# Patient Record
Sex: MALE | Race: WHITE | NOT HISPANIC OR LATINO | Employment: OTHER | RURAL
[De-identification: names, ages, dates, MRNs, and addresses within clinical notes are randomized per-mention and may not be internally consistent; named-entity substitution may affect disease eponyms.]

---

## 2017-11-07 ENCOUNTER — OFFICE VISIT (OUTPATIENT)
Dept: CARDIOLOGY | Facility: CLINIC | Age: 67
End: 2017-11-07

## 2017-11-07 VITALS
DIASTOLIC BLOOD PRESSURE: 74 MMHG | SYSTOLIC BLOOD PRESSURE: 130 MMHG | WEIGHT: 263 LBS | HEIGHT: 72 IN | BODY MASS INDEX: 35.62 KG/M2 | HEART RATE: 80 BPM

## 2017-11-07 DIAGNOSIS — I10 ESSENTIAL HYPERTENSION: ICD-10-CM

## 2017-11-07 DIAGNOSIS — I25.10 CORONARY ARTERY DISEASE INVOLVING NATIVE CORONARY ARTERY OF NATIVE HEART WITHOUT ANGINA PECTORIS: Primary | ICD-10-CM

## 2017-11-07 DIAGNOSIS — E78.5 DYSLIPIDEMIA: ICD-10-CM

## 2017-11-07 PROCEDURE — 99213 OFFICE O/P EST LOW 20 MIN: CPT | Performed by: INTERNAL MEDICINE

## 2018-11-20 ENCOUNTER — OFFICE VISIT (OUTPATIENT)
Dept: CARDIOLOGY | Facility: CLINIC | Age: 68
End: 2018-11-20

## 2018-11-20 VITALS
WEIGHT: 238 LBS | HEIGHT: 72 IN | SYSTOLIC BLOOD PRESSURE: 108 MMHG | DIASTOLIC BLOOD PRESSURE: 70 MMHG | HEART RATE: 89 BPM | BODY MASS INDEX: 32.23 KG/M2

## 2018-11-20 DIAGNOSIS — E78.5 DYSLIPIDEMIA: ICD-10-CM

## 2018-11-20 DIAGNOSIS — I10 ESSENTIAL HYPERTENSION: ICD-10-CM

## 2018-11-20 DIAGNOSIS — I25.110 CORONARY ARTERY DISEASE INVOLVING NATIVE CORONARY ARTERY OF NATIVE HEART WITH UNSTABLE ANGINA PECTORIS (HCC): Primary | ICD-10-CM

## 2018-11-20 PROCEDURE — 99214 OFFICE O/P EST MOD 30 MIN: CPT | Performed by: INTERNAL MEDICINE

## 2018-11-20 RX ORDER — NITROGLYCERIN 0.4 MG/1
0.4 TABLET SUBLINGUAL
Qty: 100 TABLET | Refills: 1 | Status: SHIPPED | OUTPATIENT
Start: 2018-11-20

## 2018-11-20 RX ORDER — EZETIMIBE 10 MG/1
10 TABLET ORAL DAILY
COMMUNITY

## 2018-11-20 RX ORDER — ISOSORBIDE MONONITRATE 30 MG/1
30 TABLET, EXTENDED RELEASE ORAL DAILY
Qty: 30 TABLET | Refills: 6 | Status: SHIPPED | OUTPATIENT
Start: 2018-11-20 | End: 2019-06-19 | Stop reason: SDUPTHER

## 2018-11-20 RX ORDER — AMLODIPINE BESYLATE 5 MG/1
5 TABLET ORAL DAILY
Status: ON HOLD | COMMUNITY
End: 2021-06-09

## 2019-02-13 ENCOUNTER — APPOINTMENT (OUTPATIENT)
Dept: PREADMISSION TESTING | Facility: HOSPITAL | Age: 69
End: 2019-02-13

## 2019-02-13 ENCOUNTER — OFFICE VISIT (OUTPATIENT)
Dept: CARDIOLOGY | Facility: CLINIC | Age: 69
End: 2019-02-13

## 2019-02-13 ENCOUNTER — PREP FOR SURGERY (OUTPATIENT)
Dept: OTHER | Facility: HOSPITAL | Age: 69
End: 2019-02-13

## 2019-02-13 VITALS
DIASTOLIC BLOOD PRESSURE: 80 MMHG | SYSTOLIC BLOOD PRESSURE: 120 MMHG | HEART RATE: 80 BPM | HEIGHT: 72 IN | BODY MASS INDEX: 32.91 KG/M2 | WEIGHT: 243 LBS

## 2019-02-13 DIAGNOSIS — E78.5 DYSLIPIDEMIA: ICD-10-CM

## 2019-02-13 DIAGNOSIS — I25.10 CORONARY ARTERY DISEASE INVOLVING NATIVE CORONARY ARTERY, ANGINA PRESENCE UNSPECIFIED, UNSPECIFIED WHETHER NATIVE OR TRANSPLANTED HEART: ICD-10-CM

## 2019-02-13 DIAGNOSIS — I20.0 UNSTABLE ANGINA (HCC): Primary | ICD-10-CM

## 2019-02-13 DIAGNOSIS — I10 ESSENTIAL HYPERTENSION: ICD-10-CM

## 2019-02-13 DIAGNOSIS — Z71.6 TOBACCO ABUSE COUNSELING: ICD-10-CM

## 2019-02-13 DIAGNOSIS — I20.0 UNSTABLE ANGINA (HCC): ICD-10-CM

## 2019-02-13 PROBLEM — I65.23 BILATERAL CAROTID ARTERY STENOSIS: Status: ACTIVE | Noted: 2019-02-13

## 2019-02-13 PROBLEM — E11.9 DIABETES MELLITUS (HCC): Status: ACTIVE | Noted: 2019-02-13

## 2019-02-13 PROBLEM — Z72.0 TOBACCO ABUSE: Status: ACTIVE | Noted: 2019-02-13

## 2019-02-13 LAB
ALBUMIN SERPL-MCNC: 4.66 G/DL (ref 3.2–4.8)
ALBUMIN/GLOB SERPL: 2.3 G/DL (ref 1.5–2.5)
ALP SERPL-CCNC: 64 U/L (ref 25–100)
ALT SERPL W P-5'-P-CCNC: 21 U/L (ref 7–40)
ANION GAP SERPL CALCULATED.3IONS-SCNC: 5 MMOL/L (ref 3–11)
ARTICHOKE IGE QN: 124 MG/DL (ref 0–130)
AST SERPL-CCNC: 16 U/L (ref 0–33)
BASOPHILS # BLD AUTO: 0.04 10*3/MM3 (ref 0–0.2)
BASOPHILS NFR BLD AUTO: 0.5 % (ref 0–1)
BILIRUB SERPL-MCNC: 0.2 MG/DL (ref 0.3–1.2)
BUN BLD-MCNC: 25 MG/DL (ref 9–23)
BUN/CREAT SERPL: 22.3 (ref 7–25)
CALCIUM SPEC-SCNC: 9.6 MG/DL (ref 8.7–10.4)
CHLORIDE SERPL-SCNC: 100 MMOL/L (ref 99–109)
CHOLEST SERPL-MCNC: 205 MG/DL (ref 0–200)
CO2 SERPL-SCNC: 32 MMOL/L (ref 20–31)
CREAT BLD-MCNC: 1.12 MG/DL (ref 0.6–1.3)
DEPRECATED RDW RBC AUTO: 39.2 FL (ref 37–54)
EOSINOPHIL # BLD AUTO: 0.05 10*3/MM3 (ref 0–0.3)
EOSINOPHIL NFR BLD AUTO: 0.7 % (ref 0–3)
ERYTHROCYTE [DISTWIDTH] IN BLOOD BY AUTOMATED COUNT: 13.1 % (ref 11.3–14.5)
GFR SERPL CREATININE-BSD FRML MDRD: 66 ML/MIN/1.73
GLOBULIN UR ELPH-MCNC: 2 GM/DL
GLUCOSE BLD-MCNC: 224 MG/DL (ref 70–100)
HBA1C MFR BLD: 9.2 % (ref 4.8–5.6)
HCT VFR BLD AUTO: 45.1 % (ref 38.9–50.9)
HDLC SERPL-MCNC: 35 MG/DL (ref 40–60)
HGB BLD-MCNC: 15.3 G/DL (ref 13.1–17.5)
IMM GRANULOCYTES # BLD AUTO: 0.01 10*3/MM3 (ref 0–0.05)
IMM GRANULOCYTES NFR BLD AUTO: 0.1 % (ref 0–0.6)
LYMPHOCYTES # BLD AUTO: 1.84 10*3/MM3 (ref 0.6–4.8)
LYMPHOCYTES NFR BLD AUTO: 24.2 % (ref 24–44)
MCH RBC QN AUTO: 28.2 PG (ref 27–31)
MCHC RBC AUTO-ENTMCNC: 33.9 G/DL (ref 32–36)
MCV RBC AUTO: 83.2 FL (ref 80–99)
MONOCYTES # BLD AUTO: 0.41 10*3/MM3 (ref 0–1)
MONOCYTES NFR BLD AUTO: 5.4 % (ref 0–12)
NEUTROPHILS # BLD AUTO: 5.27 10*3/MM3 (ref 1.5–8.3)
NEUTROPHILS NFR BLD AUTO: 69.2 % (ref 41–71)
PLATELET # BLD AUTO: 186 10*3/MM3 (ref 150–450)
PMV BLD AUTO: 10.7 FL (ref 6–12)
POTASSIUM BLD-SCNC: 4.3 MMOL/L (ref 3.5–5.5)
PROT SERPL-MCNC: 6.7 G/DL (ref 5.7–8.2)
RBC # BLD AUTO: 5.42 10*6/MM3 (ref 4.2–5.76)
SODIUM BLD-SCNC: 137 MMOL/L (ref 132–146)
TRIGL SERPL-MCNC: 645 MG/DL (ref 0–150)
WBC NRBC COR # BLD: 7.61 10*3/MM3 (ref 3.5–10.8)

## 2019-02-13 PROCEDURE — 99214 OFFICE O/P EST MOD 30 MIN: CPT | Performed by: PHYSICIAN ASSISTANT

## 2019-02-13 PROCEDURE — 83036 HEMOGLOBIN GLYCOSYLATED A1C: CPT | Performed by: PHYSICIAN ASSISTANT

## 2019-02-13 PROCEDURE — 80053 COMPREHEN METABOLIC PANEL: CPT | Performed by: PHYSICIAN ASSISTANT

## 2019-02-13 PROCEDURE — 80061 LIPID PANEL: CPT | Performed by: PHYSICIAN ASSISTANT

## 2019-02-13 PROCEDURE — 85025 COMPLETE CBC W/AUTO DIFF WBC: CPT | Performed by: PHYSICIAN ASSISTANT

## 2019-02-13 PROCEDURE — 93000 ELECTROCARDIOGRAM COMPLETE: CPT | Performed by: PHYSICIAN ASSISTANT

## 2019-02-13 PROCEDURE — 36415 COLL VENOUS BLD VENIPUNCTURE: CPT

## 2019-02-13 RX ORDER — NITROGLYCERIN 0.4 MG/1
0.4 TABLET SUBLINGUAL
Status: CANCELLED | OUTPATIENT
Start: 2019-02-13

## 2019-02-13 RX ORDER — RANOLAZINE 500 MG/1
500 TABLET, EXTENDED RELEASE ORAL 2 TIMES DAILY
Qty: 60 TABLET | Refills: 0 | Status: ON HOLD | COMMUNITY
Start: 2019-02-13 | End: 2021-06-09

## 2019-02-13 RX ORDER — ACETAMINOPHEN 325 MG/1
650 TABLET ORAL EVERY 4 HOURS PRN
Status: CANCELLED | OUTPATIENT
Start: 2019-02-13

## 2019-02-13 RX ORDER — SODIUM CHLORIDE 0.9 % (FLUSH) 0.9 %
3-10 SYRINGE (ML) INJECTION AS NEEDED
Status: CANCELLED | OUTPATIENT
Start: 2019-02-13

## 2019-02-13 RX ORDER — SODIUM CHLORIDE 0.9 % (FLUSH) 0.9 %
3 SYRINGE (ML) INJECTION EVERY 12 HOURS SCHEDULED
Status: CANCELLED | OUTPATIENT
Start: 2019-02-13

## 2019-02-13 RX ORDER — ASPIRIN 325 MG
325 TABLET ORAL ONCE
Status: CANCELLED | OUTPATIENT
Start: 2019-02-13 | End: 2019-02-13

## 2019-02-13 RX ORDER — ONDANSETRON 2 MG/ML
4 INJECTION INTRAMUSCULAR; INTRAVENOUS EVERY 8 HOURS PRN
Status: CANCELLED | OUTPATIENT
Start: 2019-02-13

## 2019-02-20 ENCOUNTER — HOSPITAL ENCOUNTER (OUTPATIENT)
Facility: HOSPITAL | Age: 69
Setting detail: HOSPITAL OUTPATIENT SURGERY
Discharge: HOME | End: 2019-02-20
Attending: INTERNAL MEDICINE | Admitting: INTERNAL MEDICINE

## 2019-02-20 VITALS
DIASTOLIC BLOOD PRESSURE: 97 MMHG | BODY MASS INDEX: 33.06 KG/M2 | OXYGEN SATURATION: 96 % | HEART RATE: 81 BPM | TEMPERATURE: 97 F | SYSTOLIC BLOOD PRESSURE: 152 MMHG | WEIGHT: 244.05 LBS | RESPIRATION RATE: 16 BRPM | HEIGHT: 72 IN

## 2019-02-20 DIAGNOSIS — I20.0 UNSTABLE ANGINA (HCC): ICD-10-CM

## 2019-02-20 LAB
ACT BLD: 224 SECONDS (ref 82–152)
GLUCOSE BLDC GLUCOMTR-MCNC: 175 MG/DL (ref 70–130)
GLUCOSE BLDC GLUCOMTR-MCNC: 204 MG/DL (ref 70–130)

## 2019-02-20 PROCEDURE — 25010000002 FENTANYL CITRATE (PF) 100 MCG/2ML SOLUTION: Performed by: INTERNAL MEDICINE

## 2019-02-20 PROCEDURE — C1874 STENT, COATED/COV W/DEL SYS: HCPCS | Performed by: INTERNAL MEDICINE

## 2019-02-20 PROCEDURE — C1769 GUIDE WIRE: HCPCS | Performed by: INTERNAL MEDICINE

## 2019-02-20 PROCEDURE — 92937 PRQ TRLUML REVSC CAB GRF 1: CPT | Performed by: INTERNAL MEDICINE

## 2019-02-20 PROCEDURE — C1887 CATHETER, GUIDING: HCPCS | Performed by: INTERNAL MEDICINE

## 2019-02-20 PROCEDURE — G0108 DIAB MANAGE TRN  PER INDIV: HCPCS

## 2019-02-20 PROCEDURE — C1725 CATH, TRANSLUMIN NON-LASER: HCPCS | Performed by: INTERNAL MEDICINE

## 2019-02-20 PROCEDURE — 0 IOPAMIDOL PER 1 ML: Performed by: INTERNAL MEDICINE

## 2019-02-20 PROCEDURE — 25010000002 PHENYLEPHRINE PER 1 ML: Performed by: INTERNAL MEDICINE

## 2019-02-20 PROCEDURE — 93005 ELECTROCARDIOGRAM TRACING: CPT | Performed by: INTERNAL MEDICINE

## 2019-02-20 PROCEDURE — 93459 L HRT ART/GRFT ANGIO: CPT | Performed by: INTERNAL MEDICINE

## 2019-02-20 PROCEDURE — 25010000002 MIDAZOLAM PER 1 MG: Performed by: INTERNAL MEDICINE

## 2019-02-20 PROCEDURE — C9604 PERC D-E COR REVASC T CABG S: HCPCS | Performed by: INTERNAL MEDICINE

## 2019-02-20 PROCEDURE — 82962 GLUCOSE BLOOD TEST: CPT

## 2019-02-20 PROCEDURE — C1894 INTRO/SHEATH, NON-LASER: HCPCS | Performed by: INTERNAL MEDICINE

## 2019-02-20 PROCEDURE — 85347 COAGULATION TIME ACTIVATED: CPT

## 2019-02-20 PROCEDURE — C1884 EMBOLIZATION PROTECT SYST: HCPCS | Performed by: INTERNAL MEDICINE

## 2019-02-20 PROCEDURE — 25010000002 HEPARIN (PORCINE) PER 1000 UNITS: Performed by: INTERNAL MEDICINE

## 2019-02-20 DEVICE — XIENCE SIERRA™ EVEROLIMUS ELUTING CORONARY STENT SYSTEM 4.00 MM X 38 MM / RAPID-EXCHANGE
Type: IMPLANTABLE DEVICE | Status: FUNCTIONAL
Brand: XIENCE SIERRA™

## 2019-02-20 DEVICE — XIENCE SIERRA™ EVEROLIMUS ELUTING CORONARY STENT SYSTEM 3.00 MM X 38 MM / RAPID-EXCHANGE
Type: IMPLANTABLE DEVICE | Status: FUNCTIONAL
Brand: XIENCE SIERRA™

## 2019-02-20 RX ORDER — SODIUM CHLORIDE 9 MG/ML
INJECTION, SOLUTION INTRAVENOUS CONTINUOUS PRN
Status: COMPLETED | OUTPATIENT
Start: 2019-02-20 | End: 2019-02-20

## 2019-02-20 RX ORDER — NICARDIPINE HCL-0.9% SOD CHLOR 1 MG/10 ML
SYRINGE (ML) INTRAVENOUS AS NEEDED
Status: DISCONTINUED | OUTPATIENT
Start: 2019-02-20 | End: 2019-02-20 | Stop reason: HOSPADM

## 2019-02-20 RX ORDER — NITROGLYCERIN 0.4 MG/1
0.4 TABLET SUBLINGUAL
Status: DISCONTINUED | OUTPATIENT
Start: 2019-02-20 | End: 2019-02-20 | Stop reason: HOSPADM

## 2019-02-20 RX ORDER — ACETAMINOPHEN 325 MG/1
650 TABLET ORAL EVERY 4 HOURS PRN
Status: DISCONTINUED | OUTPATIENT
Start: 2019-02-20 | End: 2019-02-20 | Stop reason: HOSPADM

## 2019-02-20 RX ORDER — ASPIRIN 325 MG
325 TABLET ORAL ONCE
Status: COMPLETED | OUTPATIENT
Start: 2019-02-20 | End: 2019-02-20

## 2019-02-20 RX ORDER — FENTANYL CITRATE 50 UG/ML
INJECTION, SOLUTION INTRAMUSCULAR; INTRAVENOUS AS NEEDED
Status: DISCONTINUED | OUTPATIENT
Start: 2019-02-20 | End: 2019-02-20 | Stop reason: HOSPADM

## 2019-02-20 RX ORDER — ASPIRIN 325 MG
325 TABLET, DELAYED RELEASE (ENTERIC COATED) ORAL DAILY
Status: DISCONTINUED | OUTPATIENT
Start: 2019-02-21 | End: 2019-02-20 | Stop reason: HOSPADM

## 2019-02-20 RX ORDER — SODIUM CHLORIDE 0.9 % (FLUSH) 0.9 %
3 SYRINGE (ML) INJECTION EVERY 12 HOURS SCHEDULED
Status: DISCONTINUED | OUTPATIENT
Start: 2019-02-20 | End: 2019-02-20 | Stop reason: HOSPADM

## 2019-02-20 RX ORDER — HEPARIN SODIUM 1000 [USP'U]/ML
INJECTION, SOLUTION INTRAVENOUS; SUBCUTANEOUS AS NEEDED
Status: DISCONTINUED | OUTPATIENT
Start: 2019-02-20 | End: 2019-02-20 | Stop reason: HOSPADM

## 2019-02-20 RX ORDER — ONDANSETRON 2 MG/ML
4 INJECTION INTRAMUSCULAR; INTRAVENOUS EVERY 8 HOURS PRN
Status: DISCONTINUED | OUTPATIENT
Start: 2019-02-20 | End: 2019-02-20 | Stop reason: HOSPADM

## 2019-02-20 RX ORDER — ONDANSETRON 2 MG/ML
4 INJECTION INTRAMUSCULAR; INTRAVENOUS EVERY 6 HOURS PRN
Status: DISCONTINUED | OUTPATIENT
Start: 2019-02-20 | End: 2019-02-20 | Stop reason: HOSPADM

## 2019-02-20 RX ORDER — MIDAZOLAM HYDROCHLORIDE 1 MG/ML
INJECTION INTRAMUSCULAR; INTRAVENOUS AS NEEDED
Status: DISCONTINUED | OUTPATIENT
Start: 2019-02-20 | End: 2019-02-20 | Stop reason: HOSPADM

## 2019-02-20 RX ORDER — SODIUM CHLORIDE 9 MG/ML
3 INJECTION, SOLUTION INTRAVENOUS CONTINUOUS
Status: ACTIVE | OUTPATIENT
Start: 2019-02-20 | End: 2019-02-20

## 2019-02-20 RX ORDER — ONDANSETRON 4 MG/1
4 TABLET, FILM COATED ORAL EVERY 6 HOURS PRN
Status: DISCONTINUED | OUTPATIENT
Start: 2019-02-20 | End: 2019-02-20 | Stop reason: HOSPADM

## 2019-02-20 RX ORDER — SODIUM CHLORIDE 9 MG/ML
100 INJECTION, SOLUTION INTRAVENOUS CONTINUOUS
Status: ACTIVE | OUTPATIENT
Start: 2019-02-20 | End: 2019-02-20

## 2019-02-20 RX ORDER — LIDOCAINE HYDROCHLORIDE 10 MG/ML
INJECTION, SOLUTION INFILTRATION; PERINEURAL AS NEEDED
Status: DISCONTINUED | OUTPATIENT
Start: 2019-02-20 | End: 2019-02-20 | Stop reason: HOSPADM

## 2019-02-20 RX ORDER — HYDROCODONE BITARTRATE AND ACETAMINOPHEN 5; 325 MG/1; MG/1
1 TABLET ORAL EVERY 4 HOURS PRN
Status: DISCONTINUED | OUTPATIENT
Start: 2019-02-20 | End: 2019-02-20 | Stop reason: HOSPADM

## 2019-02-20 RX ORDER — SODIUM CHLORIDE 0.9 % (FLUSH) 0.9 %
3-10 SYRINGE (ML) INJECTION AS NEEDED
Status: DISCONTINUED | OUTPATIENT
Start: 2019-02-20 | End: 2019-02-20 | Stop reason: HOSPADM

## 2019-02-20 RX ADMIN — SODIUM CHLORIDE 3 ML/KG/HR: 9 INJECTION, SOLUTION INTRAVENOUS at 06:52

## 2019-02-20 RX ADMIN — ASPIRIN 325 MG ORAL TABLET 325 MG: 325 PILL ORAL at 06:52

## 2019-02-21 ENCOUNTER — CALL CENTER PROGRAMS (OUTPATIENT)
Dept: CALL CENTER | Facility: HOSPITAL | Age: 69
End: 2019-02-21

## 2019-06-19 RX ORDER — ISOSORBIDE MONONITRATE 30 MG/1
30 TABLET, EXTENDED RELEASE ORAL DAILY
Qty: 30 TABLET | Refills: 2 | Status: SHIPPED | OUTPATIENT
Start: 2019-06-19 | End: 2019-09-23 | Stop reason: SDUPTHER

## 2019-07-29 ENCOUNTER — TELEPHONE (OUTPATIENT)
Dept: CARDIOLOGY | Facility: CLINIC | Age: 69
End: 2019-07-29

## 2019-09-23 RX ORDER — ISOSORBIDE MONONITRATE 30 MG/1
30 TABLET, EXTENDED RELEASE ORAL DAILY
Qty: 30 TABLET | Refills: 2 | Status: SHIPPED | OUTPATIENT
Start: 2019-09-23 | End: 2021-03-30 | Stop reason: SDUPTHER

## 2019-10-08 ENCOUNTER — TELEPHONE (OUTPATIENT)
Dept: CARDIOLOGY | Facility: CLINIC | Age: 69
End: 2019-10-08

## 2020-04-10 ENCOUNTER — OFFICE VISIT (OUTPATIENT)
Dept: CARDIOLOGY | Facility: CLINIC | Age: 70
End: 2020-04-10

## 2020-04-10 VITALS
DIASTOLIC BLOOD PRESSURE: 64 MMHG | SYSTOLIC BLOOD PRESSURE: 128 MMHG | HEART RATE: 70 BPM | WEIGHT: 240 LBS | HEIGHT: 72 IN | BODY MASS INDEX: 32.51 KG/M2

## 2020-04-10 DIAGNOSIS — I25.810 CORONARY ARTERY DISEASE INVOLVING CORONARY BYPASS GRAFT OF NATIVE HEART WITHOUT ANGINA PECTORIS: Primary | ICD-10-CM

## 2020-04-10 DIAGNOSIS — E78.5 DYSLIPIDEMIA: ICD-10-CM

## 2020-04-10 DIAGNOSIS — I10 ESSENTIAL HYPERTENSION: ICD-10-CM

## 2020-04-10 PROCEDURE — 99213 OFFICE O/P EST LOW 20 MIN: CPT | Performed by: INTERNAL MEDICINE

## 2020-07-21 ENCOUNTER — OFFICE VISIT (OUTPATIENT)
Dept: CARDIOLOGY | Facility: CLINIC | Age: 70
End: 2020-07-21

## 2020-07-21 VITALS
SYSTOLIC BLOOD PRESSURE: 100 MMHG | HEIGHT: 72 IN | BODY MASS INDEX: 30.61 KG/M2 | WEIGHT: 226 LBS | DIASTOLIC BLOOD PRESSURE: 60 MMHG | HEART RATE: 93 BPM

## 2020-07-21 DIAGNOSIS — I10 ESSENTIAL HYPERTENSION: ICD-10-CM

## 2020-07-21 DIAGNOSIS — I65.23 BILATERAL CAROTID ARTERY STENOSIS: ICD-10-CM

## 2020-07-21 DIAGNOSIS — E78.5 DYSLIPIDEMIA: ICD-10-CM

## 2020-07-21 DIAGNOSIS — I25.810 CORONARY ARTERY DISEASE INVOLVING CORONARY BYPASS GRAFT OF NATIVE HEART WITHOUT ANGINA PECTORIS: Primary | ICD-10-CM

## 2020-07-21 PROCEDURE — 99214 OFFICE O/P EST MOD 30 MIN: CPT | Performed by: INTERNAL MEDICINE

## 2021-03-02 ENCOUNTER — OUTSIDE FACILITY SERVICE (OUTPATIENT)
Dept: CARDIOLOGY | Facility: CLINIC | Age: 71
End: 2021-03-02

## 2021-03-02 PROCEDURE — 78452 HT MUSCLE IMAGE SPECT MULT: CPT | Performed by: INTERNAL MEDICINE

## 2021-03-02 PROCEDURE — 93018 CV STRESS TEST I&R ONLY: CPT | Performed by: INTERNAL MEDICINE

## 2021-03-30 ENCOUNTER — OFFICE VISIT (OUTPATIENT)
Dept: CARDIOLOGY | Facility: CLINIC | Age: 71
End: 2021-03-30

## 2021-03-30 VITALS
TEMPERATURE: 97 F | HEART RATE: 74 BPM | WEIGHT: 264 LBS | SYSTOLIC BLOOD PRESSURE: 118 MMHG | DIASTOLIC BLOOD PRESSURE: 68 MMHG | BODY MASS INDEX: 35.76 KG/M2 | OXYGEN SATURATION: 97 % | HEIGHT: 72 IN

## 2021-03-30 DIAGNOSIS — I65.23 BILATERAL CAROTID ARTERY STENOSIS: ICD-10-CM

## 2021-03-30 DIAGNOSIS — I10 ESSENTIAL HYPERTENSION: ICD-10-CM

## 2021-03-30 DIAGNOSIS — I25.708 CORONARY ARTERY DISEASE INVOLVING CORONARY BYPASS GRAFT OF NATIVE HEART WITH OTHER FORMS OF ANGINA PECTORIS (HCC): Primary | ICD-10-CM

## 2021-03-30 DIAGNOSIS — E78.5 DYSLIPIDEMIA: ICD-10-CM

## 2021-03-30 PROCEDURE — 99214 OFFICE O/P EST MOD 30 MIN: CPT | Performed by: INTERNAL MEDICINE

## 2021-03-30 RX ORDER — HYDROCODONE BITARTRATE AND ACETAMINOPHEN 10; 325 MG/1; MG/1
1 TABLET ORAL EVERY 6 HOURS PRN
Status: ON HOLD | COMMUNITY
End: 2021-06-09

## 2021-03-30 RX ORDER — ISOSORBIDE MONONITRATE 60 MG/1
60 TABLET, EXTENDED RELEASE ORAL DAILY
Qty: 90 TABLET | Refills: 3 | Status: SHIPPED | OUTPATIENT
Start: 2021-03-30

## 2021-05-27 ENCOUNTER — DOCUMENTATION (OUTPATIENT)
Dept: CARDIOLOGY | Facility: CLINIC | Age: 71
End: 2021-05-27

## 2021-05-27 ENCOUNTER — HOSPITAL ENCOUNTER (OUTPATIENT)
Facility: HOSPITAL | Age: 71
Setting detail: OBSERVATION
Discharge: HOME | End: 2021-05-28
Attending: INTERNAL MEDICINE | Admitting: INTERNAL MEDICINE

## 2021-05-27 PROBLEM — I21.4 NSTEMI (NON-ST ELEVATED MYOCARDIAL INFARCTION): Status: ACTIVE | Noted: 2021-05-27

## 2021-05-27 PROBLEM — R07.9 CHEST PAIN: Status: ACTIVE | Noted: 2021-05-27

## 2021-05-27 PROBLEM — N18.9 CKD (CHRONIC KIDNEY DISEASE): Status: ACTIVE | Noted: 2021-05-27

## 2021-05-27 LAB
ALBUMIN SERPL-MCNC: 4 G/DL (ref 3.5–5.2)
ALBUMIN/GLOB SERPL: 1.6 G/DL
ALP SERPL-CCNC: 46 U/L (ref 39–117)
ALT SERPL W P-5'-P-CCNC: 25 U/L (ref 1–41)
ANION GAP SERPL CALCULATED.3IONS-SCNC: 10 MMOL/L (ref 5–15)
ANION GAP SERPL CALCULATED.3IONS-SCNC: 11 MMOL/L (ref 5–15)
APTT PPP: 40.8 SECONDS (ref 50–95)
AST SERPL-CCNC: 78 U/L (ref 1–40)
BASOPHILS # BLD AUTO: 0.03 10*3/MM3 (ref 0–0.2)
BASOPHILS NFR BLD AUTO: 0.4 % (ref 0–1.5)
BILIRUB SERPL-MCNC: 0.2 MG/DL (ref 0–1.2)
BUN SERPL-MCNC: 25 MG/DL (ref 8–23)
BUN SERPL-MCNC: 25 MG/DL (ref 8–23)
BUN/CREAT SERPL: 11.6 (ref 7–25)
BUN/CREAT SERPL: 12.4 (ref 7–25)
CALCIUM SPEC-SCNC: 10.1 MG/DL (ref 8.6–10.5)
CALCIUM SPEC-SCNC: 9.5 MG/DL (ref 8.6–10.5)
CHLORIDE SERPL-SCNC: 100 MMOL/L (ref 98–107)
CHLORIDE SERPL-SCNC: 101 MMOL/L (ref 98–107)
CO2 SERPL-SCNC: 27 MMOL/L (ref 22–29)
CO2 SERPL-SCNC: 28 MMOL/L (ref 22–29)
CREAT SERPL-MCNC: 2.02 MG/DL (ref 0.76–1.27)
CREAT SERPL-MCNC: 2.15 MG/DL (ref 0.76–1.27)
DEPRECATED RDW RBC AUTO: 42.5 FL (ref 37–54)
EOSINOPHIL # BLD AUTO: 0.06 10*3/MM3 (ref 0–0.4)
EOSINOPHIL NFR BLD AUTO: 0.8 % (ref 0.3–6.2)
ERYTHROCYTE [DISTWIDTH] IN BLOOD BY AUTOMATED COUNT: 14.2 % (ref 12.3–15.4)
FLUAV RNA RESP QL NAA+PROBE: NOT DETECTED
FLUBV RNA RESP QL NAA+PROBE: NOT DETECTED
GFR SERPL CREATININE-BSD FRML MDRD: 31 ML/MIN/1.73
GFR SERPL CREATININE-BSD FRML MDRD: 33 ML/MIN/1.73
GLOBULIN UR ELPH-MCNC: 2.5 GM/DL
GLUCOSE SERPL-MCNC: 100 MG/DL (ref 65–99)
GLUCOSE SERPL-MCNC: 150 MG/DL (ref 65–99)
HCT VFR BLD AUTO: 32.9 % (ref 37.5–51)
HGB BLD-MCNC: 10.9 G/DL (ref 13–17.7)
IMM GRANULOCYTES # BLD AUTO: 0.05 10*3/MM3 (ref 0–0.05)
IMM GRANULOCYTES NFR BLD AUTO: 0.7 % (ref 0–0.5)
INR PPP: 1.02 (ref 0.85–1.16)
LYMPHOCYTES # BLD AUTO: 1.12 10*3/MM3 (ref 0.7–3.1)
LYMPHOCYTES NFR BLD AUTO: 15.4 % (ref 19.6–45.3)
MCH RBC QN AUTO: 27.5 PG (ref 26.6–33)
MCHC RBC AUTO-ENTMCNC: 33.1 G/DL (ref 31.5–35.7)
MCV RBC AUTO: 82.9 FL (ref 79–97)
MONOCYTES # BLD AUTO: 0.44 10*3/MM3 (ref 0.1–0.9)
MONOCYTES NFR BLD AUTO: 6 % (ref 5–12)
NEUTROPHILS NFR BLD AUTO: 5.58 10*3/MM3 (ref 1.7–7)
NEUTROPHILS NFR BLD AUTO: 76.7 % (ref 42.7–76)
NRBC BLD AUTO-RTO: 0 /100 WBC (ref 0–0.2)
NT-PROBNP SERPL-MCNC: 3005 PG/ML (ref 0–900)
PLATELET # BLD AUTO: 133 10*3/MM3 (ref 140–450)
PMV BLD AUTO: 9.7 FL (ref 6–12)
POTASSIUM SERPL-SCNC: 4.2 MMOL/L (ref 3.5–5.2)
POTASSIUM SERPL-SCNC: 4.3 MMOL/L (ref 3.5–5.2)
PROT SERPL-MCNC: 6.5 G/DL (ref 6–8.5)
PROTHROMBIN TIME: 13.1 SECONDS (ref 11.4–14.4)
RBC # BLD AUTO: 3.97 10*6/MM3 (ref 4.14–5.8)
SARS-COV-2 RNA RESP QL NAA+PROBE: NOT DETECTED
SODIUM SERPL-SCNC: 138 MMOL/L (ref 136–145)
SODIUM SERPL-SCNC: 139 MMOL/L (ref 136–145)
TROPONIN T SERPL-MCNC: 2.42 NG/ML (ref 0–0.03)
TROPONIN T SERPL-MCNC: 3.07 NG/ML (ref 0–0.03)
UFH PPP CHRO-ACNC: 0.1 IU/ML (ref 0.3–0.7)
WBC # BLD AUTO: 7.28 10*3/MM3 (ref 3.4–10.8)

## 2021-05-27 PROCEDURE — 25010000002 HEPARIN (PORCINE) 25000-0.45 UT/250ML-% SOLUTION: Performed by: NURSE PRACTITIONER

## 2021-05-27 PROCEDURE — 84484 ASSAY OF TROPONIN QUANT: CPT | Performed by: NURSE PRACTITIONER

## 2021-05-27 PROCEDURE — 96366 THER/PROPH/DIAG IV INF ADDON: CPT

## 2021-05-27 PROCEDURE — G0379 DIRECT REFER HOSPITAL OBSERV: HCPCS

## 2021-05-27 PROCEDURE — G0378 HOSPITAL OBSERVATION PER HR: HCPCS

## 2021-05-27 PROCEDURE — 96365 THER/PROPH/DIAG IV INF INIT: CPT

## 2021-05-27 PROCEDURE — 84484 ASSAY OF TROPONIN QUANT: CPT | Performed by: INTERNAL MEDICINE

## 2021-05-27 PROCEDURE — 85025 COMPLETE CBC W/AUTO DIFF WBC: CPT | Performed by: NURSE PRACTITIONER

## 2021-05-27 PROCEDURE — 99220 PR INITIAL OBSERVATION CARE/DAY 70 MINUTES: CPT | Performed by: NURSE PRACTITIONER

## 2021-05-27 PROCEDURE — 96376 TX/PRO/DX INJ SAME DRUG ADON: CPT

## 2021-05-27 PROCEDURE — 87636 SARSCOV2 & INF A&B AMP PRB: CPT | Performed by: INTERNAL MEDICINE

## 2021-05-27 PROCEDURE — 85730 THROMBOPLASTIN TIME PARTIAL: CPT | Performed by: NURSE PRACTITIONER

## 2021-05-27 PROCEDURE — 96368 THER/DIAG CONCURRENT INF: CPT

## 2021-05-27 PROCEDURE — 25010000002 HEPARIN (PORCINE) PER 1000 UNITS

## 2021-05-27 PROCEDURE — 85610 PROTHROMBIN TIME: CPT | Performed by: NURSE PRACTITIONER

## 2021-05-27 PROCEDURE — 63710000001 INSULIN DETEMIR PER 5 UNITS: Performed by: NURSE PRACTITIONER

## 2021-05-27 PROCEDURE — 85520 HEPARIN ASSAY: CPT | Performed by: NURSE PRACTITIONER

## 2021-05-27 PROCEDURE — 83880 ASSAY OF NATRIURETIC PEPTIDE: CPT | Performed by: INTERNAL MEDICINE

## 2021-05-27 PROCEDURE — 80053 COMPREHEN METABOLIC PANEL: CPT | Performed by: INTERNAL MEDICINE

## 2021-05-27 RX ORDER — NITROGLYCERIN 20 MG/100ML
5-200 INJECTION INTRAVENOUS
Status: DISCONTINUED | OUTPATIENT
Start: 2021-05-27 | End: 2021-05-28 | Stop reason: HOSPADM

## 2021-05-27 RX ORDER — METOPROLOL TARTRATE 50 MG/1
50 TABLET, FILM COATED ORAL EVERY 12 HOURS SCHEDULED
Status: DISCONTINUED | OUTPATIENT
Start: 2021-05-27 | End: 2021-05-28 | Stop reason: HOSPADM

## 2021-05-27 RX ORDER — HYDROCODONE BITARTRATE AND ACETAMINOPHEN 10; 325 MG/1; MG/1
1 TABLET ORAL EVERY 6 HOURS PRN
Status: DISCONTINUED | OUTPATIENT
Start: 2021-05-27 | End: 2021-05-28 | Stop reason: HOSPADM

## 2021-05-27 RX ORDER — HEPARIN SODIUM 10000 [USP'U]/100ML
14 INJECTION, SOLUTION INTRAVENOUS
Status: DISCONTINUED | OUTPATIENT
Start: 2021-05-27 | End: 2021-05-28 | Stop reason: HOSPADM

## 2021-05-27 RX ORDER — AMLODIPINE BESYLATE 5 MG/1
5 TABLET ORAL DAILY
Status: DISCONTINUED | OUTPATIENT
Start: 2021-05-28 | End: 2021-05-28 | Stop reason: HOSPADM

## 2021-05-27 RX ORDER — SODIUM CHLORIDE 0.9 % (FLUSH) 0.9 %
10 SYRINGE (ML) INJECTION AS NEEDED
Status: DISCONTINUED | OUTPATIENT
Start: 2021-05-27 | End: 2021-05-28 | Stop reason: HOSPADM

## 2021-05-27 RX ORDER — ASPIRIN 81 MG/1
81 TABLET, CHEWABLE ORAL DAILY
Status: DISCONTINUED | OUTPATIENT
Start: 2021-05-28 | End: 2021-05-28 | Stop reason: HOSPADM

## 2021-05-27 RX ORDER — HEPARIN SODIUM 1000 [USP'U]/ML
30 INJECTION, SOLUTION INTRAVENOUS; SUBCUTANEOUS AS NEEDED
Status: DISCONTINUED | OUTPATIENT
Start: 2021-05-27 | End: 2021-05-27

## 2021-05-27 RX ORDER — RANOLAZINE 500 MG/1
500 TABLET, EXTENDED RELEASE ORAL 2 TIMES DAILY
Status: DISCONTINUED | OUTPATIENT
Start: 2021-05-27 | End: 2021-05-28 | Stop reason: HOSPADM

## 2021-05-27 RX ORDER — ISOSORBIDE MONONITRATE 60 MG/1
60 TABLET, EXTENDED RELEASE ORAL DAILY
Status: CANCELLED | OUTPATIENT
Start: 2021-05-28

## 2021-05-27 RX ORDER — DEXTROSE MONOHYDRATE 25 G/50ML
25 INJECTION, SOLUTION INTRAVENOUS
Status: DISCONTINUED | OUTPATIENT
Start: 2021-05-27 | End: 2021-05-28 | Stop reason: HOSPADM

## 2021-05-27 RX ORDER — PANTOPRAZOLE SODIUM 40 MG/1
40 TABLET, DELAYED RELEASE ORAL EVERY MORNING
Status: DISCONTINUED | OUTPATIENT
Start: 2021-05-28 | End: 2021-05-28 | Stop reason: HOSPADM

## 2021-05-27 RX ORDER — AMOXICILLIN 250 MG
2 CAPSULE ORAL 2 TIMES DAILY
Status: DISCONTINUED | OUTPATIENT
Start: 2021-05-27 | End: 2021-05-28 | Stop reason: HOSPADM

## 2021-05-27 RX ORDER — SODIUM CHLORIDE 0.9 % (FLUSH) 0.9 %
10 SYRINGE (ML) INJECTION EVERY 12 HOURS SCHEDULED
Status: DISCONTINUED | OUTPATIENT
Start: 2021-05-27 | End: 2021-05-28 | Stop reason: HOSPADM

## 2021-05-27 RX ORDER — ACETAMINOPHEN 160 MG/5ML
650 SOLUTION ORAL EVERY 4 HOURS PRN
Status: DISCONTINUED | OUTPATIENT
Start: 2021-05-27 | End: 2021-05-28 | Stop reason: HOSPADM

## 2021-05-27 RX ORDER — BISACODYL 10 MG
10 SUPPOSITORY, RECTAL RECTAL DAILY PRN
Status: DISCONTINUED | OUTPATIENT
Start: 2021-05-27 | End: 2021-05-28 | Stop reason: HOSPADM

## 2021-05-27 RX ORDER — BISACODYL 5 MG/1
5 TABLET, DELAYED RELEASE ORAL DAILY PRN
Status: DISCONTINUED | OUTPATIENT
Start: 2021-05-27 | End: 2021-05-28 | Stop reason: HOSPADM

## 2021-05-27 RX ORDER — ACETAMINOPHEN 650 MG/1
650 SUPPOSITORY RECTAL EVERY 4 HOURS PRN
Status: DISCONTINUED | OUTPATIENT
Start: 2021-05-27 | End: 2021-05-28 | Stop reason: HOSPADM

## 2021-05-27 RX ORDER — NICOTINE POLACRILEX 4 MG
15 LOZENGE BUCCAL
Status: DISCONTINUED | OUTPATIENT
Start: 2021-05-27 | End: 2021-05-28 | Stop reason: HOSPADM

## 2021-05-27 RX ORDER — HEPARIN SODIUM 1000 [USP'U]/ML
4000 INJECTION, SOLUTION INTRAVENOUS; SUBCUTANEOUS ONCE
Status: COMPLETED | OUTPATIENT
Start: 2021-05-27 | End: 2021-05-27

## 2021-05-27 RX ORDER — ACETAMINOPHEN 325 MG/1
650 TABLET ORAL EVERY 4 HOURS PRN
Status: DISCONTINUED | OUTPATIENT
Start: 2021-05-27 | End: 2021-05-28 | Stop reason: HOSPADM

## 2021-05-27 RX ORDER — ONDANSETRON 4 MG/1
4 TABLET, FILM COATED ORAL EVERY 6 HOURS PRN
Status: DISCONTINUED | OUTPATIENT
Start: 2021-05-27 | End: 2021-05-28 | Stop reason: HOSPADM

## 2021-05-27 RX ORDER — FENOFIBRATE 145 MG/1
145 TABLET, COATED ORAL DAILY
Status: DISCONTINUED | OUTPATIENT
Start: 2021-05-28 | End: 2021-05-28 | Stop reason: HOSPADM

## 2021-05-27 RX ORDER — HEPARIN SODIUM 1000 [USP'U]/ML
60 INJECTION, SOLUTION INTRAVENOUS; SUBCUTANEOUS AS NEEDED
Status: DISCONTINUED | OUTPATIENT
Start: 2021-05-27 | End: 2021-05-27

## 2021-05-27 RX ORDER — POLYETHYLENE GLYCOL 3350 17 G/17G
17 POWDER, FOR SOLUTION ORAL DAILY PRN
Status: DISCONTINUED | OUTPATIENT
Start: 2021-05-27 | End: 2021-05-28 | Stop reason: HOSPADM

## 2021-05-27 RX ORDER — SERTRALINE HYDROCHLORIDE 100 MG/1
100 TABLET, FILM COATED ORAL 2 TIMES DAILY
Status: DISCONTINUED | OUTPATIENT
Start: 2021-05-27 | End: 2021-05-28 | Stop reason: HOSPADM

## 2021-05-27 RX ORDER — ONDANSETRON 2 MG/ML
4 INJECTION INTRAMUSCULAR; INTRAVENOUS EVERY 6 HOURS PRN
Status: DISCONTINUED | OUTPATIENT
Start: 2021-05-27 | End: 2021-05-28 | Stop reason: HOSPADM

## 2021-05-27 RX ADMIN — ACETAMINOPHEN 650 MG: 325 TABLET ORAL at 20:23

## 2021-05-27 RX ADMIN — SODIUM CHLORIDE, PRESERVATIVE FREE 10 ML: 5 INJECTION INTRAVENOUS at 20:34

## 2021-05-27 RX ADMIN — NITROGLYCERIN 5 MCG/MIN: 20 INJECTION INTRAVENOUS at 20:23

## 2021-05-27 RX ADMIN — INSULIN DETEMIR 20 UNITS: 100 INJECTION, SOLUTION SUBCUTANEOUS at 20:38

## 2021-05-27 RX ADMIN — HEPARIN SODIUM 4000 UNITS: 1000 INJECTION, SOLUTION INTRAVENOUS; SUBCUTANEOUS at 20:38

## 2021-05-27 RX ADMIN — HEPARIN SODIUM 8.8 UNITS/KG/HR: 10000 INJECTION, SOLUTION INTRAVENOUS at 19:21

## 2021-05-28 ENCOUNTER — APPOINTMENT (OUTPATIENT)
Dept: CARDIOLOGY | Facility: HOSPITAL | Age: 71
End: 2021-05-28

## 2021-05-28 ENCOUNTER — APPOINTMENT (OUTPATIENT)
Dept: GENERAL RADIOLOGY | Facility: HOSPITAL | Age: 71
End: 2021-05-28

## 2021-05-28 VITALS
WEIGHT: 248 LBS | RESPIRATION RATE: 16 BRPM | BODY MASS INDEX: 33.59 KG/M2 | DIASTOLIC BLOOD PRESSURE: 100 MMHG | SYSTOLIC BLOOD PRESSURE: 157 MMHG | OXYGEN SATURATION: 94 % | HEIGHT: 72 IN | HEART RATE: 98 BPM | TEMPERATURE: 98.2 F

## 2021-05-28 LAB
ANION GAP SERPL CALCULATED.3IONS-SCNC: 10 MMOL/L (ref 5–15)
BASOPHILS # BLD AUTO: 0.02 10*3/MM3 (ref 0–0.2)
BASOPHILS NFR BLD AUTO: 0.3 % (ref 0–1.5)
BUN SERPL-MCNC: 25 MG/DL (ref 8–23)
BUN/CREAT SERPL: 11.5 (ref 7–25)
CALCIUM SPEC-SCNC: 9.8 MG/DL (ref 8.6–10.5)
CHLORIDE SERPL-SCNC: 105 MMOL/L (ref 98–107)
CHOLEST SERPL-MCNC: 159 MG/DL (ref 0–200)
CO2 SERPL-SCNC: 27 MMOL/L (ref 22–29)
CREAT SERPL-MCNC: 2.18 MG/DL (ref 0.76–1.27)
DEPRECATED RDW RBC AUTO: 43.7 FL (ref 37–54)
EOSINOPHIL # BLD AUTO: 0.08 10*3/MM3 (ref 0–0.4)
EOSINOPHIL NFR BLD AUTO: 1.4 % (ref 0.3–6.2)
ERYTHROCYTE [DISTWIDTH] IN BLOOD BY AUTOMATED COUNT: 14.3 % (ref 12.3–15.4)
GFR SERPL CREATININE-BSD FRML MDRD: 30 ML/MIN/1.73
GLUCOSE BLDC GLUCOMTR-MCNC: 151 MG/DL (ref 70–130)
GLUCOSE SERPL-MCNC: 141 MG/DL (ref 65–99)
HBA1C MFR BLD: 7.2 % (ref 4.8–5.6)
HCT VFR BLD AUTO: 33.4 % (ref 37.5–51)
HDLC SERPL-MCNC: 32 MG/DL (ref 40–60)
HGB BLD-MCNC: 10.7 G/DL (ref 13–17.7)
IMM GRANULOCYTES # BLD AUTO: 0.02 10*3/MM3 (ref 0–0.05)
IMM GRANULOCYTES NFR BLD AUTO: 0.3 % (ref 0–0.5)
LDLC SERPL CALC-MCNC: 85 MG/DL (ref 0–100)
LDLC/HDLC SERPL: 2.39 {RATIO}
LYMPHOCYTES # BLD AUTO: 0.94 10*3/MM3 (ref 0.7–3.1)
LYMPHOCYTES NFR BLD AUTO: 16.2 % (ref 19.6–45.3)
MCH RBC QN AUTO: 26.8 PG (ref 26.6–33)
MCHC RBC AUTO-ENTMCNC: 32 G/DL (ref 31.5–35.7)
MCV RBC AUTO: 83.7 FL (ref 79–97)
MONOCYTES # BLD AUTO: 0.38 10*3/MM3 (ref 0.1–0.9)
MONOCYTES NFR BLD AUTO: 6.5 % (ref 5–12)
NEUTROPHILS NFR BLD AUTO: 4.38 10*3/MM3 (ref 1.7–7)
NEUTROPHILS NFR BLD AUTO: 75.3 % (ref 42.7–76)
NRBC BLD AUTO-RTO: 0 /100 WBC (ref 0–0.2)
PLATELET # BLD AUTO: 122 10*3/MM3 (ref 140–450)
PMV BLD AUTO: 10 FL (ref 6–12)
POTASSIUM SERPL-SCNC: 4.8 MMOL/L (ref 3.5–5.2)
RBC # BLD AUTO: 3.99 10*6/MM3 (ref 4.14–5.8)
SODIUM SERPL-SCNC: 142 MMOL/L (ref 136–145)
TRIGL SERPL-MCNC: 252 MG/DL (ref 0–150)
TROPONIN T SERPL-MCNC: 2.32 NG/ML (ref 0–0.03)
TROPONIN T SERPL-MCNC: 2.97 NG/ML (ref 0–0.03)
UFH PPP CHRO-ACNC: 0.29 IU/ML (ref 0.3–0.7)
VLDLC SERPL-MCNC: 42 MG/DL (ref 5–40)
WBC # BLD AUTO: 5.82 10*3/MM3 (ref 3.4–10.8)

## 2021-05-28 PROCEDURE — 82962 GLUCOSE BLOOD TEST: CPT

## 2021-05-28 PROCEDURE — G0378 HOSPITAL OBSERVATION PER HR: HCPCS

## 2021-05-28 PROCEDURE — 84484 ASSAY OF TROPONIN QUANT: CPT | Performed by: NURSE PRACTITIONER

## 2021-05-28 PROCEDURE — 93005 ELECTROCARDIOGRAM TRACING: CPT | Performed by: PHYSICIAN ASSISTANT

## 2021-05-28 PROCEDURE — 93010 ELECTROCARDIOGRAM REPORT: CPT | Performed by: INTERNAL MEDICINE

## 2021-05-28 PROCEDURE — 71045 X-RAY EXAM CHEST 1 VIEW: CPT

## 2021-05-28 PROCEDURE — 99214 OFFICE O/P EST MOD 30 MIN: CPT | Performed by: INTERNAL MEDICINE

## 2021-05-28 PROCEDURE — 93306 TTE W/DOPPLER COMPLETE: CPT

## 2021-05-28 PROCEDURE — 85025 COMPLETE CBC W/AUTO DIFF WBC: CPT | Performed by: NURSE PRACTITIONER

## 2021-05-28 PROCEDURE — 96366 THER/PROPH/DIAG IV INF ADDON: CPT

## 2021-05-28 PROCEDURE — 83036 HEMOGLOBIN GLYCOSYLATED A1C: CPT | Performed by: NURSE PRACTITIONER

## 2021-05-28 PROCEDURE — 85520 HEPARIN ASSAY: CPT

## 2021-05-28 PROCEDURE — 93306 TTE W/DOPPLER COMPLETE: CPT | Performed by: INTERNAL MEDICINE

## 2021-05-28 PROCEDURE — 80061 LIPID PANEL: CPT | Performed by: NURSE PRACTITIONER

## 2021-05-28 PROCEDURE — 80048 BASIC METABOLIC PNL TOTAL CA: CPT | Performed by: NURSE PRACTITIONER

## 2021-05-28 RX ORDER — SODIUM CHLORIDE 9 MG/ML
1-3 INJECTION, SOLUTION INTRAVENOUS CONTINUOUS
Status: DISCONTINUED | OUTPATIENT
Start: 2021-05-28 | End: 2021-05-28 | Stop reason: HOSPADM

## 2021-05-28 RX ORDER — ROSUVASTATIN CALCIUM 10 MG/1
10 TABLET, COATED ORAL NIGHTLY
Status: DISCONTINUED | OUTPATIENT
Start: 2021-05-28 | End: 2021-05-28 | Stop reason: HOSPADM

## 2021-05-28 RX ORDER — NICOTINE 21 MG/24HR
1 PATCH, TRANSDERMAL 24 HOURS TRANSDERMAL
Status: DISCONTINUED | OUTPATIENT
Start: 2021-05-28 | End: 2021-05-28 | Stop reason: HOSPADM

## 2021-05-28 RX ADMIN — METOPROLOL TARTRATE 50 MG: 50 TABLET, FILM COATED ORAL at 08:17

## 2021-05-28 RX ADMIN — TICAGRELOR 90 MG: 90 TABLET ORAL at 08:18

## 2021-05-28 RX ADMIN — FENOFIBRATE 145 MG: 145 TABLET ORAL at 08:20

## 2021-05-28 RX ADMIN — AMLODIPINE BESYLATE 5 MG: 5 TABLET ORAL at 08:20

## 2021-05-28 RX ADMIN — NITROGLYCERIN 30 MCG/MIN: 20 INJECTION INTRAVENOUS at 08:22

## 2021-05-28 RX ADMIN — ASPIRIN 81 MG: 81 TABLET, CHEWABLE ORAL at 08:17

## 2021-05-28 RX ADMIN — DOCUSATE SODIUM 50MG AND SENNOSIDES 8.6MG 2 TABLET: 8.6; 5 TABLET, FILM COATED ORAL at 08:18

## 2021-05-28 RX ADMIN — RANOLAZINE 500 MG: 500 TABLET, FILM COATED, EXTENDED RELEASE ORAL at 08:21

## 2021-05-28 RX ADMIN — SODIUM CHLORIDE, PRESERVATIVE FREE 10 ML: 5 INJECTION INTRAVENOUS at 08:21

## 2021-05-28 RX ADMIN — SODIUM CHLORIDE 3 ML/KG/HR: 9 INJECTION, SOLUTION INTRAVENOUS at 10:59

## 2021-05-28 RX ADMIN — PANTOPRAZOLE SODIUM 40 MG: 40 TABLET, DELAYED RELEASE ORAL at 05:59

## 2021-05-28 RX ADMIN — SERTRALINE HYDROCHLORIDE 100 MG: 100 TABLET ORAL at 08:18

## 2021-05-29 LAB
ASCENDING AORTA: 4.2 CM
BH CV ECHO MEAS - AO MAX PG (FULL): 7 MMHG
BH CV ECHO MEAS - AO MAX PG: 10.5 MMHG
BH CV ECHO MEAS - AO MEAN PG (FULL): 4.5 MMHG
BH CV ECHO MEAS - AO MEAN PG: 6 MMHG
BH CV ECHO MEAS - AO ROOT AREA (BSA CORRECTED): 1.7
BH CV ECHO MEAS - AO ROOT AREA: 12.6 CM^2
BH CV ECHO MEAS - AO ROOT DIAM: 4 CM
BH CV ECHO MEAS - AO V2 MAX: 162 CM/SEC
BH CV ECHO MEAS - AO V2 MEAN: 116 CM/SEC
BH CV ECHO MEAS - AO V2 VTI: 37.4 CM
BH CV ECHO MEAS - ASC AORTA: 4.2 CM
BH CV ECHO MEAS - AVA(I,A): 2.1 CM^2
BH CV ECHO MEAS - AVA(I,D): 2.1 CM^2
BH CV ECHO MEAS - AVA(V,A): 2.2 CM^2
BH CV ECHO MEAS - AVA(V,D): 2.2 CM^2
BH CV ECHO MEAS - BSA(HAYCOCK): 2.4 M^2
BH CV ECHO MEAS - BSA: 2.3 M^2
BH CV ECHO MEAS - BZI_BMI: 33.6 KILOGRAMS/M^2
BH CV ECHO MEAS - BZI_METRIC_HEIGHT: 182.9 CM
BH CV ECHO MEAS - BZI_METRIC_WEIGHT: 112.5 KG
BH CV ECHO MEAS - EDV(CUBED): 193.1 ML
BH CV ECHO MEAS - EDV(MOD-SP2): 133 ML
BH CV ECHO MEAS - EDV(MOD-SP4): 159 ML
BH CV ECHO MEAS - EDV(TEICH): 165.2 ML
BH CV ECHO MEAS - EF(CUBED): 56.8 %
BH CV ECHO MEAS - EF(MOD-BP): 59 %
BH CV ECHO MEAS - EF(MOD-SP2): 60.2 %
BH CV ECHO MEAS - EF(MOD-SP4): 57.2 %
BH CV ECHO MEAS - EF(TEICH): 47.8 %
BH CV ECHO MEAS - ESV(CUBED): 83.5 ML
BH CV ECHO MEAS - ESV(MOD-SP2): 53 ML
BH CV ECHO MEAS - ESV(MOD-SP4): 68 ML
BH CV ECHO MEAS - ESV(TEICH): 86.3 ML
BH CV ECHO MEAS - FS: 24.4 %
BH CV ECHO MEAS - IVS/LVPW: 1.2
BH CV ECHO MEAS - IVSD: 1.3 CM
BH CV ECHO MEAS - LA DIMENSION: 4.6 CM
BH CV ECHO MEAS - LA/AO: 1.2
BH CV ECHO MEAS - LAD MAJOR: 5.8 CM
BH CV ECHO MEAS - LAT PEAK E' VEL: 7.2 CM/SEC
BH CV ECHO MEAS - LATERAL E/E' RATIO: 12.5
BH CV ECHO MEAS - LV DIASTOLIC VOL/BSA (35-75): 68.1 ML/M^2
BH CV ECHO MEAS - LV IVRT: 0.07 SEC
BH CV ECHO MEAS - LV MASS(C)D: 288.7 GRAMS
BH CV ECHO MEAS - LV MASS(C)DI: 123.7 GRAMS/M^2
BH CV ECHO MEAS - LV MAX PG: 3.4 MMHG
BH CV ECHO MEAS - LV MEAN PG: 1.5 MMHG
BH CV ECHO MEAS - LV SYSTOLIC VOL/BSA (12-30): 29.1 ML/M^2
BH CV ECHO MEAS - LV V1 MAX: 92.4 CM/SEC
BH CV ECHO MEAS - LV V1 MEAN: 60.8 CM/SEC
BH CV ECHO MEAS - LV V1 VTI: 20.9 CM
BH CV ECHO MEAS - LVIDD: 5.8 CM
BH CV ECHO MEAS - LVIDS: 4.4 CM
BH CV ECHO MEAS - LVLD AP2: 7.8 CM
BH CV ECHO MEAS - LVLD AP4: 8.2 CM
BH CV ECHO MEAS - LVLS AP2: 6.7 CM
BH CV ECHO MEAS - LVLS AP4: 6.9 CM
BH CV ECHO MEAS - LVOT AREA (M): 3.8 CM^2
BH CV ECHO MEAS - LVOT AREA: 3.8 CM^2
BH CV ECHO MEAS - LVOT DIAM: 2.2 CM
BH CV ECHO MEAS - LVPWD: 1.1 CM
BH CV ECHO MEAS - MED PEAK E' VEL: 6.4 CM/SEC
BH CV ECHO MEAS - MEDIAL E/E' RATIO: 14.2
BH CV ECHO MEAS - MV A MAX VEL: 72.6 CM/SEC
BH CV ECHO MEAS - MV DEC SLOPE: 289 CM/SEC^2
BH CV ECHO MEAS - MV DEC TIME: 0.22 SEC
BH CV ECHO MEAS - MV E MAX VEL: 90.3 CM/SEC
BH CV ECHO MEAS - MV E/A: 1.2
BH CV ECHO MEAS - MV P1/2T MAX VEL: 99.9 CM/SEC
BH CV ECHO MEAS - MV P1/2T: 101.2 MSEC
BH CV ECHO MEAS - MVA P1/2T LCG: 2.2 CM^2
BH CV ECHO MEAS - MVA(P1/2T): 2.2 CM^2
BH CV ECHO MEAS - PA ACC SLOPE: 886 CM/SEC^2
BH CV ECHO MEAS - PA ACC TIME: 0.11 SEC
BH CV ECHO MEAS - PA MAX PG: 5.6 MMHG
BH CV ECHO MEAS - PA PR(ACCEL): 28.2 MMHG
BH CV ECHO MEAS - PA V2 MAX: 118 CM/SEC
BH CV ECHO MEAS - PAPD(PI EDV): 5 MMHG
BH CV ECHO MEAS - PI END-D VEL: 111 CM/SEC
BH CV ECHO MEAS - PULM A REVS VEL: 27 CM/SEC
BH CV ECHO MEAS - PULM DIAS VEL: 62.1 CM/SEC
BH CV ECHO MEAS - PULM S/D: 0.89
BH CV ECHO MEAS - PULM SYS VEL: 55.1 CM/SEC
BH CV ECHO MEAS - SI(AO): 201.3 ML/M^2
BH CV ECHO MEAS - SI(CUBED): 47 ML/M^2
BH CV ECHO MEAS - SI(LVOT): 34 ML/M^2
BH CV ECHO MEAS - SI(MOD-SP2): 34.3 ML/M^2
BH CV ECHO MEAS - SI(MOD-SP4): 39 ML/M^2
BH CV ECHO MEAS - SI(TEICH): 33.8 ML/M^2
BH CV ECHO MEAS - SV(AO): 470 ML
BH CV ECHO MEAS - SV(CUBED): 109.6 ML
BH CV ECHO MEAS - SV(LVOT): 79.3 ML
BH CV ECHO MEAS - SV(MOD-SP2): 80 ML
BH CV ECHO MEAS - SV(MOD-SP4): 91 ML
BH CV ECHO MEAS - SV(TEICH): 79 ML
BH CV ECHO MEAS - TAPSE (>1.6): 2.3 CM
BH CV ECHO MEAS - TR MAX PG: 36 MMHG
BH CV ECHO MEAS - TR MAX VEL: 300 CM/SEC
BH CV ECHO MEASUREMENTS AVERAGE E/E' RATIO: 13.28
BH CV VAS BP LEFT ARM: NORMAL MMHG
BH CV XLRA - RV BASE: 4.9 CM
BH CV XLRA - RV LENGTH: 8.4 CM
BH CV XLRA - RV MID: 4.3 CM
BH CV XLRA - TDI S': 9.32 CM/SEC
GLUCOSE BLDC GLUCOMTR-MCNC: 171 MG/DL (ref 70–130)
LEFT ATRIUM VOLUME INDEX: 27.8 ML/M^2
LEFT ATRIUM VOLUME: 65 ML
LV EF 2D ECHO EST: 55 %

## 2021-05-29 PROCEDURE — 82962 GLUCOSE BLOOD TEST: CPT

## 2021-06-01 ENCOUNTER — TELEPHONE (OUTPATIENT)
Dept: CARDIOLOGY | Facility: CLINIC | Age: 71
End: 2021-06-01

## 2021-06-01 DIAGNOSIS — I21.4 NSTEMI (NON-ST ELEVATED MYOCARDIAL INFARCTION) (HCC): Primary | ICD-10-CM

## 2021-06-01 DIAGNOSIS — R07.9 CHEST PAIN, UNSPECIFIED TYPE: ICD-10-CM

## 2021-06-01 DIAGNOSIS — I25.708 CORONARY ARTERY DISEASE INVOLVING CORONARY BYPASS GRAFT OF NATIVE HEART WITH OTHER FORMS OF ANGINA PECTORIS (HCC): ICD-10-CM

## 2021-06-02 PROBLEM — I25.10 CORONARY ARTERY DISEASE: Status: ACTIVE | Noted: 2021-06-02

## 2021-06-08 ENCOUNTER — PREP FOR SURGERY (OUTPATIENT)
Dept: OTHER | Facility: HOSPITAL | Age: 71
End: 2021-06-08

## 2021-06-08 DIAGNOSIS — I21.4 NSTEMI (NON-ST ELEVATED MYOCARDIAL INFARCTION) (HCC): Primary | ICD-10-CM

## 2021-06-08 RX ORDER — SODIUM CHLORIDE 0.9 % (FLUSH) 0.9 %
3 SYRINGE (ML) INJECTION EVERY 12 HOURS SCHEDULED
Status: CANCELLED | OUTPATIENT
Start: 2021-06-08

## 2021-06-08 RX ORDER — ASPIRIN 325 MG
325 TABLET ORAL ONCE
Status: CANCELLED | OUTPATIENT
Start: 2021-06-08 | End: 2021-06-08

## 2021-06-08 RX ORDER — SODIUM CHLORIDE 0.9 % (FLUSH) 0.9 %
10 SYRINGE (ML) INJECTION AS NEEDED
Status: CANCELLED | OUTPATIENT
Start: 2021-06-08

## 2021-06-08 RX ORDER — ONDANSETRON 2 MG/ML
4 INJECTION INTRAMUSCULAR; INTRAVENOUS EVERY 8 HOURS PRN
Status: CANCELLED | OUTPATIENT
Start: 2021-06-08

## 2021-06-08 RX ORDER — ASPIRIN 325 MG
325 TABLET, DELAYED RELEASE (ENTERIC COATED) ORAL DAILY
Status: CANCELLED | OUTPATIENT
Start: 2021-06-09

## 2021-06-08 RX ORDER — NITROGLYCERIN 0.4 MG/1
0.4 TABLET SUBLINGUAL
Status: CANCELLED | OUTPATIENT
Start: 2021-06-08

## 2021-06-09 ENCOUNTER — HOSPITAL ENCOUNTER (OUTPATIENT)
Facility: HOSPITAL | Age: 71
Discharge: HOME | End: 2021-06-10
Attending: INTERNAL MEDICINE | Admitting: INTERNAL MEDICINE

## 2021-06-09 DIAGNOSIS — N18.9 CHRONIC KIDNEY DISEASE, UNSPECIFIED CKD STAGE: ICD-10-CM

## 2021-06-09 DIAGNOSIS — I21.4 NSTEMI (NON-ST ELEVATED MYOCARDIAL INFARCTION) (HCC): ICD-10-CM

## 2021-06-09 DIAGNOSIS — R07.9 CHEST PAIN, UNSPECIFIED TYPE: ICD-10-CM

## 2021-06-09 DIAGNOSIS — N18.4 STAGE 4 CHRONIC KIDNEY DISEASE (HCC): Primary | ICD-10-CM

## 2021-06-09 DIAGNOSIS — I25.708 CORONARY ARTERY DISEASE INVOLVING CORONARY BYPASS GRAFT OF NATIVE HEART WITH OTHER FORMS OF ANGINA PECTORIS (HCC): ICD-10-CM

## 2021-06-09 LAB
ACT BLD: 219 SECONDS (ref 82–152)
ACT BLD: 246 SECONDS (ref 82–152)
ALBUMIN SERPL-MCNC: 4.2 G/DL (ref 3.5–5.2)
ALBUMIN/GLOB SERPL: 1.8 G/DL
ALP SERPL-CCNC: 46 U/L (ref 39–117)
ALT SERPL W P-5'-P-CCNC: 11 U/L (ref 1–41)
ANION GAP SERPL CALCULATED.3IONS-SCNC: 10 MMOL/L (ref 5–15)
AST SERPL-CCNC: 9 U/L (ref 1–40)
BILIRUB SERPL-MCNC: 0.2 MG/DL (ref 0–1.2)
BUN SERPL-MCNC: 32 MG/DL (ref 8–23)
BUN/CREAT SERPL: 13.9 (ref 7–25)
CALCIUM SPEC-SCNC: 9.8 MG/DL (ref 8.6–10.5)
CHLORIDE SERPL-SCNC: 103 MMOL/L (ref 98–107)
CHOLEST SERPL-MCNC: 147 MG/DL (ref 0–200)
CO2 SERPL-SCNC: 27 MMOL/L (ref 22–29)
CREAT SERPL-MCNC: 2.3 MG/DL (ref 0.76–1.27)
DEPRECATED RDW RBC AUTO: 43.1 FL (ref 37–54)
ERYTHROCYTE [DISTWIDTH] IN BLOOD BY AUTOMATED COUNT: 14 % (ref 12.3–15.4)
FLUAV RNA RESP QL NAA+PROBE: NOT DETECTED
FLUBV RNA RESP QL NAA+PROBE: NOT DETECTED
GFR SERPL CREATININE-BSD FRML MDRD: 28 ML/MIN/1.73
GLOBULIN UR ELPH-MCNC: 2.3 GM/DL
GLUCOSE BLDC GLUCOMTR-MCNC: 104 MG/DL (ref 70–130)
GLUCOSE BLDC GLUCOMTR-MCNC: 149 MG/DL (ref 70–130)
GLUCOSE BLDC GLUCOMTR-MCNC: 201 MG/DL (ref 70–130)
GLUCOSE SERPL-MCNC: 154 MG/DL (ref 65–99)
HBA1C MFR BLD: 7.1 % (ref 4.8–5.6)
HCT VFR BLD AUTO: 34.6 % (ref 37.5–51)
HDLC SERPL-MCNC: 27 MG/DL (ref 40–60)
HGB BLD-MCNC: 11 G/DL (ref 13–17.7)
LDLC SERPL CALC-MCNC: 60 MG/DL (ref 0–100)
LDLC/HDLC SERPL: 1.58 {RATIO}
MCH RBC QN AUTO: 26.8 PG (ref 26.6–33)
MCHC RBC AUTO-ENTMCNC: 31.8 G/DL (ref 31.5–35.7)
MCV RBC AUTO: 84.2 FL (ref 79–97)
PLATELET # BLD AUTO: 155 10*3/MM3 (ref 140–450)
PMV BLD AUTO: 10.1 FL (ref 6–12)
POTASSIUM SERPL-SCNC: 4.8 MMOL/L (ref 3.5–5.2)
PROT SERPL-MCNC: 6.5 G/DL (ref 6–8.5)
RBC # BLD AUTO: 4.11 10*6/MM3 (ref 4.14–5.8)
SARS-COV-2 RNA RESP QL NAA+PROBE: NOT DETECTED
SODIUM SERPL-SCNC: 140 MMOL/L (ref 136–145)
TRIGL SERPL-MCNC: 387 MG/DL (ref 0–150)
VLDLC SERPL-MCNC: 60 MG/DL (ref 5–40)
WBC # BLD AUTO: 6.6 10*3/MM3 (ref 3.4–10.8)

## 2021-06-09 PROCEDURE — 63710000001 TAMSULOSIN 0.4 MG CAPSULE: Performed by: INTERNAL MEDICINE

## 2021-06-09 PROCEDURE — C1874 STENT, COATED/COV W/DEL SYS: HCPCS | Performed by: INTERNAL MEDICINE

## 2021-06-09 PROCEDURE — 0 IOPAMIDOL PER 1 ML: Performed by: INTERNAL MEDICINE

## 2021-06-09 PROCEDURE — 25010000002 MIDAZOLAM PER 1 MG: Performed by: INTERNAL MEDICINE

## 2021-06-09 PROCEDURE — 93005 ELECTROCARDIOGRAM TRACING: CPT | Performed by: INTERNAL MEDICINE

## 2021-06-09 PROCEDURE — 63710000001 ASPIRIN 325 MG TABLET: Performed by: PHYSICIAN ASSISTANT

## 2021-06-09 PROCEDURE — C1887 CATHETER, GUIDING: HCPCS | Performed by: INTERNAL MEDICINE

## 2021-06-09 PROCEDURE — A9270 NON-COVERED ITEM OR SERVICE: HCPCS | Performed by: INTERNAL MEDICINE

## 2021-06-09 PROCEDURE — C1769 GUIDE WIRE: HCPCS | Performed by: INTERNAL MEDICINE

## 2021-06-09 PROCEDURE — 63710000001 INSULIN LISPRO (HUMAN) PER 5 UNITS: Performed by: PHYSICIAN ASSISTANT

## 2021-06-09 PROCEDURE — 63710000001 TICAGRELOR 90 MG TABLET: Performed by: INTERNAL MEDICINE

## 2021-06-09 PROCEDURE — 87636 SARSCOV2 & INF A&B AMP PRB: CPT | Performed by: INTERNAL MEDICINE

## 2021-06-09 PROCEDURE — 82565 ASSAY OF CREATININE: CPT

## 2021-06-09 PROCEDURE — C1725 CATH, TRANSLUMIN NON-LASER: HCPCS | Performed by: INTERNAL MEDICINE

## 2021-06-09 PROCEDURE — 82962 GLUCOSE BLOOD TEST: CPT

## 2021-06-09 PROCEDURE — 92937 PRQ TRLUML REVSC CAB GRF 1: CPT | Performed by: INTERNAL MEDICINE

## 2021-06-09 PROCEDURE — G0378 HOSPITAL OBSERVATION PER HR: HCPCS

## 2021-06-09 PROCEDURE — 25010000002 HEPARIN (PORCINE) PER 1000 UNITS: Performed by: INTERNAL MEDICINE

## 2021-06-09 PROCEDURE — 63710000001 BUSPIRONE 15 MG TABLET: Performed by: INTERNAL MEDICINE

## 2021-06-09 PROCEDURE — 83036 HEMOGLOBIN GLYCOSYLATED A1C: CPT | Performed by: INTERNAL MEDICINE

## 2021-06-09 PROCEDURE — 93459 L HRT ART/GRFT ANGIO: CPT | Performed by: INTERNAL MEDICINE

## 2021-06-09 PROCEDURE — 80061 LIPID PANEL: CPT | Performed by: INTERNAL MEDICINE

## 2021-06-09 PROCEDURE — 63710000001 METOPROLOL TARTRATE 50 MG TABLET: Performed by: INTERNAL MEDICINE

## 2021-06-09 PROCEDURE — 80053 COMPREHEN METABOLIC PANEL: CPT | Performed by: INTERNAL MEDICINE

## 2021-06-09 PROCEDURE — C1894 INTRO/SHEATH, NON-LASER: HCPCS | Performed by: INTERNAL MEDICINE

## 2021-06-09 PROCEDURE — A9270 NON-COVERED ITEM OR SERVICE: HCPCS | Performed by: PHYSICIAN ASSISTANT

## 2021-06-09 PROCEDURE — 63710000001 INSULIN DETEMIR PER 5 UNITS: Performed by: PHYSICIAN ASSISTANT

## 2021-06-09 PROCEDURE — C1757 CATH, THROMBECTOMY/EMBOLECT: HCPCS | Performed by: INTERNAL MEDICINE

## 2021-06-09 PROCEDURE — C9803 HOPD COVID-19 SPEC COLLECT: HCPCS

## 2021-06-09 PROCEDURE — 85027 COMPLETE CBC AUTOMATED: CPT | Performed by: INTERNAL MEDICINE

## 2021-06-09 PROCEDURE — 93010 ELECTROCARDIOGRAM REPORT: CPT | Performed by: INTERNAL MEDICINE

## 2021-06-09 PROCEDURE — 85347 COAGULATION TIME ACTIVATED: CPT

## 2021-06-09 PROCEDURE — C9604 PERC D-E COR REVASC T CABG S: HCPCS | Performed by: INTERNAL MEDICINE

## 2021-06-09 DEVICE — XIENCE SIERRA™ EVEROLIMUS ELUTING CORONARY STENT SYSTEM 4.00 MM X 38 MM / RAPID-EXCHANGE
Type: IMPLANTABLE DEVICE | Status: FUNCTIONAL
Brand: XIENCE SIERRA™

## 2021-06-09 DEVICE — XIENCE SIERRA™ EVEROLIMUS ELUTING CORONARY STENT SYSTEM 3.00 MM X 23 MM / RAPID-EXCHANGE
Type: IMPLANTABLE DEVICE | Status: FUNCTIONAL
Brand: XIENCE SIERRA™

## 2021-06-09 DEVICE — XIENCE SIERRA™ EVEROLIMUS ELUTING CORONARY STENT SYSTEM 3.00 MM X 18 MM / RAPID-EXCHANGE
Type: IMPLANTABLE DEVICE | Status: FUNCTIONAL
Brand: XIENCE SIERRA™

## 2021-06-09 RX ORDER — HEPARIN SODIUM 1000 [USP'U]/ML
INJECTION, SOLUTION INTRAVENOUS; SUBCUTANEOUS AS NEEDED
Status: DISCONTINUED | OUTPATIENT
Start: 2021-06-09 | End: 2021-06-09 | Stop reason: HOSPADM

## 2021-06-09 RX ORDER — ACETAMINOPHEN 325 MG/1
650 TABLET ORAL EVERY 4 HOURS PRN
Status: DISCONTINUED | OUTPATIENT
Start: 2021-06-09 | End: 2021-06-10 | Stop reason: HOSPADM

## 2021-06-09 RX ORDER — GLIPIZIDE 10 MG/1
10 TABLET ORAL
Status: DISCONTINUED | OUTPATIENT
Start: 2021-06-10 | End: 2021-06-10

## 2021-06-09 RX ORDER — SODIUM CHLORIDE 0.9 % (FLUSH) 0.9 %
10 SYRINGE (ML) INJECTION AS NEEDED
Status: DISCONTINUED | OUTPATIENT
Start: 2021-06-09 | End: 2021-06-09 | Stop reason: HOSPADM

## 2021-06-09 RX ORDER — ASPIRIN 325 MG
325 TABLET, DELAYED RELEASE (ENTERIC COATED) ORAL DAILY
Status: DISCONTINUED | OUTPATIENT
Start: 2021-06-10 | End: 2021-06-09 | Stop reason: HOSPADM

## 2021-06-09 RX ORDER — BUSPIRONE HYDROCHLORIDE 15 MG/1
15 TABLET ORAL 2 TIMES DAILY
COMMUNITY

## 2021-06-09 RX ORDER — SODIUM CHLORIDE 9 MG/ML
330 INJECTION, SOLUTION INTRAVENOUS CONTINUOUS
Status: ACTIVE | OUTPATIENT
Start: 2021-06-09 | End: 2021-06-09

## 2021-06-09 RX ORDER — LABETALOL HYDROCHLORIDE 5 MG/ML
INJECTION, SOLUTION INTRAVENOUS AS NEEDED
Status: DISCONTINUED | OUTPATIENT
Start: 2021-06-09 | End: 2021-06-09 | Stop reason: HOSPADM

## 2021-06-09 RX ORDER — FINASTERIDE 5 MG/1
5 TABLET, FILM COATED ORAL DAILY
Status: DISCONTINUED | OUTPATIENT
Start: 2021-06-10 | End: 2021-06-10 | Stop reason: HOSPADM

## 2021-06-09 RX ORDER — SODIUM CHLORIDE 9 MG/ML
100 INJECTION, SOLUTION INTRAVENOUS CONTINUOUS
Status: ACTIVE | OUTPATIENT
Start: 2021-06-09 | End: 2021-06-09

## 2021-06-09 RX ORDER — NITROGLYCERIN 0.4 MG/1
0.4 TABLET SUBLINGUAL
Status: DISCONTINUED | OUTPATIENT
Start: 2021-06-09 | End: 2021-06-09 | Stop reason: HOSPADM

## 2021-06-09 RX ORDER — LIDOCAINE HYDROCHLORIDE 10 MG/ML
INJECTION, SOLUTION EPIDURAL; INFILTRATION; INTRACAUDAL; PERINEURAL AS NEEDED
Status: DISCONTINUED | OUTPATIENT
Start: 2021-06-09 | End: 2021-06-09 | Stop reason: HOSPADM

## 2021-06-09 RX ORDER — SODIUM CHLORIDE 0.9 % (FLUSH) 0.9 %
3 SYRINGE (ML) INJECTION EVERY 12 HOURS SCHEDULED
Status: DISCONTINUED | OUTPATIENT
Start: 2021-06-09 | End: 2021-06-09 | Stop reason: HOSPADM

## 2021-06-09 RX ORDER — TAMSULOSIN HYDROCHLORIDE 0.4 MG/1
0.4 CAPSULE ORAL NIGHTLY
Status: DISCONTINUED | OUTPATIENT
Start: 2021-06-09 | End: 2021-06-10 | Stop reason: HOSPADM

## 2021-06-09 RX ORDER — ASPIRIN 81 MG/1
81 TABLET, CHEWABLE ORAL DAILY
Status: DISCONTINUED | OUTPATIENT
Start: 2021-06-10 | End: 2021-06-10 | Stop reason: HOSPADM

## 2021-06-09 RX ORDER — GLIPIZIDE 10 MG/1
10 TABLET ORAL
COMMUNITY

## 2021-06-09 RX ORDER — DUTASTERIDE 0.5 MG/1
0.5 CAPSULE, LIQUID FILLED ORAL DAILY
COMMUNITY

## 2021-06-09 RX ORDER — BUSPIRONE HYDROCHLORIDE 15 MG/1
15 TABLET ORAL EVERY 12 HOURS SCHEDULED
Status: DISCONTINUED | OUTPATIENT
Start: 2021-06-09 | End: 2021-06-10 | Stop reason: HOSPADM

## 2021-06-09 RX ORDER — MIDAZOLAM HYDROCHLORIDE 1 MG/ML
INJECTION INTRAMUSCULAR; INTRAVENOUS AS NEEDED
Status: DISCONTINUED | OUTPATIENT
Start: 2021-06-09 | End: 2021-06-09 | Stop reason: HOSPADM

## 2021-06-09 RX ORDER — LISINOPRIL 20 MG/1
20 TABLET ORAL DAILY
Status: DISCONTINUED | OUTPATIENT
Start: 2021-06-09 | End: 2021-06-10 | Stop reason: HOSPADM

## 2021-06-09 RX ORDER — ALFUZOSIN HYDROCHLORIDE 10 MG/1
10 TABLET, EXTENDED RELEASE ORAL DAILY
COMMUNITY
End: 2022-08-30 | Stop reason: ALTCHOICE

## 2021-06-09 RX ORDER — SERTRALINE HYDROCHLORIDE 100 MG/1
100 TABLET, FILM COATED ORAL DAILY
Status: DISCONTINUED | OUTPATIENT
Start: 2021-06-10 | End: 2021-06-10 | Stop reason: HOSPADM

## 2021-06-09 RX ORDER — METOPROLOL TARTRATE 50 MG/1
50 TABLET, FILM COATED ORAL EVERY 12 HOURS SCHEDULED
Status: DISCONTINUED | OUTPATIENT
Start: 2021-06-09 | End: 2021-06-10 | Stop reason: HOSPADM

## 2021-06-09 RX ORDER — PANTOPRAZOLE SODIUM 40 MG/1
40 TABLET, DELAYED RELEASE ORAL
Status: DISCONTINUED | OUTPATIENT
Start: 2021-06-10 | End: 2021-06-10 | Stop reason: HOSPADM

## 2021-06-09 RX ORDER — ISOSORBIDE MONONITRATE 60 MG/1
60 TABLET, EXTENDED RELEASE ORAL DAILY
Status: DISCONTINUED | OUTPATIENT
Start: 2021-06-10 | End: 2021-06-10 | Stop reason: HOSPADM

## 2021-06-09 RX ORDER — ONDANSETRON 2 MG/ML
4 INJECTION INTRAMUSCULAR; INTRAVENOUS EVERY 8 HOURS PRN
Status: DISCONTINUED | OUTPATIENT
Start: 2021-06-09 | End: 2021-06-09 | Stop reason: HOSPADM

## 2021-06-09 RX ORDER — CHOLECALCIFEROL (VITAMIN D3) 50 MCG
2000 TABLET ORAL DAILY
COMMUNITY

## 2021-06-09 RX ORDER — NITROGLYCERIN 0.4 MG/1
0.4 TABLET SUBLINGUAL
Status: DISCONTINUED | OUTPATIENT
Start: 2021-06-09 | End: 2021-06-10 | Stop reason: HOSPADM

## 2021-06-09 RX ORDER — DEXTROSE MONOHYDRATE 25 G/50ML
25 INJECTION, SOLUTION INTRAVENOUS
Status: DISCONTINUED | OUTPATIENT
Start: 2021-06-09 | End: 2021-06-10 | Stop reason: HOSPADM

## 2021-06-09 RX ORDER — NICOTINE POLACRILEX 4 MG
15 LOZENGE BUCCAL
Status: DISCONTINUED | OUTPATIENT
Start: 2021-06-09 | End: 2021-06-10 | Stop reason: HOSPADM

## 2021-06-09 RX ORDER — ASPIRIN 325 MG
325 TABLET ORAL ONCE
Status: COMPLETED | OUTPATIENT
Start: 2021-06-09 | End: 2021-06-09

## 2021-06-09 RX ADMIN — BUSPIRONE HYDROCHLORIDE 15 MG: 15 TABLET ORAL at 23:05

## 2021-06-09 RX ADMIN — ASPIRIN 325 MG ORAL TABLET 325 MG: 325 PILL ORAL at 10:26

## 2021-06-09 RX ADMIN — INSULIN LISPRO 4 UNITS: 100 INJECTION, SOLUTION INTRAVENOUS; SUBCUTANEOUS at 23:16

## 2021-06-09 RX ADMIN — TAMSULOSIN HYDROCHLORIDE 0.4 MG: 0.4 CAPSULE ORAL at 23:05

## 2021-06-09 RX ADMIN — SODIUM CHLORIDE 330 ML/HR: 9 INJECTION, SOLUTION INTRAVENOUS at 10:26

## 2021-06-09 RX ADMIN — METOPROLOL TARTRATE 50 MG: 50 TABLET, FILM COATED ORAL at 23:05

## 2021-06-09 RX ADMIN — INSULIN DETEMIR 30 UNITS: 100 INJECTION, SOLUTION SUBCUTANEOUS at 23:06

## 2021-06-09 RX ADMIN — TICAGRELOR 90 MG: 90 TABLET ORAL at 23:06

## 2021-06-10 VITALS
HEART RATE: 60 BPM | RESPIRATION RATE: 16 BRPM | WEIGHT: 151.9 LBS | DIASTOLIC BLOOD PRESSURE: 78 MMHG | SYSTOLIC BLOOD PRESSURE: 128 MMHG | BODY MASS INDEX: 21.27 KG/M2 | HEIGHT: 71 IN | TEMPERATURE: 98.2 F | OXYGEN SATURATION: 98 %

## 2021-06-10 LAB
ANION GAP SERPL CALCULATED.3IONS-SCNC: 9 MMOL/L (ref 5–15)
BUN SERPL-MCNC: 30 MG/DL (ref 8–23)
BUN/CREAT SERPL: 13.1 (ref 7–25)
CALCIUM SPEC-SCNC: 9.1 MG/DL (ref 8.6–10.5)
CHLORIDE SERPL-SCNC: 105 MMOL/L (ref 98–107)
CO2 SERPL-SCNC: 27 MMOL/L (ref 22–29)
CREAT BLDA-MCNC: 2.4 MG/DL (ref 0.6–1.3)
CREAT SERPL-MCNC: 2.29 MG/DL (ref 0.76–1.27)
DEPRECATED RDW RBC AUTO: 41.9 FL (ref 37–54)
ERYTHROCYTE [DISTWIDTH] IN BLOOD BY AUTOMATED COUNT: 14.1 % (ref 12.3–15.4)
GFR SERPL CREATININE-BSD FRML MDRD: 29 ML/MIN/1.73
GLUCOSE BLDC GLUCOMTR-MCNC: 137 MG/DL (ref 70–130)
GLUCOSE BLDC GLUCOMTR-MCNC: 453 MG/DL (ref 70–130)
GLUCOSE SERPL-MCNC: 122 MG/DL (ref 65–99)
HCT VFR BLD AUTO: 33.7 % (ref 37.5–51)
HGB BLD-MCNC: 11.1 G/DL (ref 13–17.7)
MCH RBC QN AUTO: 27.1 PG (ref 26.6–33)
MCHC RBC AUTO-ENTMCNC: 32.9 G/DL (ref 31.5–35.7)
MCV RBC AUTO: 82.2 FL (ref 79–97)
PLATELET # BLD AUTO: 136 10*3/MM3 (ref 140–450)
PMV BLD AUTO: 9.8 FL (ref 6–12)
POTASSIUM SERPL-SCNC: 4.6 MMOL/L (ref 3.5–5.2)
RBC # BLD AUTO: 4.1 10*6/MM3 (ref 4.14–5.8)
SODIUM SERPL-SCNC: 141 MMOL/L (ref 136–145)
WBC # BLD AUTO: 6.87 10*3/MM3 (ref 3.4–10.8)

## 2021-06-10 PROCEDURE — 85027 COMPLETE CBC AUTOMATED: CPT | Performed by: INTERNAL MEDICINE

## 2021-06-10 PROCEDURE — 82962 GLUCOSE BLOOD TEST: CPT

## 2021-06-10 PROCEDURE — 63710000001 FINASTERIDE 5 MG TABLET: Performed by: INTERNAL MEDICINE

## 2021-06-10 PROCEDURE — A9270 NON-COVERED ITEM OR SERVICE: HCPCS | Performed by: INTERNAL MEDICINE

## 2021-06-10 PROCEDURE — 63710000001 INSULIN LISPRO (HUMAN) PER 5 UNITS: Performed by: PHYSICIAN ASSISTANT

## 2021-06-10 PROCEDURE — 93005 ELECTROCARDIOGRAM TRACING: CPT | Performed by: INTERNAL MEDICINE

## 2021-06-10 PROCEDURE — 63710000001 GLIPIZIDE 10 MG TABLET: Performed by: INTERNAL MEDICINE

## 2021-06-10 PROCEDURE — 63710000001 PANTOPRAZOLE 40 MG TABLET DELAYED-RELEASE: Performed by: INTERNAL MEDICINE

## 2021-06-10 PROCEDURE — 63710000001 TICAGRELOR 90 MG TABLET: Performed by: INTERNAL MEDICINE

## 2021-06-10 PROCEDURE — A9270 NON-COVERED ITEM OR SERVICE: HCPCS | Performed by: PHYSICIAN ASSISTANT

## 2021-06-10 PROCEDURE — 99213 OFFICE O/P EST LOW 20 MIN: CPT | Performed by: NURSE PRACTITIONER

## 2021-06-10 PROCEDURE — 63710000001 BUSPIRONE 15 MG TABLET: Performed by: INTERNAL MEDICINE

## 2021-06-10 PROCEDURE — 93010 ELECTROCARDIOGRAM REPORT: CPT | Performed by: INTERNAL MEDICINE

## 2021-06-10 PROCEDURE — 63710000001 ISOSORBIDE MONONITRATE 60 MG TABLET SUSTAINED-RELEASE 24 HOUR: Performed by: INTERNAL MEDICINE

## 2021-06-10 PROCEDURE — 63710000001 SERTRALINE 100 MG TABLET: Performed by: INTERNAL MEDICINE

## 2021-06-10 PROCEDURE — 63710000001 METOPROLOL TARTRATE 50 MG TABLET: Performed by: INTERNAL MEDICINE

## 2021-06-10 PROCEDURE — 99214 OFFICE O/P EST MOD 30 MIN: CPT | Performed by: INTERNAL MEDICINE

## 2021-06-10 PROCEDURE — 80048 BASIC METABOLIC PNL TOTAL CA: CPT | Performed by: INTERNAL MEDICINE

## 2021-06-10 PROCEDURE — 63710000001 ASPIRIN 81 MG CHEWABLE TABLET: Performed by: INTERNAL MEDICINE

## 2021-06-10 PROCEDURE — 63710000001 LISINOPRIL 20 MG TABLET: Performed by: INTERNAL MEDICINE

## 2021-06-10 PROCEDURE — G0378 HOSPITAL OBSERVATION PER HR: HCPCS

## 2021-06-10 RX ORDER — AMLODIPINE BESYLATE 10 MG/1
10 TABLET ORAL DAILY
Qty: 90 TABLET | Refills: 3 | Status: SHIPPED | OUTPATIENT
Start: 2021-06-10 | End: 2021-10-27

## 2021-06-10 RX ADMIN — METOPROLOL TARTRATE 50 MG: 50 TABLET, FILM COATED ORAL at 09:03

## 2021-06-10 RX ADMIN — ISOSORBIDE MONONITRATE 60 MG: 60 TABLET, EXTENDED RELEASE ORAL at 09:03

## 2021-06-10 RX ADMIN — LISINOPRIL 20 MG: 20 TABLET ORAL at 09:03

## 2021-06-10 RX ADMIN — TICAGRELOR 90 MG: 90 TABLET ORAL at 09:03

## 2021-06-10 RX ADMIN — GLIPIZIDE 10 MG: 10 TABLET ORAL at 09:03

## 2021-06-10 RX ADMIN — SERTRALINE 100 MG: 100 TABLET, FILM COATED ORAL at 09:03

## 2021-06-10 RX ADMIN — INSULIN LISPRO 9 UNITS: 100 INJECTION, SOLUTION INTRAVENOUS; SUBCUTANEOUS at 09:02

## 2021-06-10 RX ADMIN — ASPIRIN 81 MG: 81 TABLET, CHEWABLE ORAL at 09:03

## 2021-06-10 RX ADMIN — FINASTERIDE 5 MG: 5 TABLET, FILM COATED ORAL at 09:03

## 2021-06-10 RX ADMIN — BUSPIRONE HYDROCHLORIDE 15 MG: 15 TABLET ORAL at 09:03

## 2021-06-10 RX ADMIN — PANTOPRAZOLE SODIUM 40 MG: 40 TABLET, DELAYED RELEASE ORAL at 06:12

## 2021-06-11 ENCOUNTER — READMISSION MANAGEMENT (OUTPATIENT)
Dept: CALL CENTER | Facility: HOSPITAL | Age: 71
End: 2021-06-11

## 2021-06-15 ENCOUNTER — READMISSION MANAGEMENT (OUTPATIENT)
Dept: CALL CENTER | Facility: HOSPITAL | Age: 71
End: 2021-06-15

## 2021-06-18 LAB
QT INTERVAL: 412 MS
QTC INTERVAL: 453 MS

## 2021-06-25 ENCOUNTER — OFFICE VISIT (OUTPATIENT)
Dept: CARDIOLOGY | Facility: HOSPITAL | Age: 71
End: 2021-06-25

## 2021-06-25 VITALS
HEART RATE: 69 BPM | OXYGEN SATURATION: 96 % | SYSTOLIC BLOOD PRESSURE: 96 MMHG | RESPIRATION RATE: 17 BRPM | TEMPERATURE: 98.2 F | WEIGHT: 265.25 LBS | DIASTOLIC BLOOD PRESSURE: 55 MMHG | BODY MASS INDEX: 37.14 KG/M2 | HEIGHT: 71 IN

## 2021-06-25 DIAGNOSIS — I10 ESSENTIAL HYPERTENSION: ICD-10-CM

## 2021-06-25 DIAGNOSIS — N18.4 STAGE 4 CHRONIC KIDNEY DISEASE (HCC): ICD-10-CM

## 2021-06-25 DIAGNOSIS — I25.810 CORONARY ARTERY DISEASE INVOLVING CORONARY BYPASS GRAFT OF NATIVE HEART WITHOUT ANGINA PECTORIS: Primary | ICD-10-CM

## 2021-06-25 PROCEDURE — 99214 OFFICE O/P EST MOD 30 MIN: CPT | Performed by: NURSE PRACTITIONER

## 2021-06-25 RX ORDER — PREGABALIN 150 MG/1
150 CAPSULE ORAL 2 TIMES DAILY
COMMUNITY

## 2021-06-25 RX ORDER — BUDESONIDE AND FORMOTEROL FUMARATE DIHYDRATE 160; 4.5 UG/1; UG/1
2 AEROSOL RESPIRATORY (INHALATION) DAILY
COMMUNITY
Start: 2021-05-18 | End: 2022-08-30 | Stop reason: ALTCHOICE

## 2021-06-25 RX ORDER — TORSEMIDE 20 MG/1
100 TABLET ORAL 2 TIMES DAILY
COMMUNITY

## 2021-07-27 ENCOUNTER — OFFICE VISIT (OUTPATIENT)
Dept: CARDIOLOGY | Facility: CLINIC | Age: 71
End: 2021-07-27

## 2021-07-27 VITALS
OXYGEN SATURATION: 96 % | DIASTOLIC BLOOD PRESSURE: 74 MMHG | HEART RATE: 60 BPM | HEIGHT: 72 IN | WEIGHT: 274 LBS | SYSTOLIC BLOOD PRESSURE: 134 MMHG | BODY MASS INDEX: 37.11 KG/M2

## 2021-07-27 DIAGNOSIS — R60.0 BILATERAL LEG EDEMA: ICD-10-CM

## 2021-07-27 DIAGNOSIS — I10 ESSENTIAL HYPERTENSION: ICD-10-CM

## 2021-07-27 DIAGNOSIS — E78.5 DYSLIPIDEMIA: ICD-10-CM

## 2021-07-27 DIAGNOSIS — I25.810 CORONARY ARTERY DISEASE INVOLVING CORONARY BYPASS GRAFT OF NATIVE HEART WITHOUT ANGINA PECTORIS: Primary | ICD-10-CM

## 2021-07-27 PROCEDURE — 99214 OFFICE O/P EST MOD 30 MIN: CPT | Performed by: INTERNAL MEDICINE

## 2021-07-27 RX ORDER — TAMSULOSIN HYDROCHLORIDE 0.4 MG/1
1 CAPSULE ORAL NIGHTLY
COMMUNITY
Start: 2021-07-09

## 2021-07-27 RX ORDER — OXYCODONE AND ACETAMINOPHEN 10; 325 MG/1; MG/1
1 TABLET ORAL EVERY 4 HOURS PRN
COMMUNITY
Start: 2021-06-30

## 2021-07-27 RX ORDER — NALOXEGOL OXALATE 25 MG/1
25 TABLET, FILM COATED ORAL DAILY
COMMUNITY
Start: 2021-07-09 | End: 2021-10-27

## 2021-09-27 ENCOUNTER — OFFICE VISIT (OUTPATIENT)
Dept: SURGERY | Facility: CLINIC | Age: 71
End: 2021-09-27

## 2021-09-27 VITALS
TEMPERATURE: 97.2 F | WEIGHT: 260 LBS | BODY MASS INDEX: 35.21 KG/M2 | HEIGHT: 72 IN | OXYGEN SATURATION: 96 % | SYSTOLIC BLOOD PRESSURE: 108 MMHG | HEART RATE: 73 BPM | DIASTOLIC BLOOD PRESSURE: 68 MMHG

## 2021-09-27 DIAGNOSIS — D50.9 IRON DEFICIENCY ANEMIA, UNSPECIFIED IRON DEFICIENCY ANEMIA TYPE: ICD-10-CM

## 2021-09-27 DIAGNOSIS — Z01.818 PREOP TESTING: Primary | ICD-10-CM

## 2021-09-27 DIAGNOSIS — R13.19 ESOPHAGEAL DYSPHAGIA: Primary | ICD-10-CM

## 2021-09-27 PROCEDURE — 99204 OFFICE O/P NEW MOD 45 MIN: CPT | Performed by: SURGERY

## 2021-09-27 RX ORDER — BISACODYL 5 MG
TABLET, DELAYED RELEASE (ENTERIC COATED) ORAL
Qty: 4 TABLET | Refills: 0 | Status: SHIPPED | OUTPATIENT
Start: 2021-09-27 | End: 2021-10-29

## 2021-09-27 RX ORDER — POLYETHYLENE GLYCOL 3350 17 G/17G
238 POWDER, FOR SOLUTION ORAL ONCE
Qty: 17 PACKET | Refills: 0 | Status: SHIPPED | OUTPATIENT
Start: 2021-09-27 | End: 2021-09-27

## 2021-09-28 PROBLEM — R13.19 ESOPHAGEAL DYSPHAGIA: Status: ACTIVE | Noted: 2021-09-28

## 2021-09-28 PROBLEM — D50.9 IRON DEFICIENCY ANEMIA: Status: ACTIVE | Noted: 2021-09-28

## 2021-09-30 LAB
QT INTERVAL: 392 MS
QT INTERVAL: 450 MS
QTC INTERVAL: 426 MS
QTC INTERVAL: 437 MS

## 2021-10-27 RX ORDER — ALLOPURINOL 100 MG/1
100 TABLET ORAL DAILY
COMMUNITY

## 2021-10-28 ENCOUNTER — LAB (OUTPATIENT)
Dept: LAB | Facility: HOSPITAL | Age: 71
End: 2021-10-28

## 2021-10-28 ENCOUNTER — TELEPHONE (OUTPATIENT)
Dept: SURGERY | Facility: CLINIC | Age: 71
End: 2021-10-28

## 2021-10-28 ENCOUNTER — TELEPHONE (OUTPATIENT)
Dept: CARDIOLOGY | Facility: CLINIC | Age: 71
End: 2021-10-28

## 2021-10-28 LAB — SARS-COV-2 RNA PNL SPEC NAA+PROBE: NOT DETECTED

## 2021-10-28 PROCEDURE — 87635 SARS-COV-2 COVID-19 AMP PRB: CPT | Performed by: SURGERY

## 2021-10-28 PROCEDURE — C9803 HOPD COVID-19 SPEC COLLECT: HCPCS

## 2021-10-29 DIAGNOSIS — Z01.818 PREOP TESTING: Primary | ICD-10-CM

## 2021-10-29 RX ORDER — POLYETHYLENE GLYCOL 3350 17 G/17G
238 POWDER, FOR SOLUTION ORAL ONCE
Qty: 17 PACKET | Refills: 0 | Status: SHIPPED | OUTPATIENT
Start: 2021-10-29 | End: 2021-10-29

## 2021-10-29 RX ORDER — BISACODYL 5 MG
TABLET, DELAYED RELEASE (ENTERIC COATED) ORAL
Qty: 4 TABLET | Refills: 0 | Status: SHIPPED | OUTPATIENT
Start: 2021-10-29 | End: 2021-11-10 | Stop reason: HOSPADM

## 2021-11-10 ENCOUNTER — ANESTHESIA (OUTPATIENT)
Dept: GASTROENTEROLOGY | Facility: HOSPITAL | Age: 71
End: 2021-11-10

## 2021-11-10 ENCOUNTER — HOSPITAL ENCOUNTER (OUTPATIENT)
Facility: HOSPITAL | Age: 71
Setting detail: HOSPITAL OUTPATIENT SURGERY
Discharge: HOME | End: 2021-11-10
Attending: SURGERY | Admitting: SURGERY

## 2021-11-10 ENCOUNTER — ANESTHESIA EVENT (OUTPATIENT)
Dept: GASTROENTEROLOGY | Facility: HOSPITAL | Age: 71
End: 2021-11-10

## 2021-11-10 VITALS
BODY MASS INDEX: 35.62 KG/M2 | WEIGHT: 263 LBS | DIASTOLIC BLOOD PRESSURE: 83 MMHG | OXYGEN SATURATION: 99 % | SYSTOLIC BLOOD PRESSURE: 133 MMHG | TEMPERATURE: 97 F | HEART RATE: 82 BPM | RESPIRATION RATE: 20 BRPM | HEIGHT: 72 IN

## 2021-11-10 DIAGNOSIS — R13.19 ESOPHAGEAL DYSPHAGIA: ICD-10-CM

## 2021-11-10 DIAGNOSIS — D50.9 IRON DEFICIENCY ANEMIA, UNSPECIFIED IRON DEFICIENCY ANEMIA TYPE: ICD-10-CM

## 2021-11-10 LAB
GLUCOSE BLDC GLUCOMTR-MCNC: 222 MG/DL (ref 70–130)
SARS-COV-2 RNA PNL SPEC NAA+PROBE: NOT DETECTED

## 2021-11-10 PROCEDURE — 43239 EGD BIOPSY SINGLE/MULTIPLE: CPT | Performed by: SURGERY

## 2021-11-10 PROCEDURE — 25010000002 ONDANSETRON PER 1 MG: Performed by: NURSE ANESTHETIST, CERTIFIED REGISTERED

## 2021-11-10 PROCEDURE — C9803 HOPD COVID-19 SPEC COLLECT: HCPCS

## 2021-11-10 PROCEDURE — 82962 GLUCOSE BLOOD TEST: CPT

## 2021-11-10 PROCEDURE — C1726 CATH, BAL DIL, NON-VASCULAR: HCPCS | Performed by: SURGERY

## 2021-11-10 PROCEDURE — 43249 ESOPH EGD DILATION <30 MM: CPT | Performed by: SURGERY

## 2021-11-10 PROCEDURE — 87635 SARS-COV-2 COVID-19 AMP PRB: CPT | Performed by: SURGERY

## 2021-11-10 PROCEDURE — S0260 H&P FOR SURGERY: HCPCS | Performed by: SURGERY

## 2021-11-10 PROCEDURE — 25010000002 PROPOFOL 200 MG/20ML EMULSION: Performed by: NURSE ANESTHETIST, CERTIFIED REGISTERED

## 2021-11-10 PROCEDURE — C1889 IMPLANT/INSERT DEVICE, NOC: HCPCS | Performed by: SURGERY

## 2021-11-10 PROCEDURE — 45385 COLONOSCOPY W/LESION REMOVAL: CPT | Performed by: SURGERY

## 2021-11-10 DEVICE — DEV CLIP ENDO RESOLUTION360 CONTRL ROT 235CM: Type: IMPLANTABLE DEVICE | Site: ASCENDING COLON | Status: FUNCTIONAL

## 2021-11-10 RX ORDER — ONDANSETRON 2 MG/ML
INJECTION INTRAMUSCULAR; INTRAVENOUS AS NEEDED
Status: DISCONTINUED | OUTPATIENT
Start: 2021-11-10 | End: 2021-11-10 | Stop reason: SURG

## 2021-11-10 RX ORDER — KETAMINE HCL IN NACL, ISO-OSM 100MG/10ML
SYRINGE (ML) INJECTION AS NEEDED
Status: DISCONTINUED | OUTPATIENT
Start: 2021-11-10 | End: 2021-11-10 | Stop reason: SURG

## 2021-11-10 RX ORDER — LIDOCAINE HYDROCHLORIDE 20 MG/ML
INJECTION, SOLUTION INTRAVENOUS AS NEEDED
Status: DISCONTINUED | OUTPATIENT
Start: 2021-11-10 | End: 2021-11-10 | Stop reason: SURG

## 2021-11-10 RX ORDER — ONDANSETRON 2 MG/ML
4 INJECTION INTRAMUSCULAR; INTRAVENOUS ONCE AS NEEDED
Status: DISCONTINUED | OUTPATIENT
Start: 2021-11-10 | End: 2021-11-10 | Stop reason: HOSPADM

## 2021-11-10 RX ORDER — PROPOFOL 10 MG/ML
INJECTION, EMULSION INTRAVENOUS AS NEEDED
Status: DISCONTINUED | OUTPATIENT
Start: 2021-11-10 | End: 2021-11-10 | Stop reason: SURG

## 2021-11-10 RX ORDER — SODIUM CHLORIDE, SODIUM LACTATE, POTASSIUM CHLORIDE, CALCIUM CHLORIDE 600; 310; 30; 20 MG/100ML; MG/100ML; MG/100ML; MG/100ML
1000 INJECTION, SOLUTION INTRAVENOUS CONTINUOUS
Status: DISCONTINUED | OUTPATIENT
Start: 2021-11-10 | End: 2021-11-10 | Stop reason: HOSPADM

## 2021-11-10 RX ORDER — SODIUM CHLORIDE 0.9 % (FLUSH) 0.9 %
10 SYRINGE (ML) INJECTION AS NEEDED
Status: DISCONTINUED | OUTPATIENT
Start: 2021-11-10 | End: 2021-11-10 | Stop reason: HOSPADM

## 2021-11-10 RX ADMIN — ONDANSETRON 4 MG: 2 INJECTION INTRAMUSCULAR; INTRAVENOUS at 11:21

## 2021-11-10 RX ADMIN — Medication 10 MG: at 10:30

## 2021-11-10 RX ADMIN — PROPOFOL 50 MG: 10 INJECTION, EMULSION INTRAVENOUS at 10:55

## 2021-11-10 RX ADMIN — PROPOFOL 50 MG: 10 INJECTION, EMULSION INTRAVENOUS at 11:20

## 2021-11-10 RX ADMIN — PROPOFOL 50 MG: 10 INJECTION, EMULSION INTRAVENOUS at 10:36

## 2021-11-10 RX ADMIN — SODIUM CHLORIDE, POTASSIUM CHLORIDE, SODIUM LACTATE AND CALCIUM CHLORIDE 1000 ML: 600; 310; 30; 20 INJECTION, SOLUTION INTRAVENOUS at 09:55

## 2021-11-10 RX ADMIN — LIDOCAINE HYDROCHLORIDE 100 MG: 20 INJECTION, SOLUTION INTRAVENOUS at 10:30

## 2021-11-10 RX ADMIN — PROPOFOL 50 MG: 10 INJECTION, EMULSION INTRAVENOUS at 10:49

## 2021-11-10 RX ADMIN — PROPOFOL 50 MG: 10 INJECTION, EMULSION INTRAVENOUS at 11:01

## 2021-11-10 RX ADMIN — GLYCOPYRROLATE 0.2 MG: 0.2 INJECTION, SOLUTION INTRAMUSCULAR; INTRAVITREAL at 10:29

## 2021-11-10 RX ADMIN — PROPOFOL 50 MG: 10 INJECTION, EMULSION INTRAVENOUS at 11:13

## 2021-11-10 RX ADMIN — PROPOFOL 50 MG: 10 INJECTION, EMULSION INTRAVENOUS at 11:07

## 2021-11-10 RX ADMIN — PROPOFOL 100 MG: 10 INJECTION, EMULSION INTRAVENOUS at 10:30

## 2021-11-10 RX ADMIN — PROPOFOL 100 MG: 10 INJECTION, EMULSION INTRAVENOUS at 10:43

## 2021-11-15 LAB
LAB AP CASE REPORT: NORMAL
PATH REPORT.FINAL DX SPEC: NORMAL

## 2021-11-22 ENCOUNTER — OFFICE VISIT (OUTPATIENT)
Dept: SURGERY | Facility: CLINIC | Age: 71
End: 2021-11-22

## 2021-11-22 VITALS
HEART RATE: 64 BPM | WEIGHT: 263 LBS | OXYGEN SATURATION: 98 % | DIASTOLIC BLOOD PRESSURE: 60 MMHG | BODY MASS INDEX: 35.62 KG/M2 | HEIGHT: 72 IN | TEMPERATURE: 98.1 F | SYSTOLIC BLOOD PRESSURE: 108 MMHG

## 2021-11-22 DIAGNOSIS — D12.6 ADENOMATOUS POLYP OF COLON, UNSPECIFIED PART OF COLON: ICD-10-CM

## 2021-11-22 DIAGNOSIS — R13.19 ESOPHAGEAL DYSPHAGIA: ICD-10-CM

## 2021-11-22 DIAGNOSIS — D50.9 IRON DEFICIENCY ANEMIA, UNSPECIFIED IRON DEFICIENCY ANEMIA TYPE: Primary | ICD-10-CM

## 2021-11-22 PROCEDURE — 99213 OFFICE O/P EST LOW 20 MIN: CPT | Performed by: SURGERY

## 2021-11-22 RX ORDER — ERYTHROMYCIN 5 MG/G
OINTMENT OPHTHALMIC
COMMUNITY
Start: 2021-09-29 | End: 2022-02-22

## 2021-11-22 RX ORDER — ATROPINE SULFATE 10 MG/ML
SOLUTION/ DROPS OPHTHALMIC
COMMUNITY
Start: 2021-09-29 | End: 2022-08-30 | Stop reason: ALTCHOICE

## 2021-11-22 RX ORDER — FENOFIBRATE 145 MG/1
145 TABLET, COATED ORAL DAILY
COMMUNITY
Start: 2021-11-01 | End: 2022-08-30 | Stop reason: ALTCHOICE

## 2022-02-22 ENCOUNTER — OFFICE VISIT (OUTPATIENT)
Dept: CARDIOLOGY | Facility: CLINIC | Age: 72
End: 2022-02-22

## 2022-02-22 VITALS
WEIGHT: 255 LBS | HEIGHT: 72 IN | SYSTOLIC BLOOD PRESSURE: 108 MMHG | DIASTOLIC BLOOD PRESSURE: 61 MMHG | HEART RATE: 74 BPM | BODY MASS INDEX: 34.54 KG/M2 | OXYGEN SATURATION: 96 %

## 2022-02-22 DIAGNOSIS — E78.5 DYSLIPIDEMIA: ICD-10-CM

## 2022-02-22 DIAGNOSIS — I25.708 CORONARY ARTERY DISEASE INVOLVING CORONARY BYPASS GRAFT OF NATIVE HEART WITH OTHER FORMS OF ANGINA PECTORIS: Primary | ICD-10-CM

## 2022-02-22 DIAGNOSIS — Z79.4 TYPE 2 DIABETES MELLITUS WITH OTHER CIRCULATORY COMPLICATION, WITH LONG-TERM CURRENT USE OF INSULIN: ICD-10-CM

## 2022-02-22 DIAGNOSIS — E11.59 TYPE 2 DIABETES MELLITUS WITH OTHER CIRCULATORY COMPLICATION, WITH LONG-TERM CURRENT USE OF INSULIN: ICD-10-CM

## 2022-02-22 DIAGNOSIS — I10 PRIMARY HYPERTENSION: ICD-10-CM

## 2022-02-22 PROCEDURE — 99214 OFFICE O/P EST MOD 30 MIN: CPT | Performed by: INTERNAL MEDICINE

## 2022-03-16 ENCOUNTER — TELEPHONE (OUTPATIENT)
Dept: VASCULAR SURGERY | Age: 72
End: 2022-03-16

## 2022-03-16 DIAGNOSIS — G47.33 OBSTRUCTIVE SLEEP APNEA: ICD-10-CM

## 2022-03-16 DIAGNOSIS — E11.00 TYPE 2 DIABETES MELLITUS WITH HYPEROSMOLARITY WITHOUT COMA, WITHOUT LONG-TERM CURRENT USE OF INSULIN (HCC): ICD-10-CM

## 2022-03-16 DIAGNOSIS — D50.0 IRON DEFICIENCY ANEMIA DUE TO CHRONIC BLOOD LOSS: ICD-10-CM

## 2022-03-16 DIAGNOSIS — I65.23 STENOSIS OF BOTH CAROTID ARTERIES WITHOUT INFARCTION: ICD-10-CM

## 2022-03-16 DIAGNOSIS — R60.9 PERIPHERAL EDEMA: ICD-10-CM

## 2022-03-16 DIAGNOSIS — I48.20 CHRONIC ATRIAL FIBRILLATION (HCC): ICD-10-CM

## 2022-03-16 DIAGNOSIS — M15.8 OTHER OSTEOARTHRITIS INVOLVING MULTIPLE JOINTS: ICD-10-CM

## 2022-03-16 DIAGNOSIS — Z86.018: ICD-10-CM

## 2022-03-16 DIAGNOSIS — I10 HYPERTENSION, UNSPECIFIED TYPE: ICD-10-CM

## 2022-03-16 DIAGNOSIS — Z71.6 TOBACCO ABUSE COUNSELING: ICD-10-CM

## 2022-03-16 DIAGNOSIS — J20.8 ACUTE BACTERIAL BRONCHITIS: ICD-10-CM

## 2022-03-16 DIAGNOSIS — N18.4 STAGE 4 CHRONIC KIDNEY DISEASE (HCC): Primary | ICD-10-CM

## 2022-03-16 DIAGNOSIS — K21.00 GASTROESOPHAGEAL REFLUX DISEASE WITH ESOPHAGITIS WITHOUT HEMORRHAGE: ICD-10-CM

## 2022-03-16 DIAGNOSIS — N25.81 SECONDARY HYPERPARATHYROIDISM (HCC): ICD-10-CM

## 2022-03-16 DIAGNOSIS — E78.5 DYSLIPIDEMIA: ICD-10-CM

## 2022-03-16 DIAGNOSIS — E79.0 ASYMPTOMATIC HYPERURICEMIA: ICD-10-CM

## 2022-03-16 DIAGNOSIS — E08.43 DIABETIC AUTONOMIC NEUROPATHY ASSOCIATED WITH DIABETES MELLITUS DUE TO UNDERLYING CONDITION (HCC): ICD-10-CM

## 2022-03-16 DIAGNOSIS — I25.10 CORONARY ARTERIOSCLEROSIS: ICD-10-CM

## 2022-03-16 DIAGNOSIS — B96.89 ACUTE BACTERIAL BRONCHITIS: ICD-10-CM

## 2022-03-16 DIAGNOSIS — R33.9 RETENTION OF URINE: ICD-10-CM

## 2022-03-16 DIAGNOSIS — E55.9 VITAMIN D DEFICIENCY: ICD-10-CM

## 2022-03-16 RX ORDER — ASPIRIN 81 MG/1
81 TABLET ORAL DAILY
COMMUNITY

## 2022-03-16 RX ORDER — CALCITRIOL 0.25 UG/1
0.5 CAPSULE, LIQUID FILLED ORAL DAILY
COMMUNITY

## 2022-03-16 RX ORDER — METOPROLOL SUCCINATE 50 MG/1
50 TABLET, EXTENDED RELEASE ORAL DAILY
COMMUNITY

## 2022-03-16 RX ORDER — CARVEDILOL 25 MG/1
25 TABLET ORAL 2 TIMES DAILY WITH MEALS
COMMUNITY

## 2022-03-16 RX ORDER — TAMSULOSIN HYDROCHLORIDE 0.4 MG/1
0.4 CAPSULE ORAL DAILY
COMMUNITY

## 2022-03-16 RX ORDER — DUTASTERIDE 0.5 MG/1
0.5 CAPSULE, LIQUID FILLED ORAL DAILY
COMMUNITY

## 2022-03-16 RX ORDER — GLIPIZIDE 10 MG/1
10 TABLET ORAL
COMMUNITY

## 2022-03-16 RX ORDER — VITAMIN B COMPLEX
1 CAPSULE ORAL DAILY
COMMUNITY

## 2022-03-16 RX ORDER — TORSEMIDE 100 MG/1
100 TABLET ORAL DAILY
COMMUNITY

## 2022-03-16 RX ORDER — LEVOTHYROXINE SODIUM 0.2 MG/1
200 TABLET ORAL DAILY
COMMUNITY

## 2022-03-16 RX ORDER — PREGABALIN 150 MG/1
150 CAPSULE ORAL 2 TIMES DAILY
COMMUNITY

## 2022-03-16 RX ORDER — ISOSORBIDE MONONITRATE 60 MG/1
60 TABLET, EXTENDED RELEASE ORAL DAILY
COMMUNITY

## 2022-03-16 RX ORDER — BUDESONIDE AND FORMOTEROL FUMARATE DIHYDRATE 160; 4.5 UG/1; UG/1
2 AEROSOL RESPIRATORY (INHALATION) 2 TIMES DAILY
COMMUNITY

## 2022-03-16 RX ORDER — BUSPIRONE HYDROCHLORIDE 15 MG/1
15 TABLET ORAL 3 TIMES DAILY
COMMUNITY

## 2022-03-16 RX ORDER — PRAVASTATIN SODIUM 80 MG/1
80 TABLET ORAL DAILY
COMMUNITY

## 2022-03-16 RX ORDER — SERTRALINE HYDROCHLORIDE 100 MG/1
100 TABLET, FILM COATED ORAL DAILY
COMMUNITY

## 2022-03-16 RX ORDER — OMEPRAZOLE 20 MG/1
20 CAPSULE, DELAYED RELEASE ORAL DAILY
COMMUNITY

## 2022-03-16 RX ORDER — LORATADINE 10 MG/1
10 CAPSULE, LIQUID FILLED ORAL DAILY
COMMUNITY

## 2022-03-16 RX ORDER — BUMETANIDE 1 MG/1
2 TABLET ORAL DAILY
COMMUNITY

## 2022-03-16 RX ORDER — SODIUM BICARBONATE 325 MG/1
650 TABLET ORAL 4 TIMES DAILY
COMMUNITY

## 2022-03-16 RX ORDER — EZETIMIBE 10 MG/1
10 TABLET ORAL DAILY
COMMUNITY

## 2022-03-16 RX ORDER — HYDRALAZINE HYDROCHLORIDE 50 MG/1
50 TABLET, FILM COATED ORAL 3 TIMES DAILY
COMMUNITY

## 2022-03-16 RX ORDER — DIMENHYDRINATE 50 MG
1000 TABLET ORAL DAILY
COMMUNITY

## 2022-03-16 RX ORDER — ALBUTEROL SULFATE 90 UG/1
2 AEROSOL, METERED RESPIRATORY (INHALATION) EVERY 6 HOURS PRN
COMMUNITY

## 2022-03-16 RX ORDER — DULOXETIN HYDROCHLORIDE 30 MG/1
30 CAPSULE, DELAYED RELEASE ORAL DAILY
COMMUNITY

## 2022-03-16 RX ORDER — ALLOPURINOL 100 MG/1
100 TABLET ORAL DAILY
COMMUNITY

## 2022-03-16 RX ORDER — METHOCARBAMOL 750 MG/1
750 TABLET, FILM COATED ORAL 4 TIMES DAILY
COMMUNITY

## 2022-03-16 RX ORDER — FAMOTIDINE 40 MG/1
40 TABLET, FILM COATED ORAL DAILY
COMMUNITY

## 2022-03-16 RX ORDER — NITROGLYCERIN 0.4 MG/1
0.4 TABLET SUBLINGUAL EVERY 5 MIN PRN
COMMUNITY

## 2022-03-16 RX ORDER — SPIRONOLACTONE 25 MG/1
25 TABLET ORAL DAILY
COMMUNITY

## 2022-03-16 RX ORDER — INSULIN GLARGINE 100 [IU]/ML
50 INJECTION, SOLUTION SUBCUTANEOUS NIGHTLY
COMMUNITY

## 2022-03-16 RX ORDER — CALCIUM ACETATE 667 MG/1
1 CAPSULE ORAL
COMMUNITY

## 2022-03-16 RX ORDER — FERROUS SULFATE 325(65) MG
325 TABLET ORAL
COMMUNITY

## 2022-03-16 RX ORDER — FOLIC ACID 0.8 MG
TABLET ORAL
COMMUNITY

## 2022-03-16 RX ORDER — LOSARTAN POTASSIUM 100 MG/1
100 TABLET ORAL DAILY
COMMUNITY

## 2022-03-16 RX ORDER — OXYCODONE AND ACETAMINOPHEN 10; 325 MG/1; MG/1
1 TABLET ORAL EVERY 4 HOURS PRN
COMMUNITY

## 2022-03-16 RX ORDER — METOLAZONE 2.5 MG/1
2.5 TABLET ORAL DAILY
COMMUNITY

## 2022-03-16 NOTE — TELEPHONE ENCOUNTER
Referral rec'd from [facility] for possible permanent access. Spoke with pt and he stated he did not want to come up to [facility] that was too far of a drive. I LM with nephrologist office re this pt.

## 2022-08-30 ENCOUNTER — OFFICE VISIT (OUTPATIENT)
Dept: CARDIOLOGY | Facility: CLINIC | Age: 72
End: 2022-08-30

## 2022-08-30 VITALS
HEIGHT: 72 IN | WEIGHT: 267 LBS | DIASTOLIC BLOOD PRESSURE: 55 MMHG | SYSTOLIC BLOOD PRESSURE: 92 MMHG | HEART RATE: 71 BPM | OXYGEN SATURATION: 94 % | BODY MASS INDEX: 36.16 KG/M2

## 2022-08-30 DIAGNOSIS — E78.5 DYSLIPIDEMIA: ICD-10-CM

## 2022-08-30 DIAGNOSIS — Z79.4 TYPE 2 DIABETES MELLITUS WITH OTHER CIRCULATORY COMPLICATION, WITH LONG-TERM CURRENT USE OF INSULIN: ICD-10-CM

## 2022-08-30 DIAGNOSIS — I10 PRIMARY HYPERTENSION: ICD-10-CM

## 2022-08-30 DIAGNOSIS — E11.59 TYPE 2 DIABETES MELLITUS WITH OTHER CIRCULATORY COMPLICATION, WITH LONG-TERM CURRENT USE OF INSULIN: ICD-10-CM

## 2022-08-30 DIAGNOSIS — Z72.0 TOBACCO USE: ICD-10-CM

## 2022-08-30 DIAGNOSIS — I25.708 CORONARY ARTERY DISEASE INVOLVING CORONARY BYPASS GRAFT OF NATIVE HEART WITH OTHER FORMS OF ANGINA PECTORIS: Primary | ICD-10-CM

## 2022-08-30 PROCEDURE — 99214 OFFICE O/P EST MOD 30 MIN: CPT

## 2022-08-30 RX ORDER — LISINOPRIL 20 MG/1
20 TABLET ORAL DAILY
COMMUNITY
End: 2022-08-30 | Stop reason: SDUPTHER

## 2022-08-30 RX ORDER — LISINOPRIL 5 MG/1
5 TABLET ORAL DAILY
Qty: 90 TABLET | Refills: 1 | Status: SHIPPED | OUTPATIENT
Start: 2022-08-30

## 2022-08-30 RX ORDER — CALCITRIOL 0.25 UG/1
0.25 CAPSULE, LIQUID FILLED ORAL DAILY
COMMUNITY

## 2022-08-30 RX ORDER — FERROUS SULFATE TAB EC 324 MG (65 MG FE EQUIVALENT) 324 (65 FE) MG
324 TABLET DELAYED RESPONSE ORAL
COMMUNITY

## 2022-08-30 RX ORDER — COLCHICINE 0.6 MG/1
0.6 TABLET ORAL DAILY
COMMUNITY

## 2022-10-04 ENCOUNTER — APPOINTMENT (OUTPATIENT)
Dept: CT IMAGING | Facility: HOSPITAL | Age: 72
End: 2022-10-04

## 2022-10-04 ENCOUNTER — APPOINTMENT (OUTPATIENT)
Dept: GENERAL RADIOLOGY | Facility: HOSPITAL | Age: 72
End: 2022-10-04

## 2022-10-04 ENCOUNTER — HOSPITAL ENCOUNTER (INPATIENT)
Facility: HOSPITAL | Age: 72
LOS: 3 days | Discharge: HOME HEALTH | End: 2022-10-07
Attending: EMERGENCY MEDICINE | Admitting: STUDENT IN AN ORGANIZED HEALTH CARE EDUCATION/TRAINING PROGRAM

## 2022-10-04 DIAGNOSIS — N18.9 ACUTE RENAL FAILURE SUPERIMPOSED ON CHRONIC KIDNEY DISEASE, UNSPECIFIED CKD STAGE, UNSPECIFIED ACUTE RENAL FAILURE TYPE: ICD-10-CM

## 2022-10-04 DIAGNOSIS — G93.40 ENCEPHALOPATHY: Primary | ICD-10-CM

## 2022-10-04 DIAGNOSIS — N17.9 ACUTE RENAL FAILURE SUPERIMPOSED ON CHRONIC KIDNEY DISEASE, UNSPECIFIED CKD STAGE, UNSPECIFIED ACUTE RENAL FAILURE TYPE: ICD-10-CM

## 2022-10-04 DIAGNOSIS — R53.81 DEBILITY: ICD-10-CM

## 2022-10-04 LAB
A-A DO2: 117.2 MMHG
ALBUMIN SERPL-MCNC: 4.1 G/DL (ref 3.5–5.2)
ALBUMIN/GLOB SERPL: 1.4 G/DL
ALP SERPL-CCNC: 108 U/L (ref 39–117)
ALT SERPL W P-5'-P-CCNC: 7 U/L (ref 1–41)
AMPHET+METHAMPHET UR QL: NEGATIVE
AMPHETAMINES UR QL: NEGATIVE
ANION GAP SERPL CALCULATED.3IONS-SCNC: 14.3 MMOL/L (ref 5–15)
APAP SERPL-MCNC: <5 MCG/ML (ref 0–30)
APTT PPP: 37.6 SECONDS (ref 70–100)
ARTERIAL PATENCY WRIST A: POSITIVE
AST SERPL-CCNC: 10 U/L (ref 1–40)
ATMOSPHERIC PRESS: 737 MMHG
BARBITURATES UR QL SCN: NEGATIVE
BASE EXCESS BLDA CALC-SCNC: -2.9 MMOL/L (ref 0–2)
BASOPHILS # BLD AUTO: 0.03 10*3/MM3 (ref 0–0.2)
BASOPHILS NFR BLD AUTO: 0.4 % (ref 0–1.5)
BDY SITE: ABNORMAL
BENZODIAZ UR QL SCN: NEGATIVE
BILIRUB SERPL-MCNC: 0.3 MG/DL (ref 0–1.2)
BILIRUB UR QL STRIP: NEGATIVE
BUN SERPL-MCNC: 82 MG/DL (ref 8–23)
BUN/CREAT SERPL: 19.7 (ref 7–25)
BUPRENORPHINE SERPL-MCNC: NEGATIVE NG/ML
CALCIUM SPEC-SCNC: 9 MG/DL (ref 8.6–10.5)
CANNABINOIDS SERPL QL: NEGATIVE
CHLORIDE SERPL-SCNC: 95 MMOL/L (ref 98–107)
CLARITY UR: CLEAR
CO2 SERPL-SCNC: 24.7 MMOL/L (ref 22–29)
COCAINE UR QL: NEGATIVE
COHGB MFR BLD: 1.1 % (ref 0–2)
COLOR UR: YELLOW
CREAT SERPL-MCNC: 4.17 MG/DL (ref 0.76–1.27)
D-LACTATE SERPL-SCNC: 1.4 MMOL/L (ref 0.5–2)
DEPRECATED RDW RBC AUTO: 46.4 FL (ref 37–54)
EGFRCR SERPLBLD CKD-EPI 2021: 14.7 ML/MIN/1.73
EOSINOPHIL # BLD AUTO: 0.21 10*3/MM3 (ref 0–0.4)
EOSINOPHIL NFR BLD AUTO: 2.5 % (ref 0.3–6.2)
ERYTHROCYTE [DISTWIDTH] IN BLOOD BY AUTOMATED COUNT: 15.4 % (ref 12.3–15.4)
ETHANOL BLD-MCNC: <10 MG/DL (ref 0–10)
ETHANOL UR QL: <0.01 %
GLOBULIN UR ELPH-MCNC: 3 GM/DL
GLUCOSE BLDC GLUCOMTR-MCNC: 135 MG/DL (ref 70–130)
GLUCOSE SERPL-MCNC: 95 MG/DL (ref 65–99)
GLUCOSE UR STRIP-MCNC: NEGATIVE MG/DL
HCO3 BLDA-SCNC: 23.8 MMOL/L (ref 22–28)
HCT VFR BLD AUTO: 29 % (ref 37.5–51)
HCT VFR BLD CALC: 27.8 %
HGB BLD-MCNC: 9.4 G/DL (ref 13–17.7)
HGB UR QL STRIP.AUTO: NEGATIVE
HOLD SPECIMEN: NORMAL
HOLD SPECIMEN: NORMAL
IMM GRANULOCYTES # BLD AUTO: 0.07 10*3/MM3 (ref 0–0.05)
IMM GRANULOCYTES NFR BLD AUTO: 0.8 % (ref 0–0.5)
INHALED O2 CONCENTRATION: 100 %
INR PPP: 1.1 (ref 0.9–1.1)
KETONES UR QL STRIP: NEGATIVE
LEUKOCYTE ESTERASE UR QL STRIP.AUTO: NEGATIVE
LYMPHOCYTES # BLD AUTO: 0.84 10*3/MM3 (ref 0.7–3.1)
LYMPHOCYTES NFR BLD AUTO: 9.9 % (ref 19.6–45.3)
Lab: ABNORMAL
Lab: ABNORMAL
MAGNESIUM SERPL-MCNC: 2.6 MG/DL (ref 1.6–2.4)
MCH RBC QN AUTO: 26.4 PG (ref 26.6–33)
MCHC RBC AUTO-ENTMCNC: 32.4 G/DL (ref 31.5–35.7)
MCV RBC AUTO: 81.5 FL (ref 79–97)
METHADONE UR QL SCN: NEGATIVE
METHGB BLD QL: 0.9 % (ref 0–1.5)
MODALITY: ABNORMAL
MONOCYTES # BLD AUTO: 0.54 10*3/MM3 (ref 0.1–0.9)
MONOCYTES NFR BLD AUTO: 6.4 % (ref 5–12)
NEUTROPHILS NFR BLD AUTO: 6.77 10*3/MM3 (ref 1.7–7)
NEUTROPHILS NFR BLD AUTO: 80 % (ref 42.7–76)
NITRITE UR QL STRIP: NEGATIVE
NOTE: ABNORMAL
NOTIFIED BY: ABNORMAL
NOTIFIED WHO: ABNORMAL
NRBC BLD AUTO-RTO: 0 /100 WBC (ref 0–0.2)
OPIATES UR QL: NEGATIVE
OXYCODONE UR QL SCN: POSITIVE
OXYHGB MFR BLDV: 98.8 % (ref 94–99)
PCO2 BLDA: 49.6 MM HG (ref 35–45)
PCO2 TEMP ADJ BLD: ABNORMAL MM[HG]
PCP UR QL SCN: NEGATIVE
PH BLDA: 7.29 PH UNITS (ref 7.35–7.45)
PH UR STRIP.AUTO: <=5 [PH] (ref 5–8)
PH, TEMP CORRECTED: ABNORMAL
PLATELET # BLD AUTO: 210 10*3/MM3 (ref 140–450)
PMV BLD AUTO: 10.7 FL (ref 6–12)
PO2 BLDA: 524 MM HG (ref 75–100)
PO2 TEMP ADJ BLD: ABNORMAL MM[HG]
POTASSIUM SERPL-SCNC: 4.9 MMOL/L (ref 3.5–5.2)
PROCALCITONIN SERPL-MCNC: 0.42 NG/ML (ref 0–0.25)
PROPOXYPH UR QL: NEGATIVE
PROT SERPL-MCNC: 7.1 G/DL (ref 6–8.5)
PROT UR QL STRIP: NEGATIVE
PROTHROMBIN TIME: 14.5 SECONDS (ref 12.5–14.5)
RBC # BLD AUTO: 3.56 10*6/MM3 (ref 4.14–5.8)
SALICYLATES SERPL-MCNC: <0.3 MG/DL
SAO2 % BLDCOA: >100.6 % (ref 94–100)
SODIUM SERPL-SCNC: 134 MMOL/L (ref 136–145)
SODIUM UR-SCNC: 55 MMOL/L
SP GR UR STRIP: 1.01 (ref 1–1.03)
TRICYCLICS UR QL SCN: NEGATIVE
TROPONIN T SERPL-MCNC: 0.03 NG/ML (ref 0–0.03)
UROBILINOGEN UR QL STRIP: NORMAL
VENTILATOR MODE: AC
WBC NRBC COR # BLD: 8.46 10*3/MM3 (ref 3.4–10.8)
WHOLE BLOOD HOLD COAG: NORMAL
WHOLE BLOOD HOLD SPECIMEN: NORMAL

## 2022-10-04 PROCEDURE — 99223 1ST HOSP IP/OBS HIGH 75: CPT | Performed by: INTERNAL MEDICINE

## 2022-10-04 PROCEDURE — 80306 DRUG TEST PRSMV INSTRMNT: CPT | Performed by: EMERGENCY MEDICINE

## 2022-10-04 PROCEDURE — 74176 CT ABD & PELVIS W/O CONTRAST: CPT

## 2022-10-04 PROCEDURE — 83735 ASSAY OF MAGNESIUM: CPT

## 2022-10-04 PROCEDURE — 80053 COMPREHEN METABOLIC PANEL: CPT

## 2022-10-04 PROCEDURE — 82805 BLOOD GASES W/O2 SATURATION: CPT

## 2022-10-04 PROCEDURE — 71045 X-RAY EXAM CHEST 1 VIEW: CPT

## 2022-10-04 PROCEDURE — 74018 RADEX ABDOMEN 1 VIEW: CPT

## 2022-10-04 PROCEDURE — 25010000002 CEFTRIAXONE SODIUM-DEXTROSE 1-3.74 GM-%(50ML) RECONSTITUTED SOLUTION: Performed by: EMERGENCY MEDICINE

## 2022-10-04 PROCEDURE — 82962 GLUCOSE BLOOD TEST: CPT

## 2022-10-04 PROCEDURE — 84300 ASSAY OF URINE SODIUM: CPT | Performed by: INTERNAL MEDICINE

## 2022-10-04 PROCEDURE — 99291 CRITICAL CARE FIRST HOUR: CPT

## 2022-10-04 PROCEDURE — 31500 INSERT EMERGENCY AIRWAY: CPT

## 2022-10-04 PROCEDURE — 87040 BLOOD CULTURE FOR BACTERIA: CPT | Performed by: EMERGENCY MEDICINE

## 2022-10-04 PROCEDURE — 93005 ELECTROCARDIOGRAM TRACING: CPT

## 2022-10-04 PROCEDURE — 82375 ASSAY CARBOXYHB QUANT: CPT

## 2022-10-04 PROCEDURE — 63710000001 INSULIN DETEMIR PER 5 UNITS: Performed by: INTERNAL MEDICINE

## 2022-10-04 PROCEDURE — B54BZZA ULTRASONOGRAPHY OF RIGHT LOWER EXTREMITY VEINS, GUIDANCE: ICD-10-PCS | Performed by: EMERGENCY MEDICINE

## 2022-10-04 PROCEDURE — 5A1935Z RESPIRATORY VENTILATION, LESS THAN 24 CONSECUTIVE HOURS: ICD-10-PCS | Performed by: INTERNAL MEDICINE

## 2022-10-04 PROCEDURE — 25010000002 HEPARIN (PORCINE) PER 1000 UNITS: Performed by: INTERNAL MEDICINE

## 2022-10-04 PROCEDURE — 25010000002 PROPOFOL 10 MG/ML EMULSION: Performed by: INTERNAL MEDICINE

## 2022-10-04 PROCEDURE — 81003 URINALYSIS AUTO W/O SCOPE: CPT

## 2022-10-04 PROCEDURE — 0BH17EZ INSERTION OF ENDOTRACHEAL AIRWAY INTO TRACHEA, VIA NATURAL OR ARTIFICIAL OPENING: ICD-10-PCS | Performed by: EMERGENCY MEDICINE

## 2022-10-04 PROCEDURE — 94002 VENT MGMT INPAT INIT DAY: CPT

## 2022-10-04 PROCEDURE — 84145 PROCALCITONIN (PCT): CPT | Performed by: EMERGENCY MEDICINE

## 2022-10-04 PROCEDURE — 06HM33Z INSERTION OF INFUSION DEVICE INTO RIGHT FEMORAL VEIN, PERCUTANEOUS APPROACH: ICD-10-PCS | Performed by: EMERGENCY MEDICINE

## 2022-10-04 PROCEDURE — 94799 UNLISTED PULMONARY SVC/PX: CPT

## 2022-10-04 PROCEDURE — 83050 HGB METHEMOGLOBIN QUAN: CPT

## 2022-10-04 PROCEDURE — 36600 WITHDRAWAL OF ARTERIAL BLOOD: CPT

## 2022-10-04 PROCEDURE — 85025 COMPLETE CBC W/AUTO DIFF WBC: CPT

## 2022-10-04 PROCEDURE — 25010000002 PROPOFOL 10 MG/ML EMULSION: Performed by: EMERGENCY MEDICINE

## 2022-10-04 PROCEDURE — 82077 ASSAY SPEC XCP UR&BREATH IA: CPT | Performed by: EMERGENCY MEDICINE

## 2022-10-04 PROCEDURE — 84484 ASSAY OF TROPONIN QUANT: CPT

## 2022-10-04 PROCEDURE — 85610 PROTHROMBIN TIME: CPT | Performed by: EMERGENCY MEDICINE

## 2022-10-04 PROCEDURE — 83605 ASSAY OF LACTIC ACID: CPT | Performed by: EMERGENCY MEDICINE

## 2022-10-04 PROCEDURE — 80143 DRUG ASSAY ACETAMINOPHEN: CPT | Performed by: EMERGENCY MEDICINE

## 2022-10-04 PROCEDURE — C1751 CATH, INF, PER/CENT/MIDLINE: HCPCS

## 2022-10-04 PROCEDURE — 80179 DRUG ASSAY SALICYLATE: CPT | Performed by: EMERGENCY MEDICINE

## 2022-10-04 PROCEDURE — 85730 THROMBOPLASTIN TIME PARTIAL: CPT | Performed by: EMERGENCY MEDICINE

## 2022-10-04 PROCEDURE — 25010000002 NALOXONE HCL 2 MG/2ML SOLUTION PREFILLED SYRINGE: Performed by: EMERGENCY MEDICINE

## 2022-10-04 PROCEDURE — 70450 CT HEAD/BRAIN W/O DYE: CPT

## 2022-10-04 RX ORDER — QUETIAPINE FUMARATE 25 MG/1
25 TABLET, FILM COATED ORAL EVERY 12 HOURS SCHEDULED
Status: DISCONTINUED | OUTPATIENT
Start: 2022-10-04 | End: 2022-10-07

## 2022-10-04 RX ORDER — NICOTINE POLACRILEX 4 MG
15 LOZENGE BUCCAL
Status: DISCONTINUED | OUTPATIENT
Start: 2022-10-04 | End: 2022-10-06 | Stop reason: SDUPTHER

## 2022-10-04 RX ORDER — HYDROCODONE BITARTRATE AND ACETAMINOPHEN 10; 325 MG/1; MG/1
1 TABLET ORAL EVERY 6 HOURS PRN
COMMUNITY

## 2022-10-04 RX ORDER — ASPIRIN 81 MG/1
81 TABLET, CHEWABLE ORAL DAILY
Status: DISCONTINUED | OUTPATIENT
Start: 2022-10-05 | End: 2022-10-07 | Stop reason: HOSPADM

## 2022-10-04 RX ORDER — SODIUM CHLORIDE 0.9 % (FLUSH) 0.9 %
10 SYRINGE (ML) INJECTION EVERY 12 HOURS SCHEDULED
Status: DISCONTINUED | OUTPATIENT
Start: 2022-10-04 | End: 2022-10-07 | Stop reason: HOSPADM

## 2022-10-04 RX ORDER — OXYCODONE AND ACETAMINOPHEN 10; 325 MG/1; MG/1
1 TABLET ORAL EVERY 4 HOURS PRN
Status: DISCONTINUED | OUTPATIENT
Start: 2022-10-04 | End: 2022-10-07 | Stop reason: HOSPADM

## 2022-10-04 RX ORDER — CALCITRIOL 0.25 UG/1
0.25 CAPSULE, LIQUID FILLED ORAL DAILY
Status: DISCONTINUED | OUTPATIENT
Start: 2022-10-05 | End: 2022-10-07 | Stop reason: HOSPADM

## 2022-10-04 RX ORDER — METOPROLOL TARTRATE 50 MG/1
50 TABLET, FILM COATED ORAL EVERY 12 HOURS SCHEDULED
Status: DISCONTINUED | OUTPATIENT
Start: 2022-10-04 | End: 2022-10-07 | Stop reason: HOSPADM

## 2022-10-04 RX ORDER — MELATONIN
2000 DAILY
Status: DISCONTINUED | OUTPATIENT
Start: 2022-10-05 | End: 2022-10-07 | Stop reason: HOSPADM

## 2022-10-04 RX ORDER — COLCHICINE 0.6 MG/1
0.6 TABLET ORAL DAILY
Status: DISCONTINUED | OUTPATIENT
Start: 2022-10-05 | End: 2022-10-07 | Stop reason: HOSPADM

## 2022-10-04 RX ORDER — SODIUM CHLORIDE 0.9 % (FLUSH) 0.9 %
10 SYRINGE (ML) INJECTION AS NEEDED
Status: DISCONTINUED | OUTPATIENT
Start: 2022-10-04 | End: 2022-10-07 | Stop reason: HOSPADM

## 2022-10-04 RX ORDER — ISOSORBIDE MONONITRATE 60 MG/1
60 TABLET, EXTENDED RELEASE ORAL DAILY
Status: DISCONTINUED | OUTPATIENT
Start: 2022-10-05 | End: 2022-10-07 | Stop reason: HOSPADM

## 2022-10-04 RX ORDER — ALLOPURINOL 100 MG/1
100 TABLET ORAL DAILY
Status: DISCONTINUED | OUTPATIENT
Start: 2022-10-05 | End: 2022-10-07 | Stop reason: HOSPADM

## 2022-10-04 RX ORDER — TAMSULOSIN HYDROCHLORIDE 0.4 MG/1
0.4 CAPSULE ORAL NIGHTLY
Status: DISCONTINUED | OUTPATIENT
Start: 2022-10-04 | End: 2022-10-07 | Stop reason: HOSPADM

## 2022-10-04 RX ORDER — BUSPIRONE HYDROCHLORIDE 15 MG/1
15 TABLET ORAL EVERY 12 HOURS SCHEDULED
Status: DISCONTINUED | OUTPATIENT
Start: 2022-10-04 | End: 2022-10-07 | Stop reason: HOSPADM

## 2022-10-04 RX ORDER — FINASTERIDE 5 MG/1
5 TABLET, FILM COATED ORAL DAILY
Status: DISCONTINUED | OUTPATIENT
Start: 2022-10-05 | End: 2022-10-07 | Stop reason: HOSPADM

## 2022-10-04 RX ORDER — NALOXONE HYDROCHLORIDE 1 MG/ML
2 INJECTION INTRAMUSCULAR; INTRAVENOUS; SUBCUTANEOUS ONCE
Status: COMPLETED | OUTPATIENT
Start: 2022-10-04 | End: 2022-10-04

## 2022-10-04 RX ORDER — DEXTROSE MONOHYDRATE 25 G/50ML
25 INJECTION, SOLUTION INTRAVENOUS
Status: DISCONTINUED | OUTPATIENT
Start: 2022-10-04 | End: 2022-10-06 | Stop reason: SDUPTHER

## 2022-10-04 RX ORDER — CEFTRIAXONE 1 G/50ML
1 INJECTION, SOLUTION INTRAVENOUS ONCE
Status: COMPLETED | OUTPATIENT
Start: 2022-10-04 | End: 2022-10-04

## 2022-10-04 RX ORDER — SODIUM CHLORIDE, SODIUM LACTATE, POTASSIUM CHLORIDE, CALCIUM CHLORIDE 600; 310; 30; 20 MG/100ML; MG/100ML; MG/100ML; MG/100ML
75 INJECTION, SOLUTION INTRAVENOUS CONTINUOUS
Status: DISCONTINUED | OUTPATIENT
Start: 2022-10-04 | End: 2022-10-06

## 2022-10-04 RX ORDER — NOREPINEPHRINE BITARTRATE/D5W 8 MG/250ML
.02-.3 PLASTIC BAG, INJECTION (ML) INTRAVENOUS
Status: DISCONTINUED | OUTPATIENT
Start: 2022-10-04 | End: 2022-10-07 | Stop reason: HOSPADM

## 2022-10-04 RX ORDER — DIAZEPAM 10 MG/1
10 TABLET ORAL 2 TIMES DAILY PRN
COMMUNITY

## 2022-10-04 RX ORDER — PANTOPRAZOLE SODIUM 40 MG/10ML
40 INJECTION, POWDER, LYOPHILIZED, FOR SOLUTION INTRAVENOUS
Status: DISCONTINUED | OUTPATIENT
Start: 2022-10-05 | End: 2022-10-07

## 2022-10-04 RX ORDER — HEPARIN SODIUM 5000 [USP'U]/ML
5000 INJECTION, SOLUTION INTRAVENOUS; SUBCUTANEOUS EVERY 8 HOURS SCHEDULED
Status: DISCONTINUED | OUTPATIENT
Start: 2022-10-04 | End: 2022-10-07 | Stop reason: HOSPADM

## 2022-10-04 RX ORDER — CHOLECALCIFEROL (VITAMIN D3) 125 MCG
5 CAPSULE ORAL NIGHTLY
Status: DISCONTINUED | OUTPATIENT
Start: 2022-10-04 | End: 2022-10-07 | Stop reason: HOSPADM

## 2022-10-04 RX ORDER — FERROUS SULFATE 300 MG/5ML
300 LIQUID (ML) ORAL DAILY
Status: DISCONTINUED | OUTPATIENT
Start: 2022-10-05 | End: 2022-10-07 | Stop reason: HOSPADM

## 2022-10-04 RX ORDER — NITROGLYCERIN 0.4 MG/1
0.4 TABLET SUBLINGUAL
Status: DISCONTINUED | OUTPATIENT
Start: 2022-10-04 | End: 2022-10-07 | Stop reason: HOSPADM

## 2022-10-04 RX ADMIN — INSULIN DETEMIR 30 UNITS: 100 INJECTION, SOLUTION SUBCUTANEOUS at 22:34

## 2022-10-04 RX ADMIN — NOREPINEPHRINE BITARTRATE 0.1 MCG/KG/MIN: 8 INJECTION, SOLUTION INTRAVENOUS at 18:00

## 2022-10-04 RX ADMIN — CEFTRIAXONE 1 G: 1 INJECTION, SOLUTION INTRAVENOUS at 18:27

## 2022-10-04 RX ADMIN — HEPARIN SODIUM 5000 UNITS: 5000 INJECTION INTRAVENOUS; SUBCUTANEOUS at 22:23

## 2022-10-04 RX ADMIN — TAMSULOSIN HYDROCHLORIDE 0.4 MG: 0.4 CAPSULE ORAL at 22:30

## 2022-10-04 RX ADMIN — TICAGRELOR 90 MG: 90 TABLET ORAL at 22:56

## 2022-10-04 RX ADMIN — PROPOFOL 25 MCG/KG/MIN: 10 INJECTION, EMULSION INTRAVENOUS at 23:10

## 2022-10-04 RX ADMIN — SODIUM CHLORIDE 1000 ML: 9 INJECTION, SOLUTION INTRAVENOUS at 14:45

## 2022-10-04 RX ADMIN — NALOXONE HYDROCHLORIDE 2 MG: 1 INJECTION PARENTERAL at 14:06

## 2022-10-04 RX ADMIN — Medication 10 ML: at 22:32

## 2022-10-04 RX ADMIN — PROPOFOL 5 MCG/KG/MIN: 10 INJECTION, EMULSION INTRAVENOUS at 15:00

## 2022-10-04 RX ADMIN — SODIUM CHLORIDE, POTASSIUM CHLORIDE, SODIUM LACTATE AND CALCIUM CHLORIDE 75 ML/HR: 600; 310; 30; 20 INJECTION, SOLUTION INTRAVENOUS at 22:41

## 2022-10-04 RX ADMIN — BUSPIRONE HYDROCHLORIDE 15 MG: 15 TABLET ORAL at 22:30

## 2022-10-04 RX ADMIN — PROPOFOL 50 MCG/KG/MIN: 10 INJECTION, EMULSION INTRAVENOUS at 20:31

## 2022-10-04 RX ADMIN — QUETIAPINE FUMARATE 25 MG: 25 TABLET ORAL at 22:31

## 2022-10-04 RX ADMIN — SODIUM CHLORIDE 1000 ML: 9 INJECTION, SOLUTION INTRAVENOUS at 18:00

## 2022-10-05 ENCOUNTER — APPOINTMENT (OUTPATIENT)
Dept: GENERAL RADIOLOGY | Facility: HOSPITAL | Age: 72
End: 2022-10-05

## 2022-10-05 LAB
ALBUMIN SERPL-MCNC: 3.7 G/DL (ref 3.5–5.2)
ALBUMIN/GLOB SERPL: 1.2 G/DL
ALP SERPL-CCNC: 100 U/L (ref 39–117)
ALT SERPL W P-5'-P-CCNC: 7 U/L (ref 1–41)
ANION GAP SERPL CALCULATED.3IONS-SCNC: 12 MMOL/L (ref 5–15)
AST SERPL-CCNC: 8 U/L (ref 1–40)
BASOPHILS # BLD AUTO: 0.02 10*3/MM3 (ref 0–0.2)
BASOPHILS NFR BLD AUTO: 0.4 % (ref 0–1.5)
BILIRUB SERPL-MCNC: 0.2 MG/DL (ref 0–1.2)
BUN SERPL-MCNC: 74 MG/DL (ref 8–23)
BUN/CREAT SERPL: 21.2 (ref 7–25)
CALCIUM SPEC-SCNC: 8.8 MG/DL (ref 8.6–10.5)
CHLORIDE SERPL-SCNC: 103 MMOL/L (ref 98–107)
CO2 SERPL-SCNC: 27 MMOL/L (ref 22–29)
CREAT SERPL-MCNC: 3.49 MG/DL (ref 0.76–1.27)
D-LACTATE SERPL-SCNC: 0.7 MMOL/L (ref 0.5–2)
DEPRECATED RDW RBC AUTO: 46.5 FL (ref 37–54)
EGFRCR SERPLBLD CKD-EPI 2021: 18.2 ML/MIN/1.73
EOSINOPHIL # BLD AUTO: 0.23 10*3/MM3 (ref 0–0.4)
EOSINOPHIL NFR BLD AUTO: 4.9 % (ref 0.3–6.2)
ERYTHROCYTE [DISTWIDTH] IN BLOOD BY AUTOMATED COUNT: 15.5 % (ref 12.3–15.4)
GLOBULIN UR ELPH-MCNC: 3 GM/DL
GLUCOSE BLDC GLUCOMTR-MCNC: 107 MG/DL (ref 70–130)
GLUCOSE BLDC GLUCOMTR-MCNC: 88 MG/DL (ref 70–130)
GLUCOSE SERPL-MCNC: 80 MG/DL (ref 65–99)
HCT VFR BLD AUTO: 27.1 % (ref 37.5–51)
HGB BLD-MCNC: 8.8 G/DL (ref 13–17.7)
IMM GRANULOCYTES # BLD AUTO: 0.02 10*3/MM3 (ref 0–0.05)
IMM GRANULOCYTES NFR BLD AUTO: 0.4 % (ref 0–0.5)
LYMPHOCYTES # BLD AUTO: 0.65 10*3/MM3 (ref 0.7–3.1)
LYMPHOCYTES NFR BLD AUTO: 13.8 % (ref 19.6–45.3)
MCH RBC QN AUTO: 26.6 PG (ref 26.6–33)
MCHC RBC AUTO-ENTMCNC: 32.5 G/DL (ref 31.5–35.7)
MCV RBC AUTO: 81.9 FL (ref 79–97)
MONOCYTES # BLD AUTO: 0.44 10*3/MM3 (ref 0.1–0.9)
MONOCYTES NFR BLD AUTO: 9.3 % (ref 5–12)
NEUTROPHILS NFR BLD AUTO: 3.35 10*3/MM3 (ref 1.7–7)
NEUTROPHILS NFR BLD AUTO: 71.2 % (ref 42.7–76)
NRBC BLD AUTO-RTO: 0 /100 WBC (ref 0–0.2)
PLATELET # BLD AUTO: 182 10*3/MM3 (ref 140–450)
PMV BLD AUTO: 10.5 FL (ref 6–12)
POTASSIUM SERPL-SCNC: 4.4 MMOL/L (ref 3.5–5.2)
PROCALCITONIN SERPL-MCNC: 0.41 NG/ML (ref 0–0.25)
PROT SERPL-MCNC: 6.7 G/DL (ref 6–8.5)
RBC # BLD AUTO: 3.31 10*6/MM3 (ref 4.14–5.8)
SODIUM SERPL-SCNC: 142 MMOL/L (ref 136–145)
WBC NRBC COR # BLD: 4.71 10*3/MM3 (ref 3.4–10.8)

## 2022-10-05 PROCEDURE — 94003 VENT MGMT INPAT SUBQ DAY: CPT

## 2022-10-05 PROCEDURE — 63710000001 INSULIN DETEMIR PER 5 UNITS: Performed by: INTERNAL MEDICINE

## 2022-10-05 PROCEDURE — 99232 SBSQ HOSP IP/OBS MODERATE 35: CPT | Performed by: STUDENT IN AN ORGANIZED HEALTH CARE EDUCATION/TRAINING PROGRAM

## 2022-10-05 PROCEDURE — 82962 GLUCOSE BLOOD TEST: CPT

## 2022-10-05 PROCEDURE — 25010000002 CEFTRIAXONE SODIUM-DEXTROSE 1-3.74 GM-%(50ML) RECONSTITUTED SOLUTION: Performed by: STUDENT IN AN ORGANIZED HEALTH CARE EDUCATION/TRAINING PROGRAM

## 2022-10-05 PROCEDURE — 83605 ASSAY OF LACTIC ACID: CPT | Performed by: STUDENT IN AN ORGANIZED HEALTH CARE EDUCATION/TRAINING PROGRAM

## 2022-10-05 PROCEDURE — 25010000002 PROPOFOL 10 MG/ML EMULSION: Performed by: INTERNAL MEDICINE

## 2022-10-05 PROCEDURE — 99223 1ST HOSP IP/OBS HIGH 75: CPT | Performed by: INTERNAL MEDICINE

## 2022-10-05 PROCEDURE — 94799 UNLISTED PULMONARY SVC/PX: CPT

## 2022-10-05 PROCEDURE — 25010000002 HEPARIN (PORCINE) PER 1000 UNITS: Performed by: INTERNAL MEDICINE

## 2022-10-05 PROCEDURE — 94761 N-INVAS EAR/PLS OXIMETRY MLT: CPT

## 2022-10-05 PROCEDURE — 71045 X-RAY EXAM CHEST 1 VIEW: CPT

## 2022-10-05 PROCEDURE — 80053 COMPREHEN METABOLIC PANEL: CPT | Performed by: STUDENT IN AN ORGANIZED HEALTH CARE EDUCATION/TRAINING PROGRAM

## 2022-10-05 PROCEDURE — 87040 BLOOD CULTURE FOR BACTERIA: CPT | Performed by: STUDENT IN AN ORGANIZED HEALTH CARE EDUCATION/TRAINING PROGRAM

## 2022-10-05 PROCEDURE — 85025 COMPLETE CBC W/AUTO DIFF WBC: CPT | Performed by: STUDENT IN AN ORGANIZED HEALTH CARE EDUCATION/TRAINING PROGRAM

## 2022-10-05 PROCEDURE — 84145 PROCALCITONIN (PCT): CPT | Performed by: STUDENT IN AN ORGANIZED HEALTH CARE EDUCATION/TRAINING PROGRAM

## 2022-10-05 PROCEDURE — 94002 VENT MGMT INPAT INIT DAY: CPT

## 2022-10-05 RX ORDER — CHLORHEXIDINE GLUCONATE 0.12 MG/ML
15 RINSE ORAL EVERY 12 HOURS SCHEDULED
Status: DISCONTINUED | OUTPATIENT
Start: 2022-10-05 | End: 2022-10-07 | Stop reason: HOSPADM

## 2022-10-05 RX ORDER — CEFTRIAXONE 1 G/50ML
1 INJECTION, SOLUTION INTRAVENOUS EVERY 24 HOURS
Status: DISCONTINUED | OUTPATIENT
Start: 2022-10-05 | End: 2022-10-07 | Stop reason: HOSPADM

## 2022-10-05 RX ADMIN — COLCHICINE 0.6 MG: 0.6 TABLET, FILM COATED ORAL at 10:18

## 2022-10-05 RX ADMIN — SERTRALINE 100 MG: 50 TABLET, FILM COATED ORAL at 10:17

## 2022-10-05 RX ADMIN — Medication 10 ML: at 10:18

## 2022-10-05 RX ADMIN — BUSPIRONE HYDROCHLORIDE 15 MG: 15 TABLET ORAL at 21:20

## 2022-10-05 RX ADMIN — HEPARIN SODIUM 5000 UNITS: 5000 INJECTION INTRAVENOUS; SUBCUTANEOUS at 06:10

## 2022-10-05 RX ADMIN — Medication 10 ML: at 21:20

## 2022-10-05 RX ADMIN — SODIUM CHLORIDE, POTASSIUM CHLORIDE, SODIUM LACTATE AND CALCIUM CHLORIDE 75 ML/HR: 600; 310; 30; 20 INJECTION, SOLUTION INTRAVENOUS at 12:09

## 2022-10-05 RX ADMIN — HEPARIN SODIUM 5000 UNITS: 5000 INJECTION INTRAVENOUS; SUBCUTANEOUS at 21:20

## 2022-10-05 RX ADMIN — TICAGRELOR 90 MG: 90 TABLET ORAL at 10:17

## 2022-10-05 RX ADMIN — CALCITRIOL CAPSULES 0.25 MCG 0.25 MCG: 0.25 CAPSULE ORAL at 10:18

## 2022-10-05 RX ADMIN — Medication 2000 UNITS: at 10:18

## 2022-10-05 RX ADMIN — 0.12% CHLORHEXIDINE GLUCONATE 15 ML: 1.2 RINSE ORAL at 01:50

## 2022-10-05 RX ADMIN — 0.12% CHLORHEXIDINE GLUCONATE 15 ML: 1.2 RINSE ORAL at 21:20

## 2022-10-05 RX ADMIN — 0.12% CHLORHEXIDINE GLUCONATE 15 ML: 1.2 RINSE ORAL at 10:17

## 2022-10-05 RX ADMIN — TICAGRELOR 90 MG: 90 TABLET ORAL at 21:20

## 2022-10-05 RX ADMIN — PROPOFOL 25 MCG/KG/MIN: 10 INJECTION, EMULSION INTRAVENOUS at 06:09

## 2022-10-05 RX ADMIN — TAMSULOSIN HYDROCHLORIDE 0.4 MG: 0.4 CAPSULE ORAL at 21:20

## 2022-10-05 RX ADMIN — BUSPIRONE HYDROCHLORIDE 15 MG: 15 TABLET ORAL at 10:17

## 2022-10-05 RX ADMIN — Medication 300 MG: at 10:17

## 2022-10-05 RX ADMIN — CEFTRIAXONE 1 G: 1 INJECTION, SOLUTION INTRAVENOUS at 15:00

## 2022-10-05 RX ADMIN — HEPARIN SODIUM 5000 UNITS: 5000 INJECTION INTRAVENOUS; SUBCUTANEOUS at 15:00

## 2022-10-05 RX ADMIN — METOPROLOL TARTRATE 50 MG: 50 TABLET ORAL at 21:20

## 2022-10-05 RX ADMIN — INSULIN DETEMIR 30 UNITS: 100 INJECTION, SOLUTION SUBCUTANEOUS at 21:20

## 2022-10-05 RX ADMIN — ALLOPURINOL 100 MG: 100 TABLET ORAL at 10:17

## 2022-10-05 RX ADMIN — Medication 5 MG: at 21:20

## 2022-10-05 RX ADMIN — PANTOPRAZOLE SODIUM 40 MG: 40 INJECTION, POWDER, FOR SOLUTION INTRAVENOUS at 06:10

## 2022-10-05 RX ADMIN — ASPIRIN 81 MG CHEWABLE TABLET 81 MG: 81 TABLET CHEWABLE at 10:17

## 2022-10-05 RX ADMIN — QUETIAPINE FUMARATE 25 MG: 25 TABLET ORAL at 21:20

## 2022-10-05 RX ADMIN — FINASTERIDE 5 MG: 5 TABLET, FILM COATED ORAL at 10:18

## 2022-10-06 ENCOUNTER — APPOINTMENT (OUTPATIENT)
Dept: GENERAL RADIOLOGY | Facility: HOSPITAL | Age: 72
End: 2022-10-06

## 2022-10-06 LAB
ALBUMIN SERPL-MCNC: 3.2 G/DL (ref 3.5–5.2)
ALBUMIN/GLOB SERPL: 1.1 G/DL
ALP SERPL-CCNC: 84 U/L (ref 39–117)
ALT SERPL W P-5'-P-CCNC: 5 U/L (ref 1–41)
ANION GAP SERPL CALCULATED.3IONS-SCNC: 9.4 MMOL/L (ref 5–15)
AST SERPL-CCNC: 8 U/L (ref 1–40)
BASOPHILS # BLD AUTO: 0.02 10*3/MM3 (ref 0–0.2)
BASOPHILS NFR BLD AUTO: 0.5 % (ref 0–1.5)
BILIRUB SERPL-MCNC: <0.2 MG/DL (ref 0–1.2)
BUN SERPL-MCNC: 60 MG/DL (ref 8–23)
BUN/CREAT SERPL: 22 (ref 7–25)
CALCIUM SPEC-SCNC: 8.9 MG/DL (ref 8.6–10.5)
CHLORIDE SERPL-SCNC: 106 MMOL/L (ref 98–107)
CK SERPL-CCNC: 42 U/L (ref 20–200)
CO2 SERPL-SCNC: 26.6 MMOL/L (ref 22–29)
CREAT SERPL-MCNC: 2.73 MG/DL (ref 0.76–1.27)
DEPRECATED RDW RBC AUTO: 46.6 FL (ref 37–54)
EGFRCR SERPLBLD CKD-EPI 2021: 24.4 ML/MIN/1.73
EOSINOPHIL # BLD AUTO: 0.23 10*3/MM3 (ref 0–0.4)
EOSINOPHIL NFR BLD AUTO: 6.2 % (ref 0.3–6.2)
ERYTHROCYTE [DISTWIDTH] IN BLOOD BY AUTOMATED COUNT: 15.3 % (ref 12.3–15.4)
GLOBULIN UR ELPH-MCNC: 3 GM/DL
GLUCOSE SERPL-MCNC: 60 MG/DL (ref 65–99)
HCT VFR BLD AUTO: 24.6 % (ref 37.5–51)
HGB BLD-MCNC: 7.8 G/DL (ref 13–17.7)
IMM GRANULOCYTES # BLD AUTO: 0.01 10*3/MM3 (ref 0–0.05)
IMM GRANULOCYTES NFR BLD AUTO: 0.3 % (ref 0–0.5)
LYMPHOCYTES # BLD AUTO: 0.73 10*3/MM3 (ref 0.7–3.1)
LYMPHOCYTES NFR BLD AUTO: 19.7 % (ref 19.6–45.3)
MCH RBC QN AUTO: 26.3 PG (ref 26.6–33)
MCHC RBC AUTO-ENTMCNC: 31.7 G/DL (ref 31.5–35.7)
MCV RBC AUTO: 82.8 FL (ref 79–97)
MONOCYTES # BLD AUTO: 0.44 10*3/MM3 (ref 0.1–0.9)
MONOCYTES NFR BLD AUTO: 11.9 % (ref 5–12)
NEUTROPHILS NFR BLD AUTO: 2.28 10*3/MM3 (ref 1.7–7)
NEUTROPHILS NFR BLD AUTO: 61.4 % (ref 42.7–76)
NRBC BLD AUTO-RTO: 0 /100 WBC (ref 0–0.2)
PLATELET # BLD AUTO: 143 10*3/MM3 (ref 140–450)
PMV BLD AUTO: 10.5 FL (ref 6–12)
POTASSIUM SERPL-SCNC: 4.2 MMOL/L (ref 3.5–5.2)
PROT SERPL-MCNC: 6.2 G/DL (ref 6–8.5)
RBC # BLD AUTO: 2.97 10*6/MM3 (ref 4.14–5.8)
SODIUM SERPL-SCNC: 142 MMOL/L (ref 136–145)
WBC NRBC COR # BLD: 3.71 10*3/MM3 (ref 3.4–10.8)

## 2022-10-06 PROCEDURE — 63710000001 INSULIN DETEMIR PER 5 UNITS: Performed by: INTERNAL MEDICINE

## 2022-10-06 PROCEDURE — 63710000001 INSULIN REGULAR HUMAN PER 5 UNITS: Performed by: INTERNAL MEDICINE

## 2022-10-06 PROCEDURE — 82550 ASSAY OF CK (CPK): CPT | Performed by: STUDENT IN AN ORGANIZED HEALTH CARE EDUCATION/TRAINING PROGRAM

## 2022-10-06 PROCEDURE — 80053 COMPREHEN METABOLIC PANEL: CPT | Performed by: STUDENT IN AN ORGANIZED HEALTH CARE EDUCATION/TRAINING PROGRAM

## 2022-10-06 PROCEDURE — 25010000002 CEFTRIAXONE SODIUM-DEXTROSE 1-3.74 GM-%(50ML) RECONSTITUTED SOLUTION: Performed by: STUDENT IN AN ORGANIZED HEALTH CARE EDUCATION/TRAINING PROGRAM

## 2022-10-06 PROCEDURE — 85025 COMPLETE CBC W/AUTO DIFF WBC: CPT | Performed by: STUDENT IN AN ORGANIZED HEALTH CARE EDUCATION/TRAINING PROGRAM

## 2022-10-06 PROCEDURE — 71045 X-RAY EXAM CHEST 1 VIEW: CPT

## 2022-10-06 PROCEDURE — 99232 SBSQ HOSP IP/OBS MODERATE 35: CPT | Performed by: STUDENT IN AN ORGANIZED HEALTH CARE EDUCATION/TRAINING PROGRAM

## 2022-10-06 PROCEDURE — 97162 PT EVAL MOD COMPLEX 30 MIN: CPT

## 2022-10-06 PROCEDURE — 25010000002 HEPARIN (PORCINE) PER 1000 UNITS: Performed by: INTERNAL MEDICINE

## 2022-10-06 PROCEDURE — 97166 OT EVAL MOD COMPLEX 45 MIN: CPT

## 2022-10-06 RX ORDER — DEXTROSE MONOHYDRATE 25 G/50ML
25 INJECTION, SOLUTION INTRAVENOUS
Status: DISCONTINUED | OUTPATIENT
Start: 2022-10-06 | End: 2022-10-07 | Stop reason: HOSPADM

## 2022-10-06 RX ORDER — NICOTINE POLACRILEX 4 MG
15 LOZENGE BUCCAL
Status: DISCONTINUED | OUTPATIENT
Start: 2022-10-06 | End: 2022-10-07 | Stop reason: HOSPADM

## 2022-10-06 RX ORDER — INSULIN ASPART 100 [IU]/ML
0-9 INJECTION, SOLUTION INTRAVENOUS; SUBCUTANEOUS
Status: DISCONTINUED | OUTPATIENT
Start: 2022-10-07 | End: 2022-10-07 | Stop reason: HOSPADM

## 2022-10-06 RX ADMIN — 0.12% CHLORHEXIDINE GLUCONATE 15 ML: 1.2 RINSE ORAL at 09:21

## 2022-10-06 RX ADMIN — TAMSULOSIN HYDROCHLORIDE 0.4 MG: 0.4 CAPSULE ORAL at 21:05

## 2022-10-06 RX ADMIN — HEPARIN SODIUM 5000 UNITS: 5000 INJECTION INTRAVENOUS; SUBCUTANEOUS at 15:41

## 2022-10-06 RX ADMIN — CEFTRIAXONE 1 G: 1 INJECTION, SOLUTION INTRAVENOUS at 15:41

## 2022-10-06 RX ADMIN — QUETIAPINE FUMARATE 25 MG: 25 TABLET ORAL at 21:05

## 2022-10-06 RX ADMIN — ALLOPURINOL 100 MG: 100 TABLET ORAL at 09:22

## 2022-10-06 RX ADMIN — Medication 300 MG: at 09:22

## 2022-10-06 RX ADMIN — BUSPIRONE HYDROCHLORIDE 15 MG: 15 TABLET ORAL at 09:22

## 2022-10-06 RX ADMIN — CALCITRIOL CAPSULES 0.25 MCG 0.25 MCG: 0.25 CAPSULE ORAL at 09:22

## 2022-10-06 RX ADMIN — Medication 5 MG: at 21:05

## 2022-10-06 RX ADMIN — TICAGRELOR 90 MG: 90 TABLET ORAL at 21:05

## 2022-10-06 RX ADMIN — QUETIAPINE FUMARATE 25 MG: 25 TABLET ORAL at 09:22

## 2022-10-06 RX ADMIN — FINASTERIDE 5 MG: 5 TABLET, FILM COATED ORAL at 09:22

## 2022-10-06 RX ADMIN — SERTRALINE 100 MG: 50 TABLET, FILM COATED ORAL at 09:22

## 2022-10-06 RX ADMIN — Medication 10 ML: at 09:22

## 2022-10-06 RX ADMIN — METOPROLOL TARTRATE 50 MG: 50 TABLET ORAL at 21:05

## 2022-10-06 RX ADMIN — COLCHICINE 0.6 MG: 0.6 TABLET, FILM COATED ORAL at 09:22

## 2022-10-06 RX ADMIN — Medication 2000 UNITS: at 09:22

## 2022-10-06 RX ADMIN — PANTOPRAZOLE SODIUM 40 MG: 40 INJECTION, POWDER, FOR SOLUTION INTRAVENOUS at 05:58

## 2022-10-06 RX ADMIN — HEPARIN SODIUM 5000 UNITS: 5000 INJECTION INTRAVENOUS; SUBCUTANEOUS at 21:05

## 2022-10-06 RX ADMIN — TICAGRELOR 90 MG: 90 TABLET ORAL at 09:22

## 2022-10-06 RX ADMIN — INSULIN DETEMIR 30 UNITS: 100 INJECTION, SOLUTION SUBCUTANEOUS at 21:05

## 2022-10-06 RX ADMIN — ISOSORBIDE MONONITRATE 60 MG: 60 TABLET, EXTENDED RELEASE ORAL at 09:22

## 2022-10-06 RX ADMIN — HEPARIN SODIUM 5000 UNITS: 5000 INJECTION INTRAVENOUS; SUBCUTANEOUS at 05:58

## 2022-10-06 RX ADMIN — METOPROLOL TARTRATE 50 MG: 50 TABLET ORAL at 09:22

## 2022-10-06 RX ADMIN — Medication 10 ML: at 21:05

## 2022-10-06 RX ADMIN — HUMAN INSULIN 3 UNITS: 100 INJECTION, SOLUTION SUBCUTANEOUS at 17:00

## 2022-10-06 RX ADMIN — ASPIRIN 81 MG CHEWABLE TABLET 81 MG: 81 TABLET CHEWABLE at 09:22

## 2022-10-06 RX ADMIN — BUSPIRONE HYDROCHLORIDE 15 MG: 15 TABLET ORAL at 21:05

## 2022-10-07 ENCOUNTER — READMISSION MANAGEMENT (OUTPATIENT)
Dept: CALL CENTER | Facility: HOSPITAL | Age: 72
End: 2022-10-07

## 2022-10-07 VITALS
TEMPERATURE: 98 F | OXYGEN SATURATION: 100 % | WEIGHT: 251.54 LBS | HEIGHT: 72 IN | DIASTOLIC BLOOD PRESSURE: 88 MMHG | SYSTOLIC BLOOD PRESSURE: 146 MMHG | BODY MASS INDEX: 34.07 KG/M2 | HEART RATE: 60 BPM | RESPIRATION RATE: 18 BRPM

## 2022-10-07 PROBLEM — R53.81 DEBILITY: Status: ACTIVE | Noted: 2022-10-07

## 2022-10-07 PROBLEM — R33.8 ACUTE URINARY RETENTION: Status: ACTIVE | Noted: 2022-10-07

## 2022-10-07 PROBLEM — G93.40 ENCEPHALOPATHY: Status: RESOLVED | Noted: 2022-10-04 | Resolved: 2022-10-07

## 2022-10-07 LAB
ALBUMIN SERPL-MCNC: 3.5 G/DL (ref 3.5–5.2)
ALBUMIN/GLOB SERPL: 1.1 G/DL
ALP SERPL-CCNC: 81 U/L (ref 39–117)
ALT SERPL W P-5'-P-CCNC: 6 U/L (ref 1–41)
ANION GAP SERPL CALCULATED.3IONS-SCNC: 11.5 MMOL/L (ref 5–15)
AST SERPL-CCNC: 9 U/L (ref 1–40)
BASOPHILS # BLD AUTO: 0.02 10*3/MM3 (ref 0–0.2)
BASOPHILS NFR BLD AUTO: 0.5 % (ref 0–1.5)
BILIRUB SERPL-MCNC: 0.2 MG/DL (ref 0–1.2)
BUN SERPL-MCNC: 46 MG/DL (ref 8–23)
BUN/CREAT SERPL: 20.8 (ref 7–25)
CALCIUM SPEC-SCNC: 9.2 MG/DL (ref 8.6–10.5)
CHLORIDE SERPL-SCNC: 109 MMOL/L (ref 98–107)
CO2 SERPL-SCNC: 26.5 MMOL/L (ref 22–29)
CREAT SERPL-MCNC: 2.21 MG/DL (ref 0.76–1.27)
DEPRECATED RDW RBC AUTO: 45 FL (ref 37–54)
EGFRCR SERPLBLD CKD-EPI 2021: 31.5 ML/MIN/1.73
EOSINOPHIL # BLD AUTO: 0.21 10*3/MM3 (ref 0–0.4)
EOSINOPHIL NFR BLD AUTO: 5.1 % (ref 0.3–6.2)
ERYTHROCYTE [DISTWIDTH] IN BLOOD BY AUTOMATED COUNT: 14.8 % (ref 12.3–15.4)
GLOBULIN UR ELPH-MCNC: 3.1 GM/DL
GLUCOSE BLDC GLUCOMTR-MCNC: 101 MG/DL (ref 70–130)
GLUCOSE BLDC GLUCOMTR-MCNC: 134 MG/DL (ref 70–130)
GLUCOSE SERPL-MCNC: 84 MG/DL (ref 65–99)
HCT VFR BLD AUTO: 25.3 % (ref 37.5–51)
HGB BLD-MCNC: 7.9 G/DL (ref 13–17.7)
IMM GRANULOCYTES # BLD AUTO: 0.02 10*3/MM3 (ref 0–0.05)
IMM GRANULOCYTES NFR BLD AUTO: 0.5 % (ref 0–0.5)
LYMPHOCYTES # BLD AUTO: 0.61 10*3/MM3 (ref 0.7–3.1)
LYMPHOCYTES NFR BLD AUTO: 15 % (ref 19.6–45.3)
MCH RBC QN AUTO: 25.9 PG (ref 26.6–33)
MCHC RBC AUTO-ENTMCNC: 31.2 G/DL (ref 31.5–35.7)
MCV RBC AUTO: 83 FL (ref 79–97)
MONOCYTES # BLD AUTO: 0.32 10*3/MM3 (ref 0.1–0.9)
MONOCYTES NFR BLD AUTO: 7.8 % (ref 5–12)
NEUTROPHILS NFR BLD AUTO: 2.9 10*3/MM3 (ref 1.7–7)
NEUTROPHILS NFR BLD AUTO: 71.1 % (ref 42.7–76)
NRBC BLD AUTO-RTO: 0 /100 WBC (ref 0–0.2)
PLATELET # BLD AUTO: 156 10*3/MM3 (ref 140–450)
PMV BLD AUTO: 10.5 FL (ref 6–12)
POTASSIUM SERPL-SCNC: 4.3 MMOL/L (ref 3.5–5.2)
PROT SERPL-MCNC: 6.6 G/DL (ref 6–8.5)
RBC # BLD AUTO: 3.05 10*6/MM3 (ref 4.14–5.8)
SODIUM SERPL-SCNC: 147 MMOL/L (ref 136–145)
WBC NRBC COR # BLD: 4.08 10*3/MM3 (ref 3.4–10.8)

## 2022-10-07 PROCEDURE — 99238 HOSP IP/OBS DSCHRG MGMT 30/<: CPT | Performed by: STUDENT IN AN ORGANIZED HEALTH CARE EDUCATION/TRAINING PROGRAM

## 2022-10-07 PROCEDURE — 25010000002 HEPARIN (PORCINE) PER 1000 UNITS: Performed by: INTERNAL MEDICINE

## 2022-10-07 PROCEDURE — 82962 GLUCOSE BLOOD TEST: CPT

## 2022-10-07 PROCEDURE — 80053 COMPREHEN METABOLIC PANEL: CPT | Performed by: STUDENT IN AN ORGANIZED HEALTH CARE EDUCATION/TRAINING PROGRAM

## 2022-10-07 PROCEDURE — 85025 COMPLETE CBC W/AUTO DIFF WBC: CPT | Performed by: STUDENT IN AN ORGANIZED HEALTH CARE EDUCATION/TRAINING PROGRAM

## 2022-10-07 RX ORDER — QUETIAPINE FUMARATE 25 MG/1
25 TABLET, FILM COATED ORAL NIGHTLY
Status: DISCONTINUED | OUTPATIENT
Start: 2022-10-07 | End: 2022-10-07 | Stop reason: HOSPADM

## 2022-10-07 RX ORDER — QUETIAPINE FUMARATE 25 MG/1
25 TABLET, FILM COATED ORAL NIGHTLY
Qty: 30 TABLET | Refills: 0 | Status: SHIPPED | OUTPATIENT
Start: 2022-10-07 | End: 2022-11-06

## 2022-10-07 RX ORDER — DEXTROSE MONOHYDRATE 50 MG/ML
250 INJECTION, SOLUTION INTRAVENOUS CONTINUOUS
Status: DISCONTINUED | OUTPATIENT
Start: 2022-10-07 | End: 2022-10-07 | Stop reason: HOSPADM

## 2022-10-07 RX ORDER — PANTOPRAZOLE SODIUM 40 MG/1
40 TABLET, DELAYED RELEASE ORAL
Status: DISCONTINUED | OUTPATIENT
Start: 2022-10-07 | End: 2022-10-07 | Stop reason: HOSPADM

## 2022-10-07 RX ADMIN — PANTOPRAZOLE SODIUM 40 MG: 40 TABLET, DELAYED RELEASE ORAL at 06:23

## 2022-10-07 RX ADMIN — ASPIRIN 81 MG CHEWABLE TABLET 81 MG: 81 TABLET CHEWABLE at 08:36

## 2022-10-07 RX ADMIN — COLCHICINE 0.6 MG: 0.6 TABLET, FILM COATED ORAL at 08:37

## 2022-10-07 RX ADMIN — SERTRALINE 100 MG: 50 TABLET, FILM COATED ORAL at 08:36

## 2022-10-07 RX ADMIN — BUSPIRONE HYDROCHLORIDE 15 MG: 15 TABLET ORAL at 08:37

## 2022-10-07 RX ADMIN — CALCITRIOL CAPSULES 0.25 MCG 0.25 MCG: 0.25 CAPSULE ORAL at 08:36

## 2022-10-07 RX ADMIN — Medication 2000 UNITS: at 08:36

## 2022-10-07 RX ADMIN — TICAGRELOR 90 MG: 90 TABLET ORAL at 08:36

## 2022-10-07 RX ADMIN — ISOSORBIDE MONONITRATE 60 MG: 60 TABLET, EXTENDED RELEASE ORAL at 08:36

## 2022-10-07 RX ADMIN — METOPROLOL TARTRATE 50 MG: 50 TABLET ORAL at 08:37

## 2022-10-07 RX ADMIN — FINASTERIDE 5 MG: 5 TABLET, FILM COATED ORAL at 08:36

## 2022-10-07 RX ADMIN — ALLOPURINOL 100 MG: 100 TABLET ORAL at 08:36

## 2022-10-07 RX ADMIN — Medication 300 MG: at 08:36

## 2022-10-07 RX ADMIN — HEPARIN SODIUM 5000 UNITS: 5000 INJECTION INTRAVENOUS; SUBCUTANEOUS at 06:23

## 2022-10-07 RX ADMIN — QUETIAPINE FUMARATE 25 MG: 25 TABLET ORAL at 08:36

## 2022-10-09 LAB — BACTERIA SPEC AEROBE CULT: NORMAL

## 2022-10-10 ENCOUNTER — READMISSION MANAGEMENT (OUTPATIENT)
Dept: CALL CENTER | Facility: HOSPITAL | Age: 72
End: 2022-10-10

## 2022-10-10 LAB
BACTERIA SPEC AEROBE CULT: NORMAL
BACTERIA SPEC AEROBE CULT: NORMAL

## 2022-10-19 ENCOUNTER — READMISSION MANAGEMENT (OUTPATIENT)
Dept: CALL CENTER | Facility: HOSPITAL | Age: 72
End: 2022-10-19

## 2022-12-09 ENCOUNTER — TELEPHONE (OUTPATIENT)
Dept: CARDIOLOGY | Facility: CLINIC | Age: 72
End: 2022-12-09

## 2023-02-02 ENCOUNTER — TELEPHONE (OUTPATIENT)
Dept: CARDIOLOGY | Facility: CLINIC | Age: 73
End: 2023-02-02
Payer: MEDICARE

## 2023-02-02 RX ORDER — CLOPIDOGREL BISULFATE 75 MG/1
75 TABLET ORAL DAILY
Qty: 90 TABLET | Refills: 3 | Status: SHIPPED | OUTPATIENT
Start: 2023-02-02

## 2023-06-13 ENCOUNTER — OFFICE VISIT (OUTPATIENT)
Dept: CARDIOLOGY | Facility: CLINIC | Age: 73
End: 2023-06-13
Payer: MEDICARE

## 2023-06-13 VITALS
OXYGEN SATURATION: 96 % | HEIGHT: 72 IN | HEART RATE: 57 BPM | BODY MASS INDEX: 33.18 KG/M2 | SYSTOLIC BLOOD PRESSURE: 96 MMHG | DIASTOLIC BLOOD PRESSURE: 60 MMHG | WEIGHT: 245 LBS

## 2023-06-13 DIAGNOSIS — I25.708 CORONARY ARTERY DISEASE INVOLVING CORONARY BYPASS GRAFT OF NATIVE HEART WITH OTHER FORMS OF ANGINA PECTORIS: Primary | ICD-10-CM

## 2023-06-13 DIAGNOSIS — Z72.0 TOBACCO USE: ICD-10-CM

## 2023-06-13 DIAGNOSIS — I10 ESSENTIAL HYPERTENSION: ICD-10-CM

## 2023-06-13 DIAGNOSIS — E78.5 DYSLIPIDEMIA: ICD-10-CM

## 2023-06-13 RX ORDER — BUPRENORPHINE HYDROCHLORIDE AND NALOXONE HYDROCHLORIDE DIHYDRATE 8; 2 MG/1; MG/1
TABLET SUBLINGUAL
COMMUNITY
Start: 2023-05-02

## 2023-06-13 RX ORDER — BUDESONIDE AND FORMOTEROL FUMARATE DIHYDRATE 160; 4.5 UG/1; UG/1
AEROSOL RESPIRATORY (INHALATION)
COMMUNITY
Start: 2023-04-18

## 2023-06-13 RX ORDER — NALOXONE HYDROCHLORIDE 4 MG/.1ML
SPRAY NASAL
COMMUNITY
Start: 2023-05-02

## 2023-06-13 RX ORDER — INSULIN DETEMIR 100 [IU]/ML
18 INJECTION, SOLUTION SUBCUTANEOUS 2 TIMES DAILY
COMMUNITY
Start: 2023-05-30

## 2023-06-13 RX ORDER — CARVEDILOL 6.25 MG/1
1 TABLET ORAL EVERY 12 HOURS SCHEDULED
COMMUNITY
Start: 2023-05-01 | End: 2023-06-14

## 2023-06-13 RX ORDER — ALBUTEROL SULFATE 90 UG/1
2 AEROSOL, METERED RESPIRATORY (INHALATION) EVERY 6 HOURS PRN
COMMUNITY
Start: 2023-04-18

## 2023-06-13 RX ORDER — TORSEMIDE 100 MG/1
1 TABLET ORAL EVERY 12 HOURS SCHEDULED
COMMUNITY
Start: 2023-05-24

## 2023-06-13 RX ORDER — SILDENAFIL CITRATE 20 MG/1
TABLET ORAL
COMMUNITY
Start: 2023-03-03

## 2023-06-15 ENCOUNTER — TELEPHONE (OUTPATIENT)
Dept: CARDIOLOGY | Facility: CLINIC | Age: 73
End: 2023-06-15
Payer: MEDICARE

## 2023-06-15 DIAGNOSIS — E78.5 DYSLIPIDEMIA: ICD-10-CM

## 2023-06-15 DIAGNOSIS — I25.708 CORONARY ARTERY DISEASE INVOLVING CORONARY BYPASS GRAFT OF NATIVE HEART WITH OTHER FORMS OF ANGINA PECTORIS: Primary | ICD-10-CM

## 2023-06-15 DIAGNOSIS — I10 PRIMARY HYPERTENSION: ICD-10-CM

## 2023-10-03 ENCOUNTER — OFFICE VISIT (OUTPATIENT)
Dept: CARDIOLOGY | Facility: CLINIC | Age: 73
End: 2023-10-03
Payer: MEDICARE

## 2023-10-03 VITALS
SYSTOLIC BLOOD PRESSURE: 118 MMHG | DIASTOLIC BLOOD PRESSURE: 60 MMHG | HEART RATE: 75 BPM | WEIGHT: 223 LBS | OXYGEN SATURATION: 94 % | BODY MASS INDEX: 30.2 KG/M2 | HEIGHT: 72 IN

## 2023-10-03 DIAGNOSIS — I25.810 CORONARY ARTERY DISEASE INVOLVING CORONARY BYPASS GRAFT OF NATIVE HEART WITHOUT ANGINA PECTORIS: Primary | ICD-10-CM

## 2023-10-03 DIAGNOSIS — I10 ESSENTIAL HYPERTENSION: ICD-10-CM

## 2023-10-03 DIAGNOSIS — E78.5 DYSLIPIDEMIA: ICD-10-CM

## 2023-10-03 RX ORDER — LISINOPRIL AND HYDROCHLOROTHIAZIDE 25; 20 MG/1; MG/1
1 TABLET ORAL DAILY
COMMUNITY

## 2023-10-05 ENCOUNTER — TELEPHONE (OUTPATIENT)
Dept: CARDIOLOGY | Facility: CLINIC | Age: 73
End: 2023-10-05

## 2023-10-19 ENCOUNTER — OFFICE VISIT (OUTPATIENT)
Dept: SURGERY | Facility: CLINIC | Age: 73
End: 2023-10-19
Payer: MEDICARE

## 2023-10-19 VITALS
DIASTOLIC BLOOD PRESSURE: 74 MMHG | HEIGHT: 72 IN | WEIGHT: 220 LBS | SYSTOLIC BLOOD PRESSURE: 108 MMHG | HEART RATE: 76 BPM | RESPIRATION RATE: 14 BRPM | OXYGEN SATURATION: 90 % | BODY MASS INDEX: 29.8 KG/M2 | TEMPERATURE: 98.4 F

## 2023-10-19 DIAGNOSIS — R13.19 ESOPHAGEAL DYSPHAGIA: ICD-10-CM

## 2023-10-19 DIAGNOSIS — D50.9 IRON DEFICIENCY ANEMIA, UNSPECIFIED IRON DEFICIENCY ANEMIA TYPE: Primary | ICD-10-CM

## 2023-10-19 PROCEDURE — 3074F SYST BP LT 130 MM HG: CPT | Performed by: SURGERY

## 2023-10-19 PROCEDURE — 1160F RVW MEDS BY RX/DR IN RCRD: CPT | Performed by: SURGERY

## 2023-10-19 PROCEDURE — 1159F MED LIST DOCD IN RCRD: CPT | Performed by: SURGERY

## 2023-10-19 PROCEDURE — 3078F DIAST BP <80 MM HG: CPT | Performed by: SURGERY

## 2023-10-19 PROCEDURE — 99214 OFFICE O/P EST MOD 30 MIN: CPT | Performed by: SURGERY

## 2023-10-19 RX ORDER — ISOSORBIDE MONONITRATE 60 MG/1
TABLET, EXTENDED RELEASE ORAL
COMMUNITY
End: 2023-10-19

## 2023-10-19 RX ORDER — CEFTRIAXONE 1 G/1
INJECTION, POWDER, FOR SOLUTION INTRAMUSCULAR; INTRAVENOUS
COMMUNITY
Start: 2023-08-17 | End: 2023-10-23

## 2023-10-19 RX ORDER — POLYETHYLENE GLYCOL 3350 17 G/17G
238 POWDER, FOR SOLUTION ORAL ONCE
Qty: 17 PACKET | Refills: 0 | Status: SHIPPED | OUTPATIENT
Start: 2023-10-19 | End: 2023-10-19

## 2023-10-19 RX ORDER — BISACODYL 5 MG/1
5 TABLET, DELAYED RELEASE ORAL TAKE AS DIRECTED
Qty: 4 TABLET | Refills: 0 | Status: SHIPPED | OUTPATIENT
Start: 2023-10-19 | End: 2024-10-18

## 2023-10-19 RX ORDER — AMLODIPINE BESYLATE 5 MG/1
TABLET ORAL
COMMUNITY

## 2023-10-19 RX ORDER — ASPIRIN 81 MG/1
1 TABLET ORAL DAILY
COMMUNITY

## 2023-10-19 RX ORDER — INSULIN ASPART 100 [IU]/ML
INJECTION, SUSPENSION SUBCUTANEOUS
COMMUNITY

## 2023-10-19 RX ORDER — IRON POLYSACCHARIDE COMPLEX 150 MG
CAPSULE ORAL
COMMUNITY
Start: 2023-08-17

## 2023-10-19 RX ORDER — SODIUM POLYSTYRENE SULFONATE 15 G/60ML
SUSPENSION ORAL; RECTAL
COMMUNITY
Start: 2023-08-29

## 2023-10-19 RX ORDER — TORSEMIDE 100 MG/1
1 TABLET ORAL 2 TIMES DAILY
COMMUNITY
Start: 2023-05-24

## 2023-10-19 RX ORDER — LISINOPRIL 10 MG/1
TABLET ORAL
COMMUNITY

## 2023-10-19 RX ORDER — ALLOPURINOL 100 MG/1
2 TABLET ORAL DAILY
COMMUNITY

## 2023-10-19 RX ORDER — CARVEDILOL 6.25 MG/1
1 TABLET ORAL 2 TIMES DAILY
COMMUNITY

## 2023-10-19 RX ORDER — OXYCODONE AND ACETAMINOPHEN 10; 325 MG/1; MG/1
TABLET ORAL
COMMUNITY

## 2023-10-19 RX ORDER — PROCHLORPERAZINE 25 MG/1
SUPPOSITORY RECTAL
COMMUNITY
Start: 2023-10-04

## 2023-10-19 RX ORDER — CALCIUM CITRATE/VITAMIN D3 200MG-6.25
TABLET ORAL SEE ADMIN INSTRUCTIONS
COMMUNITY
Start: 2023-10-03

## 2023-10-19 RX ORDER — ACETAMINOPHEN 325 MG/1
650 TABLET ORAL EVERY 6 HOURS PRN
COMMUNITY
Start: 2023-06-30

## 2023-10-19 RX ORDER — HYDROCHLOROTHIAZIDE 25 MG/1
TABLET ORAL
COMMUNITY

## 2023-10-19 RX ORDER — CALCITRIOL 0.25 UG/1
1 CAPSULE, LIQUID FILLED ORAL DAILY
COMMUNITY

## 2023-10-19 RX ORDER — SODIUM CHLORIDE 0.9 % (FLUSH) 0.9 %
10 SYRINGE (ML) INJECTION
COMMUNITY
Start: 2023-07-20

## 2023-10-19 RX ORDER — FINASTERIDE 5 MG/1
1 TABLET, FILM COATED ORAL DAILY
COMMUNITY

## 2023-10-19 RX ORDER — ANORECTAL OINTMENT 15.7; .44; 24; 20.6 G/100G; G/100G; G/100G; G/100G
OINTMENT TOPICAL
COMMUNITY
Start: 2023-08-17

## 2023-10-19 RX ORDER — LORATADINE 10 MG/1
10 CAPSULE, LIQUID FILLED ORAL DAILY
COMMUNITY

## 2023-10-20 ENCOUNTER — TELEPHONE (OUTPATIENT)
Dept: SURGERY | Facility: CLINIC | Age: 73
End: 2023-10-20
Payer: MEDICARE

## 2023-10-25 ENCOUNTER — ANESTHESIA EVENT (OUTPATIENT)
Dept: GASTROENTEROLOGY | Facility: HOSPITAL | Age: 73
End: 2023-10-25
Payer: MEDICARE

## 2023-10-25 ENCOUNTER — ANESTHESIA (OUTPATIENT)
Dept: GASTROENTEROLOGY | Facility: HOSPITAL | Age: 73
End: 2023-10-25
Payer: MEDICARE

## 2023-10-25 ENCOUNTER — PREP FOR SURGERY (OUTPATIENT)
Dept: OTHER | Facility: HOSPITAL | Age: 73
End: 2023-10-25
Payer: MEDICARE

## 2023-10-25 ENCOUNTER — HOSPITAL ENCOUNTER (OUTPATIENT)
Facility: HOSPITAL | Age: 73
Setting detail: HOSPITAL OUTPATIENT SURGERY
Discharge: HOME | End: 2023-10-25
Attending: SURGERY | Admitting: SURGERY
Payer: MEDICARE

## 2023-10-25 VITALS
BODY MASS INDEX: 29.77 KG/M2 | RESPIRATION RATE: 18 BRPM | HEART RATE: 73 BPM | DIASTOLIC BLOOD PRESSURE: 60 MMHG | TEMPERATURE: 97 F | HEIGHT: 72 IN | SYSTOLIC BLOOD PRESSURE: 106 MMHG | OXYGEN SATURATION: 91 % | WEIGHT: 219.8 LBS

## 2023-10-25 DIAGNOSIS — R13.19 ESOPHAGEAL DYSPHAGIA: ICD-10-CM

## 2023-10-25 DIAGNOSIS — D50.9 IRON DEFICIENCY ANEMIA, UNSPECIFIED IRON DEFICIENCY ANEMIA TYPE: ICD-10-CM

## 2023-10-25 DIAGNOSIS — D50.9 IRON DEFICIENCY ANEMIA, UNSPECIFIED IRON DEFICIENCY ANEMIA TYPE: Primary | ICD-10-CM

## 2023-10-25 LAB — GLUCOSE BLDC GLUCOMTR-MCNC: 167 MG/DL (ref 70–130)

## 2023-10-25 PROCEDURE — 25010000002 ONDANSETRON PER 1 MG: Performed by: NURSE ANESTHETIST, CERTIFIED REGISTERED

## 2023-10-25 PROCEDURE — C1726 CATH, BAL DIL, NON-VASCULAR: HCPCS | Performed by: SURGERY

## 2023-10-25 PROCEDURE — 82948 REAGENT STRIP/BLOOD GLUCOSE: CPT

## 2023-10-25 PROCEDURE — 25010000002 PROPOFOL 200 MG/20ML EMULSION: Performed by: NURSE ANESTHETIST, CERTIFIED REGISTERED

## 2023-10-25 PROCEDURE — 25810000003 LACTATED RINGERS PER 1000 ML: Performed by: SURGERY

## 2023-10-25 RX ORDER — BISACODYL 5 MG/1
5 TABLET, DELAYED RELEASE ORAL TAKE AS DIRECTED
Qty: 4 TABLET | Refills: 0 | Status: SHIPPED | OUTPATIENT
Start: 2023-10-25 | End: 2024-10-24

## 2023-10-25 RX ORDER — PROPOFOL 10 MG/ML
INJECTION, EMULSION INTRAVENOUS AS NEEDED
Status: DISCONTINUED | OUTPATIENT
Start: 2023-10-25 | End: 2023-10-25 | Stop reason: SURG

## 2023-10-25 RX ORDER — SODIUM CHLORIDE 0.9 % (FLUSH) 0.9 %
10 SYRINGE (ML) INJECTION AS NEEDED
Status: DISCONTINUED | OUTPATIENT
Start: 2023-10-25 | End: 2023-10-25 | Stop reason: HOSPADM

## 2023-10-25 RX ORDER — LIDOCAINE HCL/PF 100 MG/5ML
SYRINGE (ML) INJECTION AS NEEDED
Status: DISCONTINUED | OUTPATIENT
Start: 2023-10-25 | End: 2023-10-25 | Stop reason: SURG

## 2023-10-25 RX ORDER — POLYETHYLENE GLYCOL 3350 17 G/17G
238 POWDER, FOR SOLUTION ORAL ONCE
Qty: 17 PACKET | Refills: 0 | Status: SHIPPED | OUTPATIENT
Start: 2023-10-25 | End: 2023-10-25

## 2023-10-25 RX ORDER — ONDANSETRON 2 MG/ML
INJECTION INTRAMUSCULAR; INTRAVENOUS AS NEEDED
Status: DISCONTINUED | OUTPATIENT
Start: 2023-10-25 | End: 2023-10-25 | Stop reason: SURG

## 2023-10-25 RX ORDER — MAGNESIUM CARB/ALUMINUM HYDROX 105-160MG
296 TABLET,CHEWABLE ORAL 2 TIMES DAILY
Qty: 195 ML | Refills: 0 | Status: SHIPPED | OUTPATIENT
Start: 2023-10-25

## 2023-10-25 RX ORDER — KETAMINE HCL IN NACL, ISO-OSM 100MG/10ML
SYRINGE (ML) INJECTION AS NEEDED
Status: DISCONTINUED | OUTPATIENT
Start: 2023-10-25 | End: 2023-10-25 | Stop reason: SURG

## 2023-10-25 RX ORDER — SODIUM CHLORIDE, SODIUM LACTATE, POTASSIUM CHLORIDE, CALCIUM CHLORIDE 600; 310; 30; 20 MG/100ML; MG/100ML; MG/100ML; MG/100ML
1000 INJECTION, SOLUTION INTRAVENOUS CONTINUOUS
Status: DISCONTINUED | OUTPATIENT
Start: 2023-10-25 | End: 2023-10-25 | Stop reason: HOSPADM

## 2023-10-25 RX ORDER — ONDANSETRON 2 MG/ML
4 INJECTION INTRAMUSCULAR; INTRAVENOUS ONCE AS NEEDED
Status: DISCONTINUED | OUTPATIENT
Start: 2023-10-25 | End: 2023-10-25 | Stop reason: HOSPADM

## 2023-10-25 RX ADMIN — PROPOFOL 50 MG: 10 INJECTION, EMULSION INTRAVENOUS at 09:36

## 2023-10-25 RX ADMIN — GLYCOPYRROLATE 0.2 MG: 0.2 INJECTION, SOLUTION INTRAMUSCULAR; INTRAVENOUS at 09:27

## 2023-10-25 RX ADMIN — Medication 10 MG: at 09:29

## 2023-10-25 RX ADMIN — PROPOFOL 80 MG: 10 INJECTION, EMULSION INTRAVENOUS at 09:43

## 2023-10-25 RX ADMIN — ONDANSETRON 4 MG: 2 INJECTION INTRAMUSCULAR; INTRAVENOUS at 09:35

## 2023-10-25 RX ADMIN — PROPOFOL 100 MG: 10 INJECTION, EMULSION INTRAVENOUS at 09:29

## 2023-10-25 RX ADMIN — Medication 50 MG: at 09:29

## 2023-10-25 RX ADMIN — SODIUM CHLORIDE, POTASSIUM CHLORIDE, SODIUM LACTATE AND CALCIUM CHLORIDE 1000 ML: 600; 310; 30; 20 INJECTION, SOLUTION INTRAVENOUS at 08:10

## 2023-10-26 LAB — REF LAB TEST METHOD: NORMAL

## 2023-11-03 ENCOUNTER — TELEPHONE (OUTPATIENT)
Dept: SURGERY | Facility: CLINIC | Age: 73
End: 2023-11-03
Payer: MEDICARE

## 2023-11-08 ENCOUNTER — HOSPITAL ENCOUNTER (OUTPATIENT)
Facility: HOSPITAL | Age: 73
Setting detail: HOSPITAL OUTPATIENT SURGERY
Discharge: HOME | End: 2023-11-08
Attending: SURGERY | Admitting: SURGERY
Payer: MEDICARE

## 2023-11-08 ENCOUNTER — ANESTHESIA (OUTPATIENT)
Dept: GASTROENTEROLOGY | Facility: HOSPITAL | Age: 73
End: 2023-11-08
Payer: MEDICARE

## 2023-11-08 ENCOUNTER — ANESTHESIA EVENT (OUTPATIENT)
Dept: GASTROENTEROLOGY | Facility: HOSPITAL | Age: 73
End: 2023-11-08
Payer: MEDICARE

## 2023-11-08 VITALS
HEART RATE: 61 BPM | RESPIRATION RATE: 18 BRPM | SYSTOLIC BLOOD PRESSURE: 131 MMHG | OXYGEN SATURATION: 96 % | HEIGHT: 72 IN | BODY MASS INDEX: 29.81 KG/M2 | TEMPERATURE: 97.4 F | DIASTOLIC BLOOD PRESSURE: 76 MMHG

## 2023-11-08 DIAGNOSIS — D50.9 IRON DEFICIENCY ANEMIA, UNSPECIFIED IRON DEFICIENCY ANEMIA TYPE: ICD-10-CM

## 2023-11-08 LAB — GLUCOSE BLDC GLUCOMTR-MCNC: 95 MG/DL (ref 70–130)

## 2023-11-08 PROCEDURE — 25810000003 SODIUM CHLORIDE 0.9 % SOLUTION: Performed by: SURGERY

## 2023-11-08 PROCEDURE — 25010000002 PROPOFOL 200 MG/20ML EMULSION: Performed by: NURSE ANESTHETIST, CERTIFIED REGISTERED

## 2023-11-08 PROCEDURE — 43239 EGD BIOPSY SINGLE/MULTIPLE: CPT | Performed by: SURGERY

## 2023-11-08 PROCEDURE — 45385 COLONOSCOPY W/LESION REMOVAL: CPT | Performed by: SURGERY

## 2023-11-08 PROCEDURE — 82948 REAGENT STRIP/BLOOD GLUCOSE: CPT

## 2023-11-08 PROCEDURE — 88300 SURGICAL PATH GROSS: CPT

## 2023-11-08 PROCEDURE — 88305 TISSUE EXAM BY PATHOLOGIST: CPT

## 2023-11-08 RX ORDER — LIDOCAINE HCL/PF 100 MG/5ML
SYRINGE (ML) INJECTION AS NEEDED
Status: DISCONTINUED | OUTPATIENT
Start: 2023-11-08 | End: 2023-11-08 | Stop reason: SURG

## 2023-11-08 RX ORDER — ONDANSETRON 2 MG/ML
4 INJECTION INTRAMUSCULAR; INTRAVENOUS ONCE AS NEEDED
Status: DISCONTINUED | OUTPATIENT
Start: 2023-11-08 | End: 2023-11-08 | Stop reason: HOSPADM

## 2023-11-08 RX ORDER — SODIUM CHLORIDE, SODIUM LACTATE, POTASSIUM CHLORIDE, CALCIUM CHLORIDE 600; 310; 30; 20 MG/100ML; MG/100ML; MG/100ML; MG/100ML
1000 INJECTION, SOLUTION INTRAVENOUS CONTINUOUS
Status: DISCONTINUED | OUTPATIENT
Start: 2023-11-08 | End: 2023-11-08

## 2023-11-08 RX ORDER — PROPOFOL 10 MG/ML
INJECTION, EMULSION INTRAVENOUS CONTINUOUS PRN
Status: DISCONTINUED | OUTPATIENT
Start: 2023-11-08 | End: 2023-11-08 | Stop reason: SURG

## 2023-11-08 RX ORDER — SODIUM CHLORIDE 9 MG/ML
500 INJECTION, SOLUTION INTRAVENOUS CONTINUOUS
Status: DISCONTINUED | OUTPATIENT
Start: 2023-11-08 | End: 2023-11-08 | Stop reason: HOSPADM

## 2023-11-08 RX ADMIN — PROPOFOL 100 MCG/KG/MIN: 10 INJECTION, EMULSION INTRAVENOUS at 09:36

## 2023-11-08 RX ADMIN — Medication 50 MG: at 09:36

## 2023-11-08 RX ADMIN — SODIUM CHLORIDE 500 ML: 9 INJECTION, SOLUTION INTRAVENOUS at 07:52

## 2023-11-09 LAB — REF LAB TEST METHOD: NORMAL

## 2023-11-16 ENCOUNTER — OFFICE VISIT (OUTPATIENT)
Dept: SURGERY | Facility: CLINIC | Age: 73
End: 2023-11-16
Payer: MEDICARE

## 2023-11-16 VITALS
TEMPERATURE: 98.4 F | SYSTOLIC BLOOD PRESSURE: 128 MMHG | OXYGEN SATURATION: 94 % | WEIGHT: 245 LBS | HEART RATE: 88 BPM | BODY MASS INDEX: 33.18 KG/M2 | HEIGHT: 72 IN | DIASTOLIC BLOOD PRESSURE: 60 MMHG

## 2023-11-16 DIAGNOSIS — D50.9 IRON DEFICIENCY ANEMIA, UNSPECIFIED IRON DEFICIENCY ANEMIA TYPE: ICD-10-CM

## 2023-11-16 DIAGNOSIS — Z12.11 ENCOUNTER FOR COLONOSCOPY DUE TO HISTORY OF ADENOMATOUS COLONIC POLYPS: Primary | ICD-10-CM

## 2023-11-16 DIAGNOSIS — Z86.010 ENCOUNTER FOR COLONOSCOPY DUE TO HISTORY OF ADENOMATOUS COLONIC POLYPS: Primary | ICD-10-CM

## 2023-11-16 PROCEDURE — 1159F MED LIST DOCD IN RCRD: CPT | Performed by: SURGERY

## 2023-11-16 PROCEDURE — 3074F SYST BP LT 130 MM HG: CPT | Performed by: SURGERY

## 2023-11-16 PROCEDURE — 3078F DIAST BP <80 MM HG: CPT | Performed by: SURGERY

## 2023-11-16 PROCEDURE — 1160F RVW MEDS BY RX/DR IN RCRD: CPT | Performed by: SURGERY

## 2023-11-16 PROCEDURE — 99213 OFFICE O/P EST LOW 20 MIN: CPT | Performed by: SURGERY

## 2023-11-16 RX ORDER — LISINOPRIL AND HYDROCHLOROTHIAZIDE 25; 20 MG/1; MG/1
1 TABLET ORAL DAILY
COMMUNITY
Start: 2023-10-16

## 2023-11-16 RX ORDER — GLIPIZIDE 10 MG/1
1 TABLET, FILM COATED, EXTENDED RELEASE ORAL EVERY 12 HOURS SCHEDULED
COMMUNITY
Start: 2023-10-30

## 2023-11-16 RX ORDER — PROCHLORPERAZINE 25 MG/1
SUPPOSITORY RECTAL
COMMUNITY
Start: 2023-11-10

## 2023-12-01 ENCOUNTER — OFFICE VISIT (OUTPATIENT)
Dept: GASTROENTEROLOGY | Facility: CLINIC | Age: 73
End: 2023-12-01
Payer: MEDICARE

## 2023-12-01 VITALS — SYSTOLIC BLOOD PRESSURE: 122 MMHG | OXYGEN SATURATION: 92 % | DIASTOLIC BLOOD PRESSURE: 60 MMHG | HEART RATE: 72 BPM

## 2023-12-01 DIAGNOSIS — D50.0 IRON DEFICIENCY ANEMIA DUE TO CHRONIC BLOOD LOSS: ICD-10-CM

## 2023-12-01 DIAGNOSIS — R71.0 DROP IN HEMOGLOBIN: ICD-10-CM

## 2023-12-01 DIAGNOSIS — R13.14 PHARYNGOESOPHAGEAL DYSPHAGIA: ICD-10-CM

## 2023-12-01 DIAGNOSIS — R19.5 DARK STOOLS: ICD-10-CM

## 2023-12-01 RX ORDER — DUTASTERIDE 0.5 MG/1
0.5 CAPSULE, LIQUID FILLED ORAL DAILY
COMMUNITY

## 2023-12-01 RX ORDER — SIMETHICONE 20 MG/.3ML
160 EMULSION ORAL ONCE
Status: SHIPPED | OUTPATIENT
Start: 2023-12-01

## 2023-12-01 RX ORDER — METOPROLOL TARTRATE 50 MG/1
50 TABLET, FILM COATED ORAL 2 TIMES DAILY
COMMUNITY

## 2023-12-01 RX ORDER — POLYETHYLENE GLYCOL 3350 17 G/17G
POWDER, FOR SOLUTION ORAL
Qty: 7 PACKET | Refills: 1 | Status: SHIPPED | OUTPATIENT
Start: 2023-12-01

## 2023-12-27 ENCOUNTER — PROCEDURE VISIT (OUTPATIENT)
Dept: GASTROENTEROLOGY | Facility: CLINIC | Age: 73
End: 2023-12-27
Payer: MEDICARE

## 2023-12-27 VITALS — HEART RATE: 67 BPM | OXYGEN SATURATION: 92 % | DIASTOLIC BLOOD PRESSURE: 64 MMHG | SYSTOLIC BLOOD PRESSURE: 138 MMHG

## 2023-12-27 DIAGNOSIS — D50.8 OTHER IRON DEFICIENCY ANEMIA: Primary | ICD-10-CM

## 2023-12-27 RX ORDER — TIOTROPIUM BROMIDE 18 UG/1
CAPSULE ORAL; RESPIRATORY (INHALATION)
COMMUNITY
Start: 2023-11-30

## 2023-12-27 RX ORDER — INSULIN GLARGINE 100 [IU]/ML
INJECTION, SOLUTION SUBCUTANEOUS
COMMUNITY
Start: 2023-12-18

## 2023-12-27 RX ORDER — CEPHALEXIN 500 MG/1
CAPSULE ORAL
COMMUNITY
Start: 2023-12-15

## 2024-01-03 ENCOUNTER — TELEPHONE (OUTPATIENT)
Dept: SURGERY | Facility: CLINIC | Age: 74
End: 2024-01-03
Payer: MEDICARE

## 2024-01-04 ENCOUNTER — TELEPHONE (OUTPATIENT)
Dept: GASTROENTEROLOGY | Facility: CLINIC | Age: 74
End: 2024-01-04
Payer: MEDICARE

## 2024-01-04 DIAGNOSIS — D50.0 IRON DEFICIENCY ANEMIA DUE TO CHRONIC BLOOD LOSS: Primary | ICD-10-CM

## 2024-01-04 RX ORDER — POLYETHYLENE GLYCOL 3350 17 G/17G
POWDER, FOR SOLUTION ORAL
Qty: 7 PACKET | Refills: 1 | Status: SHIPPED | OUTPATIENT
Start: 2024-01-04

## 2024-01-09 ENCOUNTER — TELEPHONE (OUTPATIENT)
Dept: GASTROENTEROLOGY | Facility: CLINIC | Age: 74
End: 2024-01-09
Payer: MEDICARE

## 2024-01-15 DIAGNOSIS — D50.0 IRON DEFICIENCY ANEMIA DUE TO CHRONIC BLOOD LOSS: ICD-10-CM

## 2024-06-25 ENCOUNTER — TELEPHONE (OUTPATIENT)
Dept: CARDIOLOGY | Facility: CLINIC | Age: 74
End: 2024-06-25

## 2024-08-27 ENCOUNTER — OFFICE VISIT (OUTPATIENT)
Dept: CARDIOLOGY | Facility: CLINIC | Age: 74
End: 2024-08-27
Payer: MEDICARE

## 2024-08-27 VITALS
BODY MASS INDEX: 33.05 KG/M2 | WEIGHT: 244 LBS | OXYGEN SATURATION: 91 % | SYSTOLIC BLOOD PRESSURE: 104 MMHG | DIASTOLIC BLOOD PRESSURE: 60 MMHG | HEIGHT: 72 IN | HEART RATE: 70 BPM

## 2024-08-27 DIAGNOSIS — E78.5 DYSLIPIDEMIA: ICD-10-CM

## 2024-08-27 DIAGNOSIS — I10 ESSENTIAL HYPERTENSION: ICD-10-CM

## 2024-08-27 DIAGNOSIS — I25.810 CORONARY ARTERY DISEASE INVOLVING CORONARY BYPASS GRAFT OF NATIVE HEART WITHOUT ANGINA PECTORIS: Primary | ICD-10-CM

## 2024-08-28 ENCOUNTER — OFFICE VISIT (OUTPATIENT)
Dept: SURGERY | Facility: CLINIC | Age: 74
End: 2024-08-28
Payer: MEDICARE

## 2024-08-28 VITALS
WEIGHT: 244 LBS | RESPIRATION RATE: 14 BRPM | HEIGHT: 72 IN | SYSTOLIC BLOOD PRESSURE: 138 MMHG | OXYGEN SATURATION: 89 % | DIASTOLIC BLOOD PRESSURE: 64 MMHG | HEART RATE: 89 BPM | BODY MASS INDEX: 33.05 KG/M2 | TEMPERATURE: 97.7 F

## 2024-08-28 DIAGNOSIS — K61.1 PERIRECTAL ABSCESS: Primary | ICD-10-CM

## 2024-08-28 PROCEDURE — 1159F MED LIST DOCD IN RCRD: CPT | Performed by: SURGERY

## 2024-08-28 PROCEDURE — 3075F SYST BP GE 130 - 139MM HG: CPT | Performed by: SURGERY

## 2024-08-28 PROCEDURE — 3078F DIAST BP <80 MM HG: CPT | Performed by: SURGERY

## 2024-08-28 PROCEDURE — 1160F RVW MEDS BY RX/DR IN RCRD: CPT | Performed by: SURGERY

## 2024-08-28 PROCEDURE — 99213 OFFICE O/P EST LOW 20 MIN: CPT | Performed by: SURGERY

## 2024-08-28 RX ORDER — DAPAGLIFLOZIN 10 MG/1
TABLET, FILM COATED ORAL DAILY
COMMUNITY

## 2024-08-29 ENCOUNTER — HOSPITAL ENCOUNTER (OUTPATIENT)
Facility: HOSPITAL | Age: 74
Setting detail: HOSPITAL OUTPATIENT SURGERY
Discharge: HOME | End: 2024-08-29
Attending: SURGERY | Admitting: SURGERY
Payer: MEDICARE

## 2024-08-29 ENCOUNTER — ANESTHESIA EVENT (OUTPATIENT)
Dept: PERIOP | Facility: HOSPITAL | Age: 74
End: 2024-08-29
Payer: MEDICARE

## 2024-08-29 ENCOUNTER — ANESTHESIA (OUTPATIENT)
Dept: PERIOP | Facility: HOSPITAL | Age: 74
End: 2024-08-29
Payer: MEDICARE

## 2024-08-29 ENCOUNTER — APPOINTMENT (OUTPATIENT)
Dept: CT IMAGING | Facility: HOSPITAL | Age: 74
DRG: 871 | End: 2024-08-29
Payer: MEDICARE

## 2024-08-29 ENCOUNTER — HOSPITAL ENCOUNTER (INPATIENT)
Facility: HOSPITAL | Age: 74
LOS: 2 days | Discharge: HOME HEALTH | DRG: 871 | End: 2024-08-31
Attending: STUDENT IN AN ORGANIZED HEALTH CARE EDUCATION/TRAINING PROGRAM | Admitting: INTERNAL MEDICINE
Payer: MEDICARE

## 2024-08-29 VITALS
TEMPERATURE: 97.2 F | RESPIRATION RATE: 18 BRPM | HEART RATE: 85 BPM | SYSTOLIC BLOOD PRESSURE: 106 MMHG | OXYGEN SATURATION: 94 % | DIASTOLIC BLOOD PRESSURE: 54 MMHG

## 2024-08-29 DIAGNOSIS — N18.9 ACUTE RENAL FAILURE SUPERIMPOSED ON CHRONIC KIDNEY DISEASE, UNSPECIFIED ACUTE RENAL FAILURE TYPE, UNSPECIFIED CKD STAGE: Primary | ICD-10-CM

## 2024-08-29 DIAGNOSIS — K61.1 PERIRECTAL ABSCESS: ICD-10-CM

## 2024-08-29 DIAGNOSIS — N17.9 ACUTE RENAL FAILURE SUPERIMPOSED ON CHRONIC KIDNEY DISEASE, UNSPECIFIED ACUTE RENAL FAILURE TYPE, UNSPECIFIED CKD STAGE: Primary | ICD-10-CM

## 2024-08-29 DIAGNOSIS — R41.82 ALTERED MENTAL STATUS, UNSPECIFIED ALTERED MENTAL STATUS TYPE: ICD-10-CM

## 2024-08-29 LAB
A-A DO2: 20.6 MMHG
ALBUMIN SERPL-MCNC: 3.9 G/DL (ref 3.5–5.2)
ALBUMIN/GLOB SERPL: 1.1 G/DL
ALP SERPL-CCNC: 63 U/L (ref 39–117)
ALT SERPL W P-5'-P-CCNC: 12 U/L (ref 1–41)
ANION GAP SERPL CALCULATED.3IONS-SCNC: 11.4 MMOL/L (ref 5–15)
ARTERIAL PATENCY WRIST A: POSITIVE
AST SERPL-CCNC: 13 U/L (ref 1–40)
ATMOSPHERIC PRESS: 736 MMHG
BASE EXCESS BLDA CALC-SCNC: 3.8 MMOL/L (ref 0–2)
BASOPHILS # BLD AUTO: 0.04 10*3/MM3 (ref 0–0.2)
BASOPHILS NFR BLD AUTO: 0.3 % (ref 0–1.5)
BDY SITE: ABNORMAL
BILIRUB SERPL-MCNC: 0.2 MG/DL (ref 0–1.2)
BUN SERPL-MCNC: 75 MG/DL (ref 8–23)
BUN/CREAT SERPL: 20.7 (ref 7–25)
CALCIUM SPEC-SCNC: 9.4 MG/DL (ref 8.6–10.5)
CHLORIDE SERPL-SCNC: 94 MMOL/L (ref 98–107)
CO2 SERPL-SCNC: 30.6 MMOL/L (ref 22–29)
COHGB MFR BLD: 1.5 % (ref 0–2)
CREAT SERPL-MCNC: 3.62 MG/DL (ref 0.76–1.27)
DEPRECATED RDW RBC AUTO: 55.8 FL (ref 37–54)
EGFRCR SERPLBLD CKD-EPI 2021: 17.2 ML/MIN/1.73
EOSINOPHIL # BLD AUTO: 0.2 10*3/MM3 (ref 0–0.4)
EOSINOPHIL NFR BLD AUTO: 1.6 % (ref 0.3–6.2)
ERYTHROCYTE [DISTWIDTH] IN BLOOD BY AUTOMATED COUNT: 18.5 % (ref 12.3–15.4)
GAS FLOW AIRWAY: 2 LPM
GLOBULIN UR ELPH-MCNC: 3.5 GM/DL
GLUCOSE BLDC GLUCOMTR-MCNC: 171 MG/DL (ref 70–130)
GLUCOSE BLDC GLUCOMTR-MCNC: 97 MG/DL (ref 70–130)
GLUCOSE SERPL-MCNC: 120 MG/DL (ref 65–99)
HCO3 BLDA-SCNC: 29.5 MMOL/L (ref 22–28)
HCT VFR BLD AUTO: 29.9 % (ref 37.5–51)
HCT VFR BLD CALC: 28.2 %
HGB BLD-MCNC: 9.3 G/DL (ref 13–17.7)
IMM GRANULOCYTES # BLD AUTO: 0.09 10*3/MM3 (ref 0–0.05)
IMM GRANULOCYTES NFR BLD AUTO: 0.7 % (ref 0–0.5)
LYMPHOCYTES # BLD AUTO: 0.98 10*3/MM3 (ref 0.7–3.1)
LYMPHOCYTES NFR BLD AUTO: 8 % (ref 19.6–45.3)
Lab: ABNORMAL
MCH RBC QN AUTO: 26.3 PG (ref 26.6–33)
MCHC RBC AUTO-ENTMCNC: 31.1 G/DL (ref 31.5–35.7)
MCV RBC AUTO: 84.5 FL (ref 79–97)
METHGB BLD QL: 0.4 % (ref 0–1.5)
MODALITY: ABNORMAL
MONOCYTES # BLD AUTO: 0.51 10*3/MM3 (ref 0.1–0.9)
MONOCYTES NFR BLD AUTO: 4.2 % (ref 5–12)
NEUTROPHILS NFR BLD AUTO: 10.45 10*3/MM3 (ref 1.7–7)
NEUTROPHILS NFR BLD AUTO: 85.2 % (ref 42.7–76)
NRBC BLD AUTO-RTO: 0 /100 WBC (ref 0–0.2)
OXYHGB MFR BLDV: 92.3 % (ref 94–99)
PCO2 BLDA: 49.8 MM HG (ref 35–45)
PCO2 TEMP ADJ BLD: ABNORMAL MM[HG]
PH BLDA: 7.38 PH UNITS (ref 7.3–7.5)
PH, TEMP CORRECTED: ABNORMAL
PLATELET # BLD AUTO: 172 10*3/MM3 (ref 140–450)
PMV BLD AUTO: 9.2 FL (ref 6–12)
PO2 BLDA: 68 MM HG (ref 75–100)
PO2 TEMP ADJ BLD: ABNORMAL MM[HG]
POTASSIUM SERPL-SCNC: 4.2 MMOL/L (ref 3.5–5.2)
PROT SERPL-MCNC: 7.4 G/DL (ref 6–8.5)
RBC # BLD AUTO: 3.54 10*6/MM3 (ref 4.14–5.8)
SAO2 % BLDCOA: 94.1 % (ref 94–100)
SODIUM SERPL-SCNC: 136 MMOL/L (ref 136–145)
VENTILATOR MODE: ABNORMAL
WBC NRBC COR # BLD AUTO: 12.27 10*3/MM3 (ref 3.4–10.8)

## 2024-08-29 PROCEDURE — 25010000002 CEFAZOLIN PER 500 MG: Performed by: SURGERY

## 2024-08-29 PROCEDURE — 25010000002 LORAZEPAM PER 2 MG

## 2024-08-29 PROCEDURE — 82805 BLOOD GASES W/O2 SATURATION: CPT

## 2024-08-29 PROCEDURE — 25810000003 SODIUM CHLORIDE 0.9 % SOLUTION: Performed by: NURSE ANESTHETIST, CERTIFIED REGISTERED

## 2024-08-29 PROCEDURE — 99223 1ST HOSP IP/OBS HIGH 75: CPT | Performed by: INTERNAL MEDICINE

## 2024-08-29 PROCEDURE — 87040 BLOOD CULTURE FOR BACTERIA: CPT | Performed by: INTERNAL MEDICINE

## 2024-08-29 PROCEDURE — 93005 ELECTROCARDIOGRAM TRACING: CPT | Performed by: STUDENT IN AN ORGANIZED HEALTH CARE EDUCATION/TRAINING PROGRAM

## 2024-08-29 PROCEDURE — 46040 I&D ISCHIORCT&/PERIRCT ABSC: CPT | Performed by: SURGERY

## 2024-08-29 PROCEDURE — 80053 COMPREHEN METABOLIC PANEL: CPT | Performed by: STUDENT IN AN ORGANIZED HEALTH CARE EDUCATION/TRAINING PROGRAM

## 2024-08-29 PROCEDURE — 83605 ASSAY OF LACTIC ACID: CPT | Performed by: INTERNAL MEDICINE

## 2024-08-29 PROCEDURE — 84145 PROCALCITONIN (PCT): CPT | Performed by: INTERNAL MEDICINE

## 2024-08-29 PROCEDURE — G0378 HOSPITAL OBSERVATION PER HR: HCPCS

## 2024-08-29 PROCEDURE — 82948 REAGENT STRIP/BLOOD GLUCOSE: CPT

## 2024-08-29 PROCEDURE — 83050 HGB METHEMOGLOBIN QUAN: CPT

## 2024-08-29 PROCEDURE — 70450 CT HEAD/BRAIN W/O DYE: CPT

## 2024-08-29 PROCEDURE — 25010000002 FENTANYL CITRATE (PF) 50 MCG/ML SOLUTION PREFILLED SYRINGE: Performed by: NURSE ANESTHETIST, CERTIFIED REGISTERED

## 2024-08-29 PROCEDURE — 25810000003 SODIUM CHLORIDE 0.9 % SOLUTION: Performed by: SURGERY

## 2024-08-29 PROCEDURE — 74176 CT ABD & PELVIS W/O CONTRAST: CPT

## 2024-08-29 PROCEDURE — 36600 WITHDRAWAL OF ARTERIAL BLOOD: CPT

## 2024-08-29 PROCEDURE — 25010000002 PROPOFOL 10 MG/ML EMULSION: Performed by: NURSE ANESTHETIST, CERTIFIED REGISTERED

## 2024-08-29 PROCEDURE — 82375 ASSAY CARBOXYHB QUANT: CPT

## 2024-08-29 PROCEDURE — 36415 COLL VENOUS BLD VENIPUNCTURE: CPT

## 2024-08-29 PROCEDURE — 99285 EMERGENCY DEPT VISIT HI MDM: CPT

## 2024-08-29 PROCEDURE — 85025 COMPLETE CBC W/AUTO DIFF WBC: CPT | Performed by: STUDENT IN AN ORGANIZED HEALTH CARE EDUCATION/TRAINING PROGRAM

## 2024-08-29 PROCEDURE — 25810000003 SODIUM CHLORIDE 0.9 % SOLUTION: Performed by: STUDENT IN AN ORGANIZED HEALTH CARE EDUCATION/TRAINING PROGRAM

## 2024-08-29 RX ORDER — MAGNESIUM HYDROXIDE 1200 MG/15ML
LIQUID ORAL AS NEEDED
Status: DISCONTINUED | OUTPATIENT
Start: 2024-08-29 | End: 2024-08-29 | Stop reason: HOSPADM

## 2024-08-29 RX ORDER — PROPOFOL 10 MG/ML
VIAL (ML) INTRAVENOUS AS NEEDED
Status: DISCONTINUED | OUTPATIENT
Start: 2024-08-29 | End: 2024-08-29 | Stop reason: SURG

## 2024-08-29 RX ORDER — HEPARIN SODIUM 5000 [USP'U]/ML
5000 INJECTION, SOLUTION INTRAVENOUS; SUBCUTANEOUS EVERY 12 HOURS SCHEDULED
Status: DISCONTINUED | OUTPATIENT
Start: 2024-08-30 | End: 2024-08-31 | Stop reason: HOSPADM

## 2024-08-29 RX ORDER — LORAZEPAM 2 MG/ML
2 INJECTION INTRAMUSCULAR ONCE
Status: COMPLETED | OUTPATIENT
Start: 2024-08-29 | End: 2024-08-29

## 2024-08-29 RX ORDER — FENTANYL CITRATE 50 UG/ML
INJECTION, SOLUTION INTRAMUSCULAR; INTRAVENOUS AS NEEDED
Status: DISCONTINUED | OUTPATIENT
Start: 2024-08-29 | End: 2024-08-29 | Stop reason: SURG

## 2024-08-29 RX ORDER — ACETAMINOPHEN 325 MG/1
650 TABLET ORAL EVERY 4 HOURS PRN
Status: DISCONTINUED | OUTPATIENT
Start: 2024-08-29 | End: 2024-08-31 | Stop reason: HOSPADM

## 2024-08-29 RX ORDER — IPRATROPIUM BROMIDE AND ALBUTEROL SULFATE 2.5; .5 MG/3ML; MG/3ML
3 SOLUTION RESPIRATORY (INHALATION) ONCE
Status: DISCONTINUED | OUTPATIENT
Start: 2024-08-29 | End: 2024-08-29 | Stop reason: HOSPADM

## 2024-08-29 RX ORDER — ACETAMINOPHEN 160 MG
TABLET,DISINTEGRATING ORAL AS NEEDED
Status: DISCONTINUED | OUTPATIENT
Start: 2024-08-29 | End: 2024-08-29 | Stop reason: HOSPADM

## 2024-08-29 RX ORDER — BISACODYL 5 MG/1
5 TABLET, DELAYED RELEASE ORAL DAILY PRN
Status: DISCONTINUED | OUTPATIENT
Start: 2024-08-29 | End: 2024-08-31 | Stop reason: HOSPADM

## 2024-08-29 RX ORDER — BUPIVACAINE HCL/0.9 % NACL/PF 0.125 %
PLASTIC BAG, INJECTION (ML) EPIDURAL AS NEEDED
Status: DISCONTINUED | OUTPATIENT
Start: 2024-08-29 | End: 2024-08-29 | Stop reason: SURG

## 2024-08-29 RX ORDER — SODIUM CHLORIDE 0.9 % (FLUSH) 0.9 %
10 SYRINGE (ML) INJECTION EVERY 12 HOURS SCHEDULED
Status: DISCONTINUED | OUTPATIENT
Start: 2024-08-30 | End: 2024-08-31 | Stop reason: HOSPADM

## 2024-08-29 RX ORDER — ACETAMINOPHEN 650 MG/1
650 SUPPOSITORY RECTAL EVERY 4 HOURS PRN
Status: DISCONTINUED | OUTPATIENT
Start: 2024-08-29 | End: 2024-08-31 | Stop reason: HOSPADM

## 2024-08-29 RX ORDER — AMOXICILLIN 250 MG
2 CAPSULE ORAL 2 TIMES DAILY
Status: DISCONTINUED | OUTPATIENT
Start: 2024-08-30 | End: 2024-08-31 | Stop reason: HOSPADM

## 2024-08-29 RX ORDER — SODIUM CHLORIDE 9 MG/ML
40 INJECTION, SOLUTION INTRAVENOUS AS NEEDED
Status: DISCONTINUED | OUTPATIENT
Start: 2024-08-29 | End: 2024-08-31 | Stop reason: HOSPADM

## 2024-08-29 RX ORDER — HYDROCODONE BITARTRATE AND ACETAMINOPHEN 5; 325 MG/1; MG/1
1 TABLET ORAL ONCE
Status: COMPLETED | OUTPATIENT
Start: 2024-08-29 | End: 2024-08-29

## 2024-08-29 RX ORDER — ONDANSETRON 2 MG/ML
4 INJECTION INTRAMUSCULAR; INTRAVENOUS EVERY 6 HOURS PRN
Status: DISCONTINUED | OUTPATIENT
Start: 2024-08-29 | End: 2024-08-31 | Stop reason: HOSPADM

## 2024-08-29 RX ORDER — ACETAMINOPHEN 160 MG/5ML
650 SOLUTION ORAL EVERY 4 HOURS PRN
Status: DISCONTINUED | OUTPATIENT
Start: 2024-08-29 | End: 2024-08-31 | Stop reason: HOSPADM

## 2024-08-29 RX ORDER — SODIUM CHLORIDE 0.9 % (FLUSH) 0.9 %
10 SYRINGE (ML) INJECTION AS NEEDED
Status: DISCONTINUED | OUTPATIENT
Start: 2024-08-29 | End: 2024-08-31 | Stop reason: HOSPADM

## 2024-08-29 RX ORDER — POLYETHYLENE GLYCOL 3350 17 G/17G
17 POWDER, FOR SOLUTION ORAL DAILY PRN
Status: DISCONTINUED | OUTPATIENT
Start: 2024-08-29 | End: 2024-08-31 | Stop reason: HOSPADM

## 2024-08-29 RX ORDER — BISACODYL 10 MG
10 SUPPOSITORY, RECTAL RECTAL DAILY PRN
Status: DISCONTINUED | OUTPATIENT
Start: 2024-08-29 | End: 2024-08-31 | Stop reason: HOSPADM

## 2024-08-29 RX ORDER — SODIUM CHLORIDE 9 MG/ML
500 INJECTION, SOLUTION INTRAVENOUS CONTINUOUS
Status: DISCONTINUED | OUTPATIENT
Start: 2024-08-29 | End: 2024-08-29 | Stop reason: HOSPADM

## 2024-08-29 RX ORDER — SODIUM CHLORIDE 9 MG/ML
INJECTION, SOLUTION INTRAVENOUS CONTINUOUS PRN
Status: DISCONTINUED | OUTPATIENT
Start: 2024-08-29 | End: 2024-08-29 | Stop reason: SURG

## 2024-08-29 RX ORDER — HYDROCODONE BITARTRATE AND ACETAMINOPHEN 5; 325 MG/1; MG/1
1 TABLET ORAL EVERY 6 HOURS PRN
Qty: 10 TABLET | Refills: 0 | Status: SHIPPED | OUTPATIENT
Start: 2024-08-29 | End: 2024-08-31 | Stop reason: HOSPADM

## 2024-08-29 RX ORDER — LORAZEPAM 2 MG/ML
INJECTION INTRAMUSCULAR
Status: COMPLETED
Start: 2024-08-29 | End: 2024-08-29

## 2024-08-29 RX ADMIN — SODIUM CHLORIDE 1000 ML: 9 INJECTION, SOLUTION INTRAVENOUS at 20:47

## 2024-08-29 RX ADMIN — FENTANYL CITRATE 50 MCG: 50 INJECTION, SOLUTION INTRAMUSCULAR; INTRAVENOUS at 11:02

## 2024-08-29 RX ADMIN — SODIUM CHLORIDE 500 ML: 9 INJECTION, SOLUTION INTRAVENOUS at 09:43

## 2024-08-29 RX ADMIN — SODIUM CHLORIDE 2000 MG: 9 INJECTION, SOLUTION INTRAVENOUS at 10:56

## 2024-08-29 RX ADMIN — LORAZEPAM 2 MG: 2 INJECTION INTRAMUSCULAR at 23:00

## 2024-08-29 RX ADMIN — LORAZEPAM 2 MG: 2 INJECTION INTRAMUSCULAR; INTRAVENOUS at 23:00

## 2024-08-29 RX ADMIN — HYDROCODONE BITARTRATE AND ACETAMINOPHEN 1 TABLET: 5; 325 TABLET ORAL at 20:48

## 2024-08-29 RX ADMIN — Medication 20 MG: at 11:02

## 2024-08-29 RX ADMIN — SODIUM CHLORIDE: 9 INJECTION, SOLUTION INTRAVENOUS at 10:56

## 2024-08-29 RX ADMIN — LIDOCAINE HYDROCHLORIDE 60 MG: 20 INJECTION, SOLUTION INTRAVENOUS at 11:02

## 2024-08-29 RX ADMIN — PROPOFOL 50 MG: 10 INJECTION, EMULSION INTRAVENOUS at 11:02

## 2024-08-29 RX ADMIN — Medication 200 MCG: at 11:18

## 2024-08-29 RX ADMIN — PROPOFOL 100 MCG/KG/MIN: 10 INJECTION, EMULSION INTRAVENOUS at 11:03

## 2024-08-30 PROBLEM — A41.9 SEPSIS: Status: ACTIVE | Noted: 2024-08-30

## 2024-08-30 PROBLEM — G93.41 ACUTE METABOLIC ENCEPHALOPATHY: Status: ACTIVE | Noted: 2024-08-30

## 2024-08-30 LAB
ALBUMIN UR-MCNC: 1.8 MG/DL
ANION GAP SERPL CALCULATED.3IONS-SCNC: 11.5 MMOL/L (ref 5–15)
BILIRUB UR QL STRIP: NEGATIVE
BUN SERPL-MCNC: 67 MG/DL (ref 8–23)
BUN/CREAT SERPL: 20.4 (ref 7–25)
CALCIUM SPEC-SCNC: 8.8 MG/DL (ref 8.6–10.5)
CHLORIDE SERPL-SCNC: 101 MMOL/L (ref 98–107)
CLARITY UR: CLEAR
CO2 SERPL-SCNC: 27.5 MMOL/L (ref 22–29)
COLOR UR: YELLOW
CREAT SERPL-MCNC: 3.28 MG/DL (ref 0.76–1.27)
CREAT UR-MCNC: 78.4 MG/DL
CREAT UR-MCNC: 78.4 MG/DL
D-LACTATE SERPL-SCNC: 0.7 MMOL/L (ref 0.5–2)
DEPRECATED RDW RBC AUTO: 56.5 FL (ref 37–54)
EGFRCR SERPLBLD CKD-EPI 2021: 19.3 ML/MIN/1.73
ERYTHROCYTE [DISTWIDTH] IN BLOOD BY AUTOMATED COUNT: 18.6 % (ref 12.3–15.4)
GLUCOSE BLDC GLUCOMTR-MCNC: 112 MG/DL (ref 70–130)
GLUCOSE BLDC GLUCOMTR-MCNC: 127 MG/DL (ref 70–130)
GLUCOSE BLDC GLUCOMTR-MCNC: 144 MG/DL (ref 70–130)
GLUCOSE BLDC GLUCOMTR-MCNC: 58 MG/DL (ref 70–130)
GLUCOSE BLDC GLUCOMTR-MCNC: 82 MG/DL (ref 70–130)
GLUCOSE SERPL-MCNC: 81 MG/DL (ref 65–99)
GLUCOSE UR STRIP-MCNC: ABNORMAL MG/DL
HCT VFR BLD AUTO: 24.6 % (ref 37.5–51)
HGB BLD-MCNC: 7.8 G/DL (ref 13–17.7)
HGB UR QL STRIP.AUTO: NEGATIVE
KETONES UR QL STRIP: NEGATIVE
LEUKOCYTE ESTERASE UR QL STRIP.AUTO: NEGATIVE
MCH RBC QN AUTO: 27 PG (ref 26.6–33)
MCHC RBC AUTO-ENTMCNC: 31.7 G/DL (ref 31.5–35.7)
MCV RBC AUTO: 85.1 FL (ref 79–97)
MICROALBUMIN/CREAT UR: 23 MG/G (ref 0–29)
NITRITE UR QL STRIP: NEGATIVE
PH UR STRIP.AUTO: 6 [PH] (ref 5–8)
PLATELET # BLD AUTO: 144 10*3/MM3 (ref 140–450)
PMV BLD AUTO: 9.7 FL (ref 6–12)
POTASSIUM SERPL-SCNC: 3.8 MMOL/L (ref 3.5–5.2)
PROCALCITONIN SERPL-MCNC: 0.44 NG/ML (ref 0–0.25)
PROT ?TM UR-MCNC: 18.1 MG/DL
PROT UR QL STRIP: ABNORMAL
PROT/CREAT UR: 230.9 MG/G CREA (ref 0–200)
RBC # BLD AUTO: 2.89 10*6/MM3 (ref 4.14–5.8)
SODIUM SERPL-SCNC: 140 MMOL/L (ref 136–145)
SP GR UR STRIP: 1.02 (ref 1–1.03)
UROBILINOGEN UR QL STRIP: ABNORMAL
WBC NRBC COR # BLD AUTO: 14.26 10*3/MM3 (ref 3.4–10.8)

## 2024-08-30 PROCEDURE — 87641 MR-STAPH DNA AMP PROBE: CPT | Performed by: INTERNAL MEDICINE

## 2024-08-30 PROCEDURE — 82043 UR ALBUMIN QUANTITATIVE: CPT | Performed by: INTERNAL MEDICINE

## 2024-08-30 PROCEDURE — 94799 UNLISTED PULMONARY SVC/PX: CPT

## 2024-08-30 PROCEDURE — 85027 COMPLETE CBC AUTOMATED: CPT | Performed by: INTERNAL MEDICINE

## 2024-08-30 PROCEDURE — 97166 OT EVAL MOD COMPLEX 45 MIN: CPT

## 2024-08-30 PROCEDURE — 81003 URINALYSIS AUTO W/O SCOPE: CPT | Performed by: STUDENT IN AN ORGANIZED HEALTH CARE EDUCATION/TRAINING PROGRAM

## 2024-08-30 PROCEDURE — 80048 BASIC METABOLIC PNL TOTAL CA: CPT | Performed by: INTERNAL MEDICINE

## 2024-08-30 PROCEDURE — 84156 ASSAY OF PROTEIN URINE: CPT | Performed by: INTERNAL MEDICINE

## 2024-08-30 PROCEDURE — 94761 N-INVAS EAR/PLS OXIMETRY MLT: CPT

## 2024-08-30 PROCEDURE — 97162 PT EVAL MOD COMPLEX 30 MIN: CPT

## 2024-08-30 PROCEDURE — 82570 ASSAY OF URINE CREATININE: CPT | Performed by: INTERNAL MEDICINE

## 2024-08-30 PROCEDURE — 94640 AIRWAY INHALATION TREATMENT: CPT

## 2024-08-30 PROCEDURE — 25010000002 CEFTRIAXONE PER 250 MG: Performed by: INTERNAL MEDICINE

## 2024-08-30 PROCEDURE — 94660 CPAP INITIATION&MGMT: CPT

## 2024-08-30 PROCEDURE — 25810000003 SODIUM CHLORIDE 0.9 % SOLUTION: Performed by: INTERNAL MEDICINE

## 2024-08-30 PROCEDURE — 99233 SBSQ HOSP IP/OBS HIGH 50: CPT | Performed by: INTERNAL MEDICINE

## 2024-08-30 PROCEDURE — 82948 REAGENT STRIP/BLOOD GLUCOSE: CPT

## 2024-08-30 PROCEDURE — 25010000002 HEPARIN (PORCINE) PER 1000 UNITS: Performed by: INTERNAL MEDICINE

## 2024-08-30 PROCEDURE — 82948 REAGENT STRIP/BLOOD GLUCOSE: CPT | Performed by: INTERNAL MEDICINE

## 2024-08-30 RX ORDER — METOPROLOL TARTRATE 25 MG/1
25 TABLET, FILM COATED ORAL 2 TIMES DAILY
Status: DISCONTINUED | OUTPATIENT
Start: 2024-08-30 | End: 2024-08-31 | Stop reason: HOSPADM

## 2024-08-30 RX ORDER — MIDODRINE HYDROCHLORIDE 5 MG/1
5 TABLET ORAL
Status: DISCONTINUED | OUTPATIENT
Start: 2024-08-30 | End: 2024-08-31 | Stop reason: HOSPADM

## 2024-08-30 RX ORDER — SODIUM CHLORIDE 9 MG/ML
100 INJECTION, SOLUTION INTRAVENOUS CONTINUOUS
Status: DISCONTINUED | OUTPATIENT
Start: 2024-08-30 | End: 2024-08-31

## 2024-08-30 RX ORDER — BUDESONIDE 0.5 MG/2ML
0.5 INHALANT ORAL
Status: DISCONTINUED | OUTPATIENT
Start: 2024-08-30 | End: 2024-08-31 | Stop reason: HOSPADM

## 2024-08-30 RX ORDER — ARGININE/GLUTAMINE/CALCIUM BMB 7G-7G-1.5G
1 POWDER IN PACKET (EA) ORAL 2 TIMES DAILY
Status: DISCONTINUED | OUTPATIENT
Start: 2024-08-30 | End: 2024-08-31 | Stop reason: HOSPADM

## 2024-08-30 RX ORDER — FINASTERIDE 5 MG/1
5 TABLET, FILM COATED ORAL DAILY
Status: DISCONTINUED | OUTPATIENT
Start: 2024-08-30 | End: 2024-08-31 | Stop reason: HOSPADM

## 2024-08-30 RX ORDER — NICOTINE POLACRILEX 4 MG
15 LOZENGE BUCCAL
Status: DISCONTINUED | OUTPATIENT
Start: 2024-08-30 | End: 2024-08-31 | Stop reason: HOSPADM

## 2024-08-30 RX ORDER — IPRATROPIUM BROMIDE AND ALBUTEROL SULFATE 2.5; .5 MG/3ML; MG/3ML
3 SOLUTION RESPIRATORY (INHALATION) EVERY 4 HOURS PRN
Status: DISCONTINUED | OUTPATIENT
Start: 2024-08-30 | End: 2024-08-31 | Stop reason: HOSPADM

## 2024-08-30 RX ORDER — DEXTROSE MONOHYDRATE 25 G/50ML
25 INJECTION, SOLUTION INTRAVENOUS
Status: DISCONTINUED | OUTPATIENT
Start: 2024-08-30 | End: 2024-08-31 | Stop reason: HOSPADM

## 2024-08-30 RX ORDER — TAMSULOSIN HYDROCHLORIDE 0.4 MG/1
0.4 CAPSULE ORAL NIGHTLY
Status: DISCONTINUED | OUTPATIENT
Start: 2024-08-30 | End: 2024-08-31 | Stop reason: HOSPADM

## 2024-08-30 RX ORDER — CLOPIDOGREL BISULFATE 75 MG/1
75 TABLET ORAL DAILY
Status: DISCONTINUED | OUTPATIENT
Start: 2024-08-30 | End: 2024-08-31 | Stop reason: HOSPADM

## 2024-08-30 RX ORDER — PANTOPRAZOLE SODIUM 40 MG/1
40 TABLET, DELAYED RELEASE ORAL
Status: DISCONTINUED | OUTPATIENT
Start: 2024-08-30 | End: 2024-08-30

## 2024-08-30 RX ORDER — LISINOPRIL/HYDROCHLOROTHIAZIDE 10-12.5 MG
1 TABLET ORAL DAILY
COMMUNITY
End: 2024-08-31 | Stop reason: HOSPADM

## 2024-08-30 RX ORDER — ASPIRIN 81 MG/1
81 TABLET ORAL DAILY
Status: DISCONTINUED | OUTPATIENT
Start: 2024-08-30 | End: 2024-08-31 | Stop reason: HOSPADM

## 2024-08-30 RX ADMIN — TAMSULOSIN HYDROCHLORIDE 0.4 MG: 0.4 CAPSULE ORAL at 20:17

## 2024-08-30 RX ADMIN — IPRATROPIUM BROMIDE AND ALBUTEROL SULFATE 3 ML: .5; 3 SOLUTION RESPIRATORY (INHALATION) at 18:52

## 2024-08-30 RX ADMIN — SODIUM CHLORIDE 100 ML/HR: 9 INJECTION, SOLUTION INTRAVENOUS at 06:40

## 2024-08-30 RX ADMIN — BUDESONIDE 0.5 MG: 0.5 INHALANT RESPIRATORY (INHALATION) at 18:52

## 2024-08-30 RX ADMIN — MIDODRINE HYDROCHLORIDE 5 MG: 5 TABLET ORAL at 18:18

## 2024-08-30 RX ADMIN — IPRATROPIUM BROMIDE AND ALBUTEROL SULFATE 3 ML: .5; 3 SOLUTION RESPIRATORY (INHALATION) at 00:52

## 2024-08-30 RX ADMIN — Medication 10 ML: at 20:18

## 2024-08-30 RX ADMIN — ACETAMINOPHEN 650 MG: 650 SUPPOSITORY RECTAL at 00:19

## 2024-08-30 RX ADMIN — SODIUM CHLORIDE 1000 ML: 9 INJECTION, SOLUTION INTRAVENOUS at 04:33

## 2024-08-30 RX ADMIN — BUDESONIDE 0.5 MG: 0.5 INHALANT RESPIRATORY (INHALATION) at 00:52

## 2024-08-30 RX ADMIN — Medication 10 ML: at 10:54

## 2024-08-30 RX ADMIN — DEXTROSE MONOHYDRATE 25 G: 25 INJECTION, SOLUTION INTRAVENOUS at 16:05

## 2024-08-30 RX ADMIN — SODIUM CHLORIDE 100 ML/HR: 9 INJECTION, SOLUTION INTRAVENOUS at 03:28

## 2024-08-30 RX ADMIN — SODIUM CHLORIDE 2000 MG: 9 INJECTION, SOLUTION INTRAVENOUS at 00:17

## 2024-08-30 RX ADMIN — CLOPIDOGREL BISULFATE 75 MG: 75 TABLET ORAL at 10:53

## 2024-08-30 RX ADMIN — ASPIRIN 81 MG: 81 TABLET, COATED ORAL at 10:53

## 2024-08-30 RX ADMIN — Medication 10 ML: at 03:30

## 2024-08-30 RX ADMIN — FINASTERIDE 5 MG: 5 TABLET, FILM COATED ORAL at 10:53

## 2024-08-30 RX ADMIN — BUDESONIDE 0.5 MG: 0.5 INHALANT RESPIRATORY (INHALATION) at 07:14

## 2024-08-30 RX ADMIN — SENNOSIDES AND DOCUSATE SODIUM 2 TABLET: 50; 8.6 TABLET ORAL at 20:17

## 2024-08-30 RX ADMIN — Medication 1 PACKET: at 20:18

## 2024-08-30 RX ADMIN — HEPARIN SODIUM 5000 UNITS: 5000 INJECTION INTRAVENOUS; SUBCUTANEOUS at 00:14

## 2024-08-30 RX ADMIN — HEPARIN SODIUM 5000 UNITS: 5000 INJECTION INTRAVENOUS; SUBCUTANEOUS at 20:18

## 2024-08-31 ENCOUNTER — READMISSION MANAGEMENT (OUTPATIENT)
Dept: CALL CENTER | Facility: HOSPITAL | Age: 74
End: 2024-08-31
Payer: MEDICARE

## 2024-08-31 VITALS
DIASTOLIC BLOOD PRESSURE: 71 MMHG | SYSTOLIC BLOOD PRESSURE: 130 MMHG | BODY MASS INDEX: 33.29 KG/M2 | HEIGHT: 72 IN | OXYGEN SATURATION: 97 % | RESPIRATION RATE: 18 BRPM | HEART RATE: 75 BPM | TEMPERATURE: 98 F | WEIGHT: 245.81 LBS

## 2024-08-31 LAB
ANION GAP SERPL CALCULATED.3IONS-SCNC: 11.6 MMOL/L (ref 5–15)
BUN SERPL-MCNC: 50 MG/DL (ref 8–23)
BUN/CREAT SERPL: 19.8 (ref 7–25)
CALCIUM SPEC-SCNC: 8.9 MG/DL (ref 8.6–10.5)
CHLORIDE SERPL-SCNC: 106 MMOL/L (ref 98–107)
CO2 SERPL-SCNC: 25.4 MMOL/L (ref 22–29)
CREAT SERPL-MCNC: 2.52 MG/DL (ref 0.76–1.27)
DEPRECATED RDW RBC AUTO: 59.5 FL (ref 37–54)
EGFRCR SERPLBLD CKD-EPI 2021: 26.5 ML/MIN/1.73
ERYTHROCYTE [DISTWIDTH] IN BLOOD BY AUTOMATED COUNT: 18.7 % (ref 12.3–15.4)
GLUCOSE BLDC GLUCOMTR-MCNC: 161 MG/DL (ref 70–130)
GLUCOSE BLDC GLUCOMTR-MCNC: 169 MG/DL (ref 70–130)
GLUCOSE SERPL-MCNC: 123 MG/DL (ref 65–99)
HCT VFR BLD AUTO: 26.6 % (ref 37.5–51)
HGB BLD-MCNC: 8 G/DL (ref 13–17.7)
MCH RBC QN AUTO: 26.3 PG (ref 26.6–33)
MCHC RBC AUTO-ENTMCNC: 30.1 G/DL (ref 31.5–35.7)
MCV RBC AUTO: 87.5 FL (ref 79–97)
MRSA DNA SPEC QL NAA+PROBE: NORMAL
PLATELET # BLD AUTO: 133 10*3/MM3 (ref 140–450)
PMV BLD AUTO: 9.9 FL (ref 6–12)
POTASSIUM SERPL-SCNC: 3.7 MMOL/L (ref 3.5–5.2)
RBC # BLD AUTO: 3.04 10*6/MM3 (ref 4.14–5.8)
SODIUM SERPL-SCNC: 143 MMOL/L (ref 136–145)
WBC NRBC COR # BLD AUTO: 6.19 10*3/MM3 (ref 3.4–10.8)

## 2024-08-31 PROCEDURE — 99239 HOSP IP/OBS DSCHRG MGMT >30: CPT | Performed by: INTERNAL MEDICINE

## 2024-08-31 PROCEDURE — 94664 DEMO&/EVAL PT USE INHALER: CPT

## 2024-08-31 PROCEDURE — 25010000002 HEPARIN (PORCINE) PER 1000 UNITS: Performed by: INTERNAL MEDICINE

## 2024-08-31 PROCEDURE — 63710000001 INSULIN REGULAR HUMAN PER 5 UNITS: Performed by: INTERNAL MEDICINE

## 2024-08-31 PROCEDURE — 85027 COMPLETE CBC AUTOMATED: CPT | Performed by: INTERNAL MEDICINE

## 2024-08-31 PROCEDURE — 94799 UNLISTED PULMONARY SVC/PX: CPT

## 2024-08-31 PROCEDURE — 25810000003 SODIUM CHLORIDE 0.9 % SOLUTION: Performed by: INTERNAL MEDICINE

## 2024-08-31 PROCEDURE — 80048 BASIC METABOLIC PNL TOTAL CA: CPT | Performed by: INTERNAL MEDICINE

## 2024-08-31 PROCEDURE — 82948 REAGENT STRIP/BLOOD GLUCOSE: CPT | Performed by: INTERNAL MEDICINE

## 2024-08-31 PROCEDURE — 82948 REAGENT STRIP/BLOOD GLUCOSE: CPT

## 2024-08-31 PROCEDURE — 94761 N-INVAS EAR/PLS OXIMETRY MLT: CPT

## 2024-08-31 PROCEDURE — 25010000002 CEFTRIAXONE PER 250 MG: Performed by: INTERNAL MEDICINE

## 2024-08-31 PROCEDURE — 94660 CPAP INITIATION&MGMT: CPT

## 2024-08-31 RX ORDER — BUDESONIDE AND FORMOTEROL FUMARATE DIHYDRATE 160; 4.5 UG/1; UG/1
2 AEROSOL RESPIRATORY (INHALATION)
Qty: 10.2 G | Refills: 0 | Status: SHIPPED | OUTPATIENT
Start: 2024-08-31 | End: 2024-09-30

## 2024-08-31 RX ORDER — ALBUTEROL SULFATE 90 UG/1
2 AEROSOL, METERED RESPIRATORY (INHALATION) EVERY 6 HOURS PRN
Qty: 18 G | Refills: 0 | Status: SHIPPED | OUTPATIENT
Start: 2024-08-31 | End: 2024-09-30

## 2024-08-31 RX ORDER — TIOTROPIUM BROMIDE 18 UG/1
1 CAPSULE ORAL; RESPIRATORY (INHALATION)
Qty: 30 CAPSULE | Refills: 0 | Status: SHIPPED | OUTPATIENT
Start: 2024-08-31 | End: 2024-09-30

## 2024-08-31 RX ADMIN — ASPIRIN 81 MG: 81 TABLET, COATED ORAL at 08:58

## 2024-08-31 RX ADMIN — INSULIN HUMAN 2 UNITS: 100 INJECTION, SOLUTION PARENTERAL at 00:34

## 2024-08-31 RX ADMIN — FINASTERIDE 5 MG: 5 TABLET, FILM COATED ORAL at 08:59

## 2024-08-31 RX ADMIN — METOPROLOL TARTRATE 25 MG: 25 TABLET, FILM COATED ORAL at 08:59

## 2024-08-31 RX ADMIN — Medication 10 ML: at 09:00

## 2024-08-31 RX ADMIN — HEPARIN SODIUM 5000 UNITS: 5000 INJECTION INTRAVENOUS; SUBCUTANEOUS at 08:58

## 2024-08-31 RX ADMIN — SENNOSIDES AND DOCUSATE SODIUM 2 TABLET: 50; 8.6 TABLET ORAL at 08:58

## 2024-08-31 RX ADMIN — SODIUM CHLORIDE 100 ML/HR: 9 INJECTION, SOLUTION INTRAVENOUS at 06:31

## 2024-08-31 RX ADMIN — ACETAMINOPHEN 650 MG: 325 TABLET, FILM COATED ORAL at 08:58

## 2024-08-31 RX ADMIN — BUDESONIDE 0.5 MG: 0.5 INHALANT RESPIRATORY (INHALATION) at 07:22

## 2024-08-31 RX ADMIN — MIDODRINE HYDROCHLORIDE 5 MG: 5 TABLET ORAL at 06:31

## 2024-08-31 RX ADMIN — CLOPIDOGREL BISULFATE 75 MG: 75 TABLET ORAL at 08:59

## 2024-08-31 RX ADMIN — INSULIN HUMAN 2 UNITS: 100 INJECTION, SOLUTION PARENTERAL at 06:31

## 2024-08-31 RX ADMIN — Medication 1 PACKET: at 08:59

## 2024-08-31 RX ADMIN — SODIUM CHLORIDE 2000 MG: 9 INJECTION, SOLUTION INTRAVENOUS at 00:34

## 2024-09-01 LAB — GLUCOSE BLDC GLUCOMTR-MCNC: 215 MG/DL (ref 70–130)

## 2024-09-03 ENCOUNTER — READMISSION MANAGEMENT (OUTPATIENT)
Dept: CALL CENTER | Facility: HOSPITAL | Age: 74
End: 2024-09-03
Payer: MEDICARE

## 2024-09-03 ENCOUNTER — TELEPHONE (OUTPATIENT)
Dept: SURGERY | Facility: CLINIC | Age: 74
End: 2024-09-03

## 2024-09-04 LAB
BACTERIA SPEC AEROBE CULT: NORMAL
BACTERIA SPEC AEROBE CULT: NORMAL

## 2024-09-05 ENCOUNTER — TELEPHONE (OUTPATIENT)
Dept: SURGERY | Facility: CLINIC | Age: 74
End: 2024-09-05

## 2024-09-16 ENCOUNTER — READMISSION MANAGEMENT (OUTPATIENT)
Dept: CALL CENTER | Facility: HOSPITAL | Age: 74
End: 2024-09-16
Payer: MEDICARE

## 2024-11-06 ENCOUNTER — TRANSCRIBE ORDERS (OUTPATIENT)
Dept: ADMINISTRATIVE | Facility: HOSPITAL | Age: 74
End: 2024-11-06
Payer: MEDICARE

## 2024-11-06 DIAGNOSIS — C68.0 URETHRAL CANCER: Primary | ICD-10-CM

## 2024-11-20 ENCOUNTER — HOSPITAL ENCOUNTER (OUTPATIENT)
Dept: PET IMAGING | Facility: HOSPITAL | Age: 74
Discharge: HOME | End: 2024-11-20
Payer: MEDICARE

## 2024-11-20 DIAGNOSIS — C68.0 URETHRAL CANCER: ICD-10-CM

## 2025-01-07 ENCOUNTER — TELEPHONE (OUTPATIENT)
Dept: CARDIOLOGY | Facility: CLINIC | Age: 75
End: 2025-01-07
Payer: MEDICARE

## 2025-01-07 RX ORDER — METOPROLOL TARTRATE 25 MG/1
25 TABLET, FILM COATED ORAL DAILY
Qty: 30 TABLET | Refills: 3 | Status: SHIPPED | OUTPATIENT
Start: 2025-01-07

## 2025-01-07 RX ORDER — TORSEMIDE 100 MG/1
100 TABLET ORAL DAILY PRN
Qty: 90 TABLET | Refills: 0 | Status: SHIPPED | OUTPATIENT
Start: 2025-01-07

## 2025-01-07 NOTE — TELEPHONE ENCOUNTER
"Salma from [facility] LVM stating pt was in clinic yesterday and pt's bp was 102/56 and pt was dizzy. Stated pt was \"already on the lowest dose of metoprolol\". Called pt this morning. Pt has been taking metoprolol differently than prescribed. Pt has been taking metoprolol 50 mg daily. Pt took blood pressure while I was on the phone with him and it was 83/40 and HR 56. Pt states dizziness has improved some. Pt stated he has not taken his metoprolol dose yet today or other morning meds. Advised pt to stay hydrated, not to stand up too quickly, take his time with position changes. Advised pt to hold off on taking metoprolol until he hears back from me.     Please advise    8/27/2024  Subjective  Chief Complaint: Coronary Artery Disease, Dizziness, Edema (Bilateral legs), and Shortness of Breath        PROBLEM LIST:  Coronary artery disease:   Angina with [facility], 2003: Multivessel disease.  CABG ×3, 04/2003, [facility]: LIMA to LAD, SVG to RCA and SVG to OM1.  Recurrent chest pain and abnormal Cardiolite stress  [facility], 01/16/2006: 3/3 patent grafts. EF 60%. Good collateralization.  [facility] 10/27/2011: Multivessel native disease with patent 3/3 grafts. 100% occlusion of left branch of RCA with excellent collaterals. EF 60%.  [facility], 12/30/2014: 3/3 patent grafts. PDA is potential culprit. Good collaterals. Normal EF.  [facility], 11/04/2016: No reversible ischemia, normal EF.  [facility], 02/20/2019: 99% stenosis of the SVG to the LCx, now s/p 4.0 x 38 mm GONZALEZ. Subtotal occlusion of the SVG to the RCA now s/p 2 overlapping 3.0 x 38 mm GONZALEZ. Patent LIMA graft to the LAD. EF 60%.  Echo, 03/01/2021: EF 60%. Moderate pulmonary hypertension. PA pressure of 46 mmHg. Concentric left ventricular hypertrophy.  Echo, 05/28/2021: EF 59%. Biatrial enlargement. Mild TR.  [facility], 06/09/2021: Patent LIMA graft to the LAD. 95% IntraStent restenosis of vein graft to obtuse marginal branch of circumflex which was restented with a 4.0 x 38 mm GONZALEZ. 90% proximal " in-stent restenosis of vein graft to RCA which was restented with a 3.0 x 18 mm GONZALEZ. 70% IntraStent restenosis of the mid segment of vein graft to RCA which was restented with 3.0 x 23 mm GONZALEZ. There is residual moderate diffuse disease of the vein graft to the RCA to be managed with medical therapy.  Echo, 08/31/2022 (): EF 65%. The aortic root is mildly dilated.   Hypertension  Dyslipidemia  Carotid artery disease:  Duplex 10/27/2011 showing a second stenosis bilaterally.  TIA in 2003 with right-sided weakness and negative carotid duplex.  No residual effects.  Hospitalized July 2017, with dizziness and subsequently negative CT brain and carotid ultrasound.  No hemodynamically significant carotid disease noted on duplex July 2017.  Obesity  DM 2  CKD  GERD  OA  Psoriasis  Probable ERASMO  Anxiety/depression  Bladder cancer, s/p left nephrectomy:  Currently undergoing chemotherapy     History of Present Illness  Patient presents today for a follow-up with a history of CAD and cardiac risk factors. Since last visit, patient has been doing well overall from a cardiovascular standpoint. He has not had any recent hospital admissions or ED visits. Patient is currently using a wheelchair after a fall where he broke his right leg. He states he had multiple orthopedic surgeries at  for repair but the bone did not fuse well and he is unable to stand or walk.  He also has edema and some redness of the lower extremities.   He otherwise denies chest pain, shortness of breath, orthopnea, palpitations, dizziness, and syncope.      Allergies        Allergies   Allergen Reactions    Metformin Diarrhea              Current Medications      Current Outpatient Medications:     acetaminophen (TYLENOL) 325 MG tablet, Take 2 tablets by mouth Every 6 (Six) Hours As Needed., Disp: , Rfl:     albuterol sulfate  (90 Base) MCG/ACT inhaler, Inhale 2 puffs Every 6 (Six) Hours As Needed., Disp: , Rfl:     allopurinol (ZYLOPRIM) 100 MG  tablet, Take 2 tablets by mouth Daily., Disp: , Rfl:     aspirin 81 MG EC tablet, Take 1 tablet by mouth Daily., Disp: , Rfl:     buprenorphine-naloxone (SUBOXONE) 8-2 MG per SL tablet, Place 1 tablet under the tongue Daily., Disp: , Rfl:     busPIRone (BUSPAR) 15 MG tablet, Take 1 tablet by mouth 2 (two) times a day., Disp: , Rfl:     calcitriol (ROCALTROL) 0.25 MCG capsule, Take 1 capsule by mouth Daily., Disp: , Rfl:     Calmoseptine 0.44-20.6 % ointment, apply to affected area twice a day, Disp: , Rfl:     Cholecalciferol (Vitamin D) 50 MCG (2000 UT) tablet, Take 1 tablet by mouth Daily., Disp: , Rfl:     clopidogrel (PLAVIX) 75 MG tablet, Take 1 tablet by mouth Daily., Disp: 90 tablet, Rfl: 3    dutasteride (AVODART) 0.5 MG capsule, Take 1 capsule by mouth Daily., Disp: , Rfl:     ezetimibe (ZETIA) 10 MG tablet, Take 1 tablet by mouth Daily., Disp: , Rfl:     glipizide (GLUCOTROL XL) 10 MG 24 hr tablet, Take 1 tablet by mouth Every 12 (Twelve) Hours., Disp: , Rfl:     Lantus SoloStar 100 UNIT/ML injection pen, Inject 20 Units under the skin into the appropriate area as directed Daily As Needed., Disp: , Rfl:     lisinopril-hydrochlorothiazide (PRINZIDE,ZESTORETIC) 20-25 MG per tablet, Take 1 tablet by mouth Daily., Disp: , Rfl:     Loratadine 10 MG capsule, Take 1 capsule by mouth Daily., Disp: , Rfl:     metoprolol tartrate (LOPRESSOR) 50 MG tablet, Take 1 tablet by mouth 2 (Two) Times a Day., Disp: , Rfl:     mupirocin (BACTROBAN) 2 % ointment, Apply 1 Application topically to the appropriate area as directed Daily., Disp: , Rfl:     naloxone (NARCAN) 4 MG/0.1ML nasal spray, spray 0.1 milliliter by intranasal route in 1 nostril may repeat dose every 2-3 minutes as needed alternating nostrils with each dose, Disp: , Rfl:     nitroglycerin (NITROSTAT) 0.4 MG SL tablet, Place 1 tablet under the tongue Every 5 (Five) Minutes As Needed for Chest Pain. Take no more than 3 doses in 15 minutes., Disp: 100 tablet,  "Rfl: 1    omeprazole (priLOSEC) 20 MG capsule, Take 1 capsule by mouth 2 (Two) Times a Day., Disp: , Rfl:     Poly-Iron 150 150 MG capsule, take 1 twice a day, Disp: , Rfl:     polyethylene glycol (MIRALAX) 17 g packet, Mix 7 packets (119 grams) in 32 ounces of gatorade. See prep instructions from office., Disp: 7 packet, Rfl: 1    sertraline (ZOLOFT) 100 MG tablet, Take 1 tablet by mouth Daily. 2 tablet daily, Disp: , Rfl:     sildenafil (REVATIO) 20 MG tablet, TAKE 3 TO 5 TABS ORALLY AS NEEDED, Disp: , Rfl:     SITagliptin (JANUVIA) 100 MG tablet, Take 1 tablet by mouth Daily., Disp: , Rfl:     Spiriva HandiHaler 18 MCG per inhalation capsule, 1 CAPSULE BY INHALING THE CONTENTS OF THE CAPSULE USING THE HANDIHALER DEVICE ONCE A DAY INHALATION 90, Disp: , Rfl:     Symbicort 160-4.5 MCG/ACT inhaler, 2 PUFFS INHALATION TWICE A DAY 30 DAYS, Disp: , Rfl:     tamsulosin (FLOMAX) 0.4 MG capsule 24 hr capsule, Take 1 capsule by mouth Every Night., Disp: , Rfl:     torsemide (DEMADEX) 100 MG tablet, Take 1 tablet by mouth Daily., Disp: , Rfl:     True Metrix Blood Glucose Test test strip, See Admin Instructions., Disp: , Rfl:      Current Facility-Administered Medications:     simethicone (MYLICON) 40 MG/0.6ML drops 160 mg, 160 mg, Oral, Once, Jaren Sterling MD        The following portions of the patient's history were reviewed and updated as appropriate: allergies, current medications, past family history, past medical history, past social history, past surgical history and problem list.     ROS  Review of Systems   14 point ROS negative except for that listed in the HPI.         Objective  Objective:      /60 (BP Location: Right arm, Patient Position: Sitting)   Pulse 70   Ht 182.9 cm (72\")   Wt 111 kg (244 lb)   SpO2 91%   BMI 33.09 kg/m²       Physical Exam  Constitutional: Patient appears well-developed and well-nourished.   HENT: HEENT exam unremarkable.   Neck: Neck supple. No JVD present. No carotid " bruits.   Cardiovascular: Normal rate, regular rhythm and normal heart sounds. No murmur heard.   2+ symmetric pulses.   Pulmonary/Chest: Breath sounds normal. Does not exhibit tenderness.   Abdominal: Abdomen benign.   Musculoskeletal: Bilateral lower extremity edema and right lower leg deformity.  Mild redness.  Neurological: Neurological exam unremarkable.   Vitals reviewed.     Data Review:   Lab date: 06/03/2024  CMP: Glu 132, BUN 26, Creat 2.12, eGFR 32.9, Na 143, K 4.5, Cl 103, CO2 27, Ca 8.7, Alk Phos 71, AST 13, ALT 18 (10/26/2023)  CBC: WBC 8.1, RBC 4.58, HGB 11.3, HCT 37.4, MCV 82, MCH 24.7,            Lab Results   Component Value Date     HGBA1C 6.2 (H) 10/26/2023         Procedures            Advance Care Planning  ACP discussion was declined by the patient. Patient does not have an advance directive, declines further assistance.               Assessment:        Diagnosis Plan   1. Coronary artery disease involving coronary bypass graft of native heart without angina pectoris  Stable without angina on current activity. Continue on aspirin 81 mg for antiplatelet therapy. Continue on Plavix 75 mg daily for antiplatelet therapy. Continue on nitroglycerin 0.4 mg PRN for chest pain.       2. Essential hypertension  Well controlled. Continue on metoprolol 50 mg BID for rate control and hypertension.        3. Dyslipidemia  No labs for review. Continue on Zetia 10 mg daily for hyperlipidemia.           Plan:   Stable cardiac status.  No angina or CHF symptoms  Recommended use of support stockings, elevation of legs, and avoidance of salt/sodium.  Advised to see PCP if the redness on his legs worsens due to risk of cellulitis  Continue current medications.   FU in 6 MO, sooner as needed.

## 2025-01-07 NOTE — TELEPHONE ENCOUNTER
Spoke with pt's wife and relayed Dr. Smith's recommendaton to change demadex to as needed for swelling and 1/2 tab metoprolol 50 mg daily if sbp greater than 110. Verbalized understanding. Pt's wife stated she was unable to cut metoprolol pill. Will send in script for metoprolol 25 mg daily. Attempted to update Salma at [facility] with medication changes, but no answer and unable to leave vm

## 2025-06-17 NOTE — PROGRESS NOTES
[facility] Cardiology    Encounter Date: 2025    Patient ID: Jonny Ferrari Jr. is a 74 y.o. male.  : 1950     PCP: Amaury Brice MD       Chief Complaint: Coronary Artery Disease (Coronary artery disease involving coronary bypass graft of native heart without angina pectoris)      PROBLEM LIST:  Coronary artery disease:   Angina with [facility], : Multivessel disease.  CABG ×3, 2003, [facility]: LIMA to LAD, SVG to RCA and SVG to OM1.  Recurrent chest pain and abnormal Cardiolite stress  [facility], 2006: 3/3 patent grafts. EF 60%. Good collateralization.  [facility] 10/27/2011: Multivessel native disease with patent 3/3 grafts. 100% occlusion of left branch of RCA with excellent collaterals. EF 60%.  [facility], 2014: 3/3 patent grafts. PDA is potential culprit. Good collaterals. Normal EF.  [facility], 2016: No reversible ischemia, normal EF.  [facility], 2019: 99% stenosis of the SVG to the LCx, now s/p 4.0 x 38 mm GONZALEZ. Subtotal occlusion of the SVG to the RCA now s/p 2 overlapping 3.0 x 38 mm GONZALEZ. Patent LIMA graft to the LAD. EF 60%.  Echo, 2021: EF 60%. Moderate pulmonary hypertension. PA pressure of 46 mmHg. Concentric left ventricular hypertrophy.  Echo, 2021: EF 59%. Biatrial enlargement. Mild TR.  [facility], 2021: Patent LIMA graft to the LAD. 95% IntraStent restenosis of vein graft to obtuse marginal branch of circumflex which was restented with a 4.0 x 38 mm GONZALEZ. 90% proximal in-stent restenosis of vein graft to RCA which was restented with a 3.0 x 18 mm GONZALEZ. 70% IntraStent restenosis of the mid segment of vein graft to RCA which was restented with 3.0 x 23 mm GONZALEZ. There is residual moderate diffuse disease of the vein graft to the RCA to be managed with medical therapy.  Echo, 2022 (): EF 65%. The aortic root is mildly dilated.   Hypertension  Dyslipidemia  Carotid artery disease:  Duplex 10/27/2011 showing a second stenosis bilaterally.  TIA in  2003 with right-sided weakness and negative carotid duplex.  No residual effects.  Hospitalized July 2017, with dizziness and subsequently negative CT brain and carotid ultrasound.  No hemodynamically significant carotid disease noted on duplex July 2017.  Obesity  DM 2  CKD  GERD  OA  Psoriasis  Probable ERASMO  Anxiety/depression  Bladder cancer, s/p left nephrectomy:  Currently undergoing chemotherapy    History of Present Illness  Patient presents today for a follow-up with a history of CAD and cardiac risk factors. Since last visit, Patient has been doing well from a cardiac standpoint.  Active and busy in daily life but does not have a regular exercise routine.  Tolerates activity well. He has continued to have leg weakness and is in a wheelchair in the office today. Had an echocardiogram performed and this revealed mildly elevated RVSP. Continues to have frequent falls. Reports his BP is low at home. Patient denies any abnormal shortness of breath,    Allergies   Allergen Reactions    Metformin Diarrhea         Current Outpatient Medications:     acetaminophen (TYLENOL) 325 MG tablet, Take 2 tablets by mouth Every 6 (Six) Hours As Needed., Disp: , Rfl:     alfuzosin (UROXATRAL) 10 MG 24 hr tablet, Take 1 tablet by mouth Daily., Disp: , Rfl:     allopurinol (ZYLOPRIM) 100 MG tablet, Take 2 tablets by mouth Daily., Disp: , Rfl:     aspirin 81 MG EC tablet, Take 1 tablet by mouth Daily., Disp: , Rfl:     buprenorphine-naloxone (SUBOXONE) 8-2 MG per SL tablet, Place 1 tablet under the tongue 2 (Two) Times a Day., Disp: , Rfl:     busPIRone (BUSPAR) 15 MG tablet, Take 1 tablet by mouth 2 (two) times a day., Disp: , Rfl:     calcitriol (ROCALTROL) 0.25 MCG capsule, Take 1 capsule by mouth Daily., Disp: , Rfl:     clopidogrel (PLAVIX) 75 MG tablet, Take 1 tablet by mouth Daily., Disp: 90 tablet, Rfl: 3    dapagliflozin Propanediol (Farxiga) 10 MG tablet, Take  by mouth Daily., Disp: , Rfl:     dutasteride (AVODART) 0.5  MG capsule, Take 1 capsule by mouth Daily., Disp: , Rfl:     ezetimibe (ZETIA) 10 MG tablet, Take 1 tablet by mouth Daily., Disp: , Rfl:     Lantus SoloStar 100 UNIT/ML injection pen, Inject 20 Units under the skin into the appropriate area as directed 4 (Four) Times a Day After Meals & at Bedtime., Disp: , Rfl:     linagliptin (TRADJENTA) 5 MG tablet tablet, Take 1 tablet by mouth Daily., Disp: 30 tablet, Rfl: 0    metoprolol tartrate (LOPRESSOR) 25 MG tablet, Take 1 tablet by mouth Daily. Take for systolic blood pressure greater than 110, Disp: 30 tablet, Rfl: 3    nitroglycerin (NITROSTAT) 0.4 MG SL tablet, Place 1 tablet under the tongue Every 5 (Five) Minutes As Needed for Chest Pain. Take no more than 3 doses in 15 minutes., Disp: 100 tablet, Rfl: 1    omeprazole (priLOSEC) 20 MG capsule, Take 1 capsule by mouth 2 (Two) Times a Day., Disp: , Rfl:     potassium chloride ER (K-TAB) 20 MEQ tablet controlled-release ER tablet, Take 1 tablet by mouth 2 (Two) Times a Day., Disp: , Rfl:     sertraline (ZOLOFT) 100 MG tablet, Take 1 tablet by mouth Daily. 2 tablet daily, Disp: , Rfl:     Spiriva HandiHaler 18 MCG per inhalation capsule, Place 1 capsule into inhaler and inhale Daily for 30 days., Disp: 30 capsule, Rfl: 0    Symbicort 160-4.5 MCG/ACT inhaler, Inhale 2 puffs 2 (Two) Times a Day for 30 days., Disp: 10.2 g, Rfl: 0    tamsulosin (FLOMAX) 0.4 MG capsule 24 hr capsule, Take 1 capsule by mouth Every Night., Disp: , Rfl:     torsemide (DEMADEX) 100 MG tablet, Take 1 tablet by mouth Daily As Needed (Take as needed for fluid retention/swelling)., Disp: 90 tablet, Rfl: 0    True Metrix Blood Glucose Test test strip, See Admin Instructions., Disp: , Rfl:     Calmoseptine 0.44-20.6 % ointment, apply to affected area twice a day (Patient not taking: Reported on 6/24/2025), Disp: , Rfl:     Cholecalciferol (Vitamin D) 50 MCG (2000 UT) tablet, Take 1 tablet by mouth Daily. (Patient not taking: Reported on 6/24/2025),  "Disp: , Rfl:     Poly-Iron 150 150 MG capsule, take 1 twice a day (Patient not taking: Reported on 6/24/2025), Disp: , Rfl:     The following portions of the patient's history were reviewed and updated as appropriate: allergies, current medications, past family history, past medical history, past social history, past surgical history and problem list.    ROS  Review of Systems   14 point ROS negative except for that listed in the HPI.         Objective:     /63 (BP Location: Right arm, Patient Position: Sitting)   Ht 182.9 cm (72\")   Wt 96.2 kg (212 lb)   BMI 28.75 kg/m²      Physical Exam  Constitutional: Patient appears well-developed and well-nourished.   Neck: Neck supple. No JVD present.    Cardiovascular: Normal rate, regular rhythm and normal heart sounds. No murmur heard.   2+ symmetric pulses.   Pulmonary/Chest: Breath sounds normal. Does not exhibit tenderness.   Musculoskeletal: Does not exhibit edema.   Vitals reviewed.    Data Review:   Lab Results   Component Value Date    GLUCOSE 123 (H) 08/31/2024    BUN 50 (H) 08/31/2024    CREATININE 2.52 (H) 08/31/2024    EGFR 26.5 (L) 08/31/2024    BCR 19.8 08/31/2024     08/31/2024    K 3.7 08/31/2024    CO2 25.4 08/31/2024    CALCIUM 8.9 08/31/2024    ALBUMIN 3.9 08/29/2024    AST 13 08/29/2024    ALT 12 08/29/2024     Lab Results   Component Value Date    WBC 6.19 08/31/2024    RBC 3.04 (L) 08/31/2024    HGB 8.0 (L) 08/31/2024    HCT 26.6 (L) 08/31/2024    MCV 87.5 08/31/2024     (L) 08/31/2024        Procedures     Advance Care Planning   ACP discussion was held with the patient during this visit. Patient does not have an advance directive, declines further assistance.           Assessment and plan:      Diagnosis Plan   1. Coronary artery disease involving coronary bypass graft of native heart without angina pectoris  Stable without current angina.  Continue Plavix and Zetia.      2. Essential hypertension  Now with low blood pressure " and frequent falls.  Add midodrine 5 mg 3 times daily as needed.      3. Dyslipidemia  Continue Zetia.          Continue current medications.   FU in 3 MO, sooner as needed.  Thank you for allowing us to participate in the care of your patient.         Erin Bailey PA-C    Please note that portions of this note may have been completed with a voice recognition program. Efforts were made to edit the dictations, but occasionally words are mistranscribed.

## 2025-06-24 ENCOUNTER — OFFICE VISIT (OUTPATIENT)
Dept: CARDIOLOGY | Facility: CLINIC | Age: 75
End: 2025-06-24
Payer: MEDICARE

## 2025-06-24 VITALS
DIASTOLIC BLOOD PRESSURE: 63 MMHG | SYSTOLIC BLOOD PRESSURE: 103 MMHG | WEIGHT: 212 LBS | HEIGHT: 72 IN | BODY MASS INDEX: 28.71 KG/M2

## 2025-06-24 DIAGNOSIS — E78.5 DYSLIPIDEMIA: ICD-10-CM

## 2025-06-24 DIAGNOSIS — I10 ESSENTIAL HYPERTENSION: ICD-10-CM

## 2025-06-24 DIAGNOSIS — I25.810 CORONARY ARTERY DISEASE INVOLVING CORONARY BYPASS GRAFT OF NATIVE HEART WITHOUT ANGINA PECTORIS: Primary | ICD-10-CM

## 2025-06-24 PROCEDURE — 99214 OFFICE O/P EST MOD 30 MIN: CPT

## 2025-06-24 PROCEDURE — 3074F SYST BP LT 130 MM HG: CPT

## 2025-06-24 PROCEDURE — 3078F DIAST BP <80 MM HG: CPT

## 2025-06-24 RX ORDER — POTASSIUM CHLORIDE 1500 MG/1
20 TABLET, EXTENDED RELEASE ORAL 2 TIMES DAILY
Status: ON HOLD | COMMUNITY

## 2025-06-24 RX ORDER — MIDODRINE HYDROCHLORIDE 5 MG/1
5 TABLET ORAL
Qty: 270 TABLET | Refills: 1 | Status: ON HOLD | OUTPATIENT
Start: 2025-06-24

## 2025-06-24 RX ORDER — ALFUZOSIN HYDROCHLORIDE 10 MG/1
10 TABLET, EXTENDED RELEASE ORAL DAILY
Status: ON HOLD | COMMUNITY

## 2025-06-24 NOTE — PROGRESS NOTES
June 24, 2025    Hello, may I speak with Jonny Ferrari Jr.?    My name is TU      I am  with [facility] CARDIOLOGY  [address]  [place] [address] [phone].    Before we get started may I verify your date of birth? 1950    I am calling to officially welcome you to our practice and ask about your recent visit. Is this a good time to talk? yes    Tell me about your visit with us. What things went well?  APPOINTMENT WAS GOOD        We're always looking for ways to make our patients' experiences even better. Do you have recommendations on ways we may improve?  no    Overall were you satisfied with your first visit to our practice? yes       I appreciate you taking the time to speak with me today. Is there anything else I can do for you? no      Thank you, and have a great day.

## 2025-06-29 ENCOUNTER — HOSPITAL ENCOUNTER (INPATIENT)
Facility: HOSPITAL | Age: 75
LOS: 11 days | Discharge: HOME HEALTH | DRG: 871 | End: 2025-07-10
Attending: EMERGENCY MEDICINE | Admitting: INTERNAL MEDICINE
Payer: MEDICARE

## 2025-06-29 ENCOUNTER — APPOINTMENT (OUTPATIENT)
Dept: CT IMAGING | Facility: HOSPITAL | Age: 75
DRG: 871 | End: 2025-06-29
Payer: MEDICARE

## 2025-06-29 ENCOUNTER — APPOINTMENT (OUTPATIENT)
Dept: GENERAL RADIOLOGY | Facility: HOSPITAL | Age: 75
DRG: 871 | End: 2025-06-29
Payer: MEDICARE

## 2025-06-29 DIAGNOSIS — R65.21 SEPSIS WITH ACUTE RENAL FAILURE AND SEPTIC SHOCK, DUE TO UNSPECIFIED ORGANISM, UNSPECIFIED ACUTE RENAL FAILURE TYPE: Primary | ICD-10-CM

## 2025-06-29 DIAGNOSIS — E11.65 TYPE 2 DIABETES MELLITUS WITH HYPERGLYCEMIA, UNSPECIFIED WHETHER LONG TERM INSULIN USE: ICD-10-CM

## 2025-06-29 DIAGNOSIS — N17.9 ACUTE KIDNEY INJURY SUPERIMPOSED ON CHRONIC KIDNEY DISEASE: ICD-10-CM

## 2025-06-29 DIAGNOSIS — R53.81 DEBILITY: ICD-10-CM

## 2025-06-29 DIAGNOSIS — D64.9 CHRONIC ANEMIA: ICD-10-CM

## 2025-06-29 DIAGNOSIS — J18.1 LEFT LOWER LOBE CONSOLIDATION: ICD-10-CM

## 2025-06-29 DIAGNOSIS — E87.6 HYPOKALEMIA: ICD-10-CM

## 2025-06-29 DIAGNOSIS — T14.8XXA MULTIPLE SKIN TEARS: ICD-10-CM

## 2025-06-29 DIAGNOSIS — N18.9 ACUTE KIDNEY INJURY SUPERIMPOSED ON CHRONIC KIDNEY DISEASE: ICD-10-CM

## 2025-06-29 DIAGNOSIS — N18.4 CHRONIC KIDNEY DISEASE, STAGE IV (SEVERE): ICD-10-CM

## 2025-06-29 DIAGNOSIS — N17.9 SEPSIS WITH ACUTE RENAL FAILURE AND SEPTIC SHOCK, DUE TO UNSPECIFIED ORGANISM, UNSPECIFIED ACUTE RENAL FAILURE TYPE: Primary | ICD-10-CM

## 2025-06-29 DIAGNOSIS — E87.1 HYPONATREMIA: ICD-10-CM

## 2025-06-29 DIAGNOSIS — A41.9 SEPSIS WITH ACUTE RENAL FAILURE AND SEPTIC SHOCK, DUE TO UNSPECIFIED ORGANISM, UNSPECIFIED ACUTE RENAL FAILURE TYPE: Primary | ICD-10-CM

## 2025-06-29 LAB
ALBUMIN SERPL-MCNC: 3.3 G/DL (ref 3.5–5.2)
ALBUMIN/GLOB SERPL: 0.9 G/DL
ALP SERPL-CCNC: 69 U/L (ref 39–117)
ALT SERPL W P-5'-P-CCNC: 14 U/L (ref 1–41)
ANION GAP SERPL CALCULATED.3IONS-SCNC: 25.6 MMOL/L (ref 5–15)
AST SERPL-CCNC: 19 U/L (ref 1–40)
BACTERIA UR QL AUTO: ABNORMAL /HPF
BASOPHILS # BLD AUTO: 0.03 10*3/MM3 (ref 0–0.2)
BASOPHILS NFR BLD AUTO: 0.2 % (ref 0–1.5)
BILIRUB SERPL-MCNC: 0.2 MG/DL (ref 0–1.2)
BILIRUB UR QL STRIP: NEGATIVE
BUN SERPL-MCNC: 61 MG/DL (ref 8–23)
BUN/CREAT SERPL: 16.1 (ref 7–25)
CALCIUM SPEC-SCNC: 9 MG/DL (ref 8.6–10.5)
CHLORIDE SERPL-SCNC: 81 MMOL/L (ref 98–107)
CLARITY UR: CLEAR
CO2 SERPL-SCNC: 24.4 MMOL/L (ref 22–29)
COLOR UR: YELLOW
CREAT SERPL-MCNC: 3.79 MG/DL (ref 0.76–1.27)
D-LACTATE SERPL-SCNC: 2 MMOL/L (ref 0.5–2)
D-LACTATE SERPL-SCNC: 4.2 MMOL/L (ref 0.5–2)
D-LACTATE SERPL-SCNC: 9.7 MMOL/L (ref 0.5–2)
DEPRECATED RDW RBC AUTO: 45.5 FL (ref 37–54)
EGFRCR SERPLBLD CKD-EPI 2021: 16.2 ML/MIN/1.73
EOSINOPHIL # BLD AUTO: 0 10*3/MM3 (ref 0–0.4)
EOSINOPHIL NFR BLD AUTO: 0 % (ref 0.3–6.2)
ERYTHROCYTE [DISTWIDTH] IN BLOOD BY AUTOMATED COUNT: 16.1 % (ref 12.3–15.4)
GEN 5 1HR TROPONIN T REFLEX: 95 NG/L
GLOBULIN UR ELPH-MCNC: 3.5 GM/DL
GLUCOSE SERPL-MCNC: 335 MG/DL (ref 65–99)
GLUCOSE UR STRIP-MCNC: ABNORMAL MG/DL
HCT VFR BLD AUTO: 27.6 % (ref 37.5–51)
HEMOCCULT STL QL: NEGATIVE
HGB BLD-MCNC: 9 G/DL (ref 13–17.7)
HGB UR QL STRIP.AUTO: NEGATIVE
HOLD SPECIMEN: NORMAL
HOLD SPECIMEN: NORMAL
HYALINE CASTS UR QL AUTO: ABNORMAL /LPF
IMM GRANULOCYTES # BLD AUTO: 0.14 10*3/MM3 (ref 0–0.05)
IMM GRANULOCYTES NFR BLD AUTO: 0.9 % (ref 0–0.5)
KETONES UR QL STRIP: NEGATIVE
LEUKOCYTE ESTERASE UR QL STRIP.AUTO: ABNORMAL
LIPASE SERPL-CCNC: 19 U/L (ref 13–60)
LYMPHOCYTES # BLD AUTO: 0.87 10*3/MM3 (ref 0.7–3.1)
LYMPHOCYTES NFR BLD AUTO: 5.5 % (ref 19.6–45.3)
MAGNESIUM SERPL-MCNC: 1.8 MG/DL (ref 1.6–2.4)
MCH RBC QN AUTO: 25.4 PG (ref 26.6–33)
MCHC RBC AUTO-ENTMCNC: 32.6 G/DL (ref 31.5–35.7)
MCV RBC AUTO: 78 FL (ref 79–97)
MONOCYTES # BLD AUTO: 0.42 10*3/MM3 (ref 0.1–0.9)
MONOCYTES NFR BLD AUTO: 2.7 % (ref 5–12)
NEUTROPHILS NFR BLD AUTO: 14.23 10*3/MM3 (ref 1.7–7)
NEUTROPHILS NFR BLD AUTO: 90.7 % (ref 42.7–76)
NITRITE UR QL STRIP: NEGATIVE
NRBC BLD AUTO-RTO: 0 /100 WBC (ref 0–0.2)
NT-PROBNP SERPL-MCNC: 1835 PG/ML (ref 0–900)
PH UR STRIP.AUTO: 5.5 [PH] (ref 5–8)
PLATELET # BLD AUTO: 205 10*3/MM3 (ref 140–450)
PMV BLD AUTO: 9.8 FL (ref 6–12)
POTASSIUM SERPL-SCNC: 2.9 MMOL/L (ref 3.5–5.2)
PROCALCITONIN SERPL-MCNC: 0.79 NG/ML (ref 0–0.25)
PROT SERPL-MCNC: 6.8 G/DL (ref 6–8.5)
PROT UR QL STRIP: NEGATIVE
RBC # BLD AUTO: 3.54 10*6/MM3 (ref 4.14–5.8)
RBC # UR STRIP: ABNORMAL /HPF
REF LAB TEST METHOD: ABNORMAL
SODIUM SERPL-SCNC: 131 MMOL/L (ref 136–145)
SP GR UR STRIP: 1.01 (ref 1–1.03)
SQUAMOUS #/AREA URNS HPF: ABNORMAL /HPF
TROPONIN T % DELTA: -3
TROPONIN T NUMERIC DELTA: -3 NG/L
TROPONIN T SERPL HS-MCNC: 98 NG/L
UROBILINOGEN UR QL STRIP: ABNORMAL
WBC # UR STRIP: ABNORMAL /HPF
WBC NRBC COR # BLD AUTO: 15.69 10*3/MM3 (ref 3.4–10.8)
WHOLE BLOOD HOLD COAG: NORMAL
WHOLE BLOOD HOLD SPECIMEN: NORMAL

## 2025-06-29 PROCEDURE — 80053 COMPREHEN METABOLIC PANEL: CPT | Performed by: EMERGENCY MEDICINE

## 2025-06-29 PROCEDURE — 25010000002 PIPERACILLIN SOD-TAZOBACTAM PER 1 G: Performed by: EMERGENCY MEDICINE

## 2025-06-29 PROCEDURE — 25810000003 LACTATED RINGERS SOLUTION: Performed by: EMERGENCY MEDICINE

## 2025-06-29 PROCEDURE — 87040 BLOOD CULTURE FOR BACTERIA: CPT | Performed by: EMERGENCY MEDICINE

## 2025-06-29 PROCEDURE — 84145 PROCALCITONIN (PCT): CPT | Performed by: EMERGENCY MEDICINE

## 2025-06-29 PROCEDURE — 82948 REAGENT STRIP/BLOOD GLUCOSE: CPT

## 2025-06-29 PROCEDURE — 93005 ELECTROCARDIOGRAM TRACING: CPT | Performed by: EMERGENCY MEDICINE

## 2025-06-29 PROCEDURE — 83036 HEMOGLOBIN GLYCOSYLATED A1C: CPT | Performed by: INTERNAL MEDICINE

## 2025-06-29 PROCEDURE — 83735 ASSAY OF MAGNESIUM: CPT | Performed by: EMERGENCY MEDICINE

## 2025-06-29 PROCEDURE — 25010000002 LIDOCAINE 1% - EPINEPHRINE 1:100000 1 %-1:100000 SOLUTION: Performed by: EMERGENCY MEDICINE

## 2025-06-29 PROCEDURE — 83690 ASSAY OF LIPASE: CPT | Performed by: EMERGENCY MEDICINE

## 2025-06-29 PROCEDURE — 25810000003 SODIUM CHLORIDE 0.9 % SOLUTION: Performed by: EMERGENCY MEDICINE

## 2025-06-29 PROCEDURE — 06HM33Z INSERTION OF INFUSION DEVICE INTO RIGHT FEMORAL VEIN, PERCUTANEOUS APPROACH: ICD-10-PCS | Performed by: EMERGENCY MEDICINE

## 2025-06-29 PROCEDURE — 25010000002 VANCOMYCIN 5 G RECONSTITUTED SOLUTION: Performed by: EMERGENCY MEDICINE

## 2025-06-29 PROCEDURE — 99223 1ST HOSP IP/OBS HIGH 75: CPT | Performed by: INTERNAL MEDICINE

## 2025-06-29 PROCEDURE — 99291 CRITICAL CARE FIRST HOUR: CPT | Performed by: EMERGENCY MEDICINE

## 2025-06-29 PROCEDURE — 36415 COLL VENOUS BLD VENIPUNCTURE: CPT

## 2025-06-29 PROCEDURE — 83605 ASSAY OF LACTIC ACID: CPT | Performed by: EMERGENCY MEDICINE

## 2025-06-29 PROCEDURE — 87186 SC STD MICRODIL/AGAR DIL: CPT | Performed by: EMERGENCY MEDICINE

## 2025-06-29 PROCEDURE — 85025 COMPLETE CBC W/AUTO DIFF WBC: CPT | Performed by: EMERGENCY MEDICINE

## 2025-06-29 PROCEDURE — 81001 URINALYSIS AUTO W/SCOPE: CPT | Performed by: EMERGENCY MEDICINE

## 2025-06-29 PROCEDURE — 87086 URINE CULTURE/COLONY COUNT: CPT | Performed by: EMERGENCY MEDICINE

## 2025-06-29 PROCEDURE — 74176 CT ABD & PELVIS W/O CONTRAST: CPT

## 2025-06-29 PROCEDURE — P9612 CATHETERIZE FOR URINE SPEC: HCPCS

## 2025-06-29 PROCEDURE — 71250 CT THORAX DX C-: CPT

## 2025-06-29 PROCEDURE — 83880 ASSAY OF NATRIURETIC PEPTIDE: CPT | Performed by: EMERGENCY MEDICINE

## 2025-06-29 PROCEDURE — 84484 ASSAY OF TROPONIN QUANT: CPT | Performed by: EMERGENCY MEDICINE

## 2025-06-29 PROCEDURE — 87077 CULTURE AEROBIC IDENTIFY: CPT | Performed by: EMERGENCY MEDICINE

## 2025-06-29 PROCEDURE — 71045 X-RAY EXAM CHEST 1 VIEW: CPT

## 2025-06-29 PROCEDURE — 82272 OCCULT BLD FECES 1-3 TESTS: CPT | Performed by: EMERGENCY MEDICINE

## 2025-06-29 PROCEDURE — 25810000003 LACTATED RINGERS PER 1000 ML: Performed by: EMERGENCY MEDICINE

## 2025-06-29 PROCEDURE — C1751 CATH, INF, PER/CENT/MIDLINE: HCPCS

## 2025-06-29 RX ORDER — SODIUM CHLORIDE 0.9 % (FLUSH) 0.9 %
10 SYRINGE (ML) INJECTION AS NEEDED
Status: DISCONTINUED | OUTPATIENT
Start: 2025-06-29 | End: 2025-07-10 | Stop reason: HOSPADM

## 2025-06-29 RX ORDER — VANCOMYCIN 2 GRAM/500 ML IN 0.9 % SODIUM CHLORIDE INTRAVENOUS
20 ONCE
Status: COMPLETED | OUTPATIENT
Start: 2025-06-29 | End: 2025-06-29

## 2025-06-29 RX ORDER — INSULIN LISPRO 100 [IU]/ML
2-9 INJECTION, SOLUTION INTRAVENOUS; SUBCUTANEOUS
Status: DISCONTINUED | OUTPATIENT
Start: 2025-06-30 | End: 2025-07-10 | Stop reason: HOSPADM

## 2025-06-29 RX ORDER — ACETAMINOPHEN 650 MG/1
650 SUPPOSITORY RECTAL EVERY 4 HOURS PRN
Status: DISCONTINUED | OUTPATIENT
Start: 2025-06-29 | End: 2025-07-10 | Stop reason: HOSPADM

## 2025-06-29 RX ORDER — BISACODYL 5 MG/1
5 TABLET, DELAYED RELEASE ORAL DAILY PRN
Status: DISCONTINUED | OUTPATIENT
Start: 2025-06-29 | End: 2025-07-10 | Stop reason: HOSPADM

## 2025-06-29 RX ORDER — INSULIN LISPRO 100 [IU]/ML
8 INJECTION, SOLUTION INTRAVENOUS; SUBCUTANEOUS
Status: DISCONTINUED | OUTPATIENT
Start: 2025-06-30 | End: 2025-07-03

## 2025-06-29 RX ORDER — SODIUM CHLORIDE, SODIUM LACTATE, POTASSIUM CHLORIDE, CALCIUM CHLORIDE 600; 310; 30; 20 MG/100ML; MG/100ML; MG/100ML; MG/100ML
125 INJECTION, SOLUTION INTRAVENOUS CONTINUOUS
Status: ACTIVE | OUTPATIENT
Start: 2025-06-29 | End: 2025-06-30

## 2025-06-29 RX ORDER — AMOXICILLIN 250 MG
2 CAPSULE ORAL 2 TIMES DAILY
Status: DISCONTINUED | OUTPATIENT
Start: 2025-06-30 | End: 2025-07-10 | Stop reason: HOSPADM

## 2025-06-29 RX ORDER — HEPARIN SODIUM 5000 [USP'U]/ML
5000 INJECTION, SOLUTION INTRAVENOUS; SUBCUTANEOUS EVERY 12 HOURS SCHEDULED
Status: DISCONTINUED | OUTPATIENT
Start: 2025-06-30 | End: 2025-07-01

## 2025-06-29 RX ORDER — LIDOCAINE HYDROCHLORIDE AND EPINEPHRINE 10; 10 MG/ML; UG/ML
10 INJECTION, SOLUTION INFILTRATION; PERINEURAL ONCE
Status: COMPLETED | OUTPATIENT
Start: 2025-06-29 | End: 2025-06-29

## 2025-06-29 RX ORDER — SODIUM CHLORIDE 9 MG/ML
40 INJECTION, SOLUTION INTRAVENOUS AS NEEDED
Status: DISCONTINUED | OUTPATIENT
Start: 2025-06-29 | End: 2025-07-10 | Stop reason: HOSPADM

## 2025-06-29 RX ORDER — DEXTROSE MONOHYDRATE 25 G/50ML
25 INJECTION, SOLUTION INTRAVENOUS
Status: DISCONTINUED | OUTPATIENT
Start: 2025-06-29 | End: 2025-07-10 | Stop reason: HOSPADM

## 2025-06-29 RX ORDER — POTASSIUM CHLORIDE 1500 MG/1
40 TABLET, EXTENDED RELEASE ORAL ONCE
Status: COMPLETED | OUTPATIENT
Start: 2025-06-29 | End: 2025-06-29

## 2025-06-29 RX ORDER — NICOTINE POLACRILEX 4 MG
15 LOZENGE BUCCAL
Status: DISCONTINUED | OUTPATIENT
Start: 2025-06-29 | End: 2025-07-10 | Stop reason: HOSPADM

## 2025-06-29 RX ORDER — SODIUM CHLORIDE 0.9 % (FLUSH) 0.9 %
10 SYRINGE (ML) INJECTION EVERY 12 HOURS SCHEDULED
Status: DISCONTINUED | OUTPATIENT
Start: 2025-06-30 | End: 2025-07-10 | Stop reason: HOSPADM

## 2025-06-29 RX ORDER — ACETAMINOPHEN 160 MG/5ML
650 SOLUTION ORAL EVERY 4 HOURS PRN
Status: DISCONTINUED | OUTPATIENT
Start: 2025-06-29 | End: 2025-07-10 | Stop reason: HOSPADM

## 2025-06-29 RX ORDER — POLYETHYLENE GLYCOL 3350 17 G/17G
17 POWDER, FOR SOLUTION ORAL DAILY PRN
Status: DISCONTINUED | OUTPATIENT
Start: 2025-06-29 | End: 2025-07-10 | Stop reason: HOSPADM

## 2025-06-29 RX ORDER — ONDANSETRON 2 MG/ML
4 INJECTION INTRAMUSCULAR; INTRAVENOUS EVERY 6 HOURS PRN
Status: DISCONTINUED | OUTPATIENT
Start: 2025-06-29 | End: 2025-07-10 | Stop reason: HOSPADM

## 2025-06-29 RX ORDER — ACETAMINOPHEN 325 MG/1
650 TABLET ORAL EVERY 4 HOURS PRN
Status: DISCONTINUED | OUTPATIENT
Start: 2025-06-29 | End: 2025-07-10 | Stop reason: HOSPADM

## 2025-06-29 RX ORDER — NOREPINEPHRINE BITARTRATE/D5W 8 MG/250ML
.02-.3 PLASTIC BAG, INJECTION (ML) INTRAVENOUS
Status: DISCONTINUED | OUTPATIENT
Start: 2025-06-29 | End: 2025-07-02

## 2025-06-29 RX ORDER — BISACODYL 10 MG
10 SUPPOSITORY, RECTAL RECTAL DAILY PRN
Status: DISCONTINUED | OUTPATIENT
Start: 2025-06-29 | End: 2025-07-10 | Stop reason: HOSPADM

## 2025-06-29 RX ADMIN — LIDOCAINE HYDROCHLORIDE,EPINEPHRINE BITARTRATE 10 ML: 10; .01 INJECTION, SOLUTION INFILTRATION; PERINEURAL at 22:27

## 2025-06-29 RX ADMIN — SODIUM CHLORIDE, POTASSIUM CHLORIDE, SODIUM LACTATE AND CALCIUM CHLORIDE 125 ML/HR: 600; 310; 30; 20 INJECTION, SOLUTION INTRAVENOUS at 22:12

## 2025-06-29 RX ADMIN — SODIUM CHLORIDE, POTASSIUM CHLORIDE, SODIUM LACTATE AND CALCIUM CHLORIDE 2859 ML: 600; 310; 30; 20 INJECTION, SOLUTION INTRAVENOUS at 19:18

## 2025-06-29 RX ADMIN — PIPERACILLIN AND TAZOBACTAM 3.38 G: 3; .375 INJECTION, POWDER, LYOPHILIZED, FOR SOLUTION INTRAVENOUS; PARENTERAL at 17:45

## 2025-06-29 RX ADMIN — SODIUM CHLORIDE 500 ML: 900 INJECTION, SOLUTION INTRAVENOUS at 17:12

## 2025-06-29 RX ADMIN — SODIUM CHLORIDE 2000 MG: 900 INJECTION, SOLUTION INTRAVENOUS at 18:15

## 2025-06-29 RX ADMIN — POTASSIUM CHLORIDE 40 MEQ: 1500 TABLET, EXTENDED RELEASE ORAL at 19:53

## 2025-06-29 RX ADMIN — NOREPINEPHRINE BITARTRATE 0.02 MCG/KG/MIN: 8 INJECTION, SOLUTION INTRAVENOUS at 22:31

## 2025-06-29 NOTE — ED PROVIDER NOTES
Subjective   History of Present Illness  Patient is a 74-year-old male presenting to the emergency department with generalized weakness and fatigue.  The patient reports that he is felt wiped out and weak for about 2 days.  EMS reports they were called and secondary to a sick patient and found him pale, cool, and diaphoretic on the toilet.  Patient believes it could be related to his heart.  He does have a significant cardiac history including coronary artery disease with CABG.  Review of the chart does demonstrate he also has a history of hypertension, hyperlipidemia, diabetes, chronic kidney disease, and prior admissions for metabolic encephalopathy and sepsis.  Patient is a former smoker.    History provided by:  Patient and EMS personnel      Review of Systems    Past Medical History:   Diagnosis Date    Anemia     Anxiety neurosis     CAD (coronary artery disease)     Cancer     Kidney    Chronic kidney disease     Patient reported left kidney removed secondary to kidney cancer and that he only has 30% function of the right kidney    Constipation     COPD (chronic obstructive pulmonary disease)     Depression     Diabetes mellitus     DM TYPE 2 , ONSET IN 2009    Dyslipidemia     Dysphagia     Reported noted with food, fluids and pills    Elevated cholesterol     Full dentures     GERD (gastroesophageal reflux disease)     Gout     H/O left nephrectomy 2020    secondary to cancer    [place] (hard of hearing)     Patient has bilateral hearing aids, still is very [place]    Hypertension     Impaired functional mobility, balance, gait, and endurance     MI (myocardial infarction)     Patient reported apx March 2021 (reported he refused cardiac cath) and June 2021 (did have cardiac cath with placement of 2 stents).     OA (osteoarthritis)     Obesity     MILD TO MODERATE EXOGENOUS OBESITY    Problems with swallowing     with food    Psoriasis     Sleep apnea     CPAP HS - to bring mask and machine DOS    Tattoo     x1     TIA (transient ischemic attack)     Wears glasses 10/27/2021       Allergies   Allergen Reactions    Metformin Diarrhea       Past Surgical History:   Procedure Laterality Date    APPENDECTOMY      CARDIAC CATHETERIZATION      CARDIAC CATHETERIZATION N/A 2/20/2019    Procedure: Left Heart Cath;  Surgeon: Ernst Smith MD;  Location:  [facility];  Service: Cardiology    CARDIAC CATHETERIZATION Left 6/9/2021    Procedure: Left Heart Cath;  Surgeon: Ernst Smith MD;  Location:  [facility];  Service: Cardiology;  Laterality: Left;    CHOLECYSTECTOMY      COLONOSCOPY      COLONOSCOPY N/A 11/10/2021    Procedure: COLONOSCOPY with polypectomy x6 and clip x2;  Surgeon: Chidi Trinidad MD;  Location: [facility] ENDOSCOPY;  Service: Gastroenterology;  Laterality: N/A;    COLONOSCOPY N/A 10/25/2023    Procedure: COLONOSCOPY incomplete;  Surgeon: Chidi Trinidad MD;  Location: [facility] ENDOSCOPY;  Service: Gastroenterology;  Laterality: N/A;    COLONOSCOPY N/A 11/8/2023    Procedure: COLONOSCOPY WITH POLYPECTOMY X1;  Surgeon: Chidi Trinidad MD;  Location: [facility] ENDOSCOPY;  Service: Gastroenterology;  Laterality: N/A;    CORONARY ARTERY BYPASS GRAFT  2001    Patient reported no MI prior to CABG and that the bypass was 3 vessel     ENDOSCOPY      ENDOSCOPY N/A 11/10/2021    Procedure: ESOPHAGOGASTRODUODENOSCOPY with biopsy and dilatation;  Surgeon: Chidi Trinidad MD;  Location: [facility] ENDOSCOPY;  Service: Gastroenterology;  Laterality: N/A;    ENDOSCOPY N/A 10/25/2023    Procedure: ESOPHAGOGASTRODUODENOSCOPY with biopsy and dilatation;  Surgeon: Chidi Trinidad MD;  Location: [facility] ENDOSCOPY;  Service: Gastroenterology;  Laterality: N/A;    ENDOSCOPY N/A 11/8/2023    Procedure: ESOPHAGOGASTRODUODENOSCOPY WITH BIOPSY;  Surgeon: Chidi Trinidad MD;  Location: [facility] ENDOSCOPY;  Service: Gastroenterology;  Laterality: N/A;    INCISION AND DRAINAGE PERIRECTAL ABSCESS N/A 8/29/2024     Procedure: INCISION AND DRAINAGE OF PERIRECTAL ABSCESS;  Surgeon: Sherman Billingsley MD;  Location: [facility];  Service: General;  Laterality: N/A;    LAPAROSCOPIC NEPHRECTOMY Left     MOUTH SURGERY      Full mouth extraction    URETER SURGERY      VASECTOMY         Family History   Problem Relation Age of Onset    Lung disease Mother     No Known Problems Father     No Known Problems Sister     No Known Problems Brother     No Known Problems Maternal Grandmother     No Known Problems Maternal Grandfather     No Known Problems Paternal Grandmother     No Known Problems Paternal Grandfather     No Known Problems Sister     No Known Problems Brother     No Known Problems Sister        Social History     Socioeconomic History    Marital status:    Tobacco Use    Smoking status: Former     Current packs/day: 1.00     Average packs/day: 1 pack/day for 45.5 years (45.5 ttl pk-yrs)     Types: Cigarettes     Start date: 1980    Smokeless tobacco: Current     Types: Snuff   Vaping Use    Vaping status: Never Used   Substance and Sexual Activity    Alcohol use: No    Drug use: No    Sexual activity: Defer           Objective   Physical Exam  Vitals and nursing note reviewed.   Constitutional:       General: He is not in acute distress.     Appearance: Normal appearance. He is ill-appearing. He is not toxic-appearing.   Cardiovascular:      Rate and Rhythm: Normal rate and regular rhythm.      Pulses: Normal pulses.   Pulmonary:      Effort: Pulmonary effort is normal. No respiratory distress.      Breath sounds: Normal breath sounds.   Abdominal:      Palpations: Abdomen is soft.   Skin:     General: Skin is warm and dry.   Neurological:      Mental Status: He is alert and oriented to person, place, and time.   Psychiatric:         Mood and Affect: Mood normal.         Behavior: Behavior normal.         Critical Care    Performed by: Fady Guevara MD  Authorized by: Fady Guevara MD    Critical care  provider statement:     Critical care time (minutes):  90    Critical care end time:  6/29/2025 10:19 PM    Critical care was necessary to treat or prevent imminent or life-threatening deterioration of the following conditions:  Sepsis, shock and renal failure    Critical care was time spent personally by me on the following activities:  Discussions with consultants, evaluation of patient's response to treatment, examination of patient, obtaining history from patient or surrogate, ordering and performing treatments and interventions, ordering and review of laboratory studies, ordering and review of radiographic studies, pulse oximetry and re-evaluation of patient's condition    Care discussed with: admitting provider    Central Line At Bedside    Date/Time: 6/29/2025 10:54 PM    Performed by: Fady Guevara MD  Authorized by: Fady Guevara MD    Consent:     Consent obtained:  Written and verbal    Consent given by:  Patient    Risks, benefits, and alternatives were discussed: yes      Risks discussed:  Arterial puncture, incorrect placement, nerve damage, pneumothorax, infection and bleeding    Alternatives discussed:  Alternative treatment  Universal protocol:     Procedure explained and questions answered to patient or proxy's satisfaction: yes      Test results available: yes      Immediately prior to procedure, a time out was called: yes      Patient identity confirmed:  Verbally with patient and arm band  Pre-procedure details:     Indication(s): central venous access      Hand hygiene: Hand hygiene performed prior to insertion      Sterile barrier technique: All elements of maximal sterile technique followed      Skin preparation:  Chlorhexidine    Skin preparation agent: Skin preparation agent completely dried prior to procedure    Sedation:     Sedation type:  None  Anesthesia:     Anesthesia method:  Local infiltration    Local anesthetic:  Lidocaine 1% WITH epi  Procedure details:      Location:  R femoral    Site selection rationale:  Access point and BP.    Patient position:  Supine    Procedural supplies:  Triple lumen    Catheter size:  7 Fr    Landmarks identified: yes      Ultrasound guidance: yes      Ultrasound guidance timing: prior to insertion and real time      Sterile ultrasound techniques: Sterile gel and sterile probe covers were used      Number of attempts:  2 (Initial wire kinked and had to be replaced.)    Successful placement: yes    Post-procedure details:     Post-procedure:  Dressing applied and line sutured    Assessment:  Blood return through all ports and free fluid flow    Procedure completion:  Tolerated well, no immediate complications             ED Course  ED Course as of 06/29/25 2255   Sun Jun 29, 2025   1703 Personally reviewed some of the most recent records.  The patient was last seen by cardiology 5 days ago.  The note is not complete at this point.  They note the patient has a history of coronary artery disease with diffuse disease and prior stents/CABG.  See that documentation for details.  Patient is also had recent visits with nephrology with known stage IV chronic kidney disease. [RS]   1821 Lactate(!!): 9.7  Significantly elevated lactate noted. [RS]   2008 XR Chest 1 View  I personally reviewed the single view of the chest.  On my interpretation there are findings concerning for left lower lobe infiltrate. [RS]   2009 Creatinine(!): 3.79  Acute kidney injury superimposed on chronic kidney disease when compared to prior values. [RS]   2157 Patient's maps have maintained in the 60s.  He continues to be alert and oriented.  Will plan admission for further evaluation and management.  Hospitalist messaged for admission.  At this point, the source appears to be a left lower lobe infiltrate.  Urine is pending. [RS]   2218 Patient's blood pressure is trending down.  We will start Levophed.  Hospitalist will admit to the ICU [RS]      ED Course User Index  [RS]  Fady Guevara MD                                                       Medical Decision Making  Problems Addressed:  Acute kidney injury superimposed on chronic kidney disease: complicated acute illness or injury  Chronic anemia: complicated acute illness or injury  Hypokalemia: complicated acute illness or injury  Hyponatremia: complicated acute illness or injury  Left lower lobe consolidation: complicated acute illness or injury  Multiple skin tears: complicated acute illness or injury  Sepsis with acute renal failure and septic shock, due to unspecified organism, unspecified acute renal failure type: complicated acute illness or injury  Type 2 diabetes mellitus with hyperglycemia, unspecified whether long term insulin use: complicated acute illness or injury    Amount and/or Complexity of Data Reviewed  Independent Historian: spouse and EMS  External Data Reviewed: labs.  Labs: ordered. Decision-making details documented in ED Course.  Radiology: ordered. Decision-making details documented in ED Course.  ECG/medicine tests: ordered.  Discussion of management or test interpretation with external provider(s): Hospitalist    Risk  OTC drugs.  Prescription drug management.  Decision regarding hospitalization.    Critical Care  Total time providing critical care: 90 minutes        Final diagnoses:   Sepsis with acute renal failure and septic shock, due to unspecified organism, unspecified acute renal failure type   Acute kidney injury superimposed on chronic kidney disease   Hyponatremia   Hypokalemia   Chronic anemia   Left lower lobe consolidation   Type 2 diabetes mellitus with hyperglycemia, unspecified whether long term insulin use   Multiple skin tears       ED Disposition  ED Disposition       ED Disposition   Decision to Admit    Condition   --    Comment   Level of Care: Critical Care [6]   Diagnosis: Septic shock [[id number]]   Admitting Physician: ANUPAM LOZANO [[id number]]   Certification: I Certify That  Inpatient Hospital Services Are Medically Necessary For Greater Than 2 Midnights                 No follow-up provider specified.       Medication List      No changes were made to your prescriptions during this visit.            Fady Guevara MD  06/29/25 1369       Fady Guevara MD  06/29/25 8060

## 2025-06-29 NOTE — ED NOTES
Wife has showed up and is looking for the pt's right ear hearing aide. Left ear is in place. Wife has looked through the bed with no success. I have reached out to [facility] to see if it is on the truck but no answer yet. Wife continues to ask about it. I told her I dont know where it is, maybe at home. She said no. I told he we reached out to [facility]. Well do they have it!? I told her we have not heard back.

## 2025-06-30 PROBLEM — N18.4 CHRONIC KIDNEY DISEASE, STAGE IV (SEVERE): Status: ACTIVE | Noted: 2021-05-27

## 2025-06-30 PROBLEM — J18.9 PNEUMONIA OF LEFT LOWER LOBE DUE TO INFECTIOUS ORGANISM: Status: ACTIVE | Noted: 2025-06-30

## 2025-06-30 PROBLEM — N39.0 ACUTE UTI (URINARY TRACT INFECTION): Status: ACTIVE | Noted: 2025-06-30

## 2025-06-30 LAB
ANION GAP SERPL CALCULATED.3IONS-SCNC: 16.7 MMOL/L (ref 5–15)
BUN SERPL-MCNC: 55 MG/DL (ref 8–23)
BUN/CREAT SERPL: 17.4 (ref 7–25)
CALCIUM SPEC-SCNC: 8.8 MG/DL (ref 8.6–10.5)
CHLORIDE SERPL-SCNC: 89 MMOL/L (ref 98–107)
CO2 SERPL-SCNC: 29.3 MMOL/L (ref 22–29)
CREAT SERPL-MCNC: 3.17 MG/DL (ref 0.76–1.27)
DEPRECATED RDW RBC AUTO: 46.4 FL (ref 37–54)
EGFRCR SERPLBLD CKD-EPI 2021: 20 ML/MIN/1.73
ERYTHROCYTE [DISTWIDTH] IN BLOOD BY AUTOMATED COUNT: 16.2 % (ref 12.3–15.4)
GLUCOSE BLDC GLUCOMTR-MCNC: 106 MG/DL (ref 70–130)
GLUCOSE BLDC GLUCOMTR-MCNC: 107 MG/DL (ref 70–130)
GLUCOSE BLDC GLUCOMTR-MCNC: 128 MG/DL (ref 70–130)
GLUCOSE BLDC GLUCOMTR-MCNC: 173 MG/DL (ref 70–130)
GLUCOSE BLDC GLUCOMTR-MCNC: 176 MG/DL (ref 70–130)
GLUCOSE SERPL-MCNC: 142 MG/DL (ref 65–99)
HBA1C MFR BLD: 7.6 % (ref 4.8–5.6)
HCT VFR BLD AUTO: 24.6 % (ref 37.5–51)
HGB BLD-MCNC: 8 G/DL (ref 13–17.7)
MCH RBC QN AUTO: 25.4 PG (ref 26.6–33)
MCHC RBC AUTO-ENTMCNC: 32.5 G/DL (ref 31.5–35.7)
MCV RBC AUTO: 78.1 FL (ref 79–97)
MRSA DNA SPEC QL NAA+PROBE: NORMAL
PLATELET # BLD AUTO: 203 10*3/MM3 (ref 140–450)
PMV BLD AUTO: 9.4 FL (ref 6–12)
POTASSIUM SERPL-SCNC: 2.4 MMOL/L (ref 3.5–5.2)
POTASSIUM SERPL-SCNC: 3.3 MMOL/L (ref 3.5–5.2)
POTASSIUM SERPL-SCNC: 3.5 MMOL/L (ref 3.5–5.2)
RBC # BLD AUTO: 3.15 10*6/MM3 (ref 4.14–5.8)
SODIUM SERPL-SCNC: 135 MMOL/L (ref 136–145)
VANCOMYCIN SERPL-MCNC: 19.8 MCG/ML (ref 5–40)
WBC NRBC COR # BLD AUTO: 18.6 10*3/MM3 (ref 3.4–10.8)

## 2025-06-30 PROCEDURE — 85027 COMPLETE CBC AUTOMATED: CPT | Performed by: INTERNAL MEDICINE

## 2025-06-30 PROCEDURE — 63710000001 INSULIN LISPRO (HUMAN) PER 5 UNITS: Performed by: INTERNAL MEDICINE

## 2025-06-30 PROCEDURE — 82948 REAGENT STRIP/BLOOD GLUCOSE: CPT | Performed by: INTERNAL MEDICINE

## 2025-06-30 PROCEDURE — 25010000002 VANCOMYCIN 1 G RECONSTITUTED SOLUTION 1 EACH VIAL: Performed by: INTERNAL MEDICINE

## 2025-06-30 PROCEDURE — 84132 ASSAY OF SERUM POTASSIUM: CPT | Performed by: INTERNAL MEDICINE

## 2025-06-30 PROCEDURE — 87641 MR-STAPH DNA AMP PROBE: CPT | Performed by: INTERNAL MEDICINE

## 2025-06-30 PROCEDURE — 94799 UNLISTED PULMONARY SVC/PX: CPT

## 2025-06-30 PROCEDURE — 25010000002 POTASSIUM CHLORIDE PER 2 MEQ: Performed by: INTERNAL MEDICINE

## 2025-06-30 PROCEDURE — 25010000002 HEPARIN (PORCINE) PER 1000 UNITS: Performed by: INTERNAL MEDICINE

## 2025-06-30 PROCEDURE — 63710000001 INSULIN GLARGINE PER 5 UNITS: Performed by: INTERNAL MEDICINE

## 2025-06-30 PROCEDURE — 80202 ASSAY OF VANCOMYCIN: CPT | Performed by: INTERNAL MEDICINE

## 2025-06-30 PROCEDURE — 25810000003 SODIUM CHLORIDE 0.9 % SOLUTION 250 ML FLEX CONT: Performed by: INTERNAL MEDICINE

## 2025-06-30 PROCEDURE — 25010000002 PIPERACILLIN SOD-TAZOBACTAM PER 1 G: Performed by: INTERNAL MEDICINE

## 2025-06-30 PROCEDURE — 99232 SBSQ HOSP IP/OBS MODERATE 35: CPT | Performed by: INTERNAL MEDICINE

## 2025-06-30 PROCEDURE — 82948 REAGENT STRIP/BLOOD GLUCOSE: CPT

## 2025-06-30 PROCEDURE — 94640 AIRWAY INHALATION TREATMENT: CPT

## 2025-06-30 PROCEDURE — 25810000003 LACTATED RINGERS PER 1000 ML: Performed by: INTERNAL MEDICINE

## 2025-06-30 PROCEDURE — 80048 BASIC METABOLIC PNL TOTAL CA: CPT | Performed by: INTERNAL MEDICINE

## 2025-06-30 RX ORDER — ASPIRIN 81 MG/1
81 TABLET ORAL DAILY
Status: DISCONTINUED | OUTPATIENT
Start: 2025-06-30 | End: 2025-07-10 | Stop reason: HOSPADM

## 2025-06-30 RX ORDER — MIDODRINE HYDROCHLORIDE 5 MG/1
5 TABLET ORAL
Status: DISCONTINUED | OUTPATIENT
Start: 2025-06-30 | End: 2025-07-01

## 2025-06-30 RX ORDER — POTASSIUM CHLORIDE 1500 MG/1
20 TABLET, EXTENDED RELEASE ORAL ONCE
Status: COMPLETED | OUTPATIENT
Start: 2025-06-30 | End: 2025-06-30

## 2025-06-30 RX ORDER — POTASSIUM CHLORIDE 29.8 MG/ML
20 INJECTION INTRAVENOUS
Status: COMPLETED | OUTPATIENT
Start: 2025-06-30 | End: 2025-06-30

## 2025-06-30 RX ORDER — BUDESONIDE 0.5 MG/2ML
0.5 INHALANT ORAL
Status: DISCONTINUED | OUTPATIENT
Start: 2025-06-30 | End: 2025-07-10 | Stop reason: HOSPADM

## 2025-06-30 RX ORDER — PANTOPRAZOLE SODIUM 40 MG/1
40 TABLET, DELAYED RELEASE ORAL
Status: DISCONTINUED | OUTPATIENT
Start: 2025-06-30 | End: 2025-07-10 | Stop reason: HOSPADM

## 2025-06-30 RX ORDER — IPRATROPIUM BROMIDE AND ALBUTEROL SULFATE 2.5; .5 MG/3ML; MG/3ML
3 SOLUTION RESPIRATORY (INHALATION) EVERY 4 HOURS PRN
Status: DISCONTINUED | OUTPATIENT
Start: 2025-06-30 | End: 2025-07-10 | Stop reason: HOSPADM

## 2025-06-30 RX ORDER — SODIUM CHLORIDE, SODIUM LACTATE, POTASSIUM CHLORIDE, CALCIUM CHLORIDE 600; 310; 30; 20 MG/100ML; MG/100ML; MG/100ML; MG/100ML
100 INJECTION, SOLUTION INTRAVENOUS CONTINUOUS
Status: ACTIVE | OUTPATIENT
Start: 2025-06-30 | End: 2025-07-01

## 2025-06-30 RX ORDER — L.ACID,PARA/B.BIFIDUM/S.THERM 8B CELL
1 CAPSULE ORAL 2 TIMES DAILY
Status: DISCONTINUED | OUTPATIENT
Start: 2025-06-30 | End: 2025-07-10 | Stop reason: HOSPADM

## 2025-06-30 RX ORDER — BUSPIRONE HYDROCHLORIDE 15 MG/1
15 TABLET ORAL EVERY 12 HOURS SCHEDULED
Status: DISCONTINUED | OUTPATIENT
Start: 2025-06-30 | End: 2025-07-10 | Stop reason: HOSPADM

## 2025-06-30 RX ORDER — CLOPIDOGREL BISULFATE 75 MG/1
75 TABLET ORAL DAILY
Status: DISCONTINUED | OUTPATIENT
Start: 2025-06-30 | End: 2025-07-10 | Stop reason: HOSPADM

## 2025-06-30 RX ADMIN — MIDODRINE HYDROCHLORIDE 5 MG: 5 TABLET ORAL at 11:29

## 2025-06-30 RX ADMIN — Medication 10 ML: at 20:37

## 2025-06-30 RX ADMIN — Medication 1 CAPSULE: at 08:26

## 2025-06-30 RX ADMIN — HEPARIN SODIUM 5000 UNITS: 5000 INJECTION INTRAVENOUS; SUBCUTANEOUS at 08:26

## 2025-06-30 RX ADMIN — CLOPIDOGREL BISULFATE 75 MG: 75 TABLET, FILM COATED ORAL at 08:23

## 2025-06-30 RX ADMIN — INSULIN LISPRO 8 UNITS: 100 INJECTION, SOLUTION INTRAVENOUS; SUBCUTANEOUS at 11:29

## 2025-06-30 RX ADMIN — VANCOMYCIN HYDROCHLORIDE 1000 MG: 1 INJECTION, POWDER, LYOPHILIZED, FOR SOLUTION INTRAVENOUS at 08:22

## 2025-06-30 RX ADMIN — MIDODRINE HYDROCHLORIDE 5 MG: 5 TABLET ORAL at 08:23

## 2025-06-30 RX ADMIN — MIDODRINE HYDROCHLORIDE 5 MG: 5 TABLET ORAL at 17:49

## 2025-06-30 RX ADMIN — Medication 10 ML: at 03:07

## 2025-06-30 RX ADMIN — POTASSIUM CHLORIDE 20 MEQ: 29.8 INJECTION, SOLUTION INTRAVENOUS at 07:12

## 2025-06-30 RX ADMIN — PANTOPRAZOLE SODIUM 40 MG: 40 TABLET, DELAYED RELEASE ORAL at 08:23

## 2025-06-30 RX ADMIN — MUPIROCIN 1 APPLICATION: 20 OINTMENT TOPICAL at 03:15

## 2025-06-30 RX ADMIN — BUSPIRONE HYDROCHLORIDE 15 MG: 15 TABLET ORAL at 03:06

## 2025-06-30 RX ADMIN — SODIUM CHLORIDE 3.38 G: 9 INJECTION, SOLUTION INTRAVENOUS at 03:05

## 2025-06-30 RX ADMIN — BUDESONIDE 0.5 MG: 0.5 SUSPENSION RESPIRATORY (INHALATION) at 20:06

## 2025-06-30 RX ADMIN — INSULIN GLARGINE 20 UNITS: 100 INJECTION, SOLUTION SUBCUTANEOUS at 21:16

## 2025-06-30 RX ADMIN — SODIUM CHLORIDE 3.38 G: 9 INJECTION, SOLUTION INTRAVENOUS at 08:22

## 2025-06-30 RX ADMIN — POTASSIUM CHLORIDE 20 MEQ: 1500 TABLET, EXTENDED RELEASE ORAL at 21:10

## 2025-06-30 RX ADMIN — HEPARIN SODIUM 5000 UNITS: 5000 INJECTION INTRAVENOUS; SUBCUTANEOUS at 03:05

## 2025-06-30 RX ADMIN — ASPIRIN 81 MG: 81 TABLET, COATED ORAL at 08:23

## 2025-06-30 RX ADMIN — MUPIROCIN 1 APPLICATION: 20 OINTMENT TOPICAL at 08:23

## 2025-06-30 RX ADMIN — INSULIN LISPRO 8 UNITS: 100 INJECTION, SOLUTION INTRAVENOUS; SUBCUTANEOUS at 17:49

## 2025-06-30 RX ADMIN — POTASSIUM CHLORIDE 20 MEQ: 29.8 INJECTION, SOLUTION INTRAVENOUS at 08:22

## 2025-06-30 RX ADMIN — MUPIROCIN 1 APPLICATION: 20 OINTMENT TOPICAL at 20:36

## 2025-06-30 RX ADMIN — SENNOSIDES AND DOCUSATE SODIUM 2 TABLET: 50; 8.6 TABLET ORAL at 20:38

## 2025-06-30 RX ADMIN — SODIUM CHLORIDE, SODIUM LACTATE, POTASSIUM CHLORIDE, CALCIUM CHLORIDE 100 ML/HR: 20; 30; 600; 310 INJECTION, SOLUTION INTRAVENOUS at 10:58

## 2025-06-30 RX ADMIN — Medication 1 CAPSULE: at 03:06

## 2025-06-30 RX ADMIN — BUSPIRONE HYDROCHLORIDE 15 MG: 15 TABLET ORAL at 21:12

## 2025-06-30 RX ADMIN — BUDESONIDE 0.5 MG: 0.5 SUSPENSION RESPIRATORY (INHALATION) at 07:17

## 2025-06-30 RX ADMIN — INSULIN GLARGINE 20 UNITS: 100 INJECTION, SOLUTION SUBCUTANEOUS at 03:06

## 2025-06-30 RX ADMIN — INSULIN LISPRO 2 UNITS: 100 INJECTION, SOLUTION INTRAVENOUS; SUBCUTANEOUS at 08:23

## 2025-06-30 RX ADMIN — BUSPIRONE HYDROCHLORIDE 15 MG: 15 TABLET ORAL at 08:26

## 2025-06-30 RX ADMIN — HEPARIN SODIUM 5000 UNITS: 5000 INJECTION INTRAVENOUS; SUBCUTANEOUS at 20:36

## 2025-06-30 RX ADMIN — INSULIN LISPRO 2 UNITS: 100 INJECTION, SOLUTION INTRAVENOUS; SUBCUTANEOUS at 11:29

## 2025-06-30 RX ADMIN — NOREPINEPHRINE BITARTRATE 0.18 MCG/KG/MIN: 8 INJECTION, SOLUTION INTRAVENOUS at 06:50

## 2025-06-30 RX ADMIN — INSULIN LISPRO 8 UNITS: 100 INJECTION, SOLUTION INTRAVENOUS; SUBCUTANEOUS at 08:23

## 2025-06-30 RX ADMIN — POTASSIUM CHLORIDE 20 MEQ: 1500 TABLET, EXTENDED RELEASE ORAL at 14:30

## 2025-06-30 RX ADMIN — NOREPINEPHRINE BITARTRATE 0.14 MCG/KG/MIN: 8 INJECTION, SOLUTION INTRAVENOUS at 14:07

## 2025-06-30 RX ADMIN — SODIUM CHLORIDE 3.38 G: 9 INJECTION, SOLUTION INTRAVENOUS at 17:49

## 2025-06-30 RX ADMIN — Medication 1 CAPSULE: at 20:36

## 2025-06-30 RX ADMIN — SODIUM CHLORIDE, SODIUM LACTATE, POTASSIUM CHLORIDE, CALCIUM CHLORIDE 100 ML/HR: 20; 30; 600; 310 INJECTION, SOLUTION INTRAVENOUS at 21:10

## 2025-06-30 RX ADMIN — Medication 10 ML: at 08:24

## 2025-06-30 NOTE — PROGRESS NOTES
[facility]    Name:  Jonny Ferrari Jr.   Age:  74 y.o.  Sex:  male  :  1950  MRN:  [id number]   Visit Number:  [id number]  Admission Date:  2025  Date Of Service:  25  Primary Care Physician:  Amaury Brice MD     LOS: 1 day :    Chief Complaint:      Follow-up septic shock    Subjective:    Patient examined this morning.  Alert and cooperative.  Very pleasant.  Overall states he is feeling better.  Wife at bedside.  Tolerating diet.  Remains on Levophed.    Hospital Course:    Jonyn Ferrari Jr. is a 74-year-old male with history of diabetes mellitus type 2, chronic kidney disease stage III, COPD, coronary artery disease status post CABG and stent, ERASMO, BPH was brought to the emergency room by EMS with symptoms of generalized weakness and fatigue   The patient was brought in by ambulance due to an inability to walk, a condition that has persisted for the past 2 to 3 days. He reports no fever, dysuria, or cough but feels weak. He has experienced several falls recently and has been unable to walk independently for the past 3 to 4 days. Despite using a walker or cane, he continues to fall.   In the emergency room, patient was afebrile but mildly tachycardic in the 100 with initial blood pressure of 80/54 and pulse oxygen saturation of 95% on room air.  Patient was given 30 mL/kg fluid bolus initially in the emergency room with initial improvement of the blood pressure to systolic 100 but subsequently dropped it again to the 70s systolic.  Patient was given another 500 bolus of normal saline, he right femoral central line was placed and he was initiated on Levophed.     Review of Systems:     All systems were reviewed and negative except as mentioned in subjective, assessment and plan.    Vital Signs:    Temp:  [97.4 °F (36.3 °C)-99.9 °F (37.7 °C)] 98.5 °F (36.9 °C)  Heart Rate:  [] 96  Resp:  [17-18] 17  BP: ()/(42-80) 102/55    Intake  and output:    I/O last 3 completed shifts:  In: 4079 [P.O.:120; IV Piggyback:3959]  Out: 1000 [Urine:1000]  I/O this shift:  In: 50 [P.O.:50]  Out: -     Physical Examination:    General Appearance:  Alert and cooperative.  Pleasant.  No acute distress.   Head:  Atraumatic and normocephalic.   Eyes: Conjunctivae and sclerae normal, no icterus. No pallor.   Throat: No oral lesions, no thrush, oral mucosa moist.   Neck: Supple, trachea midline, no thyromegaly.   Lungs:   Breath sounds heard bilaterally equally.  No wheezing or crackles.    Heart:  Normal S1 and S2, no murmur,No JVD.   Abdomen:   Normal bowel sounds, Soft, nontender, nondistended, no rebound tenderness.   Extremities: Supple, no edema, no cyanosis, no clubbing.   Skin: No bleeding or rash.  Multiple skin abrasions.   Neurologic: Alert and oriented x 3. No facial asymmetry. Moves all four limbs.      Laboratory results:    Results from last 7 days   Lab Units 06/30/25  0429 06/29/25  1706   SODIUM mmol/L 135* 131*   POTASSIUM mmol/L 2.4* 2.9*   CHLORIDE mmol/L 89* 81*   CO2 mmol/L 29.3* 24.4   BUN mg/dL 55.0* 61.0*   CREATININE mg/dL 3.17* 3.79*   CALCIUM mg/dL 8.8 9.0   BILIRUBIN mg/dL  --  0.2   ALK PHOS U/L  --  69   ALT (SGPT) U/L  --  14   AST (SGOT) U/L  --  19   GLUCOSE mg/dL 142* 335*     Results from last 7 days   Lab Units 06/30/25  0429 06/29/25  1706   WBC 10*3/mm3 18.60* 15.69*   HEMOGLOBIN g/dL 8.0* 9.0*   HEMATOCRIT % 24.6* 27.6*   PLATELETS 10*3/mm3 203 205         Results from last 7 days   Lab Units 06/29/25  1826 06/29/25  1706   HSTROP T ng/L 95* 98*         Recent Labs     08/29/24  2314   PHART 7.382   ZVK3ARH 49.8*   PO2ART 68.0*   AEE6JJQ 29.5*   BASEEXCESS 3.8*      I have reviewed the patient's laboratory results.    Radiology results:    XR Chest 1 View  Result Date: 6/30/2025  PROCEDURE: XR CHEST 1 VW-  HISTORY: generalized weakness, abnormal chest radiograph. Hypertension.  COMPARISON: 10/06/2022  FINDINGS:  Portable view  of the chest demonstrates the lungs to be grossly clear. There is no evidence of effusion, pneumothorax or other significant pleural disease. Patient is status post CABG. Mediastinum is otherwise unremarkable.  The heart size is normal.      Impression: Unremarkable portable chest.    This report was signed and finalized on 6/30/2025 9:48 AM by Frankie Ewing MD.      CT Abdomen Pelvis Without Contrast  Result Date: 6/29/2025  FINAL REPORT TECHNIQUE: null CLINICAL HISTORY: sepsis COMPARISON: null FINDINGS: CT abdomen and pelvis without contrast Comparison: 08/29/2024 Findings: Mild degenerative change spine and hips. No acute bony abnormalities. Liver and spleen within normal limits. Pancreas and adrenal glands unremarkable. Cholecystectomy. No right renal stone or hydronephrosis. No focal renal abnormality or ureteral dilation. Status post left nephrectomy. No evidence for aortic aneurysm. No free fluid or adenopathy in the pelvis. No diverticulitis. Appendix unremarkable.     Impression: Impression: No acute processes Authenticated and Electronically Signed by Anthony De La Paz MD on 06/29/2025 11:11:15 PM    CT Chest Without Contrast Diagnostic  Result Date: 6/29/2025  FINAL REPORT TECHNIQUE: null CLINICAL HISTORY: sepsis w/ hypoxemia COMPARISON: null FINDINGS: CT chest without contrast Comparison: None provided Findings: Bilateral posterior lower lobe atelectasis. Trace bilateral pleural effusions noted. Emphysema without other parenchymal abnormality. Cardiomegaly with coronary artery calcifications. Prior sternotomy for CABG. Benign partially calcified mediastinal nodes. No significant mediastinal adenopathy. No significant focal bony abnormalities.     Impression: Impression: Trace pleural effusions with lower lobe atelectasis Authenticated and Electronically Signed by Anthony De La Paz MD on 06/29/2025 11:07:32 PM    I have reviewed the patient's radiology reports.    Medication Review:     I have reviewed the  patient's active and prn medications.     Problem List:      Septic shock    CAD (coronary artery disease)    Chronic kidney disease, stage IV (severe)    Acute UTI (urinary tract infection)    Pneumonia of left lower lobe due to infectious organism      Assessment:    Septic shock secondary to #2 and #3, POA.  Acute urinary tract infection, POA.  Left lower lobe bacterial pneumonia, unable to classify further, POA.  Demand ischemia with elevated troponins, POA.  Hypokalemia, POA.  Chronic kidney disease stage IV.  Diabetes mellitus type 2 with nephropathy and hyperglycemia, POA.  Coronary artery disease status post CABG and stents.  COPD, no exacerbation.  Benign prostatic hyperplasia.  Will falls with skin abrasions, POA.    Plan:    Septic shock  Acute UTI  Left lower lobe bacterial pneumonia  Patient requiring Levophed to maintain MAP greater than 65.  Titrate off as able.  Hold chronic metoprolol and torsemide  Continue midodrine  CVL placed to right femoral on admission  Continue empiric antibiotics, Zosyn and vancomycin  MRSA screen pending  Continue supplemental oxygen as necessary to maintain saturation greater than 88%.  DuoNebs as needed  Follow-up urine and blood cultures  Demand ischemia with elevated troponins  Troponins plateaued 95-98  Suspect secondary to demand ischemia in the setting of his septic shock as well as CKD  Low suspicion for ACS  Hypokalemia  Cautious replacement given renal disease  Monitor daily  CKD stage IV  Nephrology, Dr. Hooper consulted and appreciate recommendations.  Type 2 diabetes  Subcutaneous insulin protocol  CAD status post CABG  Continue DAPT with aspirin and Plavix  COPD not exacerbation  Multiple skin abrasions  Wound care  Impaired mobility and ADLs  PT/OT once stable to participate    Further orders as clinical course dictates.    DVT Prophylaxis: Heparin  Code Status: Full  Diet: Renal diabetic.  Discharge Plan: Pending    Kuldeep Patel DO  06/30/25  10:32  EDT    Dictated utilizing Dragon dictation.

## 2025-06-30 NOTE — PROGRESS NOTES
"Pharmacy Consult-Vancomycin Dosing  Jonny Ferrari Jr. is a  74 y.o. male receiving vancomycin therapy.     Indication: Pneumonia  Consulting Provider: Dr. Silviano Cisneros    Goal Trough:    Current Antimicrobial Therapy  Anti-Infectives (From admission, onward)      Ordered     Dose/Rate Route Frequency Start Stop    06/30/25 0203  vancomycin (dosing per levels)        Ordering Provider: Silviano Cisneros MD     Not Applicable Daily 06/30/25 0900 07/05/25 0859    06/30/25 0013  piperacillin-tazobactam (ZOSYN) IVPB 3.375 g IVPB in 100 mL NS (VTB)        Ordering Provider: Silviano Cisneros MD    3.375 g  over 4 Hours Intravenous Every 8 Hours 06/30/25 0100 07/05/25 0059    06/30/25 0005  Pharmacy to dose vancomycin        Ordering Provider: Silviano Cisneros MD     Not Applicable Continuous PRN 06/30/25 0005 07/03/25 0004    06/30/25 0005  Pharmacy To Dose: Piperacillin-tazobactam (Zosyn)        Ordering Provider: Silviano Cisneros MD     Not Applicable Continuous PRN 06/30/25 0005 07/05/25 0004    06/29/25 1714  piperacillin-tazobactam (ZOSYN) IVPB 3.375 g IVPB in 100 mL NS (VTB)        Ordering Provider: Fady Guevara MD    3.375 g  over 30 Minutes Intravenous Once 06/29/25 1730 06/29/25 1815    06/29/25 1714  vancomycin IVPB 2000 mg in 0.9% Sodium Chloride 500 mL        Ordering Provider: Fady Guevara MD    20 mg/kg × 95.3 kg  250 mL/hr over 120 Minutes Intravenous Once 06/29/25 1730 06/29/25 2033            Allergies  Allergies as of 06/29/2025 - Reviewed 06/29/2025   Allergen Reaction Noted    Metformin Diarrhea        Labs    Results from last 7 days   Lab Units 06/29/25  1706   BUN mg/dL 61.0*   CREATININE mg/dL 3.79*       Results from last 7 days   Lab Units 06/29/25  1706   WBC 10*3/mm3 15.69*       Evaluation of Dosing     Last Dose Received in the ED/Outside Facility: ED:  2000 mg on 6/29/25 @ 1815  Is Patient on Dialysis or Renal Replacement: No, CrCl:  21.1 ml/min    Ht - 182.9 cm (72\")  Wt - 95.3 kg (210 " lb)    Estimated Creatinine Clearance: 21.1 mL/min (A) (by C-G formula based on SCr of 3.79 mg/dL (H)).    Intake & Output (last 3 days)         06/27 0701 06/28 0700 06/28 0701 06/29 0700 06/29 0701 06/30 0700    IV Piggyback   3959    Total Intake(mL/kg)   3959 (41.5)    Net   +3959                   Microbiology and Radiology  Microbiology Results (last 10 days)       ** No results found for the last 240 hours. **            Vancomycin Levels:                      Assessment/Plan    Pharmacy to dose vancomycin for Pneumonia. Goal trough 15-20 mcg/mL.  Patient currently receiving vancomycin dosed per level. Will order vancomycin 2000 mg for one dose.  Assess clearance by vancomycin random am level on 06/30/25 @ ~0600.  Pharmacy will continue to monitor renal function, cultures and sensitivities, and clinical status to adjust regimen as necessary.    Thank you,  Lili Zepeda [facility]  06/30/25 02:04 EDT

## 2025-06-30 NOTE — CONSULTS
[facility]      Nephrology Consultation  [facility]      Referring Provider:   Fady Guevara MD    Reason for Consultation:  Acute on chronic kidney disease 4 and associated problems.    Subjective:  Chief complaint   Chief Complaint   Patient presents with    Weakness - Generalized     Pt called ems for sick, unable to urinate and weakness. Pt was on the toilet with a pressure in the 80's. Pt has multiple skin tears. Pt has 18 ga iv in left ac, 22 ga right bicep by ems.      History of present illness:    Patient is 74-year-old male with multimedical problems including chronic kidney disease stage IV, type 2 diabetes, COPD, coronary artery disease status post CABG stent placement, obstructive sleep apnea, BPH, solitary kidney status post left nephrectomy secondary to cancer who was brought into the emergency room via EMS after he has been feeling weak and had not been able to get up and walk even with the help of walker or cane.  He had multiple falls in the last few days.  He is hard of hearing.  Patient does follow-up with our office as an outpatient.  In the ER he was noted to have low blood pressure was given boluses, central line was placed and pressors were started.  He was noted to have lactic acid of 9.7 with a white count of 15.7 and a hemoglobin of 9 UA shows 21-50 WBCs.  CT of the chest shows pleural effusion with lower lobe atelectasis versus pneumonia.  He was started on IV antibiotics including Zosyn and vancomycin.  He was admitted through the hospitalist service for further evaluation and treatment.  Currently patient is laying in the bed awake alert and interactive, he is hard of hearing wife walked in with his hearing aids and he can hear fairly well.  Denies having any chest pain or shortness of breath, denies any nausea vomiting or diarrhea.  No fever or chills.  I have reviewed labs/imaging/records from this hospitalization, including  ER staff and admitting/attending physicians H/P's and progress notes to establish a comprehensive understanding of this patient's clinical hospital course, as well as to establish plan of care appropriately.     Past Medical History:   Diagnosis Date    Anemia     Anxiety neurosis     CAD (coronary artery disease)     Cancer     Kidney    Chronic kidney disease     Patient reported left kidney removed secondary to kidney cancer and that he only has 30% function of the right kidney    Constipation     COPD (chronic obstructive pulmonary disease)     Depression     Diabetes mellitus     DM TYPE 2 , ONSET IN 2009    Dyslipidemia     Dysphagia     Reported noted with food, fluids and pills    Elevated cholesterol     Full dentures     GERD (gastroesophageal reflux disease)     Gout     H/O left nephrectomy 2020    secondary to cancer    [place] (hard of hearing)     Patient has bilateral hearing aids, still is very [place]    Hypertension     Impaired functional mobility, balance, gait, and endurance     MI (myocardial infarction)     Patient reported apx March 2021 (reported he refused cardiac cath) and June 2021 (did have cardiac cath with placement of 2 stents).     OA (osteoarthritis)     Obesity     MILD TO MODERATE EXOGENOUS OBESITY    Problems with swallowing     with food    Psoriasis     Sleep apnea     CPAP HS - to bring mask and machine DOS    Tattoo     x1    TIA (transient ischemic attack)     Wears glasses 10/27/2021       Past Surgical History:   Procedure Laterality Date    APPENDECTOMY      CARDIAC CATHETERIZATION      CARDIAC CATHETERIZATION N/A 2/20/2019    Procedure: Left Heart Cath;  Surgeon: Ernst Smith MD;  Location:  [facility];  Service: Cardiology    CARDIAC CATHETERIZATION Left 6/9/2021    Procedure: Left Heart Cath;  Surgeon: Ernst Smith MD;  Location:  [facility];  Service: Cardiology;  Laterality: Left;    CHOLECYSTECTOMY      COLONOSCOPY      COLONOSCOPY N/A  11/10/2021    Procedure: COLONOSCOPY with polypectomy x6 and clip x2;  Surgeon: Chidi Trinidad MD;  Location: [facility] ENDOSCOPY;  Service: Gastroenterology;  Laterality: N/A;    COLONOSCOPY N/A 10/25/2023    Procedure: COLONOSCOPY incomplete;  Surgeon: Chidi Trinidad MD;  Location: [facility] ENDOSCOPY;  Service: Gastroenterology;  Laterality: N/A;    COLONOSCOPY N/A 11/8/2023    Procedure: COLONOSCOPY WITH POLYPECTOMY X1;  Surgeon: Chidi Trinidad MD;  Location: [facility] ENDOSCOPY;  Service: Gastroenterology;  Laterality: N/A;    CORONARY ARTERY BYPASS GRAFT  2001    Patient reported no MI prior to CABG and that the bypass was 3 vessel     ENDOSCOPY      ENDOSCOPY N/A 11/10/2021    Procedure: ESOPHAGOGASTRODUODENOSCOPY with biopsy and dilatation;  Surgeon: Chidi Trinidad MD;  Location: [facility] ENDOSCOPY;  Service: Gastroenterology;  Laterality: N/A;    ENDOSCOPY N/A 10/25/2023    Procedure: ESOPHAGOGASTRODUODENOSCOPY with biopsy and dilatation;  Surgeon: Chidi Trinidad MD;  Location: [facility] ENDOSCOPY;  Service: Gastroenterology;  Laterality: N/A;    ENDOSCOPY N/A 11/8/2023    Procedure: ESOPHAGOGASTRODUODENOSCOPY WITH BIOPSY;  Surgeon: Chidi Trinidad MD;  Location: [facility] ENDOSCOPY;  Service: Gastroenterology;  Laterality: N/A;    INCISION AND DRAINAGE PERIRECTAL ABSCESS N/A 8/29/2024    Procedure: INCISION AND DRAINAGE OF PERIRECTAL ABSCESS;  Surgeon: Sherman Billingsley MD;  Location: [facility] OR;  Service: General;  Laterality: N/A;    LAPAROSCOPIC NEPHRECTOMY Left     MOUTH SURGERY      Full mouth extraction    URETER SURGERY      VASECTOMY       Family History   Problem Relation Age of Onset    Lung disease Mother     No Known Problems Father     No Known Problems Sister     No Known Problems Brother     No Known Problems Maternal Grandmother     No Known Problems Maternal Grandfather     No Known Problems Paternal Grandmother     No Known Problems Paternal Grandfather     No Known Problems Sister      No Known Problems Brother     No Known Problems Sister      negative h/o ESRD     Social History     Tobacco Use    Smoking status: Former     Current packs/day: 1.00     Average packs/day: 1 pack/day for 45.5 years (45.5 ttl pk-yrs)     Types: Cigarettes     Start date: 1980    Smokeless tobacco: Current     Types: Snuff   Vaping Use    Vaping status: Never Used   Substance Use Topics    Alcohol use: No    Drug use: No     Home medications:   Prior to Admission Medications       Prescriptions Last Dose Informant Patient Reported? Taking?    acetaminophen (TYLENOL) 325 MG tablet  Spouse/Significant Other Yes No    Take 2 tablets by mouth Every 6 (Six) Hours As Needed.    alfuzosin (UROXATRAL) 10 MG 24 hr tablet   Yes No    Take 1 tablet by mouth Daily.    allopurinol (ZYLOPRIM) 100 MG tablet  Spouse/Significant Other Yes No    Take 2 tablets by mouth Daily.    aspirin 81 MG EC tablet  Spouse/Significant Other Yes No    Take 1 tablet by mouth Daily.    buprenorphine-naloxone (SUBOXONE) 8-2 MG per SL tablet  Spouse/Significant Other Yes No    Place 1 tablet under the tongue 2 (Two) Times a Day.    busPIRone (BUSPAR) 15 MG tablet  Spouse/Significant Other Yes No    Take 1 tablet by mouth 2 (two) times a day.    calcitriol (ROCALTROL) 0.25 MCG capsule  Spouse/Significant Other Yes No    Take 1 capsule by mouth Daily.    Cholecalciferol (Vitamin D) 50 MCG (2000 UT) tablet  Spouse/Significant Other Yes No    Take 1 tablet by mouth Daily.    Patient not taking:  Reported on 6/24/2025    clopidogrel (PLAVIX) 75 MG tablet  Spouse/Significant Other No No    Take 1 tablet by mouth Daily.    dapagliflozin Propanediol (Farxiga) 10 MG tablet  Spouse/Significant Other Yes No    Take  by mouth Daily.    dutasteride (AVODART) 0.5 MG capsule  Spouse/Significant Other Yes No    Take 1 capsule by mouth Daily.    ezetimibe (ZETIA) 10 MG tablet  Spouse/Significant Other Yes No    Take 1 tablet by mouth Daily.    Lantus SoloStar 100  UNIT/ML injection pen  Spouse/Significant Other Yes No    Inject 20 Units under the skin into the appropriate area as directed 4 (Four) Times a Day After Meals & at Bedtime.    linagliptin (TRADJENTA) 5 MG tablet tablet   No No    Take 1 tablet by mouth Daily.    metoprolol tartrate (LOPRESSOR) 25 MG tablet   No No    Take 1 tablet by mouth Daily. Take for systolic blood pressure greater than 110    midodrine (PROAMATINE) 5 MG tablet   No No    Take 1 tablet by mouth 3 (Three) Times a Day Before Meals.    nitroglycerin (NITROSTAT) 0.4 MG SL tablet  Spouse/Significant Other No No    Place 1 tablet under the tongue Every 5 (Five) Minutes As Needed for Chest Pain. Take no more than 3 doses in 15 minutes.    omeprazole (priLOSEC) 20 MG capsule  Spouse/Significant Other Yes No    Take 1 capsule by mouth 2 (Two) Times a Day.    potassium chloride ER (K-TAB) 20 MEQ tablet controlled-release ER tablet   Yes No    Take 1 tablet by mouth 2 (Two) Times a Day.    sertraline (ZOLOFT) 100 MG tablet  Spouse/Significant Other Yes No    Take 1 tablet by mouth Daily. 2 tablet daily    Spiriva HandiHaler 18 MCG per inhalation capsule   No No    Place 1 capsule into inhaler and inhale Daily for 30 days.    Symbicort 160-4.5 MCG/ACT inhaler   No No    Inhale 2 puffs 2 (Two) Times a Day for 30 days.    tamsulosin (FLOMAX) 0.4 MG capsule 24 hr capsule  Spouse/Significant Other Yes No    Take 1 capsule by mouth Every Night.    torsemide (DEMADEX) 100 MG tablet   No No    Take 1 tablet by mouth Daily As Needed (Take as needed for fluid retention/swelling).    True Metrix Blood Glucose Test test strip  Spouse/Significant Other Yes No    See Admin Instructions.          Emergency department medications:   Medications   sodium chloride 0.9 % flush 10 mL (has no administration in time range)   Potassium Replacement - Follow Nurse / BPA Driven Protocol (has no administration in time range)   lactated ringers infusion (0 mL/hr Intravenous Stopped  6/30/25 0713)   norepinephrine (LEVOPHED) 8 mg in 250mL D5W infusion (0.18 mcg/kg/min × 95.3 kg Intravenous New Bag 6/30/25 0650)   insulin glargine (LANTUS, SEMGLEE) injection 20 Units (20 Units Subcutaneous Given 6/30/25 0306)   sodium chloride 0.9 % flush 10 mL (10 mL Intravenous Given 6/30/25 0307)   sodium chloride 0.9 % flush 10 mL (has no administration in time range)   sodium chloride 0.9 % infusion 40 mL (has no administration in time range)   mupirocin (BACTROBAN) 2 % nasal ointment 1 Application (1 Application Each Nare Given 6/30/25 0315)   acetaminophen (TYLENOL) tablet 650 mg (has no administration in time range)     Or   acetaminophen (TYLENOL) 160 MG/5ML oral solution 650 mg (has no administration in time range)     Or   acetaminophen (TYLENOL) suppository 650 mg (has no administration in time range)   ondansetron (ZOFRAN) injection 4 mg (has no administration in time range)   heparin (porcine) 5000 UNIT/ML injection 5,000 Units (5,000 Units Subcutaneous Given 6/30/25 0305)   sennosides-docusate (PERICOLACE) 8.6-50 MG per tablet 2 tablet (2 tablets Oral Not Given 6/30/25 0307)     And   polyethylene glycol (MIRALAX) packet 17 g (has no administration in time range)     And   bisacodyl (DULCOLAX) EC tablet 5 mg (has no administration in time range)     And   bisacodyl (DULCOLAX) suppository 10 mg (has no administration in time range)   dextrose (GLUTOSE) oral gel 15 g (has no administration in time range)   dextrose (D50W) (25 g/50 mL) IV injection 25 g (has no administration in time range)   glucagon (GLUCAGEN) injection 1 mg (has no administration in time range)   Insulin Lispro (humaLOG) injection 2-9 Units (has no administration in time range)   Insulin Lispro (humaLOG) injection 8 Units (has no administration in time range)   lactobacillus acidophilus (RISAQUAD) capsule 1 capsule (1 capsule Oral Given 6/30/25 0306)   aspirin EC tablet 81 mg (has no administration in time range)   busPIRone  (BUSPAR) tablet 15 mg (15 mg Oral Given 6/30/25 0306)   clopidogrel (PLAVIX) tablet 75 mg (has no administration in time range)   midodrine (PROAMATINE) tablet 5 mg (has no administration in time range)   pantoprazole (PROTONIX) EC tablet 40 mg (has no administration in time range)   Pharmacy To Dose: Piperacillin-tazobactam (Zosyn) (has no administration in time range)   Pharmacy to dose vancomycin (has no administration in time range)   budesonide (PULMICORT) nebulizer solution 0.5 mg (0.5 mg Nebulization Given 6/30/25 0717)   ipratropium-albuterol (DUO-NEB) nebulizer solution 3 mL (has no administration in time range)   Magnesium Standard Dose Replacement - Follow Nurse / BPA Driven Protocol (has no administration in time range)   Potassium Replacement - Follow Nurse / BPA Driven Protocol (has no administration in time range)   piperacillin-tazobactam (ZOSYN) IVPB 3.375 g IVPB in 100 mL NS (VTB) (3.375 g Intravenous New Bag 6/30/25 0305)   vancomycin (dosing per levels) ( Not Applicable Canceled Entry 6/30/25 0900)   potassium chloride 20 mEq in 50 mL IVPB (20 mEq Intravenous New Bag 6/30/25 0712)   vancomycin (VANCOCIN) 1,000 mg in sodium chloride 0.9 % 250 mL IVPB-VTB (has no administration in time range)   sodium chloride 0.9 % bolus 500 mL (0 mL Intravenous Stopped 6/29/25 1750)   piperacillin-tazobactam (ZOSYN) IVPB 3.375 g IVPB in 100 mL NS (VTB) (0 g Intravenous Stopped 6/29/25 1815)   vancomycin IVPB 2000 mg in 0.9% Sodium Chloride 500 mL (0 mg Intravenous Stopped 6/29/25 2033)   lactated ringers bolus 2,859 mL (0 mL Intravenous Stopped 6/29/25 2134)   potassium chloride (KLOR-CON M20) CR tablet 40 mEq (40 mEq Oral Given 6/29/25 1953)   lidocaine 1% - EPINEPHrine 1:472763 (XYLOCAINE W/EPI) 1 %-1:524147 injection 10 mL (10 mL Injection Given 6/29/25 2227)       Allergies:  Metformin    Review of Systems  14 point review of system were done and are negative except as mentioned in the history of present  "illness and assessment and plan.      Physical Exam:  Objective:  Vital Signs  /79   Pulse 98   Temp 98.5 °F (36.9 °C) (Oral)   Resp 18   Ht 182.9 cm (72\")   Wt 95.3 kg (210 lb)   SpO2 93%   BMI 28.48 kg/m²   Objective    No intake/output data recorded.    Intake/Output Summary (Last 24 hours) at 6/30/2025 0806  Last data filed at 6/30/2025 0633  Gross per 24 hour   Intake 3959 ml   Output 1000 ml   Net 2959 ml        Physical Exam     Constitutional: Awake, alert  Eyes: sclerae anicteric, no conjunctival injection  HEENT: mucous membranes dry  Neck: Supple, no thyromegaly, no lymphadenopathy, trachea midline, No JVD  Respiratory: Decreased breath sounds all over the chest with occasional rhonchi.  Cardiovascular: RRR, no murmurs, rubs, or gallops.  Gastrointestinal: Positive bowel sounds, obese, soft, nontender, nondistended  Musculoskeletal: Trace to 1+ edema, no clubbing or cyanosis  Psychiatric: Appropriate affect, cooperative  Neurologic: Oriented x 3, moving all extremities, Cranial Nerves grossly intact, speech clear  Skin: warm and dry, no rashes            Results Review:   Results from last 7 days   Lab Units 06/30/25  0429 06/29/25  1706   SODIUM mmol/L 135* 131*   POTASSIUM mmol/L 2.4* 2.9*   CHLORIDE mmol/L 89* 81*   CO2 mmol/L 29.3* 24.4   BUN mg/dL 55.0* 61.0*   CREATININE mg/dL 3.17* 3.79*   CALCIUM mg/dL 8.8 9.0   ALBUMIN g/dL  --  3.3*   BILIRUBIN mg/dL  --  0.2   ALK PHOS U/L  --  69   ALT (SGPT) U/L  --  14   AST (SGOT) U/L  --  19   GLUCOSE mg/dL 142* 335*     Estimated Creatinine Clearance: 25.2 mL/min (A) (by C-G formula based on SCr of 3.17 mg/dL (H)).  Results from last 7 days   Lab Units 06/29/25  1826   MAGNESIUM mg/dL 1.8         Results from last 7 days   Lab Units 06/30/25  0429 06/29/25  1706   WBC 10*3/mm3 18.60* 15.69*   HEMOGLOBIN g/dL 8.0* 9.0*   PLATELETS 10*3/mm3 203 205         Brief Urine Lab Results  (Last result in the past 365 days)        Color   Clarity   " "Blood   Leuk Est   Nitrite   Protein   CREAT   Urine HCG        06/29/25 2205 Yellow   Clear   Negative   Moderate (2+)   Negative   Negative                 No results found for: \"UTPCR\"  Imaging Results (Last 24 Hours)       Procedure Component Value Units Date/Time    CT Abdomen Pelvis Without Contrast [[id number]] Collected: 06/29/25 2311     Updated: 06/29/25 2312    Narrative:      FINAL REPORT    TECHNIQUE:  null    CLINICAL HISTORY:  sepsis    COMPARISON:  null    FINDINGS:  CT abdomen and pelvis without contrast    Comparison: 08/29/2024    Findings:    Mild degenerative change spine and hips.    No acute bony abnormalities.    Liver and spleen within normal limits.    Pancreas and adrenal glands unremarkable.    Cholecystectomy.    No right renal stone or hydronephrosis.    No focal renal abnormality or ureteral dilation.    Status post left nephrectomy.    No evidence for aortic aneurysm.    No free fluid or adenopathy in the pelvis.    No diverticulitis. Appendix unremarkable.      Impression:      Impression:    No acute processes    Authenticated and Electronically Signed by Anthony De La Paz MD on  06/29/2025 11:11:15 PM    CT Chest Without Contrast Diagnostic [[id number]] Collected: 06/29/25 2307     Updated: 06/29/25 2308    Narrative:      FINAL REPORT    TECHNIQUE:  null    CLINICAL HISTORY:  sepsis w/ hypoxemia    COMPARISON:  null    FINDINGS:  CT chest without contrast    Comparison: None provided    Findings:    Bilateral posterior lower lobe atelectasis.    Trace bilateral pleural effusions noted.    Emphysema without other parenchymal abnormality.    Cardiomegaly with coronary artery calcifications.    Prior sternotomy for CABG.    Benign partially calcified mediastinal nodes.    No significant mediastinal adenopathy.    No significant focal bony abnormalities.      Impression:      Impression:    Trace pleural effusions with lower lobe atelectasis    Authenticated and Electronically Signed by " Anthony De La Paz MD on  06/29/2025 11:07:32 PM    XR Chest 1 View - In process [[id number]] Resulted: 06/29/25 1712     Updated: 06/29/25 1714    This result has not been signed. Information might be incomplete.            aspirin, 81 mg, Oral, Daily  budesonide, 0.5 mg, Nebulization, BID - RT  busPIRone, 15 mg, Oral, Q12H  clopidogrel, 75 mg, Oral, Daily  heparin (porcine), 5,000 Units, Subcutaneous, Q12H  insulin glargine, 20 Units, Subcutaneous, Nightly  insulin lispro, 2-9 Units, Subcutaneous, 4x Daily AC & at Bedtime  insulin lispro, 8 Units, Subcutaneous, TID With Meals  lactobacillus acidophilus, 1 capsule, Oral, BID  midodrine, 5 mg, Oral, TID AC  mupirocin, 1 Application, Each Nare, BID  pantoprazole, 40 mg, Oral, Q AM  piperacillin-tazobactam, 3.375 g, Intravenous, Q8H  potassium chloride, 20 mEq, Intravenous, Q1H  senna-docusate sodium, 2 tablet, Oral, BID  sodium chloride, 10 mL, Intravenous, Q12H  vancomycin (dosing per levels), , Not Applicable, Daily  vancomycin, 1,000 mg, Intravenous, Once      norepinephrine, 0.02-0.3 mcg/kg/min, Last Rate: 0.18 mcg/kg/min (06/30/25 0650)  Pharmacy to dose vancomycin,   Pharmacy To Dose:,         Assessment/Plan:      Septic shock    CAD (coronary artery disease)    Chronic kidney disease, stage IV (severe)    Acute UTI (urinary tract infection)    Pneumonia of left lower lobe due to infectious organism    Acute kidney injury: Patient does have significant worsening of his renal function.  Most likely secondary to volume depletion and septic shock.  Bicarb is stable low potassium noted.  Chronic kidney disease stage IV: Underlying chronic kidney disease secondary to diabetes and hypertension as well as volume loss, status post left nephrectomy in March of 2020.  Baseline creatinine of 2.9 with a EGFR of 21 mL/min.  Type 2 diabetes: Longstanding history of type 2 diabetes likely with some complications.  Septic shock: Treated as per hospitalist service still on  pressors.  Left lower lobe pneumonia: On IV antibiotics as per hospitalist service.  Urinary tract infection: Treated  Hypokalemia: Replace per protocol  Coronary artery disease: Status post CABG and stent placements.  No acute issues  COPD: Longstanding history of smoking.  Untreated sleep apnea: Longstanding known history of obstructive sleep apnea, has not been able to use his BiPAP      Risk and complexity: High       Plan:  Continue with LR at 100 cc an hour, will reassess every 24 hours.  Electrolyte replacement per protocol.  Patient will need close monitoring of electrolytes.  Check echo once he is more stable to assess status right-sided pressures.  Continue with rest of the current treatment plan and surveillance labs, adjust medications to patient's GFR.  Details were discussed with the patient as well as family in the room.  Wife at the bedside.  Details were also discussed with the hospitalist service.   Further recommendations will depend on clinical course of the patient during the current hospitalization.    I also discussed the details with the nursing staff.  Rest as ordered.    In closing, I sincerely appreciate opportunity to participate in care of this patient. If I can be of any further assistance with the management of this patient, please don’t hesitate to contact me.    Jefry Hooper MD, FÁTIMA    06/30/25  08:06 EDT    Dictated using Dragon.

## 2025-06-30 NOTE — PROGRESS NOTES
Results from last 7 days   Lab Units 06/29/25  1706   WBC 10*3/mm3 15.69*   CREATININE mg/dL 3.79*   PROCALCITONIN ng/mL 0.79*   Estimated Creatinine Clearance: 21.1 mL/min (A) (by C-G formula based on SCr of 3.79 mg/dL (H)).    Indication:  Uncomplicated Cystitis    Micro:  Microbiology Results (last 10 days)       ** No results found for the last 240 hours. **            Current Abx:  Anti-Infectives (From admission, onward)      Ordered     Dose/Rate Route Frequency Start Stop    06/30/25 0013  piperacillin-tazobactam (ZOSYN) IVPB 3.375 g IVPB in 100 mL NS (VTB)        Ordering Provider: Silviano Cisneros MD    3.375 g  over 4 Hours Intravenous Every 8 Hours 06/30/25 0100 07/05/25 0059    06/30/25 0005  Pharmacy to dose vancomycin        Ordering Provider: Silviano Cisneros MD     Not Applicable Continuous PRN 06/30/25 0005 07/03/25 0004    06/30/25 0005  Pharmacy To Dose: Piperacillin-tazobactam (Zosyn)        Ordering Provider: Silviano Cisneros MD     Not Applicable Continuous PRN 06/30/25 0005 07/05/25 0004    06/29/25 1714  piperacillin-tazobactam (ZOSYN) IVPB 3.375 g IVPB in 100 mL NS (VTB)        Ordering Provider: Fady Guevara MD    3.375 g  over 30 Minutes Intravenous Once 06/29/25 1730 06/29/25 1815    06/29/25 1714  vancomycin IVPB 2000 mg in 0.9% Sodium Chloride 500 mL        Ordering Provider: Fady Guevara MD    20 mg/kg × 95.3 kg  250 mL/hr over 120 Minutes Intravenous Once 06/29/25 1730 06/29/25 2033            Assessment/Plan:  CrCl 21.1 ml/min,  borderline clearance, dose at Pipercillin-tazobactam 3.375 Gm IVPB q8hrs extended infusion.  Pharmacy to monitor renal function and adjust if necessary.

## 2025-06-30 NOTE — CONSULTS
Diabetes Education    Patient Name:  Jonny Ferrari Jr.  YOB: 1950  MRN: [id number]  Admit Date:  6/29/2025        DM education referral received this AM.  Will plan to see if pt. Is more medically appropriate for education 07/01/25      Electronically signed by:  Fiona Sweet RN  06/30/25 16:17 EDT

## 2025-06-30 NOTE — PROGRESS NOTES
[facility]      [facility]    Name:  Jonny Ferrari Jr.   Age:  74 y.o.  Sex:  male  :  1950  MRN:  [id number]   Visit Number:  [id number]  Admission Date:  2025  Date Of Service:  25  Primary Care Physician:  Amaury Brice MD     LOS: 1 day :    SEPSIS FOCUSED EXAM    *Must be completed by a licensed independent Practitioner within 6 hours of diagnosis for the following conditions -- Septic Shock or Severe Sepsis with Lactate >/= 4.    Fluid bolus must be completed prior to assessment.      Diagnosis: Septic shock secondary to urinary tract infection.     Vital Signs Temp:  [97.9 °F (36.6 °C)] 97.9 °F (36.6 °C)  Heart Rate:  [] 107  Resp:  [18] 18  BP: ()/(42-68) 91/56   General Comfortable at rest.   Respiratory Breath sounds heard bilaterally equally.  Bilateral basal crackles heard.   Cardiac/Chest S1 and S2 heard, normal.  No murmurs appreciated.  No JVD.     Skin Skin color is normal.  No mottling noted.  Multiple skin abrasions and excoriations noted.   Capillary Refill Capillary filling is less than 3 seconds.     Peripheral Pulses Checked                          Bilateral radial pulses felt equally.  Dorsalis pedis pulses are felt equally.       † Septic shock is defined by CMS as severe sepsis plus one of the following: persistent hypotension after fluid bolus OR tissue hypoperfusion (Lactic Acid >=4)  †† TWO OF THE FOLLOWING can be done in lieu of the Focused Exam: Measure CVP; Measure ScVO2; Bedside cardiovascular ultrasound; Dynamic assessment of fluid responsiveness with passive leg raise or fluid challenge.      Silviano Cisneros MD  25  00:26 EDT    Dictated using Dragon dictation.

## 2025-06-30 NOTE — NURSING NOTE
Seen for wound consult. Dressings intact and placed per previous shift per standing orders for skin tears.   He is at increased risk for skin breakdown. Standing orders for care include a turning schedule, barrier cream twice daily and prn after cleansing, float heels off bed with pillows, encourage increase mobility as appropriate and tolerated to reduce pressure, and a nutrition consult for dietary needs. Staff to contact provider and re-consult wound nurse for new skin issues or lack of improvement with current orders. Thank you for the consult. If you have questions or concerns do not hesitate to contact me.

## 2025-06-30 NOTE — PAYOR COMM NOTE
"TO:BC  FROM:FELIPE RODRÍGUEZ, RN PHONE [phone]  CLINICALS REF# [id number]    Isis, Nuzhat CallahanBuffy (74 y.o. Male)       Date of Birth   1950    Social Security Number       Address   [address] [place] [address]    Home Phone   [phone]    MRN   [id number]       [facility]    Marital Status                               Admission Date   2025    Admission Type   Emergency    Admitting Provider   Silviano Cisneros MD    Attending Provider   Kuldeep Patel DO    Department, Room/Bed   [facility] INTENSIVE CARE, I       Discharge Date       Discharge Disposition       Discharge Destination                                 Attending Provider: Kuldeep Patel DO    Allergies: Metformin    Isolation: None   Infection: None   Code Status: CPR    Ht: 182.9 cm (72\")   Wt: 95.3 kg (210 lb)    Admission Cmt: None   Principal Problem: Septic shock [A41.9,R65.21]                   Active Insurance as of 2025       Primary Coverage       Payor Plan Insurance Group Employer/Plan Group    ANTHEM MEDICARE REPLACEMENT ANTHEM MEDICARE ADVANTAGE HMO [id number]       Payor Plan Address Payor Plan Phone Number Payor Plan Fax Number Effective Dates    [id number] [phone]  2025 - None Entered    [facility] [address]         Subscriber Name Subscriber Birth Date Member ID       NUZHAT MARINELLI JR. 1950 [id number]                     Emergency Contacts        (Rel.) Home Phone Work Phone Mobile Phone    IsisLashon (Spouse) [phone] -- [phone]    ISISCATE (Relative) -- -- [phone]    ISISNEGRA (Son) [phone] -- --                 History & Physical        Silviano Cisneros MD at [address] [place] [address]   HISTORY AND PHYSICAL      Name:  Nuzhat Marinelli JrBuffy   Age:  74 y.o.  Sex:  male  :  1950  MRN:  [id number]   Visit Number:  [id number]  Admission Date:  2025  Date Of " Service:  06/29/25  Primary Care Physician:  Amaury Brice MD    Chief Complaint:     Generalized weakness and falls.    History Of Presenting Illness:      Jonny Ferrari Jr. is a 74-year-old male with history of diabetes mellitus type 2, chronic kidney disease stage III, COPD, coronary artery disease status post CABG and stent, ERASMO, BPH was brought to the emergency room by EMS with symptoms of generalized weakness and fatigue that has been going on for the last 2 days.  History of Present Illness  Reason for Admit: Fatigue, sepsis, and chronic kidney disease.    The patient was brought in by ambulance due to an inability to walk, a condition that has persisted for the past 2 to 3 days. He reports no fever, dysuria, or cough but feels weak. He has experienced several falls recently and has been unable to walk independently for the past 3 to 4 days. Despite using a walker or cane, he continues to fall. He also reports swelling in his legs and is hard of hearing in his left ear. He reports feeling slightly better this evening.    He has stage 4 chronic kidney disease and is under the care of Dr. Hooper. He has been taking torsemide 100 mg.    He underwent bypass surgery approximately 20 years ago and is currently on baby aspirin.    MEDICATIONS  Current: Aspirin, torsemide     In the emergency room, patient was afebrile but mildly tachycardic in the 100 with initial blood pressure of 80/54 and pulse oxygen saturation of 95% on room air.  Patient was given 30 mL/kg fluid bolus initially in the emergency room with initial improvement of the blood pressure to systolic 100 but subsequently dropped it again to the 70s systolic.  Patient was given another 500 bolus of normal saline, he right femoral central line was placed and he was initiated on Levophed.    Blood work done in the emergency room showed initial troponin of 98 with repeat at 95.  proBNP 1835.  Sodium 131, potassium 2.9, BUN 61, creatinine 3.79  (baseline), glucose 335.  LFT was unremarkable.  Magnesium 1.8.  Initial lactic acid was 9.7 with repeat at 4.2.  Procalcitonin 0.79.  WBC 15.7, hemoglobin 9.  Urine analysis shows 21-50 WBCs with trace bacteria.  Blood cultures were drawn.  Fecal occult blood was negative.  Urine culture was sent.  Portable chest x-ray done in the emergency room showed chronic appearing bilateral parenchymal changes with prominent right transverse pressure.  Possible on homogenous opacity noted on the left lower zone.  Patient was given Zosyn and vancomycin in the emergency room, oral potassium chloride and was subsequently initiated on lactated Ringer's IV fluids.  CT abdomen and pelvis showed no acute process.  CT chest without contrast showed pleural effusion with lower lobe atelectasis.  Patient was given Zosyn and vancomycin and was subsequently admitted to the medical ICU for management of septic shock, urinary tract infection, left lower lobe pneumonia.    Review Of Systems:    All systems were reviewed and negative except as mentioned in history of presenting illness, assessment and plan.    Past Medical History: Patient's  has a past medical history of Anemia, Anxiety neurosis, CAD (coronary artery disease), Cancer, Chronic kidney disease, Constipation, COPD (chronic obstructive pulmonary disease), Depression, Diabetes mellitus, Dyslipidemia, Dysphagia, Elevated cholesterol, Full dentures, GERD (gastroesophageal reflux disease), Gout, H/O left nephrectomy (2020), [place] (hard of hearing), Hypertension, Impaired functional mobility, balance, gait, and endurance, MI (myocardial infarction), OA (osteoarthritis), Obesity, Problems with swallowing, Psoriasis, Sleep apnea, Tattoo, TIA (transient ischemic attack), and Wears glasses (10/27/2021).    Past Surgical History: Patient's  has a past surgical history that includes Appendectomy; Cardiac catheterization; Coronary artery bypass graft (2001); Cholecystectomy; Cardiac  catheterization (N/A, 2/20/2019); Ureter surgery; Laparoscopic nephrectomy (Left); Cardiac catheterization (Left, 6/9/2021); Mouth surgery; Colonoscopy; Esophagogastroduodenoscopy; Vasectomy; Colonoscopy (N/A, 11/10/2021); Esophagogastroduodenoscopy (N/A, 11/10/2021); Colonoscopy (N/A, 10/25/2023); Esophagogastroduodenoscopy (N/A, 10/25/2023); Colonoscopy (N/A, 11/8/2023); Esophagogastroduodenoscopy (N/A, 11/8/2023); and Incision and drainage perirectal abscess (N/A, 8/29/2024).    Social History: Patient's  reports that he has quit smoking. His smoking use included cigarettes. He started smoking about 45 years ago. He has a 45.5 pack-year smoking history. His smokeless tobacco use includes snuff. He reports that he does not drink alcohol and does not use drugs.    Family History:  Patient's family history includes Lung disease in his mother; No Known Problems in his brother, brother, father, maternal grandfather, maternal grandmother, paternal grandfather, paternal grandmother, sister, sister, and sister.     Allergies:      Metformin    Home Medications:    Prior to Admission Medications       Prescriptions Last Dose Informant Patient Reported? Taking?    acetaminophen (TYLENOL) 325 MG tablet  Spouse/Significant Other Yes No    Take 2 tablets by mouth Every 6 (Six) Hours As Needed.    alfuzosin (UROXATRAL) 10 MG 24 hr tablet   Yes No    Take 1 tablet by mouth Daily.    allopurinol (ZYLOPRIM) 100 MG tablet  Spouse/Significant Other Yes No    Take 2 tablets by mouth Daily.    aspirin 81 MG EC tablet  Spouse/Significant Other Yes No    Take 1 tablet by mouth Daily.    buprenorphine-naloxone (SUBOXONE) 8-2 MG per SL tablet  Spouse/Significant Other Yes No    Place 1 tablet under the tongue 2 (Two) Times a Day.    busPIRone (BUSPAR) 15 MG tablet  Spouse/Significant Other Yes No    Take 1 tablet by mouth 2 (two) times a day.    calcitriol (ROCALTROL) 0.25 MCG capsule  Spouse/Significant Other Yes No    Take 1 capsule by  mouth Daily.    Cholecalciferol (Vitamin D) 50 MCG (2000 UT) tablet  Spouse/Significant Other Yes No    Take 1 tablet by mouth Daily.    Patient not taking:  Reported on 6/24/2025    clopidogrel (PLAVIX) 75 MG tablet  Spouse/Significant Other No No    Take 1 tablet by mouth Daily.    dapagliflozin Propanediol (Farxiga) 10 MG tablet  Spouse/Significant Other Yes No    Take  by mouth Daily.    dutasteride (AVODART) 0.5 MG capsule  Spouse/Significant Other Yes No    Take 1 capsule by mouth Daily.    ezetimibe (ZETIA) 10 MG tablet  Spouse/Significant Other Yes No    Take 1 tablet by mouth Daily.    Lantus SoloStar 100 UNIT/ML injection pen  Spouse/Significant Other Yes No    Inject 20 Units under the skin into the appropriate area as directed 4 (Four) Times a Day After Meals & at Bedtime.    linagliptin (TRADJENTA) 5 MG tablet tablet   No No    Take 1 tablet by mouth Daily.    metoprolol tartrate (LOPRESSOR) 25 MG tablet   No No    Take 1 tablet by mouth Daily. Take for systolic blood pressure greater than 110    midodrine (PROAMATINE) 5 MG tablet   No No    Take 1 tablet by mouth 3 (Three) Times a Day Before Meals.    nitroglycerin (NITROSTAT) 0.4 MG SL tablet  Spouse/Significant Other No No    Place 1 tablet under the tongue Every 5 (Five) Minutes As Needed for Chest Pain. Take no more than 3 doses in 15 minutes.    omeprazole (priLOSEC) 20 MG capsule  Spouse/Significant Other Yes No    Take 1 capsule by mouth 2 (Two) Times a Day.    potassium chloride ER (K-TAB) 20 MEQ tablet controlled-release ER tablet   Yes No    Take 1 tablet by mouth 2 (Two) Times a Day.    sertraline (ZOLOFT) 100 MG tablet  Spouse/Significant Other Yes No    Take 1 tablet by mouth Daily. 2 tablet daily    Spiriva HandiHaler 18 MCG per inhalation capsule   No No    Place 1 capsule into inhaler and inhale Daily for 30 days.    Symbicort 160-4.5 MCG/ACT inhaler   No No    Inhale 2 puffs 2 (Two) Times a Day for 30 days.    tamsulosin (FLOMAX) 0.4  "MG capsule 24 hr capsule  Spouse/Significant Other Yes No    Take 1 capsule by mouth Every Night.    torsemide (DEMADEX) 100 MG tablet   No No    Take 1 tablet by mouth Daily As Needed (Take as needed for fluid retention/swelling).    True Metrix Blood Glucose Test test strip  Spouse/Significant Other Yes No    See Admin Instructions.     ED Medications:    Medications   sodium chloride 0.9 % flush 10 mL (has no administration in time range)   Potassium Replacement - Follow Nurse / BPA Driven Protocol (has no administration in time range)   lactated ringers infusion (125 mL/hr Intravenous New Bag 6/29/25 2212)   norepinephrine (LEVOPHED) 8 mg in 250mL D5W infusion (0.1 mcg/kg/min × 95.3 kg Intravenous Rate/Dose Change 6/29/25 2306)   sodium chloride 0.9 % bolus 500 mL (0 mL Intravenous Stopped 6/29/25 1750)   piperacillin-tazobactam (ZOSYN) IVPB 3.375 g IVPB in 100 mL NS (VTB) (0 g Intravenous Stopped 6/29/25 1815)   vancomycin IVPB 2000 mg in 0.9% Sodium Chloride 500 mL (0 mg Intravenous Stopped 6/29/25 2033)   lactated ringers bolus 2,859 mL (0 mL Intravenous Stopped 6/29/25 2134)   potassium chloride (KLOR-CON M20) CR tablet 40 mEq (40 mEq Oral Given 6/29/25 1953)   lidocaine 1% - EPINEPHrine 1:966126 (XYLOCAINE W/EPI) 1 %-1:335384 injection 10 mL (10 mL Injection Given 6/29/25 2227)     Vital Signs:  Temp:  [97.9 °F (36.6 °C)] 97.9 °F (36.6 °C)  Heart Rate:  [] 107  Resp:  [18] 18  BP: ()/(42-68) 91/56        06/29/25  1658   Weight: 95.3 kg (210 lb)     Body mass index is 28.48 kg/m².    Physical Exam:     Most recent vital Signs: BP 91/56   Pulse 107   Temp 97.9 °F (36.6 °C) (Rectal)   Resp 18   Ht 182.9 cm (72\")   Wt 95.3 kg (210 lb)   SpO2 93%   BMI 28.48 kg/m²     Physical Exam  Constitutional:       General: He is not in acute distress.     Appearance: He is ill-appearing.      Comments: Does not seem to be in any distress at this time.   HENT:      Head: Normocephalic.      Comments: " Healing abrasion noted on the scalp/vertex.     Right Ear: External ear normal.      Left Ear: External ear normal.      Nose: Nose normal.      Mouth/Throat:      Mouth: Mucous membranes are moist.   Eyes:      Extraocular Movements: Extraocular movements intact.      Conjunctiva/sclera: Conjunctivae normal.   Cardiovascular:      Rate and Rhythm: Normal rate and regular rhythm.      Pulses: Normal pulses.      Heart sounds: Normal heart sounds. No murmur heard.     Comments: Midline CABG scar noted.  Pulmonary:      Effort: Pulmonary effort is normal.      Breath sounds: Rales present. No wheezing.   Abdominal:      General: Bowel sounds are normal.      Palpations: Abdomen is soft.      Tenderness: There is no abdominal tenderness. There is no guarding or rebound.      Comments: Surgical scars noted on the epigastric region.   Musculoskeletal:         General: Normal range of motion.      Cervical back: Neck supple.      Right lower leg: Edema present.      Left lower leg: Edema present.      Comments: 2+ pitting edema noted bilateral lower extremities up to the knees.  Surgical scar noted on the right knee joint.  Right femoral central venous line noted.   Skin:     General: Skin is warm.      Findings: Bruising and lesion present.      Comments: Multiple abrasions noted in bilateral lower and upper extremities, scalp.   Neurological:      General: No focal deficit present.      Mental Status: He is alert and oriented to person, place, and time. Mental status is at baseline.      Comments: Hard of hearing.   Psychiatric:         Mood and Affect: Mood normal.         Behavior: Behavior normal.       Physical Exam  Skin: Bruising is present all over the skin. There are numerous skin tears.  Extremities: Swelling is noted in the legs.    Laboratory data:    I have reviewed the labs done in the emergency room.    Results from last 7 days   Lab Units 06/29/25  1706   SODIUM mmol/L 131*   POTASSIUM mmol/L 2.9*    CHLORIDE mmol/L 81*   CO2 mmol/L 24.4   BUN mg/dL 61.0*   CREATININE mg/dL 3.79*   CALCIUM mg/dL 9.0   BILIRUBIN mg/dL 0.2   ALK PHOS U/L 69   ALT (SGPT) U/L 14   AST (SGOT) U/L 19   GLUCOSE mg/dL 335*     Results from last 7 days   Lab Units 06/29/25  1706   WBC 10*3/mm3 15.69*   HEMOGLOBIN g/dL 9.0*   HEMATOCRIT % 27.6*   PLATELETS 10*3/mm3 205       Results from last 7 days   Lab Units 06/29/25  1826 06/29/25  1706   HSTROP T ng/L 95* 98*     Results from last 7 days   Lab Units 06/29/25  1706   PROBNP pg/mL 1,835.0*       Results from last 7 days   Lab Units 06/29/25  1706   LIPASE U/L 19       Results from last 7 days   Lab Units 06/29/25  2205   COLOR UA  Yellow   GLUCOSE UA  500 mg/dL (2+)*   KETONES UA  Negative   BLOOD UA  Negative   LEUKOCYTES UA  Moderate (2+)*   PH, URINE  5.5   BILIRUBIN UA  Negative   UROBILINOGEN UA  0.2 E.U./dL   RBC UA /HPF None Seen   WBC UA /HPF 21-50*     EKG:      EKG done in the emergency room was reviewed by me.  It shows sinus tachycardia at 103 bpm.  Left axis deviation noted.  ST flattening noted in the anterior chest leads.  Occasional PAC noted.    Radiology:    Portable chest x-ray done in the emergency room was reviewed by me.  It shows chronic appearing parenchymal changes bilaterally with no homogenous opacity in the left lower zone suspicious for infiltrate.  Prominent right transfer fissure noted.  Sternal wires noted.  An official report is currently pending.    CT Abdomen Pelvis Without Contrast  Result Date: 6/29/2025  FINAL REPORT TECHNIQUE: null CLINICAL HISTORY: sepsis COMPARISON: null FINDINGS: CT abdomen and pelvis without contrast Comparison: 08/29/2024 Findings: Mild degenerative change spine and hips. No acute bony abnormalities. Liver and spleen within normal limits. Pancreas and adrenal glands unremarkable. Cholecystectomy. No right renal stone or hydronephrosis. No focal renal abnormality or ureteral dilation. Status post left nephrectomy. No  evidence for aortic aneurysm. No free fluid or adenopathy in the pelvis. No diverticulitis. Appendix unremarkable.     Impression: No acute processes Authenticated and Electronically Signed by Anthony De La Paz MD on 06/29/2025 11:11:15 PM    CT Chest Without Contrast Diagnostic  Result Date: 6/29/2025  FINAL REPORT TECHNIQUE: null CLINICAL HISTORY: sepsis w/ hypoxemia COMPARISON: null FINDINGS: CT chest without contrast Comparison: None provided Findings: Bilateral posterior lower lobe atelectasis. Trace bilateral pleural effusions noted. Emphysema without other parenchymal abnormality. Cardiomegaly with coronary artery calcifications. Prior sternotomy for CABG. Benign partially calcified mediastinal nodes. No significant mediastinal adenopathy. No significant focal bony abnormalities.     Impression: Trace pleural effusions with lower lobe atelectasis Authenticated and Electronically Signed by Anthony De La Paz MD on 06/29/2025 11:07:32 PM    XR Ankle 3+ View Left  Result Date: 6/26/2025  CLINICAL INDICATION: pain TECHNIQUE: XR ANKLE RIGHT 3+ VIEWS COMPARISON: 24 April 2025, CT 6/13/2025 FINDINGS: Postoperative changes from resection of the distal fibula. Healed distal tibial fracture deformity in similar alignment. Severe narrowing of the tibiotalar joint with irregularity of the lateral talar dome and adjacent distal tibia. Tilt of the talus with widening of the medial clear space. Heterotopic ossification is again seen laterally. Severe narrowing of the tibiotalar joint redemonstrated.. Small tibiotalar effusion. Calcaneal enthesopathy.    Redemonstration of severe tibiotalar osteoarthrosis with postoperative changes as described. No acute bony findings. CRITICAL RESULT:   No. COMMUNICATION: Per this written report. Drafted by Pino Martinez MD on 6/26/2025 12:57 PM Final report signed by Pino Martinez MD on 6/26/2025 12:59 PM    Assessment:    Septic shock secondary to #2 and #3, POA.  Acute urinary tract  infection, POA.  Left lower lobe bacterial pneumonia, unable to classify further, POA.  Demand ischemia with elevated troponins, POA.  Hypokalemia, POA.  Chronic kidney disease stage IV.  Diabetes mellitus type 2 with nephropathy and hyperglycemia, POA.  Coronary artery disease status post CABG and stents.  COPD, no exacerbation.  Benign prostatic hyperplasia.  Will falls with skin abrasions, POA.  Assessment & Plan  The plan for septic shock is as follows:  - Administered 3 liters of fluid and started on Levophed to maintain blood pressure.  - A central line has been placed in the right groin.  - Strong antibiotics, Zosyn and vancomycin, have been initiated.  - Blood and urine cultures have been sent.  - Obtain nasal swab for MRSA.    The plan for stage IV chronic kidney disease is as follows:  - We will hold torsemide 100 mg due to low blood pressure.  - Consult Dr. Hooper from nephrology in AM.    The plan for status post bypass surgery is as follows:  - Continue baby aspirin, clopidogrel.  - Hold metoprolol due to low blood pressures.  - Continue midodrine.    Diabetes mellitus type 2 with hyperglycemia.  - We will start him on Lantus 20 units nightly.  - Continue subcutaneous insulin protocol for coverage and postprandial insulin.  - Obtain hemoglobin A1c levels.    Hypokalemia.  - Electrolyte replacement protocol.    Multiple skin abrasions.  - Consult wound care services.     Discussed with nursing staff at the bedside.    Risk Assessment: High  DVT Prophylaxis: Heparin  Code Status: Full  Diet: Renal diabetic.      Silviano Cisneros MD  06/29/25  23:45 EDT    Patient or patient representative verbalized consent for the use of Ambient Listening during the visit for chart documentation.    Dictated utilizing Dragon dictation.     Contains text generated by [employer]  Electronically signed by Silviano Cisneros MD at 06/30/25 0004          Emergency Department Notes        Selam Heredia, RN at 06/29/25 9301           Provider aware of decreasing Bps    Electronically signed by Selam Heredia, RN at 06/29/25 2218       Selam Heredia, RN at 06/29/25 2200          Pt still unable to provide urine sample, verbal order to straight cath pt noted    Electronically signed by Selam Heredia, RN at 06/29/25 2209       Goltz, Patrick, RN at 06/29/25 1753          Wife has showed up and is looking for the pt's right ear hearing aide. Left ear is in place. Wife has looked through the bed with no success. I have reached out to [facility] to see if it is on the truck but no answer yet. Wife continues to ask about it. I told her I dont know where it is, maybe at home. She said no. I told he we reached out to [facility]. Well do they have it!? I told her we have not heard back.     Electronically signed by Goltz, Patrick, RN at 06/29/25 1756       Fady Guevara MD at 06/29/25 1650        Procedure Orders    1. Critical Care [[id number]] ordered by Fady Guevara MD    2. Central Line At Bedside [[id number]] ordered by Fady Guevara MD                 Subjective   History of Present Illness  Patient is a 74-year-old male presenting to the emergency department with generalized weakness and fatigue.  The patient reports that he is felt wiped out and weak for about 2 days.  EMS reports they were called and secondary to a sick patient and found him pale, cool, and diaphoretic on the toilet.  Patient believes it could be related to his heart.  He does have a significant cardiac history including coronary artery disease with CABG.  Review of the chart does demonstrate he also has a history of hypertension, hyperlipidemia, diabetes, chronic kidney disease, and prior admissions for metabolic encephalopathy and sepsis.  Patient is a former smoker.    History provided by:  Patient and EMS personnel      Review of Systems    Past Medical History:   Diagnosis Date    Anemia     Anxiety neurosis     CAD (coronary artery disease)      Cancer     Kidney    Chronic kidney disease     Patient reported left kidney removed secondary to kidney cancer and that he only has 30% function of the right kidney    Constipation     COPD (chronic obstructive pulmonary disease)     Depression     Diabetes mellitus     DM TYPE 2 , ONSET IN 2009    Dyslipidemia     Dysphagia     Reported noted with food, fluids and pills    Elevated cholesterol     Full dentures     GERD (gastroesophageal reflux disease)     Gout     H/O left nephrectomy 2020    secondary to cancer    [place] (hard of hearing)     Patient has bilateral hearing aids, still is very [place]    Hypertension     Impaired functional mobility, balance, gait, and endurance     MI (myocardial infarction)     Patient reported apx March 2021 (reported he refused cardiac cath) and June 2021 (did have cardiac cath with placement of 2 stents).     OA (osteoarthritis)     Obesity     MILD TO MODERATE EXOGENOUS OBESITY    Problems with swallowing     with food    Psoriasis     Sleep apnea     CPAP HS - to bring mask and machine DOS    Tattoo     x1    TIA (transient ischemic attack)     Wears glasses 10/27/2021       Allergies   Allergen Reactions    Metformin Diarrhea       Past Surgical History:   Procedure Laterality Date    APPENDECTOMY      CARDIAC CATHETERIZATION      CARDIAC CATHETERIZATION N/A 2/20/2019    Procedure: Left Heart Cath;  Surgeon: Ernst Smith MD;  Location:  [facility];  Service: Cardiology    CARDIAC CATHETERIZATION Left 6/9/2021    Procedure: Left Heart Cath;  Surgeon: Ernst Smith MD;  Location:  [facility];  Service: Cardiology;  Laterality: Left;    CHOLECYSTECTOMY      COLONOSCOPY      COLONOSCOPY N/A 11/10/2021    Procedure: COLONOSCOPY with polypectomy x6 and clip x2;  Surgeon: Chidi Trinidad MD;  Location: [facility] ENDOSCOPY;  Service: Gastroenterology;  Laterality: N/A;    COLONOSCOPY N/A 10/25/2023    Procedure: COLONOSCOPY incomplete;  Surgeon:  Chidi Trinidad MD;  Location: [facility] ENDOSCOPY;  Service: Gastroenterology;  Laterality: N/A;    COLONOSCOPY N/A 11/8/2023    Procedure: COLONOSCOPY WITH POLYPECTOMY X1;  Surgeon: Chidi Trinidad MD;  Location: [facility] ENDOSCOPY;  Service: Gastroenterology;  Laterality: N/A;    CORONARY ARTERY BYPASS GRAFT  2001    Patient reported no MI prior to CABG and that the bypass was 3 vessel     ENDOSCOPY      ENDOSCOPY N/A 11/10/2021    Procedure: ESOPHAGOGASTRODUODENOSCOPY with biopsy and dilatation;  Surgeon: Chidi Trinidad MD;  Location: [facility] ENDOSCOPY;  Service: Gastroenterology;  Laterality: N/A;    ENDOSCOPY N/A 10/25/2023    Procedure: ESOPHAGOGASTRODUODENOSCOPY with biopsy and dilatation;  Surgeon: Chidi Trinidad MD;  Location: [facility] ENDOSCOPY;  Service: Gastroenterology;  Laterality: N/A;    ENDOSCOPY N/A 11/8/2023    Procedure: ESOPHAGOGASTRODUODENOSCOPY WITH BIOPSY;  Surgeon: Chidi Trinidad MD;  Location: [facility] ENDOSCOPY;  Service: Gastroenterology;  Laterality: N/A;    INCISION AND DRAINAGE PERIRECTAL ABSCESS N/A 8/29/2024    Procedure: INCISION AND DRAINAGE OF PERIRECTAL ABSCESS;  Surgeon: Sherman Billingsley MD;  Location: [facility] OR;  Service: General;  Laterality: N/A;    LAPAROSCOPIC NEPHRECTOMY Left     MOUTH SURGERY      Full mouth extraction    URETER SURGERY      VASECTOMY         Family History   Problem Relation Age of Onset    Lung disease Mother     No Known Problems Father     No Known Problems Sister     No Known Problems Brother     No Known Problems Maternal Grandmother     No Known Problems Maternal Grandfather     No Known Problems Paternal Grandmother     No Known Problems Paternal Grandfather     No Known Problems Sister     No Known Problems Brother     No Known Problems Sister        Social History     Socioeconomic History    Marital status:    Tobacco Use    Smoking status: Former     Current packs/day: 1.00     Average packs/day: 1 pack/day for 45.5 years (45.5  ttl pk-yrs)     Types: Cigarettes     Start date: 1980    Smokeless tobacco: Current     Types: Snuff   Vaping Use    Vaping status: Never Used   Substance and Sexual Activity    Alcohol use: No    Drug use: No    Sexual activity: Defer           Objective   Physical Exam  Vitals and nursing note reviewed.   Constitutional:       General: He is not in acute distress.     Appearance: Normal appearance. He is ill-appearing. He is not toxic-appearing.   Cardiovascular:      Rate and Rhythm: Normal rate and regular rhythm.      Pulses: Normal pulses.   Pulmonary:      Effort: Pulmonary effort is normal. No respiratory distress.      Breath sounds: Normal breath sounds.   Abdominal:      Palpations: Abdomen is soft.   Skin:     General: Skin is warm and dry.   Neurological:      Mental Status: He is alert and oriented to person, place, and time.   Psychiatric:         Mood and Affect: Mood normal.         Behavior: Behavior normal.         Critical Care    Performed by: Fady Guevara MD  Authorized by: Fady Guevara MD    Critical care provider statement:     Critical care time (minutes):  90    Critical care end time:  6/29/2025 10:19 PM    Critical care was necessary to treat or prevent imminent or life-threatening deterioration of the following conditions:  Sepsis, shock and renal failure    Critical care was time spent personally by me on the following activities:  Discussions with consultants, evaluation of patient's response to treatment, examination of patient, obtaining history from patient or surrogate, ordering and performing treatments and interventions, ordering and review of laboratory studies, ordering and review of radiographic studies, pulse oximetry and re-evaluation of patient's condition    Care discussed with: admitting provider    Central Line At Bedside    Date/Time: 6/29/2025 10:54 PM    Performed by: Fady Guevara MD  Authorized by: Fady Guevara MD    Consent:      Consent obtained:  Written and verbal    Consent given by:  Patient    Risks, benefits, and alternatives were discussed: yes      Risks discussed:  Arterial puncture, incorrect placement, nerve damage, pneumothorax, infection and bleeding    Alternatives discussed:  Alternative treatment  Universal protocol:     Procedure explained and questions answered to patient or proxy's satisfaction: yes      Test results available: yes      Immediately prior to procedure, a time out was called: yes      Patient identity confirmed:  Verbally with patient and arm band  Pre-procedure details:     Indication(s): central venous access      Hand hygiene: Hand hygiene performed prior to insertion      Sterile barrier technique: All elements of maximal sterile technique followed      Skin preparation:  Chlorhexidine    Skin preparation agent: Skin preparation agent completely dried prior to procedure    Sedation:     Sedation type:  None  Anesthesia:     Anesthesia method:  Local infiltration    Local anesthetic:  Lidocaine 1% WITH epi  Procedure details:     Location:  R femoral    Site selection rationale:  Access point and BP.    Patient position:  Supine    Procedural supplies:  Triple lumen    Catheter size:  7 Fr    Landmarks identified: yes      Ultrasound guidance: yes      Ultrasound guidance timing: prior to insertion and real time      Sterile ultrasound techniques: Sterile gel and sterile probe covers were used      Number of attempts:  2 (Initial wire kinked and had to be replaced.)    Successful placement: yes    Post-procedure details:     Post-procedure:  Dressing applied and line sutured    Assessment:  Blood return through all ports and free fluid flow    Procedure completion:  Tolerated well, no immediate complications            ED Course  ED Course as of 06/29/25 2255   Sun Jun 29, 2025   1703 Personally reviewed some of the most recent records.  The patient was last seen by cardiology 5 days ago.  The note is  not complete at this point.  They note the patient has a history of coronary artery disease with diffuse disease and prior stents/CABG.  See that documentation for details.  Patient is also had recent visits with nephrology with known stage IV chronic kidney disease. [RS]   1821 Lactate(!!): 9.7  Significantly elevated lactate noted. [RS]   2008 XR Chest 1 View  I personally reviewed the single view of the chest.  On my interpretation there are findings concerning for left lower lobe infiltrate. [RS]   2009 Creatinine(!): 3.79  Acute kidney injury superimposed on chronic kidney disease when compared to prior values. [RS]   2157 Patient's maps have maintained in the 60s.  He continues to be alert and oriented.  Will plan admission for further evaluation and management.  Hospitalist messaged for admission.  At this point, the source appears to be a left lower lobe infiltrate.  Urine is pending. [RS]   2218 Patient's blood pressure is trending down.  We will start Levophed.  Hospitalist will admit to the ICU [RS]      ED Course User Index  [RS] Fady Guevara MD                                                       Medical Decision Making  Problems Addressed:  Acute kidney injury superimposed on chronic kidney disease: complicated acute illness or injury  Chronic anemia: complicated acute illness or injury  Hypokalemia: complicated acute illness or injury  Hyponatremia: complicated acute illness or injury  Left lower lobe consolidation: complicated acute illness or injury  Multiple skin tears: complicated acute illness or injury  Sepsis with acute renal failure and septic shock, due to unspecified organism, unspecified acute renal failure type: complicated acute illness or injury  Type 2 diabetes mellitus with hyperglycemia, unspecified whether long term insulin use: complicated acute illness or injury    Amount and/or Complexity of Data Reviewed  Independent Historian: spouse and EMS  External Data Reviewed:  labs.  Labs: ordered. Decision-making details documented in ED Course.  Radiology: ordered. Decision-making details documented in ED Course.  ECG/medicine tests: ordered.  Discussion of management or test interpretation with external provider(s): Hospitalist    Risk  OTC drugs.  Prescription drug management.  Decision regarding hospitalization.    Critical Care  Total time providing critical care: 90 minutes        Final diagnoses:   Sepsis with acute renal failure and septic shock, due to unspecified organism, unspecified acute renal failure type   Acute kidney injury superimposed on chronic kidney disease   Hyponatremia   Hypokalemia   Chronic anemia   Left lower lobe consolidation   Type 2 diabetes mellitus with hyperglycemia, unspecified whether long term insulin use   Multiple skin tears       ED Disposition  ED Disposition       ED Disposition   Decision to Admit    Condition   --    Comment   Level of Care: Critical Care [6]   Diagnosis: Septic shock [[id number]]   Admitting Physician: ANUPAM LOZANO [[id number]]   Certification: I Certify That Inpatient Hospital Services Are Medically Necessary For Greater Than 2 Midnights                 No follow-up provider specified.       Medication List      No changes were made to your prescriptions during this visit.            Fady Guevara MD  06/29/25 2220       Fady Guevara MD  06/29/25 2255      Electronically signed by Fady Guevara MD at 06/29/25 2251       Vital Signs (last day)       Date/Time Temp Temp src Pulse Resp BP Patient Position SpO2    06/30/25 1200 -- -- 95 -- 127/61 -- 94    06/30/25 1100 99 (37.2) Oral 93 16 136/59 Lying 98    06/30/25 1000 -- -- 94 -- 104/60 -- 100    06/30/25 0900 -- -- 96 -- 102/55 -- 96    06/30/25 0845 -- -- 95 -- 112/51 -- 98    06/30/25 0830 -- -- 92 -- 106/69 -- 100    06/30/25 0815 -- -- 92 -- 104/47 -- 100    06/30/25 0800 -- -- 96 17 105/54 Lying 98    06/30/25 0745 -- -- 98 -- 111/79 -- 93    06/30/25  0730 -- -- 104 -- 101/55 -- 95    06/30/25 0715 -- -- 99 -- 120/60 -- 98    06/30/25 0700 98.5 (36.9) Oral 100 -- 127/63 -- 98    06/30/25 0650 -- -- 102 -- 112/62 -- --    06/30/25 0645 -- -- 103 -- 112/62 -- 98    06/30/25 0630 -- -- 105 -- 115/58 -- 96    06/30/25 0615 -- -- 105 -- 112/65 -- 100    06/30/25 0600 -- -- 96 -- 109/73 -- 100    06/30/25 0545 -- -- 99 -- 112/55 -- 99    06/30/25 0530 -- -- 101 -- 114/52 -- 99    06/30/25 0515 -- -- 100 -- 116/53 -- 99    06/30/25 0500 -- -- 102 -- 117/48 -- 99    06/30/25 0445 -- -- 102 -- 116/52 -- 99    06/30/25 0430 -- -- 104 -- 108/54 -- 99    06/30/25 0415 -- -- 105 -- 114/53 -- 96    06/30/25 0400 -- -- 106 -- 103/61 -- 98    06/30/25 0345 -- -- 105 -- 107/50 -- 100    06/30/25 0330 -- -- 112 -- 105/54 -- 100    06/30/25 0315 -- -- 112 -- 99/60 -- 100    06/30/25 0300 99.9 (37.7) Oral 115 -- 90/53 -- 100    06/30/25 0245 -- -- 113 -- 94/58 -- 99    06/30/25 0230 -- -- 113 -- 102/58 -- 97    06/30/25 0215 -- -- 112 -- 97/65 -- 98    06/30/25 0200 -- -- 114 -- 95/54 -- 98    06/30/25 0145 -- -- 113 -- 105/53 -- 100    06/30/25 0130 -- -- 113 -- 97/57 -- 100    06/30/25 0115 -- -- 113 -- 105/80 -- 100    06/30/25 0100 -- -- 113 -- 109/58 -- 100    06/30/25 0045 -- -- 114 -- 88/51 -- 96    06/30/25 0030 -- -- 114 -- 81/55 -- 100    06/30/25 0015 -- -- 112 -- 89/51 -- 98    06/30/25 0000 -- -- 114 -- 86/52 -- 100    06/29/25 2345 -- -- 110 -- 90/52 -- 96    06/29/25 2306 -- -- 107 -- 91/56 -- --    06/29/25 2300 97.4 (36.3) Oral 107 -- 91/68 -- 93 06/29/25 2257 -- -- -- -- 81/51 -- --    06/29/25 2255 -- -- 109 -- 66/42 -- 93 06/29/25 2254 -- -- 57 -- 81/48 -- --    06/29/25 2252 -- -- 108 -- 81/48 -- 95 06/29/25 2245 -- -- 107 -- 91/48 -- --    06/29/25 2241 -- -- 111 -- 83/55 -- 96    06/29/25 2240 -- -- 110 -- 83/55 -- 97    06/29/25 2236 -- -- 111 -- 85/47 -- 95    06/29/25 2235 -- -- 109 -- 79/48 -- 96    06/29/25 2231 -- -- 107 -- 68/43 -- --     06/29/25 2220 -- -- 108 -- 67/46 -- 92    06/29/25 2215 -- -- 112 -- 71/48 -- 97    06/29/25 2204 -- -- 110 -- -- -- 94    06/29/25 2202 -- -- -- -- 80/48 -- --    06/29/25 2159 -- -- -- -- 78/51 -- 95    06/29/25 2120 -- -- 102 -- 89/54 -- --    06/29/25 2110 -- -- 100 -- 95/54 -- 90    06/29/25 2040 -- -- 100 -- 93/55 -- 93    06/29/25 2020 -- -- 103 -- 94/58 -- 98    06/29/25 1956 -- -- 102 -- 104/63 -- 99    06/29/25 1930 -- -- 100 -- 97/58 -- 90    06/29/25 1907 -- -- 101 -- -- -- 94    06/29/25 1900 -- -- 101 -- 92/59 -- 96    06/29/25 1845 -- -- 103 -- 79/55 -- 98    06/29/25 1830 -- -- 101 -- 87/57 -- 94    06/29/25 1701 -- -- 95 -- -- -- 97    06/29/25 1658 97.9 (36.6) Rectal 90 18 80/54 Sitting 95          Current Facility-Administered Medications   Medication Dose Route Frequency Provider Last Rate Last Admin    acetaminophen (TYLENOL) tablet 650 mg  650 mg Oral Q4H PRN Silviano Cisneros MD        Or    acetaminophen (TYLENOL) 160 MG/5ML oral solution 650 mg  650 mg Oral Q4H PRN Silviano Cisneros MD        Or    acetaminophen (TYLENOL) suppository 650 mg  650 mg Rectal Q4H PRN Silviano Cisneros MD        aspirin EC tablet 81 mg  81 mg Oral Daily Silviano Cisneros MD   81 mg at 06/30/25 0823    sennosides-docusate (PERICOLACE) 8.6-50 MG per tablet 2 tablet  2 tablet Oral BID Silviano Cisneros MD        And    polyethylene glycol (MIRALAX) packet 17 g  17 g Oral Daily PRN Silviano Cisneros MD        And    bisacodyl (DULCOLAX) EC tablet 5 mg  5 mg Oral Daily PRN Silviano Cisneros MD        And    bisacodyl (DULCOLAX) suppository 10 mg  10 mg Rectal Daily PRN Silviano Cisneros MD        budesonide (PULMICORT) nebulizer solution 0.5 mg  0.5 mg Nebulization BID - RT Silviano Cisneros MD   0.5 mg at 06/30/25 0717    busPIRone (BUSPAR) tablet 15 mg  15 mg Oral Q12H Silviano Cisneros MD   15 mg at 06/30/25 0826    clopidogrel (PLAVIX) tablet 75 mg  75 mg Oral Daily Silviano Cisneros MD   75 mg at 06/30/25 0823    dextrose (D50W) (25 g/50 mL) IV injection  25 g  25 g Intravenous Q15 Min PRN Silviano Cisneros MD        dextrose (GLUTOSE) oral gel 15 g  15 g Oral Q15 Min PRN Silviano Cisneros MD        glucagon (GLUCAGEN) injection 1 mg  1 mg Intramuscular Q15 Min PRN Silviano Cisneros MD        heparin (porcine) 5000 UNIT/ML injection 5,000 Units  5,000 Units Subcutaneous Q12H Silviano Cisneros MD   5,000 Units at 06/30/25 0826    insulin glargine (LANTUS, SEMGLEE) injection 20 Units  20 Units Subcutaneous Nightly Silviano Cisneros MD   20 Units at 06/30/25 0306    Insulin Lispro (humaLOG) injection 2-9 Units  2-9 Units Subcutaneous 4x Daily AC & at Bedtime Silviano Cisneros MD   2 Units at 06/30/25 1129    Insulin Lispro (humaLOG) injection 8 Units  8 Units Subcutaneous TID With Meals Silviano Cisneros MD   8 Units at 06/30/25 1129    ipratropium-albuterol (DUO-NEB) nebulizer solution 3 mL  3 mL Nebulization Q4H PRN Silviano Cisneros MD        lactated ringers infusion  100 mL/hr Intravenous Continuous Jefry Hooper MD, FASN 100 mL/hr at 06/30/25 1058 100 mL/hr at 06/30/25 1058    lactobacillus acidophilus (RISAQUAD) capsule 1 capsule  1 capsule Oral BID Silviano Cisneros MD   1 capsule at 06/30/25 0826    Magnesium Standard Dose Replacement - Follow Nurse / BPA Driven Protocol   Not Applicable PRN Silviano Cisneros MD        midodrine (PROAMATINE) tablet 5 mg  5 mg Oral TID AC Silviano Cisneros MD   5 mg at 06/30/25 1129    mupirocin (BACTROBAN) 2 % nasal ointment 1 Application  1 Application Each Nare BID Silviano Cisneros MD   1 Application at 06/30/25 0823    norepinephrine (LEVOPHED) 8 mg in 250mL D5W infusion  0.02-0.3 mcg/kg/min Intravenous Titrated Fady Guevara MD 39.3 mL/hr at 06/30/25 1055 0.22 mcg/kg/min at 06/30/25 1055    ondansetron (ZOFRAN) injection 4 mg  4 mg Intravenous Q6H PRN Silviano Cisneros MD        pantoprazole (PROTONIX) EC tablet 40 mg  40 mg Oral Q AM Silviano Cisneros MD   40 mg at 06/30/25 0823    Pharmacy to dose vancomycin   Not Applicable Continuous PRN Silviano Cisneros MD         Pharmacy To Dose: Piperacillin-tazobactam (Zosyn)   Not Applicable Continuous PRN Silviano Cisneros MD        piperacillin-tazobactam (ZOSYN) IVPB 3.375 g IVPB in 100 mL NS (VTB)  3.375 g Intravenous Q8H Silviano Cisneros MD   3.375 g at 06/30/25 0822    Potassium Replacement - Follow Nurse / BPA Driven Protocol   Not Applicable PRN Fady Guevara MD        Potassium Replacement - Follow Nurse / BPA Driven Protocol   Not Applicable PRN Silviano Cisneros MD        sodium chloride 0.9 % flush 10 mL  10 mL Intravenous PRN Fady Guevara MD        sodium chloride 0.9 % flush 10 mL  10 mL Intravenous Q12H Silviano Cisneros MD   10 mL at 06/30/25 0824    sodium chloride 0.9 % flush 10 mL  10 mL Intravenous PRN Silviano Cisneros MD        sodium chloride 0.9 % infusion 40 mL  40 mL Intravenous PRN Silviano Cisneros MD        vancomycin (dosing per levels)   Not Applicable Daily Silviano Cisneros MD         Lab Results (last 24 hours)       Procedure Component Value Units Date/Time    Urine Culture - Urine, Straight Cath [[id number]]  (Normal) Collected: 06/29/25 2205    Specimen: Urine from Straight Cath Updated: 06/30/25 1219     Urine Culture No growth    POC Glucose Once [[id number]]  (Abnormal) Collected: 06/30/25 1113    Specimen: Blood Updated: 06/30/25 1115     Glucose 173 mg/dL      Comment: Serial Number: [id number]:  [id number]       POC Glucose 4x Daily Before Meals & at Bedtime [[id number]]  (Abnormal) Collected: 06/30/25 0725    Specimen: Blood Updated: 06/30/25 0736     Glucose 176 mg/dL      Comment: Serial Number: [id number]:  [id number]       Basic Metabolic Panel [[id number]]  (Abnormal) Collected: 06/30/25 0429    Specimen: Blood Updated: 06/30/25 0609     Glucose 142 mg/dL      BUN 55.0 mg/dL      Creatinine 3.17 mg/dL      Sodium 135 mmol/L      Potassium 2.4 mmol/L      Chloride 89 mmol/L      CO2 29.3 mmol/L      Calcium 8.8 mg/dL      BUN/Creatinine Ratio 17.4     Anion Gap 16.7 mmol/L      eGFR 20.0  mL/min/1.73     Narrative:      GFR Categories in Chronic Kidney Disease (CKD)              GFR Category          GFR (mL/min/1.73)    Interpretation  G1                    90 or greater        Normal or high (1)  G2                    60-89                Mild decrease (1)  G3a                   45-59                Mild to moderate decrease  G3b                   30-44                Moderate to severe decrease  G4                    15-29                Severe decrease  G5                    14 or less           Kidney failure    (1)In the absence of evidence of kidney disease, neither GFR category G1 or G2 fulfill the criteria for CKD.    eGFR calculation 2021 CKD-EPI creatinine equation, which does not include race as a factor    Vancomycin, Random [[id number]]  (Normal) Collected: 06/30/25 0429    Specimen: Blood Updated: 06/30/25 0539     Vancomycin Random 19.80 mcg/mL     Narrative:      Therapeutic Ranges for Vancomycin    Vancomycin Random   5.0-40.0 mcg/mL  Vancomycin Trough   5.0-20.0 mcg/mL  Vancomycin Peak     20.0-40.0 mcg/mL    CBC (No Diff) [[id number]]  (Abnormal) Collected: 06/30/25 0429    Specimen: Blood Updated: 06/30/25 0519     WBC 18.60 10*3/mm3      RBC 3.15 10*6/mm3      Hemoglobin 8.0 g/dL      Hematocrit 24.6 %      MCV 78.1 fL      MCH 25.4 pg      MCHC 32.5 g/dL      RDW 16.2 %      RDW-SD 46.4 fl      MPV 9.4 fL      Platelets 203 10*3/mm3     MRSA Screen, PCR (Inpatient) - Swab, Nares [[id number]] Collected: 06/30/25 0313    Specimen: Swab from Nares Updated: 06/30/25 0510    POC Glucose Once [[id number]]  (Normal) Collected: 06/29/25 2343    Specimen: Blood Updated: 06/30/25 0054     Glucose 128 mg/dL      Comment: Serial Number: [id number]:  [id number]       Hemoglobin A1c [[id number]]  (Abnormal) Collected: 06/29/25 1706    Specimen: Blood Updated: 06/30/25 0014     Hemoglobin A1C 7.60 %     Narrative:      Hemoglobin A1C Ranges:    Increased Risk for Diabetes  5.7% to  6.4%  Diabetes                     >= 6.5%  Diabetic Goal                < 7.0%    STAT Lactic Acid, Reflex [[id number]]  (Normal) Collected: 06/29/25 2307    Specimen: Blood Updated: 06/29/25 2334     Lactate 2.0 mmol/L     Urinalysis, Microscopic Only - Straight Cath [[id number]]  (Abnormal) Collected: 06/29/25 2205    Specimen: Urine from Straight Cath Updated: 06/29/25 2219     RBC, UA None Seen /HPF      WBC, UA 21-50 /HPF      Bacteria, UA Trace /HPF      Squamous Epithelial Cells, UA 3-6 /HPF      Hyaline Casts, UA None Seen /LPF      Methodology Manual Light Microscopy    Urinalysis With Culture If Indicated - Straight Cath [[id number]]  (Abnormal) Collected: 06/29/25 2205    Specimen: Urine from Straight Cath Updated: 06/29/25 2213     Color, UA Yellow     Appearance, UA Clear     pH, UA 5.5     Specific Gravity, UA 1.010     Glucose,  mg/dL (2+)     Ketones, UA Negative     Bilirubin, UA Negative     Blood, UA Negative     Protein, UA Negative     Leuk Esterase, UA Moderate (2+)     Nitrite, UA Negative     Urobilinogen, UA 0.2 E.U./dL    Narrative:      In absence of clinical symptoms, the presence of pyuria, bacteria, and/or nitrites on the urinalysis result does not correlate with infection.    STAT Lactic Acid, Reflex [[id number]]  (Abnormal) Collected: 06/29/25 2005    Specimen: Blood Updated: 06/29/25 2036     Lactate 4.2 mmol/L     Magnesium [[id number]]  (Normal) Collected: 06/29/25 1826    Specimen: Blood Updated: 06/29/25 2010     Magnesium 1.8 mg/dL     High Sensitivity Troponin T 1Hr [[id number]]  (Abnormal) Collected: 06/29/25 1826    Specimen: Blood Updated: 06/29/25 1901     HS Troponin T 95 ng/L      Troponin T Numeric Delta -3 ng/L      Troponin T % Delta -3    Narrative:      High Sensitive Troponin T Reference Range:  <14.0 ng/L- Negative Female for AMI  <22.0 ng/L- Negative Male for AMI  >=14 - Abnormal Female indicating possible myocardial injury.  >=22 - Abnormal Male indicating  "possible myocardial injury.   Clinicians would have to utilize clinical acumen, EKG, Troponin, and serial changes to determine if it is an Acute Myocardial Infarction or myocardial injury due to an underlying chronic condition.         High Sensitivity Troponin T [[id number]]  (Abnormal) Collected: 06/29/25 1706    Specimen: Blood Updated: 06/29/25 1758     HS Troponin T 98 ng/L     Narrative:      High Sensitive Troponin T Reference Range:  <14.0 ng/L- Negative Female for AMI  <22.0 ng/L- Negative Male for AMI  >=14 - Abnormal Female indicating possible myocardial injury.  >=22 - Abnormal Male indicating possible myocardial injury.   Clinicians would have to utilize clinical acumen, EKG, Troponin, and serial changes to determine if it is an Acute Myocardial Infarction or myocardial injury due to an underlying chronic condition.         Lactic Acid, Plasma [[id number]]  (Abnormal) Collected: 06/29/25 1706    Specimen: Blood Updated: 06/29/25 1758     Lactate 9.7 mmol/L     Procalcitonin [[id number]]  (Abnormal) Collected: 06/29/25 1706    Specimen: Blood Updated: 06/29/25 1745     Procalcitonin 0.79 ng/mL     Narrative:      As a Marker for Sepsis (Non-Neonates):    1. <0.5 ng/mL represents a low risk of severe sepsis and/or septic shock.  2. >2 ng/mL represents a high risk of severe sepsis and/or septic shock.    As a Marker for Lower Respiratory Tract Infections that require antibiotic therapy:    PCT on Admission    Antibiotic Therapy       6-12 Hrs later    >0.5                Strongly Recommended  >0.25 - <0.5        Recommended   0.1 - 0.25          Discouraged              Remeasure/reassess PCT  <0.1                Strongly Discouraged     Remeasure/reassess PCT    As 28 day mortality risk marker: \"Change in Procalcitonin Result\" (>80% or <=80%) if Day 0 (or Day 1) and Day 4 values are available. Refer to http://www.[employer]-pct-calculator.com    Change in PCT <=80%  A decrease of PCT levels below or equal to 80% " defines a positive change in PCT test result representing a higher risk for 28-day all-cause mortality of patients diagnosed with severe sepsis for septic shock.    Change in PCT >80%  A decrease of PCT levels of more than 80% defines a negative change in PCT result representing a lower risk for 28-day all-cause mortality of patients diagnosed with severe sepsis or septic shock.       Blood Culture - Blood, Arm, Right [[id number]] Collected: 06/29/25 1738    Specimen: Blood from Arm, Right Updated: 06/29/25 1742    Blood Culture - Blood, Arm, Right [[id number]] Collected: 06/29/25 1728    Specimen: Blood from Arm, Right Updated: 06/29/25 1742    BNP [[id number]]  (Abnormal) Collected: 06/29/25 1706    Specimen: Blood Updated: 06/29/25 1738     proBNP 1,835.0 pg/mL     Narrative:      This assay is used as an aid in the diagnosis of individuals suspected of having heart failure. It can be used as an aid in the diagnosis of acute decompensated heart failure (ADHF) in patients presenting with signs and symptoms of ADHF to the emergency department (ED). In addition, NT-proBNP of <300 pg/mL indicates ADHF is not likely.    Age Range Result Interpretation  NT-proBNP Concentration (pg/mL:      <50             Positive            >450                   Gray                 300-450                    Negative             <300    50-75           Positive            >900                  Gray                300-900                  Negative            <300      >75             Positive            >1800                  Gray                300-1800                  Negative            <300    Comprehensive Metabolic Panel [[id number]]  (Abnormal) Collected: 06/29/25 1706    Specimen: Blood Updated: 06/29/25 1734     Glucose 335 mg/dL      Comment: Glucose >180, Hemoglobin A1C recommended.        BUN 61.0 mg/dL      Creatinine 3.79 mg/dL      Sodium 131 mmol/L      Potassium 2.9 mmol/L      Chloride 81 mmol/L      CO2 24.4 mmol/L       Calcium 9.0 mg/dL      Total Protein 6.8 g/dL      Albumin 3.3 g/dL      ALT (SGPT) 14 U/L      AST (SGOT) 19 U/L      Alkaline Phosphatase 69 U/L      Total Bilirubin 0.2 mg/dL      Globulin 3.5 gm/dL      A/G Ratio 0.9 g/dL      BUN/Creatinine Ratio 16.1     Anion Gap 25.6 mmol/L      eGFR 16.2 mL/min/1.73     Narrative:      GFR Categories in Chronic Kidney Disease (CKD)              GFR Category          GFR (mL/min/1.73)    Interpretation  G1                    90 or greater        Normal or high (1)  G2                    60-89                Mild decrease (1)  G3a                   45-59                Mild to moderate decrease  G3b                   30-44                Moderate to severe decrease  G4                    15-29                Severe decrease  G5                    14 or less           Kidney failure    (1)In the absence of evidence of kidney disease, neither GFR category G1 or G2 fulfill the criteria for CKD.    eGFR calculation 2021 CKD-EPI creatinine equation, which does not include race as a factor    Lipase [[id number]]  (Normal) Collected: 06/29/25 1706    Specimen: Blood Updated: 06/29/25 1734     Lipase 19 U/L     Occult Blood X 1, Stool - Stool, Per Rectum [[id number]]  (Normal) Collected: 06/29/25 1709    Specimen: Stool from Per Rectum Updated: 06/29/25 1721     Fecal Occult Blood Negative    CBC & Differential [[id number]]  (Abnormal) Collected: 06/29/25 1706    Specimen: Blood Updated: 06/29/25 1715    Narrative:      The following orders were created for panel order CBC & Differential.  Procedure                               Abnormality         Status                     ---------                               -----------         ------                     CBC Auto Differential[[id number]]        Abnormal            Final result                 Please view results for these tests on the individual orders.    CBC Auto Differential [[id number]]  (Abnormal) Collected: 06/29/25 1706     Specimen: Blood Updated: 06/29/25 1715     WBC 15.69 10*3/mm3      RBC 3.54 10*6/mm3      Hemoglobin 9.0 g/dL      Hematocrit 27.6 %      MCV 78.0 fL      MCH 25.4 pg      MCHC 32.6 g/dL      RDW 16.1 %      RDW-SD 45.5 fl      MPV 9.8 fL      Platelets 205 10*3/mm3      Neutrophil % 90.7 %      Lymphocyte % 5.5 %      Monocyte % 2.7 %      Eosinophil % 0.0 %      Basophil % 0.2 %      Immature Grans % 0.9 %      Neutrophils, Absolute 14.23 10*3/mm3      Lymphocytes, Absolute 0.87 10*3/mm3      Monocytes, Absolute 0.42 10*3/mm3      Eosinophils, Absolute 0.00 10*3/mm3      Basophils, Absolute 0.03 10*3/mm3      Immature Grans, Absolute 0.14 10*3/mm3      nRBC 0.0 /100 WBC     [place] Draw [[id number]] Collected: 06/29/25 1706    Specimen: Blood Updated: 06/29/25 1715    Narrative:      The following orders were created for panel order [place] Draw.  Procedure                               Abnormality         Status                     ---------                               -----------         ------                     Green Top (Gel)[[id number]]                                  Final result               Lavender Top[[id number]]                                     Final result               Gold Top - SST[[id number]]                                   Final result               Light Blue Top[[id number]]                                   Final result                 Please view results for these tests on the individual orders.    Green Top (Gel) [[id number]] Collected: 06/29/25 1706    Specimen: Blood Updated: 06/29/25 1715     Extra Tube Hold for add-ons.     Comment: Auto resulted.       Lavender Top [[id number]] Collected: 06/29/25 1706    Specimen: Blood Updated: 06/29/25 1715     Extra Tube hold for add-on     Comment: Auto resulted       Gold Top - SST [[id number]] Collected: 06/29/25 1706    Specimen: Blood Updated: 06/29/25 1715     Extra Tube Hold for add-ons.     Comment: Auto resulted.       Light Blue Top  [[id number]] Collected: 06/29/25 1706    Specimen: Blood Updated: 06/29/25 1715     Extra Tube Hold for add-ons.     Comment: Auto resulted             Imaging Results (Last 24 Hours)       Procedure Component Value Units Date/Time    XR Chest 1 View [[id number]] Collected: 06/30/25 0941     Updated: 06/30/25 0950    Narrative:      PROCEDURE: XR CHEST 1 VW-     HISTORY: generalized weakness, abnormal chest radiograph. Hypertension.     COMPARISON: 10/06/2022     FINDINGS:  Portable view of the chest demonstrates the lungs to be  grossly clear. There is no evidence of effusion, pneumothorax or other  significant pleural disease. Patient is status post CABG. Mediastinum is  otherwise unremarkable.     The heart size is normal.       Impression:      Unremarkable portable chest.            This report was signed and finalized on 6/30/2025 9:48 AM by Frankie Ewing MD.       CT Abdomen Pelvis Without Contrast [[id number]] Collected: 06/29/25 2311     Updated: 06/29/25 2312    Narrative:      FINAL REPORT    TECHNIQUE:  null    CLINICAL HISTORY:  sepsis    COMPARISON:  null    FINDINGS:  CT abdomen and pelvis without contrast    Comparison: 08/29/2024    Findings:    Mild degenerative change spine and hips.    No acute bony abnormalities.    Liver and spleen within normal limits.    Pancreas and adrenal glands unremarkable.    Cholecystectomy.    No right renal stone or hydronephrosis.    No focal renal abnormality or ureteral dilation.    Status post left nephrectomy.    No evidence for aortic aneurysm.    No free fluid or adenopathy in the pelvis.    No diverticulitis. Appendix unremarkable.      Impression:      Impression:    No acute processes    Authenticated and Electronically Signed by Anthony De La Paz MD on  06/29/2025 11:11:15 PM    CT Chest Without Contrast Diagnostic [[id number]] Collected: 06/29/25 2307     Updated: 06/29/25 2308    Narrative:      FINAL REPORT    TECHNIQUE:  null    CLINICAL HISTORY:  sepsis w/  hypoxemia    COMPARISON:  null    FINDINGS:  CT chest without contrast    Comparison: None provided    Findings:    Bilateral posterior lower lobe atelectasis.    Trace bilateral pleural effusions noted.    Emphysema without other parenchymal abnormality.    Cardiomegaly with coronary artery calcifications.    Prior sternotomy for CABG.    Benign partially calcified mediastinal nodes.    No significant mediastinal adenopathy.    No significant focal bony abnormalities.      Impression:      Impression:    Trace pleural effusions with lower lobe atelectasis    Authenticated and Electronically Signed by Anthony De La Paz MD on  2025 11:07:32 PM             Physician Progress Notes (last 24 hours)        Kuldeep Patel DO at 25 1032              [facility]    Name:  Jonny Ferrari Jr.   Age:  74 y.o.  Sex:  male  :  1950  MRN:  [id number]   Visit Number:  [id number]  Admission Date:  2025  Date Of Service:  25  Primary Care Physician:  Amaury Brice MD     LOS: 1 day :    Chief Complaint:      Follow-up septic shock    Subjective:    Patient examined this morning.  Alert and cooperative.  Very pleasant.  Overall states he is feeling better.  Wife at bedside.  Tolerating diet.  Remains on Levophed.    Hospital Course:    Jonny Ferrari Jr. is a 74-year-old male with history of diabetes mellitus type 2, chronic kidney disease stage III, COPD, coronary artery disease status post CABG and stent, ERASMO, BPH was brought to the emergency room by EMS with symptoms of generalized weakness and fatigue   The patient was brought in by ambulance due to an inability to walk, a condition that has persisted for the past 2 to 3 days. He reports no fever, dysuria, or cough but feels weak. He has experienced several falls recently and has been unable to walk independently for the past 3 to 4 days. Despite using a walker or cane, he continues to fall.   In the  emergency room, patient was afebrile but mildly tachycardic in the 100 with initial blood pressure of 80/54 and pulse oxygen saturation of 95% on room air.  Patient was given 30 mL/kg fluid bolus initially in the emergency room with initial improvement of the blood pressure to systolic 100 but subsequently dropped it again to the 70s systolic.  Patient was given another 500 bolus of normal saline, he right femoral central line was placed and he was initiated on Levophed.     Review of Systems:     All systems were reviewed and negative except as mentioned in subjective, assessment and plan.    Vital Signs:    Temp:  [97.4 °F (36.3 °C)-99.9 °F (37.7 °C)] 98.5 °F (36.9 °C)  Heart Rate:  [] 96  Resp:  [17-18] 17  BP: ()/(42-80) 102/55    Intake and output:    I/O last 3 completed shifts:  In: 4079 [P.O.:120; IV Piggyback:3959]  Out: 1000 [Urine:1000]  I/O this shift:  In: 50 [P.O.:50]  Out: -     Physical Examination:    General Appearance:  Alert and cooperative.  Pleasant.  No acute distress.   Head:  Atraumatic and normocephalic.   Eyes: Conjunctivae and sclerae normal, no icterus. No pallor.   Throat: No oral lesions, no thrush, oral mucosa moist.   Neck: Supple, trachea midline, no thyromegaly.   Lungs:   Breath sounds heard bilaterally equally.  No wheezing or crackles.    Heart:  Normal S1 and S2, no murmur,No JVD.   Abdomen:   Normal bowel sounds, Soft, nontender, nondistended, no rebound tenderness.   Extremities: Supple, no edema, no cyanosis, no clubbing.   Skin: No bleeding or rash.  Multiple skin abrasions.   Neurologic: Alert and oriented x 3. No facial asymmetry. Moves all four limbs.      Laboratory results:    Results from last 7 days   Lab Units 06/30/25  0429 06/29/25  1706   SODIUM mmol/L 135* 131*   POTASSIUM mmol/L 2.4* 2.9*   CHLORIDE mmol/L 89* 81*   CO2 mmol/L 29.3* 24.4   BUN mg/dL 55.0* 61.0*   CREATININE mg/dL 3.17* 3.79*   CALCIUM mg/dL 8.8 9.0   BILIRUBIN mg/dL  --  0.2   ALK  PHOS U/L  --  69   ALT (SGPT) U/L  --  14   AST (SGOT) U/L  --  19   GLUCOSE mg/dL 142* 335*     Results from last 7 days   Lab Units 06/30/25  0429 06/29/25  1706   WBC 10*3/mm3 18.60* 15.69*   HEMOGLOBIN g/dL 8.0* 9.0*   HEMATOCRIT % 24.6* 27.6*   PLATELETS 10*3/mm3 203 205         Results from last 7 days   Lab Units 06/29/25  1826 06/29/25  1706   HSTROP T ng/L 95* 98*         Recent Labs     08/29/24  2314   PHART 7.382   NAF7CVA 49.8*   PO2ART 68.0*   AQN0JBV 29.5*   BASEEXCESS 3.8*      I have reviewed the patient's laboratory results.    Radiology results:    XR Chest 1 View  Result Date: 6/30/2025  PROCEDURE: XR CHEST 1 VW-  HISTORY: generalized weakness, abnormal chest radiograph. Hypertension.  COMPARISON: 10/06/2022  FINDINGS:  Portable view of the chest demonstrates the lungs to be grossly clear. There is no evidence of effusion, pneumothorax or other significant pleural disease. Patient is status post CABG. Mediastinum is otherwise unremarkable.  The heart size is normal.      Impression: Unremarkable portable chest.    This report was signed and finalized on 6/30/2025 9:48 AM by Frankie Ewing MD.      CT Abdomen Pelvis Without Contrast  Result Date: 6/29/2025  FINAL REPORT TECHNIQUE: null CLINICAL HISTORY: sepsis COMPARISON: null FINDINGS: CT abdomen and pelvis without contrast Comparison: 08/29/2024 Findings: Mild degenerative change spine and hips. No acute bony abnormalities. Liver and spleen within normal limits. Pancreas and adrenal glands unremarkable. Cholecystectomy. No right renal stone or hydronephrosis. No focal renal abnormality or ureteral dilation. Status post left nephrectomy. No evidence for aortic aneurysm. No free fluid or adenopathy in the pelvis. No diverticulitis. Appendix unremarkable.     Impression: Impression: No acute processes Authenticated and Electronically Signed by Anthony De La Paz MD on 06/29/2025 11:11:15 PM    CT Chest Without Contrast Diagnostic  Result Date:  6/29/2025  FINAL REPORT TECHNIQUE: null CLINICAL HISTORY: sepsis w/ hypoxemia COMPARISON: null FINDINGS: CT chest without contrast Comparison: None provided Findings: Bilateral posterior lower lobe atelectasis. Trace bilateral pleural effusions noted. Emphysema without other parenchymal abnormality. Cardiomegaly with coronary artery calcifications. Prior sternotomy for CABG. Benign partially calcified mediastinal nodes. No significant mediastinal adenopathy. No significant focal bony abnormalities.     Impression: Impression: Trace pleural effusions with lower lobe atelectasis Authenticated and Electronically Signed by Anthony De La Paz MD on 06/29/2025 11:07:32 PM    I have reviewed the patient's radiology reports.    Medication Review:     I have reviewed the patient's active and prn medications.     Problem List:      Septic shock    CAD (coronary artery disease)    Chronic kidney disease, stage IV (severe)    Acute UTI (urinary tract infection)    Pneumonia of left lower lobe due to infectious organism      Assessment:    Septic shock secondary to #2 and #3, POA.  Acute urinary tract infection, POA.  Left lower lobe bacterial pneumonia, unable to classify further, POA.  Demand ischemia with elevated troponins, POA.  Hypokalemia, POA.  Chronic kidney disease stage IV.  Diabetes mellitus type 2 with nephropathy and hyperglycemia, POA.  Coronary artery disease status post CABG and stents.  COPD, no exacerbation.  Benign prostatic hyperplasia.  Will falls with skin abrasions, POA.    Plan:    Septic shock  Acute UTI  Left lower lobe bacterial pneumonia  Patient requiring Levophed to maintain MAP greater than 65.  Titrate off as able.  Hold chronic metoprolol and torsemide  Continue midodrine  CVL placed to right femoral on admission  Continue empiric antibiotics, Zosyn and vancomycin  MRSA screen pending  Continue supplemental oxygen as necessary to maintain saturation greater than 88%.  DuoNebs as needed  Follow-up  urine and blood cultures  Demand ischemia with elevated troponins  Troponins plateaued 95-98  Suspect secondary to demand ischemia in the setting of his septic shock as well as CKD  Low suspicion for ACS  Hypokalemia  Cautious replacement given renal disease  Monitor daily  CKD stage IV  Nephrology, Dr. Hooper consulted and appreciate recommendations.  Type 2 diabetes  Subcutaneous insulin protocol  CAD status post CABG  Continue DAPT with aspirin and Plavix  COPD not exacerbation  Multiple skin abrasions  Wound care  Impaired mobility and ADLs  PT/OT once stable to participate    Further orders as clinical course dictates.    DVT Prophylaxis: Heparin  Code Status: Full  Diet: Renal diabetic.  Discharge Plan: Pending    Kuldeep Patel DO  25  10:32 EDT    Dictated utilizing Dragon dictation.        Electronically signed by Kuldeep Patel DO at 25 1037       Silviano Cisneros MD at 25 0025                   [facility]      SEPSIS FOCUSED EXAM    Name:  Jonny Ferrari Jr.   Age:  74 y.o.  Sex:  male  :  1950  MRN:  [id number]   Visit Number:  [id number]  Admission Date:  2025  Date Of Service:  25  Primary Care Physician:  Amaury Brice MD     LOS: 1 day :    SEPSIS FOCUSED EXAM    *Must be completed by a licensed independent Practitioner within 6 hours of diagnosis for the following conditions -- Septic Shock or Severe Sepsis with Lactate >/= 4.    Fluid bolus must be completed prior to assessment.      Diagnosis: Septic shock secondary to urinary tract infection.     Vital Signs Temp:  [97.9 °F (36.6 °C)] 97.9 °F (36.6 °C)  Heart Rate:  [] 107  Resp:  [18] 18  BP: ()/(42-68) 91/56   General Comfortable at rest.   Respiratory Breath sounds heard bilaterally equally.  Bilateral basal crackles heard.   Cardiac/Chest S1 and S2 heard, normal.  No murmurs appreciated.  No JVD.     Skin Skin color is normal.  No mottling noted.  Multiple skin  abrasions and excoriations noted.   Capillary Refill Capillary filling is less than 3 seconds.     Peripheral Pulses Checked                          Bilateral radial pulses felt equally.  Dorsalis pedis pulses are felt equally.       † Septic shock is defined by CMS as severe sepsis plus one of the following: persistent hypotension after fluid bolus OR tissue hypoperfusion (Lactic Acid >=4)  †† TWO OF THE FOLLOWING can be done in lieu of the Focused Exam: Measure CVP; Measure ScVO2; Bedside cardiovascular ultrasound; Dynamic assessment of fluid responsiveness with passive leg raise or fluid challenge.      Silviano Cisneros MD  06/30/25  00:26 EDT    Dictated using Dragon dictation.    Electronically signed by Silviano Cisneros MD at 06/30/25 0026       Consult Notes (last 24 hours)  Notes from 06/29/25 1243 through 06/30/25 1243   No notes of this type exist for this encounter.

## 2025-06-30 NOTE — NURSING NOTE
"Wife at bedside at this time, attempting to complete admission questions, asked Lashon, pts spouse, at this time if she knew his medications and she stated \"no, Ill see if I can get a list and bring it when I come later\".   "

## 2025-06-30 NOTE — PROGRESS NOTES
Pharmacy Consult-Vancomycin Dosing  Jonny Ferrari Jr. is a  74 y.o. male receiving vancomycin therapy.     Indication: Pneumonia  Consulting Provider: Dr. Cisneros    Goal Trough: 15 to 20 mcg/mL    Current Antimicrobial Therapy  Anti-Infectives (From admission, onward)      Ordered     Dose/Rate Route Frequency Start Stop    06/30/25 0203  vancomycin (dosing per levels)        Ordering Provider: Silviano Cisneros MD     Not Applicable Daily 06/30/25 0900 07/05/25 0859    06/30/25 0658  vancomycin (VANCOCIN) 1,000 mg in sodium chloride 0.9 % 250 mL IVPB-VTB        Ordering Provider: Silviano Cisneros MD    1,000 mg  250 mL/hr over 60 Minutes Intravenous Once 06/30/25 0800      06/30/25 0013  piperacillin-tazobactam (ZOSYN) IVPB 3.375 g IVPB in 100 mL NS (VTB)        Ordering Provider: Silviano Cisneros MD    3.375 g  over 4 Hours Intravenous Every 8 Hours 06/30/25 0100 07/05/25 0059    06/30/25 0005  Pharmacy to dose vancomycin        Ordering Provider: Silviano Cisneros MD     Not Applicable Continuous PRN 06/30/25 0005 07/03/25 0004    06/30/25 0005  Pharmacy To Dose: Piperacillin-tazobactam (Zosyn)        Ordering Provider: Silviano Cisneros MD     Not Applicable Continuous PRN 06/30/25 0005 07/05/25 0004    06/29/25 1714  piperacillin-tazobactam (ZOSYN) IVPB 3.375 g IVPB in 100 mL NS (VTB)        Ordering Provider: Fady Guevara MD    3.375 g  over 30 Minutes Intravenous Once 06/29/25 1730 06/29/25 1815    06/29/25 1714  vancomycin IVPB 2000 mg in 0.9% Sodium Chloride 500 mL        Ordering Provider: Fady Guevara MD    20 mg/kg × 95.3 kg  250 mL/hr over 120 Minutes Intravenous Once 06/29/25 1730 06/29/25 2033            Allergies  Allergies as of 06/29/2025 - Reviewed 06/29/2025   Allergen Reaction Noted    Metformin Diarrhea        Labs    Results from last 7 days   Lab Units 06/30/25  0429 06/29/25  1706   BUN mg/dL 55.0* 61.0*   CREATININE mg/dL 3.17* 3.79*       Results from last 7 days   Lab Units 06/30/25  042  "06/29/25  1706   WBC 10*3/mm3 18.60* 15.69*       Evaluation of Dosing     Last Dose Received in the ED/Outside Facility: Yes  Is Patient on Dialysis or Renal Replacement: Not at this time    Ht - 182.9 cm (72\")  Wt - 95.3 kg (210 lb)    Estimated Creatinine Clearance: 25.2 mL/min (A) (by C-G formula based on SCr of 3.17 mg/dL (H)).    Intake & Output (last 3 days)         06/27 0701 06/28 0700 06/28 0701 06/29 0700 06/29 0701 06/30 0700    IV Piggyback   3959    Total Intake(mL/kg)   3959 (41.5)    Urine (mL/kg/hr)   1000    Total Output   1000    Net   +2959                   Microbiology and Radiology  Microbiology Results (last 10 days)       ** No results found for the last 240 hours. **            Vancomycin Levels:    Results from last 7 days   Lab Units 06/30/25  0429   VANCOMYCIN RM mcg/mL 19.80                   Assessment/Plan    Pharmacy to dose vancomycin for pneumonia. Goal trough 15 to 20 mcg/mL.  Patient currently receiving vancomycin dosed per level due to poor predicted renal function. Will order vancomycin 1000 mg for one dose.  Assess clearance by vancomycin random level on 7/1 @ 0600  Pharmacy will continue to monitor renal function, cultures and sensitivities, and clinical status to adjust regimen as necessary.    Thank you for the consult,    Wesly Singletary, PharmD, BCPS   06/30/25 06:59 EDT  "

## 2025-06-30 NOTE — H&P
[facility]   HISTORY AND PHYSICAL      Name:  Jonny Ferrari Jr.   Age:  74 y.o.  Sex:  male  :  1950  MRN:  [id number]   Visit Number:  [id number]  Admission Date:  2025  Date Of Service:  25  Primary Care Physician:  Amaury Brice MD    Chief Complaint:     Generalized weakness and falls.    History Of Presenting Illness:      Jonny Ferrari Jr. is a 74-year-old male with history of diabetes mellitus type 2, chronic kidney disease stage III, COPD, coronary artery disease status post CABG and stent, ERASMO, BPH was brought to the emergency room by EMS with symptoms of generalized weakness and fatigue that has been going on for the last 2 days.  History of Present Illness  Reason for Admit: Fatigue, sepsis, and chronic kidney disease.    The patient was brought in by ambulance due to an inability to walk, a condition that has persisted for the past 2 to 3 days. He reports no fever, dysuria, or cough but feels weak. He has experienced several falls recently and has been unable to walk independently for the past 3 to 4 days. Despite using a walker or cane, he continues to fall. He also reports swelling in his legs and is hard of hearing in his left ear. He reports feeling slightly better this evening.    He has stage 4 chronic kidney disease and is under the care of Dr. Hooper. He has been taking torsemide 100 mg.    He underwent bypass surgery approximately 20 years ago and is currently on baby aspirin.    MEDICATIONS  Current: Aspirin, torsemide     In the emergency room, patient was afebrile but mildly tachycardic in the 100 with initial blood pressure of 80/54 and pulse oxygen saturation of 95% on room air.  Patient was given 30 mL/kg fluid bolus initially in the emergency room with initial improvement of the blood pressure to systolic 100 but subsequently dropped it again to the 70s systolic.  Patient was given another 500 bolus of normal saline, he right femoral  central line was placed and he was initiated on Levophed.    Blood work done in the emergency room showed initial troponin of 98 with repeat at 95.  proBNP 1835.  Sodium 131, potassium 2.9, BUN 61, creatinine 3.79 (baseline), glucose 335.  LFT was unremarkable.  Magnesium 1.8.  Initial lactic acid was 9.7 with repeat at 4.2.  Procalcitonin 0.79.  WBC 15.7, hemoglobin 9.  Urine analysis shows 21-50 WBCs with trace bacteria.  Blood cultures were drawn.  Fecal occult blood was negative.  Urine culture was sent.  Portable chest x-ray done in the emergency room showed chronic appearing bilateral parenchymal changes with prominent right transverse pressure.  Possible on homogenous opacity noted on the left lower zone.  Patient was given Zosyn and vancomycin in the emergency room, oral potassium chloride and was subsequently initiated on lactated Ringer's IV fluids.  CT abdomen and pelvis showed no acute process.  CT chest without contrast showed pleural effusion with lower lobe atelectasis.  Patient was given Zosyn and vancomycin and was subsequently admitted to the medical ICU for management of septic shock, urinary tract infection, left lower lobe pneumonia.    Review Of Systems:    All systems were reviewed and negative except as mentioned in history of presenting illness, assessment and plan.    Past Medical History: Patient's  has a past medical history of Anemia, Anxiety neurosis, CAD (coronary artery disease), Cancer, Chronic kidney disease, Constipation, COPD (chronic obstructive pulmonary disease), Depression, Diabetes mellitus, Dyslipidemia, Dysphagia, Elevated cholesterol, Full dentures, GERD (gastroesophageal reflux disease), Gout, H/O left nephrectomy (2020), [place] (hard of hearing), Hypertension, Impaired functional mobility, balance, gait, and endurance, MI (myocardial infarction), OA (osteoarthritis), Obesity, Problems with swallowing, Psoriasis, Sleep apnea, Tattoo, TIA (transient ischemic attack), and  Wears glasses (10/27/2021).    Past Surgical History: Patient's  has a past surgical history that includes Appendectomy; Cardiac catheterization; Coronary artery bypass graft (2001); Cholecystectomy; Cardiac catheterization (N/A, 2/20/2019); Ureter surgery; Laparoscopic nephrectomy (Left); Cardiac catheterization (Left, 6/9/2021); Mouth surgery; Colonoscopy; Esophagogastroduodenoscopy; Vasectomy; Colonoscopy (N/A, 11/10/2021); Esophagogastroduodenoscopy (N/A, 11/10/2021); Colonoscopy (N/A, 10/25/2023); Esophagogastroduodenoscopy (N/A, 10/25/2023); Colonoscopy (N/A, 11/8/2023); Esophagogastroduodenoscopy (N/A, 11/8/2023); and Incision and drainage perirectal abscess (N/A, 8/29/2024).    Social History: Patient's  reports that he has quit smoking. His smoking use included cigarettes. He started smoking about 45 years ago. He has a 45.5 pack-year smoking history. His smokeless tobacco use includes snuff. He reports that he does not drink alcohol and does not use drugs.    Family History:  Patient's family history includes Lung disease in his mother; No Known Problems in his brother, brother, father, maternal grandfather, maternal grandmother, paternal grandfather, paternal grandmother, sister, sister, and sister.     Allergies:      Metformin    Home Medications:    Prior to Admission Medications       Prescriptions Last Dose Informant Patient Reported? Taking?    acetaminophen (TYLENOL) 325 MG tablet  Spouse/Significant Other Yes No    Take 2 tablets by mouth Every 6 (Six) Hours As Needed.    alfuzosin (UROXATRAL) 10 MG 24 hr tablet   Yes No    Take 1 tablet by mouth Daily.    allopurinol (ZYLOPRIM) 100 MG tablet  Spouse/Significant Other Yes No    Take 2 tablets by mouth Daily.    aspirin 81 MG EC tablet  Spouse/Significant Other Yes No    Take 1 tablet by mouth Daily.    buprenorphine-naloxone (SUBOXONE) 8-2 MG per SL tablet  Spouse/Significant Other Yes No    Place 1 tablet under the tongue 2 (Two) Times a Day.     busPIRone (BUSPAR) 15 MG tablet  Spouse/Significant Other Yes No    Take 1 tablet by mouth 2 (two) times a day.    calcitriol (ROCALTROL) 0.25 MCG capsule  Spouse/Significant Other Yes No    Take 1 capsule by mouth Daily.    Cholecalciferol (Vitamin D) 50 MCG (2000 UT) tablet  Spouse/Significant Other Yes No    Take 1 tablet by mouth Daily.    Patient not taking:  Reported on 6/24/2025    clopidogrel (PLAVIX) 75 MG tablet  Spouse/Significant Other No No    Take 1 tablet by mouth Daily.    dapagliflozin Propanediol (Farxiga) 10 MG tablet  Spouse/Significant Other Yes No    Take  by mouth Daily.    dutasteride (AVODART) 0.5 MG capsule  Spouse/Significant Other Yes No    Take 1 capsule by mouth Daily.    ezetimibe (ZETIA) 10 MG tablet  Spouse/Significant Other Yes No    Take 1 tablet by mouth Daily.    Lantus SoloStar 100 UNIT/ML injection pen  Spouse/Significant Other Yes No    Inject 20 Units under the skin into the appropriate area as directed 4 (Four) Times a Day After Meals & at Bedtime.    linagliptin (TRADJENTA) 5 MG tablet tablet   No No    Take 1 tablet by mouth Daily.    metoprolol tartrate (LOPRESSOR) 25 MG tablet   No No    Take 1 tablet by mouth Daily. Take for systolic blood pressure greater than 110    midodrine (PROAMATINE) 5 MG tablet   No No    Take 1 tablet by mouth 3 (Three) Times a Day Before Meals.    nitroglycerin (NITROSTAT) 0.4 MG SL tablet  Spouse/Significant Other No No    Place 1 tablet under the tongue Every 5 (Five) Minutes As Needed for Chest Pain. Take no more than 3 doses in 15 minutes.    omeprazole (priLOSEC) 20 MG capsule  Spouse/Significant Other Yes No    Take 1 capsule by mouth 2 (Two) Times a Day.    potassium chloride ER (K-TAB) 20 MEQ tablet controlled-release ER tablet   Yes No    Take 1 tablet by mouth 2 (Two) Times a Day.    sertraline (ZOLOFT) 100 MG tablet  Spouse/Significant Other Yes No    Take 1 tablet by mouth Daily. 2 tablet daily    Spiriva HandiHaler 18 MCG per  "inhalation capsule   No No    Place 1 capsule into inhaler and inhale Daily for 30 days.    Symbicort 160-4.5 MCG/ACT inhaler   No No    Inhale 2 puffs 2 (Two) Times a Day for 30 days.    tamsulosin (FLOMAX) 0.4 MG capsule 24 hr capsule  Spouse/Significant Other Yes No    Take 1 capsule by mouth Every Night.    torsemide (DEMADEX) 100 MG tablet   No No    Take 1 tablet by mouth Daily As Needed (Take as needed for fluid retention/swelling).    True Metrix Blood Glucose Test test strip  Spouse/Significant Other Yes No    See Admin Instructions.     ED Medications:    Medications   sodium chloride 0.9 % flush 10 mL (has no administration in time range)   Potassium Replacement - Follow Nurse / BPA Driven Protocol (has no administration in time range)   lactated ringers infusion (125 mL/hr Intravenous New Bag 6/29/25 2212)   norepinephrine (LEVOPHED) 8 mg in 250mL D5W infusion (0.1 mcg/kg/min × 95.3 kg Intravenous Rate/Dose Change 6/29/25 2306)   sodium chloride 0.9 % bolus 500 mL (0 mL Intravenous Stopped 6/29/25 1750)   piperacillin-tazobactam (ZOSYN) IVPB 3.375 g IVPB in 100 mL NS (VTB) (0 g Intravenous Stopped 6/29/25 1815)   vancomycin IVPB 2000 mg in 0.9% Sodium Chloride 500 mL (0 mg Intravenous Stopped 6/29/25 2033)   lactated ringers bolus 2,859 mL (0 mL Intravenous Stopped 6/29/25 2134)   potassium chloride (KLOR-CON M20) CR tablet 40 mEq (40 mEq Oral Given 6/29/25 1953)   lidocaine 1% - EPINEPHrine 1:707354 (XYLOCAINE W/EPI) 1 %-1:803806 injection 10 mL (10 mL Injection Given 6/29/25 2227)     Vital Signs:  Temp:  [97.9 °F (36.6 °C)] 97.9 °F (36.6 °C)  Heart Rate:  [] 107  Resp:  [18] 18  BP: ()/(42-68) 91/56        06/29/25  1658   Weight: 95.3 kg (210 lb)     Body mass index is 28.48 kg/m².    Physical Exam:     Most recent vital Signs: BP 91/56   Pulse 107   Temp 97.9 °F (36.6 °C) (Rectal)   Resp 18   Ht 182.9 cm (72\")   Wt 95.3 kg (210 lb)   SpO2 93%   BMI 28.48 kg/m²     Physical " Exam  Constitutional:       General: He is not in acute distress.     Appearance: He is ill-appearing.      Comments: Does not seem to be in any distress at this time.   HENT:      Head: Normocephalic.      Comments: Healing abrasion noted on the scalp/vertex.     Right Ear: External ear normal.      Left Ear: External ear normal.      Nose: Nose normal.      Mouth/Throat:      Mouth: Mucous membranes are moist.   Eyes:      Extraocular Movements: Extraocular movements intact.      Conjunctiva/sclera: Conjunctivae normal.   Cardiovascular:      Rate and Rhythm: Normal rate and regular rhythm.      Pulses: Normal pulses.      Heart sounds: Normal heart sounds. No murmur heard.     Comments: Midline CABG scar noted.  Pulmonary:      Effort: Pulmonary effort is normal.      Breath sounds: Rales present. No wheezing.   Abdominal:      General: Bowel sounds are normal.      Palpations: Abdomen is soft.      Tenderness: There is no abdominal tenderness. There is no guarding or rebound.      Comments: Surgical scars noted on the epigastric region.   Musculoskeletal:         General: Normal range of motion.      Cervical back: Neck supple.      Right lower leg: Edema present.      Left lower leg: Edema present.      Comments: 2+ pitting edema noted bilateral lower extremities up to the knees.  Surgical scar noted on the right knee joint.  Right femoral central venous line noted.   Skin:     General: Skin is warm.      Findings: Bruising and lesion present.      Comments: Multiple abrasions noted in bilateral lower and upper extremities, scalp.   Neurological:      General: No focal deficit present.      Mental Status: He is alert and oriented to person, place, and time. Mental status is at baseline.      Comments: Hard of hearing.   Psychiatric:         Mood and Affect: Mood normal.         Behavior: Behavior normal.       Physical Exam  Skin: Bruising is present all over the skin. There are numerous skin  tears.  Extremities: Swelling is noted in the legs.    Laboratory data:    I have reviewed the labs done in the emergency room.    Results from last 7 days   Lab Units 06/29/25  1706   SODIUM mmol/L 131*   POTASSIUM mmol/L 2.9*   CHLORIDE mmol/L 81*   CO2 mmol/L 24.4   BUN mg/dL 61.0*   CREATININE mg/dL 3.79*   CALCIUM mg/dL 9.0   BILIRUBIN mg/dL 0.2   ALK PHOS U/L 69   ALT (SGPT) U/L 14   AST (SGOT) U/L 19   GLUCOSE mg/dL 335*     Results from last 7 days   Lab Units 06/29/25  1706   WBC 10*3/mm3 15.69*   HEMOGLOBIN g/dL 9.0*   HEMATOCRIT % 27.6*   PLATELETS 10*3/mm3 205       Results from last 7 days   Lab Units 06/29/25  1826 06/29/25  1706   HSTROP T ng/L 95* 98*     Results from last 7 days   Lab Units 06/29/25  1706   PROBNP pg/mL 1,835.0*       Results from last 7 days   Lab Units 06/29/25  1706   LIPASE U/L 19       Results from last 7 days   Lab Units 06/29/25  2205   COLOR UA  Yellow   GLUCOSE UA  500 mg/dL (2+)*   KETONES UA  Negative   BLOOD UA  Negative   LEUKOCYTES UA  Moderate (2+)*   PH, URINE  5.5   BILIRUBIN UA  Negative   UROBILINOGEN UA  0.2 E.U./dL   RBC UA /HPF None Seen   WBC UA /HPF 21-50*     EKG:      EKG done in the emergency room was reviewed by me.  It shows sinus tachycardia at 103 bpm.  Left axis deviation noted.  ST flattening noted in the anterior chest leads.  Occasional PAC noted.    Radiology:    Portable chest x-ray done in the emergency room was reviewed by me.  It shows chronic appearing parenchymal changes bilaterally with no homogenous opacity in the left lower zone suspicious for infiltrate.  Prominent right transfer fissure noted.  Sternal wires noted.  An official report is currently pending.    CT Abdomen Pelvis Without Contrast  Result Date: 6/29/2025  FINAL REPORT TECHNIQUE: null CLINICAL HISTORY: sepsis COMPARISON: null FINDINGS: CT abdomen and pelvis without contrast Comparison: 08/29/2024 Findings: Mild degenerative change spine and hips. No acute bony abnormalities.  Liver and spleen within normal limits. Pancreas and adrenal glands unremarkable. Cholecystectomy. No right renal stone or hydronephrosis. No focal renal abnormality or ureteral dilation. Status post left nephrectomy. No evidence for aortic aneurysm. No free fluid or adenopathy in the pelvis. No diverticulitis. Appendix unremarkable.     Impression: No acute processes Authenticated and Electronically Signed by Anthony De La Paz MD on 06/29/2025 11:11:15 PM    CT Chest Without Contrast Diagnostic  Result Date: 6/29/2025  FINAL REPORT TECHNIQUE: null CLINICAL HISTORY: sepsis w/ hypoxemia COMPARISON: null FINDINGS: CT chest without contrast Comparison: None provided Findings: Bilateral posterior lower lobe atelectasis. Trace bilateral pleural effusions noted. Emphysema without other parenchymal abnormality. Cardiomegaly with coronary artery calcifications. Prior sternotomy for CABG. Benign partially calcified mediastinal nodes. No significant mediastinal adenopathy. No significant focal bony abnormalities.     Impression: Trace pleural effusions with lower lobe atelectasis Authenticated and Electronically Signed by Anthony De La Paz MD on 06/29/2025 11:07:32 PM    XR Ankle 3+ View Left  Result Date: 6/26/2025  CLINICAL INDICATION: pain TECHNIQUE: XR ANKLE RIGHT 3+ VIEWS COMPARISON: 24 April 2025, CT 6/13/2025 FINDINGS: Postoperative changes from resection of the distal fibula. Healed distal tibial fracture deformity in similar alignment. Severe narrowing of the tibiotalar joint with irregularity of the lateral talar dome and adjacent distal tibia. Tilt of the talus with widening of the medial clear space. Heterotopic ossification is again seen laterally. Severe narrowing of the tibiotalar joint redemonstrated.. Small tibiotalar effusion. Calcaneal enthesopathy.    Redemonstration of severe tibiotalar osteoarthrosis with postoperative changes as described. No acute bony findings. CRITICAL RESULT:   No. COMMUNICATION: Per  this written report. Drafted by Pino Martinez MD on 6/26/2025 12:57 PM Final report signed by Pino Martinez MD on 6/26/2025 12:59 PM    Assessment:    Septic shock secondary to #2 and #3, POA.  Acute urinary tract infection, POA.  Left lower lobe bacterial pneumonia, unable to classify further, POA.  Demand ischemia with elevated troponins, POA.  Hypokalemia, POA.  Chronic kidney disease stage IV.  Diabetes mellitus type 2 with nephropathy and hyperglycemia, POA.  Coronary artery disease status post CABG and stents.  COPD, no exacerbation.  Benign prostatic hyperplasia.  Will falls with skin abrasions, POA.  Assessment & Plan  The plan for septic shock is as follows:  - Administered 3 liters of fluid and started on Levophed to maintain blood pressure.  - A central line has been placed in the right groin.  - Strong antibiotics, Zosyn and vancomycin, have been initiated.  - Blood and urine cultures have been sent.  - Obtain nasal swab for MRSA.    The plan for stage IV chronic kidney disease is as follows:  - We will hold torsemide 100 mg due to low blood pressure.  - Consult Dr. Hooper from nephrology in AM.    The plan for status post bypass surgery is as follows:  - Continue baby aspirin, clopidogrel.  - Hold metoprolol due to low blood pressures.  - Continue midodrine.    Diabetes mellitus type 2 with hyperglycemia.  - We will start him on Lantus 20 units nightly.  - Continue subcutaneous insulin protocol for coverage and postprandial insulin.  - Obtain hemoglobin A1c levels.    Hypokalemia.  - Electrolyte replacement protocol.    Multiple skin abrasions.  - Consult wound care services.     Discussed with nursing staff at the bedside.    Risk Assessment: High  DVT Prophylaxis: Heparin  Code Status: Full  Diet: Renal diabetic.      Silviano Cisneros MD  06/29/25  23:45 EDT    Patient or patient representative verbalized consent for the use of Ambient Listening during the visit for chart  documentation.    Dictated utilizing Dragon dictation.

## 2025-06-30 NOTE — CASE MANAGEMENT/SOCIAL WORK
Discharge Planning Assessment   [place]     Patient Name: Jonny Ferrari Jr.  MRN: [id number]  Today's Date: 6/30/2025    Admit Date: 6/29/2025    Plan: Goal is home with wife and home health, pending recommedations   Discharge Needs Assessment       Row Name 06/30/25 1116       Living Environment    People in Home spouse    Current Living Arrangements home    Potentially Unsafe Housing Conditions none    In the past 12 months has the electric, gas, oil, or water company threatened to shut off services in your home? No    Primary Care Provided by self;spouse/significant other    Provides Primary Care For no one    Family Caregiver if Needed spouse    Quality of Family Relationships helpful;involved;supportive    Able to Return to Prior Arrangements yes       Resource/Environmental Concerns    Resource/Environmental Concerns none    Transportation Concerns none       Transportation Needs    In the past 12 months, has lack of transportation kept you from medical appointments or from getting medications? no    In the past 12 months, has lack of transportation kept you from meetings, work, or from getting things needed for daily living? No       Food Insecurity    Within the past 12 months, you worried that your food would run out before you got the money to buy more. Never true    Within the past 12 months, the food you bought just didn't last and you didn't have money to get more. Never true       Transition Planning    Patient/Family Anticipates Transition to home with family;home with help/services    Patient/Family Anticipated Services at Transition     Transportation Anticipated family or friend will provide;health plan transportation       Discharge Needs Assessment    Readmission Within the Last 30 Days no previous admission in last 30 days    Equipment Currently Used at Home wheelchair;rollator;shower chair;oxygen    Concerns to be Addressed discharge planning    Do you want help finding or keeping  work or a job? I do not need or want help    Do you want help with school or training? For example, starting or completing job training or getting a high school diploma, GED or equivalent No                   Discharge Plan       Row Name 06/30/25 1118       Plan    Plan Goal is home with wife and home health, pending recommedations    Patient/Family in Agreement with Plan yes    Plan Comments Spoke to patient and wife at bedside to initiate discharge planning. Patient lives at home with his wife in [facility]. Prior to admission, wife assisted patient as needed. He uses a wheelchair, rollator and shower chair and wears oxygen PRN at 2L [place]. Company unknown. He is not current with home health and does not have a living well or HCS/POA. He uses [facility] and declines meds to bed. There are no financial, food or resource concerns noted at this time. His goal is home with his wife and home health, pending recommendations. CM will continue to follow.                  Continued Care and Services - Admitted Since 6/29/2025    No active coordination exists.          Demographic Summary       Row Name 06/30/25 1116       General Information    Admission Type inpatient    Arrived From emergency department    Required Notices Provided Important Message from Medicare    Referral Source admission list    Reason for Consult discharge planning    Preferred Language English                   Functional Status       Row Name 06/30/25 1116       Functional Status    Usual Activity Tolerance moderate    Current Activity Tolerance moderate       Physical Activity    On average, how many days per week do you engage in moderate to strenuous exercise (like a brisk walk)? 0 days    On average, how many minutes do you engage in exercise at this level? 0 min    Number of minutes of exercise per week 0       Functional Status, IADL    Medications assistive person    Meal Preparation assistive person    Housekeeping assistive  person    Shopping assistive person    If for any reason you need help with day-to-day activities such as bathing, preparing meals, shopping, managing finances, etc., do you get the help you need? I get all the help I need                   Psychosocial    No documentation.                  Abuse/Neglect    No documentation.                  Legal    No documentation.                  Substance Abuse    No documentation.                  Patient Forms    No documentation.                     Ying Thorne RN

## 2025-06-30 NOTE — PLAN OF CARE
Goal Outcome Evaluation:  Plan of Care Reviewed With: patient        Progress: improving   Pt A&Ox4 throughout shift. Levophed gtt still infusing for bp support. IV fluids started this shift. Electrolytes replaced. Pt pleasant and states he is feeling better as shift has gone on. No acute changes.

## 2025-07-01 LAB
ALBUMIN SERPL-MCNC: 2.8 G/DL (ref 3.5–5.2)
ANION GAP SERPL CALCULATED.3IONS-SCNC: 12.6 MMOL/L (ref 5–15)
BUN SERPL-MCNC: 37 MG/DL (ref 8–23)
BUN/CREAT SERPL: 15.2 (ref 7–25)
CALCIUM SPEC-SCNC: 8.9 MG/DL (ref 8.6–10.5)
CHLORIDE SERPL-SCNC: 93 MMOL/L (ref 98–107)
CO2 SERPL-SCNC: 32.4 MMOL/L (ref 22–29)
CREAT SERPL-MCNC: 2.44 MG/DL (ref 0.76–1.27)
DEPRECATED RDW RBC AUTO: 47.7 FL (ref 37–54)
DEPRECATED RDW RBC AUTO: 48.2 FL (ref 37–54)
EGFRCR SERPLBLD CKD-EPI 2021: 27.4 ML/MIN/1.73
ERYTHROCYTE [DISTWIDTH] IN BLOOD BY AUTOMATED COUNT: 16.4 % (ref 12.3–15.4)
ERYTHROCYTE [DISTWIDTH] IN BLOOD BY AUTOMATED COUNT: 16.5 % (ref 12.3–15.4)
GLUCOSE BLDC GLUCOMTR-MCNC: 117 MG/DL (ref 70–130)
GLUCOSE BLDC GLUCOMTR-MCNC: 126 MG/DL (ref 70–130)
GLUCOSE BLDC GLUCOMTR-MCNC: 131 MG/DL (ref 70–130)
GLUCOSE BLDC GLUCOMTR-MCNC: 84 MG/DL (ref 70–130)
GLUCOSE SERPL-MCNC: 108 MG/DL (ref 65–99)
HCT VFR BLD AUTO: 22.4 % (ref 37.5–51)
HCT VFR BLD AUTO: 26.7 % (ref 37.5–51)
HGB BLD-MCNC: 7 G/DL (ref 13–17.7)
HGB BLD-MCNC: 8.4 G/DL (ref 13–17.7)
INR PPP: 1.03 (ref 0.9–1.1)
MCH RBC QN AUTO: 25.1 PG (ref 26.6–33)
MCH RBC QN AUTO: 25.5 PG (ref 26.6–33)
MCHC RBC AUTO-ENTMCNC: 31.3 G/DL (ref 31.5–35.7)
MCHC RBC AUTO-ENTMCNC: 31.5 G/DL (ref 31.5–35.7)
MCV RBC AUTO: 80.3 FL (ref 79–97)
MCV RBC AUTO: 81.2 FL (ref 79–97)
PHOSPHATE SERPL-MCNC: 2.8 MG/DL (ref 2.5–4.5)
PLATELET # BLD AUTO: 148 10*3/MM3 (ref 140–450)
PLATELET # BLD AUTO: 154 10*3/MM3 (ref 140–450)
PMV BLD AUTO: 8.6 FL (ref 6–12)
PMV BLD AUTO: 9.1 FL (ref 6–12)
POTASSIUM SERPL-SCNC: 2.7 MMOL/L (ref 3.5–5.2)
POTASSIUM SERPL-SCNC: 2.7 MMOL/L (ref 3.5–5.2)
POTASSIUM SERPL-SCNC: 3.4 MMOL/L (ref 3.5–5.2)
PROTHROMBIN TIME: 14.2 SECONDS (ref 12.3–15.1)
RBC # BLD AUTO: 2.79 10*6/MM3 (ref 4.14–5.8)
RBC # BLD AUTO: 3.29 10*6/MM3 (ref 4.14–5.8)
SODIUM SERPL-SCNC: 138 MMOL/L (ref 136–145)
VANCOMYCIN SERPL-MCNC: 17.7 MCG/ML (ref 5–40)
WBC NRBC COR # BLD AUTO: 5.67 10*3/MM3 (ref 3.4–10.8)
WBC NRBC COR # BLD AUTO: 8.15 10*3/MM3 (ref 3.4–10.8)

## 2025-07-01 PROCEDURE — 94799 UNLISTED PULMONARY SVC/PX: CPT

## 2025-07-01 PROCEDURE — 25810000003 LACTATED RINGERS PER 1000 ML: Performed by: INTERNAL MEDICINE

## 2025-07-01 PROCEDURE — 80069 RENAL FUNCTION PANEL: CPT | Performed by: INTERNAL MEDICINE

## 2025-07-01 PROCEDURE — 85610 PROTHROMBIN TIME: CPT | Performed by: INTERNAL MEDICINE

## 2025-07-01 PROCEDURE — 82948 REAGENT STRIP/BLOOD GLUCOSE: CPT

## 2025-07-01 PROCEDURE — 80202 ASSAY OF VANCOMYCIN: CPT | Performed by: INTERNAL MEDICINE

## 2025-07-01 PROCEDURE — 84132 ASSAY OF SERUM POTASSIUM: CPT | Performed by: INTERNAL MEDICINE

## 2025-07-01 PROCEDURE — 94761 N-INVAS EAR/PLS OXIMETRY MLT: CPT

## 2025-07-01 PROCEDURE — 25010000002 HEPARIN (PORCINE) PER 1000 UNITS: Performed by: INTERNAL MEDICINE

## 2025-07-01 PROCEDURE — 82948 REAGENT STRIP/BLOOD GLUCOSE: CPT | Performed by: INTERNAL MEDICINE

## 2025-07-01 PROCEDURE — 85027 COMPLETE CBC AUTOMATED: CPT | Performed by: INTERNAL MEDICINE

## 2025-07-01 PROCEDURE — 63710000001 INSULIN GLARGINE PER 5 UNITS: Performed by: INTERNAL MEDICINE

## 2025-07-01 PROCEDURE — 99232 SBSQ HOSP IP/OBS MODERATE 35: CPT | Performed by: FAMILY MEDICINE

## 2025-07-01 PROCEDURE — 25010000002 PIPERACILLIN SOD-TAZOBACTAM PER 1 G: Performed by: INTERNAL MEDICINE

## 2025-07-01 PROCEDURE — 63710000001 INSULIN LISPRO (HUMAN) PER 5 UNITS: Performed by: INTERNAL MEDICINE

## 2025-07-01 PROCEDURE — 97166 OT EVAL MOD COMPLEX 45 MIN: CPT

## 2025-07-01 PROCEDURE — 97162 PT EVAL MOD COMPLEX 30 MIN: CPT

## 2025-07-01 RX ORDER — POTASSIUM CHLORIDE 1500 MG/1
20 TABLET, EXTENDED RELEASE ORAL ONCE
Status: COMPLETED | OUTPATIENT
Start: 2025-07-01 | End: 2025-07-01

## 2025-07-01 RX ORDER — SODIUM CHLORIDE, SODIUM LACTATE, POTASSIUM CHLORIDE, CALCIUM CHLORIDE 600; 310; 30; 20 MG/100ML; MG/100ML; MG/100ML; MG/100ML
100 INJECTION, SOLUTION INTRAVENOUS CONTINUOUS
Status: ACTIVE | OUTPATIENT
Start: 2025-07-01 | End: 2025-07-02

## 2025-07-01 RX ORDER — POTASSIUM CHLORIDE 750 MG/1
40 CAPSULE, EXTENDED RELEASE ORAL EVERY 4 HOURS
Status: COMPLETED | OUTPATIENT
Start: 2025-07-01 | End: 2025-07-01

## 2025-07-01 RX ORDER — MIDODRINE HYDROCHLORIDE 5 MG/1
10 TABLET ORAL
Status: DISCONTINUED | OUTPATIENT
Start: 2025-07-01 | End: 2025-07-03

## 2025-07-01 RX ADMIN — INSULIN LISPRO 8 UNITS: 100 INJECTION, SOLUTION INTRAVENOUS; SUBCUTANEOUS at 08:23

## 2025-07-01 RX ADMIN — ASPIRIN 81 MG: 81 TABLET, COATED ORAL at 08:23

## 2025-07-01 RX ADMIN — POTASSIUM CHLORIDE 20 MEQ: 1500 TABLET, EXTENDED RELEASE ORAL at 04:33

## 2025-07-01 RX ADMIN — BUSPIRONE HYDROCHLORIDE 15 MG: 15 TABLET ORAL at 08:23

## 2025-07-01 RX ADMIN — BUSPIRONE HYDROCHLORIDE 15 MG: 15 TABLET ORAL at 21:00

## 2025-07-01 RX ADMIN — BUDESONIDE 0.5 MG: 0.5 SUSPENSION RESPIRATORY (INHALATION) at 19:08

## 2025-07-01 RX ADMIN — SODIUM CHLORIDE, SODIUM LACTATE, POTASSIUM CHLORIDE, CALCIUM CHLORIDE 100 ML/HR: 20; 30; 600; 310 INJECTION, SOLUTION INTRAVENOUS at 06:19

## 2025-07-01 RX ADMIN — SENNOSIDES AND DOCUSATE SODIUM 2 TABLET: 50; 8.6 TABLET ORAL at 08:23

## 2025-07-01 RX ADMIN — NOREPINEPHRINE BITARTRATE 0.07 MCG/KG/MIN: 8 INJECTION, SOLUTION INTRAVENOUS at 04:32

## 2025-07-01 RX ADMIN — SODIUM CHLORIDE, POTASSIUM CHLORIDE, SODIUM LACTATE AND CALCIUM CHLORIDE 100 ML/HR: 600; 310; 30; 20 INJECTION, SOLUTION INTRAVENOUS at 10:51

## 2025-07-01 RX ADMIN — MIDODRINE HYDROCHLORIDE 10 MG: 5 TABLET ORAL at 16:37

## 2025-07-01 RX ADMIN — SODIUM CHLORIDE 3.38 G: 9 INJECTION, SOLUTION INTRAVENOUS at 08:30

## 2025-07-01 RX ADMIN — Medication 10 ML: at 21:02

## 2025-07-01 RX ADMIN — Medication 1 CAPSULE: at 21:00

## 2025-07-01 RX ADMIN — MUPIROCIN 1 APPLICATION: 20 OINTMENT TOPICAL at 08:20

## 2025-07-01 RX ADMIN — MIDODRINE HYDROCHLORIDE 10 MG: 5 TABLET ORAL at 10:50

## 2025-07-01 RX ADMIN — POTASSIUM CHLORIDE 40 MEQ: 750 CAPSULE, EXTENDED RELEASE ORAL at 12:09

## 2025-07-01 RX ADMIN — SODIUM CHLORIDE 3.38 G: 9 INJECTION, SOLUTION INTRAVENOUS at 01:38

## 2025-07-01 RX ADMIN — Medication 10 ML: at 08:22

## 2025-07-01 RX ADMIN — CLOPIDOGREL BISULFATE 75 MG: 75 TABLET, FILM COATED ORAL at 08:23

## 2025-07-01 RX ADMIN — SODIUM CHLORIDE 3.38 G: 9 INJECTION, SOLUTION INTRAVENOUS at 16:37

## 2025-07-01 RX ADMIN — INSULIN GLARGINE 20 UNITS: 100 INJECTION, SOLUTION SUBCUTANEOUS at 21:01

## 2025-07-01 RX ADMIN — POTASSIUM CHLORIDE 40 MEQ: 750 CAPSULE, EXTENDED RELEASE ORAL at 16:37

## 2025-07-01 RX ADMIN — PANTOPRAZOLE SODIUM 40 MG: 40 TABLET, DELAYED RELEASE ORAL at 06:17

## 2025-07-01 RX ADMIN — HEPARIN SODIUM 5000 UNITS: 5000 INJECTION INTRAVENOUS; SUBCUTANEOUS at 08:23

## 2025-07-01 RX ADMIN — MIDODRINE HYDROCHLORIDE 10 MG: 5 TABLET ORAL at 08:22

## 2025-07-01 RX ADMIN — MUPIROCIN 1 APPLICATION: 20 OINTMENT TOPICAL at 21:00

## 2025-07-01 RX ADMIN — BUDESONIDE 0.5 MG: 0.5 SUSPENSION RESPIRATORY (INHALATION) at 07:02

## 2025-07-01 RX ADMIN — Medication 1 CAPSULE: at 08:23

## 2025-07-01 RX ADMIN — POTASSIUM CHLORIDE 40 MEQ: 750 CAPSULE, EXTENDED RELEASE ORAL at 08:22

## 2025-07-01 NOTE — PLAN OF CARE
Problem: Adult Inpatient Plan of Care  Goal: Absence of Hospital-Acquired Illness or Injury  Intervention: Identify and Manage Fall Risk  Recent Flowsheet Documentation  Taken 7/1/2025 1606 by Isma Beltran RN  Safety Promotion/Fall Prevention:   activity supervised   assistive device/personal items within reach   clutter free environment maintained   fall prevention program maintained   lighting adjusted   room organization consistent   safety round/check completed  Taken 7/1/2025 1408 by Isma Beltran RN  Safety Promotion/Fall Prevention:   activity supervised   assistive device/personal items within reach   clutter free environment maintained   fall prevention program maintained   lighting adjusted   room organization consistent   safety round/check completed  Taken 7/1/2025 1202 by Isma Beltran RN  Safety Promotion/Fall Prevention:   activity supervised   assistive device/personal items within reach   clutter free environment maintained   fall prevention program maintained   lighting adjusted   room organization consistent   safety round/check completed  Taken 7/1/2025 1007 by Isma Beltran RN  Safety Promotion/Fall Prevention:   activity supervised   assistive device/personal items within reach   clutter free environment maintained   fall prevention program maintained   lighting adjusted   room organization consistent   safety round/check completed  Taken 7/1/2025 0829 by Isma Beltran RN  Safety Promotion/Fall Prevention:   activity supervised   assistive device/personal items within reach   clutter free environment maintained   fall prevention program maintained   lighting adjusted   room organization consistent   safety round/check completed  Intervention: Prevent Skin Injury  Recent Flowsheet Documentation  Taken 7/1/2025 1606 by Isma Beltran RN  Body Position:   heels elevated   sitting up in bed   weight shifting   neutral head position   neutral body alignment  Taken 7/1/2025 1408 by Isma Beltran  RN  Body Position:   heels elevated   sitting up in bed   weight shifting   tilted   left  Taken 7/1/2025 1202 by Isma Beltran RN  Body Position:   heels elevated   sitting up in bed   weight shifting   neutral head position   neutral body alignment  Taken 7/1/2025 1007 by Isma Beltran RN  Body Position:   heels elevated   sitting up in bed   weight shifting   tilted   right  Taken 7/1/2025 0829 by Isma Beltran RN  Body Position:   heels elevated   sitting up in bed   weight shifting   neutral head position   neutral body alignment  Skin Protection:   hydrocolloids used   incontinence pads utilized   protective footwear used   pulse oximeter probe site changed   skin sealant/moisture barrier applied   silicone foam dressing in place  Intervention: Prevent and Manage VTE (Venous Thromboembolism) Risk  Recent Flowsheet Documentation  Taken 7/1/2025 0829 by Isma Beltran RN  VTE Prevention/Management: (See MAR) other (see comments)  Intervention: Prevent Infection  Recent Flowsheet Documentation  Taken 7/1/2025 1606 by Isma Beltran RN  Infection Prevention:   environmental surveillance performed   single patient room provided  Taken 7/1/2025 1408 by Isma Beltran RN  Infection Prevention:   environmental surveillance performed   single patient room provided  Taken 7/1/2025 1202 by Isma Beltran RN  Infection Prevention:   environmental surveillance performed   single patient room provided  Taken 7/1/2025 1007 by Isma Beltran RN  Infection Prevention:   environmental surveillance performed   single patient room provided  Taken 7/1/2025 0829 by Isma Beltran RN  Infection Prevention:   environmental surveillance performed   single patient room provided  Goal: Optimal Comfort and Wellbeing  Intervention: Provide Person-Centered Care  Recent Flowsheet Documentation  Taken 7/1/2025 1606 by Isma Beltran RN  Trust Relationship/Rapport:   care explained   choices provided   emotional support provided   empathic listening  provided   questions answered   questions encouraged   reassurance provided   thoughts/feelings acknowledged  Taken 7/1/2025 1202 by Isma Beltran RN  Trust Relationship/Rapport:   care explained   choices provided   emotional support provided   empathic listening provided   questions answered   questions encouraged   reassurance provided   thoughts/feelings acknowledged  Taken 7/1/2025 0829 by Isma Beltran RN  Trust Relationship/Rapport:   care explained   choices provided   emotional support provided   empathic listening provided   questions answered   questions encouraged   reassurance provided   thoughts/feelings acknowledged     Problem: Skin Injury Risk Increased  Goal: Skin Health and Integrity  Intervention: Optimize Skin Protection  Recent Flowsheet Documentation  Taken 7/1/2025 1606 by Isma Beltran RN  Activity Management: (Bed exercises and repositioning encouraged) activity encouraged  Head of Bed (HOB) Positioning: HOB at 60-90 degrees  Taken 7/1/2025 1408 by Isma Beltran RN  Activity Management: (Bed exercises and repositioning encouraged) activity encouraged  Head of Bed (HOB) Positioning: HOB at 30 degrees  Taken 7/1/2025 1202 by Isma Beltran RN  Activity Management: (Repositioning and bed exercises encouraged) activity encouraged  Head of Bed (HOB) Positioning: HOB at 60-90 degrees  Taken 7/1/2025 1007 by Isma Beltran RN  Activity Management: (Bed exercises and repositioning encouraged) activity encouraged  Head of Bed (HOB) Positioning: HOB at 30-45 degrees  Taken 7/1/2025 0829 by sIma Beltran RN  Activity Management: (Repositioning and bed exercises encouraged) activity encouraged  Pressure Reduction Techniques:   weight shift assistance provided   pressure points protected   positioned off wounds   heels elevated off bed   frequent weight shift encouraged  Head of Bed (HOB) Positioning: HOB at 60-90 degrees  Pressure Reduction Devices:   specialty bed utilized   pressure-redistributing  mattress utilized   positioning supports utilized   heel offloading device utilized   foam padding utilized  Skin Protection:   hydrocolloids used   incontinence pads utilized   protective footwear used   pulse oximeter probe site changed   skin sealant/moisture barrier applied   silicone foam dressing in place  Intervention: Promote and Optimize Oral Intake  Recent Flowsheet Documentation  Taken 7/1/2025 0829 by Isma Beltran RN  Oral Nutrition Promotion:   rest periods promoted   nutrition counseling provided     Problem: Fall Injury Risk  Goal: Absence of Fall and Fall-Related Injury  Intervention: Identify and Manage Contributors  Recent Flowsheet Documentation  Taken 7/1/2025 1606 by Isma Beltran RN  Self-Care Promotion:   BADL personal objects within reach   BADL personal routines maintained  Taken 7/1/2025 1408 by Isma Beltran RN  Medication Review/Management: medications reviewed  Self-Care Promotion:   BADL personal objects within reach   BADL personal routines maintained  Taken 7/1/2025 1202 by Isma Beltran RN  Self-Care Promotion:   BADL personal objects within reach   BADL personal routines maintained  Taken 7/1/2025 1007 by Isma Beltran RN  Self-Care Promotion:   BADL personal objects within reach   BADL personal routines maintained  Taken 7/1/2025 0829 by Isma Beltran RN  Self-Care Promotion:   BADL personal objects within reach   BADL personal routines maintained  Intervention: Promote Injury-Free Environment  Recent Flowsheet Documentation  Taken 7/1/2025 1606 by Isma Beltran RN  Safety Promotion/Fall Prevention:   activity supervised   assistive device/personal items within reach   clutter free environment maintained   fall prevention program maintained   lighting adjusted   room organization consistent   safety round/check completed  Taken 7/1/2025 1408 by Isma Beltran RN  Safety Promotion/Fall Prevention:   activity supervised   assistive device/personal items within reach   clutter free  environment maintained   fall prevention program maintained   lighting adjusted   room organization consistent   safety round/check completed  Taken 7/1/2025 1202 by Isma Beltran RN  Safety Promotion/Fall Prevention:   activity supervised   assistive device/personal items within reach   clutter free environment maintained   fall prevention program maintained   lighting adjusted   room organization consistent   safety round/check completed  Taken 7/1/2025 1007 by Isma Beltran RN  Safety Promotion/Fall Prevention:   activity supervised   assistive device/personal items within reach   clutter free environment maintained   fall prevention program maintained   lighting adjusted   room organization consistent   safety round/check completed  Taken 7/1/2025 0829 by Isma Beltran RN  Safety Promotion/Fall Prevention:   activity supervised   assistive device/personal items within reach   clutter free environment maintained   fall prevention program maintained   lighting adjusted   room organization consistent   safety round/check completed     Problem: Comorbidity Management  Goal: Blood Glucose Level Within Target Range  Intervention: Monitor and Manage Glycemia  Recent Flowsheet Documentation  Taken 7/1/2025 1408 by Isma Beltran RN  Medication Review/Management: medications reviewed  Goal: Blood Pressure in Desired Range  Intervention: Maintain Blood Pressure Management  Recent Flowsheet Documentation  Taken 7/1/2025 1408 by Isma Beltran RN  Medication Review/Management: medications reviewed     Problem: Sepsis/Septic Shock  Goal: Optimal Coping  Intervention: Support Patient and Family Response  Recent Flowsheet Documentation  Taken 7/1/2025 1202 by Isma Beltran RN  Family/Support System Care:   presence promoted   involvement promoted   support provided  Taken 7/1/2025 0829 by Isma Beltran RN  Family/Support System Care: self-care encouraged  Goal: Blood Glucose Level Within Target Range  Intervention: Optimize  Glycemic Control  Recent Flowsheet Documentation  Taken 7/1/2025 1606 by Isma Beltran RN  Hypoglycemia Management: blood glucose monitored  Taken 7/1/2025 1408 by Isma Beltran RN  Hypoglycemia Management: blood glucose monitored  Taken 7/1/2025 1007 by Isma Beltran RN  Hypoglycemia Management: blood glucose monitored  Taken 7/1/2025 0829 by Isma Beltran RN  Hypoglycemia Management: blood glucose monitored  Goal: Absence of Infection Signs and Symptoms  Intervention: Initiate Sepsis Management  Recent Flowsheet Documentation  Taken 7/1/2025 1606 by Isma Beltran RN  Infection Prevention:   environmental surveillance performed   single patient room provided  Taken 7/1/2025 1408 by Isma Beltran RN  Infection Prevention:   environmental surveillance performed   single patient room provided  Taken 7/1/2025 1202 by Isma Beltran RN  Infection Prevention:   environmental surveillance performed   single patient room provided  Taken 7/1/2025 1007 by Isma Beltran RN  Infection Prevention:   environmental surveillance performed   single patient room provided  Taken 7/1/2025 0829 by Isma Beltran RN  Infection Prevention:   environmental surveillance performed   single patient room provided  Intervention: Promote Recovery  Recent Flowsheet Documentation  Taken 7/1/2025 1606 by Isma Beltran RN  Activity Management: (Bed exercises and repositioning encouraged) activity encouraged  Taken 7/1/2025 1408 by Isma Beltran RN  Activity Management: (Bed exercises and repositioning encouraged) activity encouraged  Taken 7/1/2025 1202 by Isma Beltran RN  Activity Management: (Repositioning and bed exercises encouraged) activity encouraged  Taken 7/1/2025 1007 by Isma Beltran RN  Activity Management: (Bed exercises and repositioning encouraged) activity encouraged  Taken 7/1/2025 0829 by Isma Beltran RN  Activity Management: (Repositioning and bed exercises encouraged) activity encouraged   Goal Outcome Evaluation:  Plan of Care  Reviewed With: patient           Outcome Evaluation: Pt participated in therapy today. Nolan cath replaced this afternoon per Dr. Davis. Skin & wound care completed during shift. Central line dressing changed this morning.

## 2025-07-01 NOTE — PROGRESS NOTES
[facility]. [place]      [facility], [facility].   Progress Note          Patient Name: Jonny Ferrari Jr.  : 1950  MRN: [id number]   LOS: 2 days    Patient Care Team:  Amaury Brice MD as PCP - General (Family Medicine)  Ernst Smith MD as Consulting Physician (Cardiology)  Panfilo Bolden MD as Consulting Physician (Family Medicine)  Chidi Trinidad MD as Consulting Physician (General Surgery)  Jaren Sterling MD as Consulting Physician (Gastroenterology)    Chief Complaint:    Chief Complaint   Patient presents with    Weakness - Generalized     Pt called ems for sick, unable to urinate and weakness. Pt was on the toilet with a pressure in the 80's. Pt has multiple skin tears. Pt has 18 ga iv in left ac, 22 ga right bicep by ems.      Primary Care Physician:  Amaury Brice MD  Date of admission: 2025    Subjective     Interval History:   Follow-up on chronic kidney disease stage IV.   Patient is a lot more awake alert and interactive, he feels like he is ready to go home.  Still on pressors.  Denies having any chest pain or shortness of breath.  Events noted from last 24 hours.  I reviewed the chart and other providers notes, labs and procedures done since my last note.    Review of Systems:   As noted above.    Objective     Vitals:   Temp:  [97.7 °F (36.5 °C)-98.6 °F (37 °C)] 98.5 °F (36.9 °C)  Heart Rate:  [] 86  Resp:  [18] 18  BP: ()/(47-81) 113/62  Flow (L/min) (Oxygen Therapy):  [2] 2    Intake/Output Summary (Last 24 hours) at 20257  Last data filed at 2025 1900  Gross per 24 hour   Intake 4019.57 ml   Output 2800 ml   Net 1219.57 ml       Physical Exam:    General Appearance: alert, oriented x 3, no acute distress   Skin: warm and dry  HEENT: oral mucosa normal, nonicteric sclera  Neck: supple, no JVD  Lungs: CTA  Heart: RRR, normal S1 and S2  Abdomen: obese, soft, nontender, non distended and positive bowel  sounds.  : no palpable bladder  Extremities: Trace edema, no cyanosis or clubbing  Neuro: normal speech and mental status     Scheduled Meds:     Current Facility-Administered Medications   Medication Dose Route Frequency Provider Last Rate Last Admin    acetaminophen (TYLENOL) tablet 650 mg  650 mg Oral Q4H PRN Silviano Cisneros MD        Or    acetaminophen (TYLENOL) 160 MG/5ML oral solution 650 mg  650 mg Oral Q4H PRN Silviano Cisneros MD        Or    acetaminophen (TYLENOL) suppository 650 mg  650 mg Rectal Q4H PRN Silviano Cisneros MD        aspirin EC tablet 81 mg  81 mg Oral Daily Silviano Cisneros MD   81 mg at 07/01/25 0823    sennosides-docusate (PERICOLACE) 8.6-50 MG per tablet 2 tablet  2 tablet Oral BID Silviano Cisneros MD   2 tablet at 07/01/25 0823    And    polyethylene glycol (MIRALAX) packet 17 g  17 g Oral Daily PRN Silviano Cisneros MD        And    bisacodyl (DULCOLAX) EC tablet 5 mg  5 mg Oral Daily PRN Silviano Cisneros MD        And    bisacodyl (DULCOLAX) suppository 10 mg  10 mg Rectal Daily PRN Silviano Cisneros MD        budesonide (PULMICORT) nebulizer solution 0.5 mg  0.5 mg Nebulization BID - RT Silviano Cisneros MD   0.5 mg at 07/01/25 1908    busPIRone (BUSPAR) tablet 15 mg  15 mg Oral Q12H Silviano Cisneros MD   15 mg at 07/01/25 2100    clopidogrel (PLAVIX) tablet 75 mg  75 mg Oral Daily Silviano Cisneros MD   75 mg at 07/01/25 0823    dextrose (D50W) (25 g/50 mL) IV injection 25 g  25 g Intravenous Q15 Min PRN Silviano Cisneros MD        dextrose (GLUTOSE) oral gel 15 g  15 g Oral Q15 Min PRN Silviano Cisneros MD        glucagon (GLUCAGEN) injection 1 mg  1 mg Intramuscular Q15 Min PRN Silviano Cisneros MD        insulin glargine (LANTUS, SEMGLEE) injection 20 Units  20 Units Subcutaneous Nightly Silviano Cisneros MD   20 Units at 07/01/25 2101    Insulin Lispro (humaLOG) injection 2-9 Units  2-9 Units Subcutaneous 4x Daily AC & at Bedtime Silviano Cisneros MD   2 Units at 06/30/25 1129    Insulin Lispro (humaLOG) injection 8 Units  8 Units  Subcutaneous TID With Meals Silviano Cisneros MD   8 Units at 07/01/25 0823    ipratropium-albuterol (DUO-NEB) nebulizer solution 3 mL  3 mL Nebulization Q4H PRN Silviano Cisneros MD        lactated ringers infusion  100 mL/hr Intravenous Continuous Jefry Hooper MD, FASN 100 mL/hr at 07/01/25 1051 100 mL/hr at 07/01/25 1051    lactobacillus acidophilus (RISAQUAD) capsule 1 capsule  1 capsule Oral BID Silviano Cisneros MD   1 capsule at 07/01/25 2100    Magnesium Standard Dose Replacement - Follow Nurse / BPA Driven Protocol   Not Applicable PRN Silviano Cisneros MD        midodrine (PROAMATINE) tablet 10 mg  10 mg Oral TID AC Jefry Hooper MD, FASN   10 mg at 07/01/25 1637    mupirocin (BACTROBAN) 2 % nasal ointment 1 Application  1 Application Each Nare BID Silviano Cisneros MD   1 Application at 07/01/25 2100    norepinephrine (LEVOPHED) 8 mg in 250mL D5W infusion  0.02-0.3 mcg/kg/min Intravenous Titrated Fady Guevara MD   Stopped at 07/01/25 0834    ondansetron (ZOFRAN) injection 4 mg  4 mg Intravenous Q6H PRN Silviano Cisneros MD        pantoprazole (PROTONIX) EC tablet 40 mg  40 mg Oral Q AM Silviano Cisneros MD   40 mg at 07/01/25 0617    Pharmacy To Dose: Piperacillin-tazobactam (Zosyn)   Not Applicable Continuous PRN Silviano Cisneros MD        piperacillin-tazobactam (ZOSYN) IVPB 3.375 g IVPB in 100 mL NS (VTB)  3.375 g Intravenous Q8H Silviano Cisneros MD   3.375 g at 07/01/25 1637    Potassium Replacement - Follow Nurse / BPA Driven Protocol   Not Applicable PRN Fady Guevara MD        Potassium Replacement - Follow Nurse / BPA Driven Protocol   Not Applicable PRN Silviano Cisneros MD        sodium chloride 0.9 % flush 10 mL  10 mL Intravenous PRN Fady Guevara MD        sodium chloride 0.9 % flush 10 mL  10 mL Intravenous Q12H Silviano Cisneros MD   10 mL at 07/01/25 2102    sodium chloride 0.9 % flush 10 mL  10 mL Intravenous PRN Silviano Cisneros MD        sodium chloride 0.9 % infusion 40 mL  40 mL Intravenous PRN  Silviano Cisneros MD           aspirin, 81 mg, Oral, Daily  budesonide, 0.5 mg, Nebulization, BID - RT  busPIRone, 15 mg, Oral, Q12H  clopidogrel, 75 mg, Oral, Daily  insulin glargine, 20 Units, Subcutaneous, Nightly  insulin lispro, 2-9 Units, Subcutaneous, 4x Daily AC & at Bedtime  insulin lispro, 8 Units, Subcutaneous, TID With Meals  lactobacillus acidophilus, 1 capsule, Oral, BID  midodrine, 10 mg, Oral, TID AC  mupirocin, 1 Application, Each Nare, BID  pantoprazole, 40 mg, Oral, Q AM  piperacillin-tazobactam, 3.375 g, Intravenous, Q8H  senna-docusate sodium, 2 tablet, Oral, BID  sodium chloride, 10 mL, Intravenous, Q12H        IV Meds:   lactated ringers, 100 mL/hr, Last Rate: 100 mL/hr (07/01/25 1051)  norepinephrine, 0.02-0.3 mcg/kg/min, Last Rate: Stopped (07/01/25 0834)  Pharmacy To Dose:,         Results Reviewed:   I have personally reviewed the results from the time of this admission to 7/1/2025 21:07 EDT     Results from last 7 days   Lab Units 07/01/25  0830 07/01/25  0445 07/01/25  0302 06/30/25  1231 06/30/25  0429 06/29/25  1706   SODIUM mmol/L  --  138  --   --  135* 131*   POTASSIUM mmol/L 3.4* 2.7* 2.7*   < > 2.4* 2.9*   CHLORIDE mmol/L  --  93*  --   --  89* 81*   CO2 mmol/L  --  32.4*  --   --  29.3* 24.4   BUN mg/dL  --  37.0*  --   --  55.0* 61.0*   CREATININE mg/dL  --  2.44*  --   --  3.17* 3.79*   CALCIUM mg/dL  --  8.9  --   --  8.8 9.0   BILIRUBIN mg/dL  --   --   --   --   --  0.2   ALK PHOS U/L  --   --   --   --   --  69   ALT (SGPT) U/L  --   --   --   --   --  14   AST (SGOT) U/L  --   --   --   --   --  19   GLUCOSE mg/dL  --  108*  --   --  142* 335*    < > = values in this interval not displayed.       Estimated Creatinine Clearance: 32.7 mL/min (A) (by C-G formula based on SCr of 2.44 mg/dL (H)).    Results from last 7 days   Lab Units 07/01/25  0445 06/29/25  1826   MAGNESIUM mg/dL  --  1.8   PHOSPHORUS mg/dL 2.8  --              Results from last 7 days   Lab Units 07/01/25  6781  "07/01/25  0830 06/30/25  0429 06/29/25  1706   WBC 10*3/mm3 5.67 8.15 18.60* 15.69*   HEMOGLOBIN g/dL 8.4* 7.0* 8.0* 9.0*   PLATELETS 10*3/mm3 148 154 203 205       Results from last 7 days   Lab Units 07/01/25  1945   INR  1.03       Brief Urine Lab Results  (Last result in the past 365 days)        Color   Clarity   Blood   Leuk Est   Nitrite   Protein   CREAT   Urine HCG        06/29/25 2205 Yellow   Clear   Negative   Moderate (2+)   Negative   Negative                   No results found for: \"UTPCR\"    Imaging Results (Last 24 Hours)       ** No results found for the last 24 hours. **                Assessment / Plan     ASSESSMENT:    Septic shock    CAD (coronary artery disease)    Chronic kidney disease, stage IV (severe)    Acute UTI (urinary tract infection)    Pneumonia of left lower lobe due to infectious organism    Acute kidney injury: Patient does have significant worsening of his renal function.  Most likely secondary to volume depletion and septic shock.  Bicarb is stable low potassium noted.  Chronic kidney disease stage IV: Underlying chronic kidney disease secondary to diabetes and hypertension as well as volume loss, status post left nephrectomy in March of 2020.  Baseline creatinine of 2.9 with a EGFR of 21 mL/min.  Type 2 diabetes: Longstanding history of type 2 diabetes likely with some complications.  Septic shock: Treated as per hospitalist service still on pressors.  Left lower lobe pneumonia: On IV antibiotics as per hospitalist service.  Urinary tract infection: Treated  Hypokalemia: Replace per protocol  Coronary artery disease: Status post CABG and stent placements.  No acute issues  COPD: Longstanding history of smoking.  Untreated sleep apnea: Longstanding known history of obstructive sleep apnea, has not been able to use his BiPAP      PLAN:  Persistently low potassium noted we will go ahead and supplement with 40 mg every 4 hours x 3 doses.  Continue with IV fluids at 100 cc an " hour.  Hemoglobin is stable blood pressures are noted to be low side, still on pressors.  I will go ahead and start him on midodrine 10 mg 3 times a day and see if we can get him off the pressors.  Details were discussed with the patient as well as family in the room.  Wife is at the bedside.  Details were also discussed with the hospitalist service and or other providers as needed.   Continue with rest of the current treatment plan, and monitor with surveillance labs, adjust medication doses to the eGFR.  Further recommendations will depend on clinical course of the patient during the current hospitalization.   I have reviewed the copied text to this note, it was edited and the changes made as needed.  It is accurate to the point, when the note was signed today.     Thank you for involving us in the care of Jonny Ferrari Jr..  Please feel free to call with any questions.    Jefry Hooper MD, FÁTIMA  07/01/25  21:07 EDT    [facility]  [phone]      Part of this note may be an electronic transcription/translation of spoken language to printed text using the Dragon Dictation System.

## 2025-07-01 NOTE — PLAN OF CARE
Goal Outcome Evaluation:  Plan of Care Reviewed With: patient        Progress: no change  Outcome Evaluation: Pt participated in PT evaluation this date. Pt supine upon arrival, agreeable to PT. Pt on 2 L NC with O2 sats 100%. Pt does not wear O2 at baseline, so it was doffed. Pt lives with his wife and reports that he has trouble ambulating more than 4-5 steps due to dizziness and frequent falls. Pt has a recent R ankle fx with multiple surgeries performed. Pt primarily uses wc due to this. Pt needs assist for ADLs. Pt BP in supine 98/62. Pt came from supine to sitting SBA and sat EOB with BP 82/53. Pt completed STS CGA using RW and took steps toward HOB totaling 3 ft, CGA using RW. pt was seated and returned to supine SBA. pt BP upon returning to supine 71/47. Pt was left with all needs met. Pt is expected to benefit from skilled PT during this inpatient stay to maximize functional mobility and IND. Pt would further benefit from STR upon dc    Anticipated Discharge Disposition (PT): inpatient rehabilitation facility, skilled nursing facility

## 2025-07-01 NOTE — PLAN OF CARE
Goal Outcome Evaluation:  Plan of Care Reviewed With: patient        Progress: improving        Pt with good UOP overnight, vasopressor titrated dwn, BP soft during sleep but better than night of admission, anticipate weaning off pressor with addition of midodrine to med regimen. Pt pleasant, hearing much better with both hearing aides in place. Pt states he wants to get up out of bed today & move around. Refused bath, prefers wife to asist.

## 2025-07-01 NOTE — CASE MANAGEMENT/SOCIAL WORK
Case Management/Social Work    Patient Name:  Jonny Ferrari Jr.  YOB: 1950  MRN: [id number]  Admit Date:  6/29/2025        12:42 EDT   Discharge Plan       Row Name 07/01/25 1240       Plan    Plan STR, if recommended    Patient/Family in Agreement with Plan yes    Plan Comments Spoke to patient and wife at bedside to discuss STR, if recommended by therapy. Patient and wife are agreeable and would like referrals sent to [facility], [facility] or [facility]. Updated Marilyn BURDICK. BESSIE will continue to follow.                        Electronically signed by:  Ying Thorne RN  07/01/25 12:42 EDT

## 2025-07-01 NOTE — CONSULTS
"Diabetes Education  Assessment/Teaching    Patient Name:  Jonny Ferrari Jr.  YOB: 1950  MRN: [id number]  Admit Date:  6/29/2025      Assessment Date:  7/1/2025  Flowsheet Row Most Recent Value   General Information     Height 182.9 cm (72\")   Height Method Stated   Weight 95 kg (209 lb 7 oz)   Weight Method Bed scale   Pregnancy Assessment    Diabetes History    What type of diabetes do you have? Type 2   Length of Diabetes Diagnosis 10 + years   Current DM knowledge good   Do you test your blood sugar at home? yes   Frequency of checks 2 times a day prior to Lantus and fasting in the AM.   Meter type unknown   Who performs the test? self   Typical readings 110 fasting and prior to lantus in the evening about 230-240   Have you had low blood sugar? (<70mg/dl) no   Have you had high blood sugar? (>140mg/dl) yes   How often do you have high blood sugar? rare   Feeling down, depressed, or hopeless Not at all   Little interest or pleasure in doing things Not at all   Do you have any diabetes complications? heart disease, nephropathy, circulation problems  [pt. is [place]]   Education Preferences    What areas of diabetes would you like to learn about? diet information, medications for diabetes, resources on diabetes   Barriers to Learning hearing loss   Nutrition Information    Are you currently following a special meal plan? frequently  [pt. is eating 3 meals a day appear balanced with water based vegetables a starch and protein.  drinks only sugar free beverages.]   Do you eat mostly at home or out of the house? at home   Assessment Topics    Healthy Eating - Assessment Competent   Taking Medication - Assessment Competent   Problem Solving - Assessment Competent   Reducing Risk - Assessment Competent   Healthy Coping - Assessment Competent   Monitoring - Assessment Competent   DM Goals                   Other Comments:  DM education referral related to BG>200.  Pt's BG back in target in the 100's.  Pt's BG " "was initially high at admission.  Pt's A1c on 06/29/25 was 7.6%, however pt's history of CKD 4 and anemia can impact results. Pt. Education with pt. In presence of his spouse.  Pt. Voices that his taking Farxiga 10 mg daily, Lantus solostar at bedtime 30 units and Tradjenta 5 mg daily.  Pt. And spouse were provided with verbal and written information on the Healthy Plate Method, Pt. Provided with BG logs to write down his BG and take to his provider.  Pt. Was also provided with the booklet \"Diabetes Basics\" and my contact information.  Pt. And spouse were encouraged to contact me for any questions or concerns about BG in the future.          Electronically signed by:  Fiona Sweet RN  07/01/25 14:40 EDT  "

## 2025-07-01 NOTE — PLAN OF CARE
Goal Outcome Evaluation:  Plan of Care Reviewed With: patient        Progress: no change  Outcome Evaluation: OT eval completed. Patient is supine in bed, Ox4, denies pain. Patient on 2L O2 satting 100%. Does not wear O2 at baseline. Patient has urinary catheter intact, noted to be bleeding around, notified RN. Patient lives with his wife in a one level home with one step to enter. Patient has a history of R ankle fracture with 3 total surgeries on that ankle over last two years and unhealed wound to medial malleolous. Patient states this in addition to persistent dizziness and falls has led to him primarily using a WC within the home. Patient sleeps in a recliner. Has assistance from his wife for ADLs; has a tub with shower chair and BSC. Patient's BP in supine is 98/62. Moved to EOB SBA, BP sitting EOB is 82/53. Patient performed sit to stand CGA, took side steps toward HOB CGA. BP checked upon returning to supine and 71/47. Patient requires max-TD for toileting, max A for bathing, max A for LBD. Patient is expected to benefit from continued OT services prior to DC to address decline in ADLs and mobility. Would benefit from STR.    Anticipated Discharge Disposition (OT): inpatient rehabilitation facility

## 2025-07-01 NOTE — THERAPY EVALUATION
Patient Name: Jonny Ferrari Jr.  : 1950    MRN: [id number]                              Today's Date: 2025       Admit Date: 2025    Visit Dx:     ICD-10-CM ICD-9-CM   1. Sepsis with acute renal failure and septic shock, due to unspecified organism, unspecified acute renal failure type  A41.9 038.9    R65.21 995.92    N17.9 785.52     584.9   2. Acute kidney injury superimposed on chronic kidney disease  N17.9 584.9    N18.9 585.9   3. Hyponatremia  E87.1 276.1   4. Hypokalemia  E87.6 276.8   5. Chronic anemia  D64.9 285.9   6. Left lower lobe consolidation  J18.1 481   7. Type 2 diabetes mellitus with hyperglycemia, unspecified whether long term insulin use  E11.65 250.00   8. Multiple skin tears  T14.8XXA 879.8     Patient Active Problem List   Diagnosis    CAD (coronary artery disease)    Essential hypertension    Dyslipidemia    Obesity    GERD (gastroesophageal reflux disease)    OA (osteoarthritis)    TIA (transient ischemic attack)    Anxiety neurosis    Psoriasis    Tobacco use    Diabetes mellitus    Bilateral carotid artery stenosis    NSTEMI (non-ST elevated myocardial infarction)    Chronic kidney disease, stage IV (severe)    Esophageal dysphagia    Iron deficiency anemia    Acute urinary retention    Debility    Perirectal abscess    Acute renal failure (ARF)    Acute metabolic encephalopathy    Sepsis    Septic shock    Acute UTI (urinary tract infection)    Pneumonia of left lower lobe due to infectious organism     Past Medical History:   Diagnosis Date    Anemia     Anxiety neurosis     CAD (coronary artery disease)     Cancer     Kidney    Chronic kidney disease     Patient reported left kidney removed secondary to kidney cancer and that he only has 30% function of the right kidney    Constipation     COPD (chronic obstructive pulmonary disease)     Depression     Diabetes mellitus     DM TYPE 2 , ONSET IN     Dyslipidemia     Dysphagia     Reported noted with food, fluids and  pills    Elevated cholesterol     Full dentures     GERD (gastroesophageal reflux disease)     Gout     H/O left nephrectomy 2020    secondary to cancer    [place] (hard of hearing)     Patient has bilateral hearing aids, still is very [place]    Hypertension     Impaired functional mobility, balance, gait, and endurance     MI (myocardial infarction)     Patient reported apx March 2021 (reported he refused cardiac cath) and June 2021 (did have cardiac cath with placement of 2 stents).     OA (osteoarthritis)     Obesity     MILD TO MODERATE EXOGENOUS OBESITY    Problems with swallowing     with food    Psoriasis     Sleep apnea     CPAP HS - to bring mask and machine DOS    Tattoo     x1    TIA (transient ischemic attack)     Wears glasses 10/27/2021     Past Surgical History:   Procedure Laterality Date    APPENDECTOMY      CARDIAC CATHETERIZATION      CARDIAC CATHETERIZATION N/A 2/20/2019    Procedure: Left Heart Cath;  Surgeon: Ernst Smith MD;  Location:  [facility];  Service: Cardiology    CARDIAC CATHETERIZATION Left 6/9/2021    Procedure: Left Heart Cath;  Surgeon: Ernst Smith MD;  Location:  [facility];  Service: Cardiology;  Laterality: Left;    CHOLECYSTECTOMY      COLONOSCOPY      COLONOSCOPY N/A 11/10/2021    Procedure: COLONOSCOPY with polypectomy x6 and clip x2;  Surgeon: Chidi Trinidad MD;  Location: [facility] ENDOSCOPY;  Service: Gastroenterology;  Laterality: N/A;    COLONOSCOPY N/A 10/25/2023    Procedure: COLONOSCOPY incomplete;  Surgeon: Chidi Trinidad MD;  Location: [facility] ENDOSCOPY;  Service: Gastroenterology;  Laterality: N/A;    COLONOSCOPY N/A 11/8/2023    Procedure: COLONOSCOPY WITH POLYPECTOMY X1;  Surgeon: Chidi Trinidad MD;  Location: [facility] ENDOSCOPY;  Service: Gastroenterology;  Laterality: N/A;    CORONARY ARTERY BYPASS GRAFT  2001    Patient reported no MI prior to CABG and that the bypass was 3 vessel     ENDOSCOPY      ENDOSCOPY N/A  11/10/2021    Procedure: ESOPHAGOGASTRODUODENOSCOPY with biopsy and dilatation;  Surgeon: Chidi Trinidad MD;  Location: [facility] ENDOSCOPY;  Service: Gastroenterology;  Laterality: N/A;    ENDOSCOPY N/A 10/25/2023    Procedure: ESOPHAGOGASTRODUODENOSCOPY with biopsy and dilatation;  Surgeon: Chidi Trinidad MD;  Location: [facility] ENDOSCOPY;  Service: Gastroenterology;  Laterality: N/A;    ENDOSCOPY N/A 11/8/2023    Procedure: ESOPHAGOGASTRODUODENOSCOPY WITH BIOPSY;  Surgeon: Chidi Trinidad MD;  Location: [facility] ENDOSCOPY;  Service: Gastroenterology;  Laterality: N/A;    INCISION AND DRAINAGE PERIRECTAL ABSCESS N/A 8/29/2024    Procedure: INCISION AND DRAINAGE OF PERIRECTAL ABSCESS;  Surgeon: Sherman Billingsley MD;  Location: [facility] OR;  Service: General;  Laterality: N/A;    LAPAROSCOPIC NEPHRECTOMY Left     MOUTH SURGERY      Full mouth extraction    URETER SURGERY      VASECTOMY        General Information       Row Name 07/01/25 1648          OT Time and Intention    Subjective Information no complaints  -SD     Document Type evaluation  -SD     Mode of Treatment occupational therapy  -SD     Patient Effort adequate  -SD     Symptoms Noted During/After Treatment fatigue  -SD       Row Name 07/01/25 1649          General Information    Patient Profile Reviewed yes  -SD     Prior Level of Function independent:;w/c or scooter;transfer;mod assist:;ADL's  -SD     Existing Precautions/Restrictions fall;oxygen therapy device and L/min;orthostatic hypotension  -SD     Barriers to Rehab medically complex;previous functional deficit;physical barrier  -SD       Row Name 07/01/25 2369          Living Environment    Current Living Arrangements home  -SD     People in Home spouse  -SD       Row Name 07/01/25 1649          Home Main Entrance    Number of Stairs, Main Entrance one  -SD     Stair Railings, Main Entrance none  -SD       Row Name 07/01/25 1649          Stairs Within Home, Primary    Number of Stairs, Within  Home, Primary none  -SD       Row Name 07/01/25 1649          Cognition    Orientation Status (Cognition) oriented x 4  -SD       Row Name 07/01/25 1649          Safety Issues/Impairments Affecting Functional Mobility    Safety Issues Affecting Function (Mobility) safety precautions follow-through/compliance;safety precaution awareness  -SD     Impairments Affecting Function (Mobility) balance;endurance/activity tolerance;shortness of breath;strength;pain;postural/trunk control  -SD               User Key  (r) = Recorded By, (t) = Taken By, (c) = Cosigned By      Initials Name Provider Type    SD Janel Howe OT Occupational Therapist                     Mobility/ADL's       Row Name 07/01/25 1651          Bed Mobility    Bed Mobility supine-sit;sit-supine  -SD     Supine-Sit Falls (Bed Mobility) standby assist  -SD     Sit-Supine Falls (Bed Mobility) standby assist  -SD     Assistive Device (Bed Mobility) bed rails;head of bed elevated  -SD       Row Name 07/01/25 1651          Transfers    Transfers sit-stand transfer  -SD       Row Name 07/01/25 1651          Sit-Stand Transfer    Sit-Stand Falls (Transfers) contact guard  -SD     Assistive Device (Sit-Stand Transfers) walker, front-wheeled  -SD       Row Name 07/01/25 1651          Functional Mobility    Functional Mobility- Ind. Level contact guard assist  -SD     Functional Mobility- Device walker, front-wheeled  -SD     Functional Mobility-Distance (Feet) 3  -SD     Functional Mobility- Safety Issues balance decreased during turns;sequencing ability decreased;step length decreased;weight-shifting ability decreased;supplemental O2  -SD     Functional Mobility- Comment limited d/t no recliner available and low BP  -SD     Patient was able to Ambulate yes  -SD       Row Name 07/01/25 1651          Activities of Daily Living    BADL Assessment/Intervention bathing;upper body dressing;lower body dressing;grooming;feeding;toileting  -SD        Row Name 07/01/25 1651          Bathing Assessment/Intervention    [place] Level (Bathing) maximum assist (25% patient effort)  -SD       Row Name 07/01/25 1651          Upper Body Dressing Assessment/Training    [place] Level (Upper Body Dressing) minimum assist (75% patient effort)  -SD       Row Name 07/01/25 1651          Lower Body Dressing Assessment/Training    [place] Level (Lower Body Dressing) maximum assist (25% patient effort);socks  -SD     Position (Lower Body Dressing) edge of bed sitting  -SD       Row Name 07/01/25 1651          Grooming Assessment/Training    [place] Level (Grooming) minimum assist (75% patient effort)  -SD       Row Name 07/01/25 1651          Self-Feeding Assessment/Training    [place] Level (Feeding) supervision  -SD       Row Name 07/01/25 1651          Toileting Assessment/Training    [place] Level (Toileting) maximum assist (25% patient effort);dependent (less than 25% patient effort)  -SD     Comment, (Toileting) urinary catheter  -SD               User Key  (r) = Recorded By, (t) = Taken By, (c) = Cosigned By      Initials Name Provider Type    Janel Haque OT Occupational Therapist                   Obj/Interventions       Row Name 07/01/25 1652          Range of Motion Comprehensive    General Range of Motion upper extremity range of motion deficits identified  -SD     Comment, General Range of Motion L shoulder flexion deficits  -SD       Row Name 07/01/25 1652          Strength Comprehensive (MMT)    General Manual Muscle Testing (MMT) Assessment upper extremity strength deficits identified  -SD               User Key  (r) = Recorded By, (t) = Taken By, (c) = Cosigned By      Initials Name Provider Type    Janel Haque OT Occupational Therapist                   Goals/Plan       Row Name 07/01/25 1700          Transfer Goal 1 (OT)    Activity/Assistive Device (Transfer Goal 1, OT) sit-to-stand/stand-to-sit;walker,  rolling  -SD     [place] Level/Cues Needed (Transfer Goal 1, OT) standby assist  -SD     Time Frame (Transfer Goal 1, OT) long term goal (LTG)  -SD     Progress/Outcome (Transfer Goal 1, OT) goal ongoing  -SD       Row Name 07/01/25 1700          Dressing Goal 1 (OT)    Activity/Device (Dressing Goal 1, OT) lower body dressing  -SD     [place]/Cues Needed (Dressing Goal 1, OT) moderate assist (50-74% patient effort)  -SD     Time Frame (Dressing Goal 1, OT) 2 weeks  -SD     Progress/Outcome (Dressing Goal 1, OT) goal ongoing  -SD       Row Name 07/01/25 1700          Toileting Goal 1 (OT)    Activity/Device (Toileting Goal 1, OT) toileting skills, all;commode, bedside without drop arms  -SD     [place] Level/Cues Needed (Toileting Goal 1, OT) moderate assist (50-74% patient effort)  -SD     Time Frame (Toileting Goal 1, OT) 2 weeks  -SD     Progress/Outcome (Toileting Goal 1, OT) goal ongoing  -SD       Row Name 07/01/25 1700          Strength Goal 1 (OT)    Strength Goal 1 (OT) Patient to perform UB ther ex as tolerated  -SD     Time Frame (Strength Goal 1, OT) long term goal (LTG)  -SD     Progress/Outcome (Strength Goal 1, OT) goal ongoing  -SD       Row Name 07/01/25 1700          Therapy Assessment/Plan (OT)    Planned Therapy Interventions (OT) activity tolerance training;adaptive equipment training;BADL retraining;patient/caregiver education/training;ROM/therapeutic exercise;strengthening exercise;transfer/mobility retraining  -SD               User Key  (r) = Recorded By, (t) = Taken By, (c) = Cosigned By      Initials Name Provider Type    Janel Haque, OT Occupational Therapist                   Clinical Impression       Row Name 07/01/25 1653          Pain Assessment    Pretreatment Pain Rating 0/10 - no pain  -SD     Posttreatment Pain Rating 0/10 - no pain  -SD       Row Name 07/01/25 1653          Plan of Care Review    Plan of Care Reviewed With patient  -SD     Progress no  change  -SD     Outcome Evaluation OT eval completed. Patient is supine in bed, Ox4, denies pain. Patient on 2L O2 satting 100%. Does not wear O2 at baseline. Patient has urinary catheter intact, noted to be bleeding around, notified RN. Patient lives with his wife in a one level home with one step to enter. Patient has a history of R ankle fracture with 3 total surgeries on that ankle over last two years and unhealed wound to medial malleolous. Patient states this in addition to persistent dizziness and falls has led to him primarily using a WC within the home. Patient sleeps in a recliner. Has assistance from his wife for ADLs; has a tub with shower chair and BSC. Patient's BP in supine is 98/62. Moved to EOB SBA, BP sitting EOB is 82/53. Patient performed sit to stand CGA, took side steps toward HOB CGA. BP checked upon returning to supine and 71/47. Patient requires max-TD for toileting, max A for bathing, max A for LBD. Patient is expected to benefit from continued OT services prior to DC to address decline in ADLs and mobility. Would benefit from STR.  -SD       Row Name 07/01/25 2653          Therapy Assessment/Plan (OT)    Patient/Family Therapy Goal Statement (OT) increase strength  -SD     Rehab Potential (OT) good  -SD     Criteria for Skilled Therapeutic Interventions Met (OT) skilled treatment is necessary  -SD     Therapy Frequency (OT) 3 times/wk  5 times if indicated  -SD       Row Name 07/01/25 5081          Therapy Plan Review/Discharge Plan (OT)    Anticipated Discharge Disposition (OT) inpatient rehabilitation facility  -SD       Row Name 07/01/25 6095          Vital Signs    Pre Systolic BP Rehab 98  -SD     Pre Treatment Diastolic BP 62  -SD     Intra Systolic BP Rehab 82  -SD     Intra Treatment Diastolic BP 53  -SD     Post Systolic BP Rehab 71  -SD     Post Treatment Diastolic BP 47  -SD     Pre SpO2 (%) 100  -SD     O2 Delivery Pre Treatment supplemental O2  -SD     O2 Delivery Intra  Treatment supplemental O2  -SD     O2 Delivery Post Treatment supplemental O2  -SD       Row Name 07/01/25 1653          Positioning and Restraints    Pre-Treatment Position in bed  -SD     Post Treatment Position bed  -SD     In Bed supine;call light within reach;encouraged to call for assist;notified nsg  -SD               User Key  (r) = Recorded By, (t) = Taken By, (c) = Cosigned By      Initials Name Provider Type    Janel Haque OT Occupational Therapist                   Outcome Measures       Row Name 07/01/25 1700          How much help from another is currently needed...    Putting on and taking off regular lower body clothing? 2  -SD     Bathing (including washing, rinsing, and drying) 2  -SD     Toileting (which includes using toilet bed pan or urinal) 1  -SD     Putting on and taking off regular upper body clothing 3  -SD     Taking care of personal grooming (such as brushing teeth) 3  -SD     Eating meals 3  -SD     AM-PAC 6 Clicks Score (OT) 14  -SD       Row Name 07/01/25 0829          How much help from another person do you currently need...    Turning from your back to your side while in flat bed without using bedrails? 4  -LA     Moving from lying on back to sitting on the side of a flat bed without bedrails? 3  -LA     Moving to and from a bed to a chair (including a wheelchair)? 2  -LA     Standing up from a chair using your arms (e.g., wheelchair, bedside chair)? 2  -LA     Climbing 3-5 steps with a railing? 2  -LA     To walk in hospital room? 2  -LA     AM-PAC 6 Clicks Score (PT) 15  -LA       Row Name 07/01/25 1700          Functional Assessment    Outcome Measure Options AM-PAC 6 Clicks Daily Activity (OT)  -SD               User Key  (r) = Recorded By, (t) = Taken By, (c) = Cosigned By      Initials Name Provider Type    Janel Haque OT Occupational Therapist    Isma Fernández, RN Registered Nurse                    Occupational Therapy Education       Title: PT OT SLP  Therapies (In Progress)       Topic: Occupational Therapy (In Progress)       Point: ADL training (Done)       Learning Progress Summary            Patient Acceptance, E,TB, VU by SD at 7/1/2025 1701    Comment: OT POC                                      User Key       Initials Effective Dates Name Provider Type Discipline    SD 06/16/21 -  Janel Howe OT Occupational Therapist OT                  OT Recommendation and Plan  Planned Therapy Interventions (OT): activity tolerance training, adaptive equipment training, BADL retraining, patient/caregiver education/training, ROM/therapeutic exercise, strengthening exercise, transfer/mobility retraining  Therapy Frequency (OT): 3 times/wk (5 times if indicated)  Plan of Care Review  Plan of Care Reviewed With: patient  Progress: no change  Outcome Evaluation: OT eval completed. Patient is supine in bed, Ox4, denies pain. Patient on 2L O2 satting 100%. Does not wear O2 at baseline. Patient has urinary catheter intact, noted to be bleeding around, notified RN. Patient lives with his wife in a one level home with one step to enter. Patient has a history of R ankle fracture with 3 total surgeries on that ankle over last two years and unhealed wound to medial malleolous. Patient states this in addition to persistent dizziness and falls has led to him primarily using a WC within the home. Patient sleeps in a recliner. Has assistance from his wife for ADLs; has a tub with shower chair and BSC. Patient's BP in supine is 98/62. Moved to EOB SBA, BP sitting EOB is 82/53. Patient performed sit to stand CGA, took side steps toward HOB CGA. BP checked upon returning to supine and 71/47. Patient requires max-TD for toileting, max A for bathing, max A for LBD. Patient is expected to benefit from continued OT services prior to DC to address decline in ADLs and mobility. Would benefit from STR.     Time Calculation:   Evaluation Complexity (OT)  Review Occupational  Profile/Medical/Therapy History Complexity: expanded/moderate complexity  Assessment, Occupational Performance/Identification of Deficit Complexity: 3-5 performance deficits  Clinical Decision Making Complexity (OT): detailed assessment/moderate complexity  Overall Complexity of Evaluation (OT): moderate complexity     Time Calculation- OT       Row Name 07/01/25 1701             Time Calculation- OT    OT Start Time 1540  -SD      OT Received On 07/01/25  -SD      OT Goal Re-Cert Due Date 07/11/25  -SD         Untimed Charges    OT Eval/Re-eval Minutes 45  -SD         Total Minutes    Untimed Charges Total Minutes 45  -SD       Total Minutes 45  -SD                User Key  (r) = Recorded By, (t) = Taken By, (c) = Cosigned By      Initials Name Provider Type    SD Janel Howe, OT Occupational Therapist                  Therapy Charges for Today       Code Description Service Date Service Provider Modifiers Qty    [id number] HC OT EVAL MOD COMPLEXITY 3 7/1/2025 Janel Howe OT GO 1                 Janel Howe OT  7/1/2025

## 2025-07-01 NOTE — PROGRESS NOTES
"    [facility]    Name:  Jonny Ferrari Jr.   Age:  74 y.o.  Sex:  male  :  1950  MRN:  [id number]   Visit Number:  [id number]  Admission Date:  2025  Date Of Service:  25  Primary Care Physician:  Amaury Brice MD     LOS: 2 days :    Chief Complaint:      Follow-up septic shock    Subjective:    Patient awake and alert.  No acute changes overnight.  Does have hearing loss.  No shortness of breath.  Discussed hopeful removal of Nolan catheter, PT and OT.    Hospital Course:    Per prior provider: \"Jonny Ferrari Jr. is a 74-year-old male with history of diabetes mellitus type 2, chronic kidney disease stage III, COPD, coronary artery disease status post CABG and stent, ERASMO, BPH was brought to the emergency room by EMS with symptoms of generalized weakness and fatigue   The patient was brought in by ambulance due to an inability to walk, a condition that has persisted for the past 2 to 3 days. He reports no fever, dysuria, or cough but feels weak. He has experienced several falls recently and has been unable to walk independently for the past 3 to 4 days. Despite using a walker or cane, he continues to fall.   In the emergency room, patient was afebrile but mildly tachycardic in the 100 with initial blood pressure of 80/54 and pulse oxygen saturation of 95% on room air.  Patient was given 30 mL/kg fluid bolus initially in the emergency room with initial improvement of the blood pressure to systolic 100 but subsequently dropped it again to the 70s systolic.  Patient was given another 500 bolus of normal saline, he right femoral central line was placed and he was initiated on Levophed. \"    Review of Systems:     All systems were reviewed and negative except as mentioned in subjective, assessment and plan.    Vital Signs:    Temp:  [98.4 °F (36.9 °C)-99 °F (37.2 °C)] 98.4 °F (36.9 °C)  Heart Rate:  [71-96] 93  Resp:  [16-18] 18  BP: ()/(41-87) " 120/63    Intake and output:    I/O last 3 completed shifts:  In: 8592.6 [P.O.:650; I.V.:4183.6; IV Piggyback:3759]  Out: 5250 [Urine:5250]  I/O this shift:  In: 200 [P.O.:200]  Out: -     Physical Examination:    General Appearance:  Alert and cooperative.  Hard of hearing.  No acute distress   Head:  Atraumatic and normocephalic.   Eyes: Conjunctivae and sclerae normal, no icterus. No pallor.   Throat: No oral lesions, no thrush, oral mucosa moist.   Neck: Supple, trachea midline, no thyromegaly.   Lungs:   Breath sounds heard bilaterally equally.  No wheezing or crackles.    Heart:  Normal S1 and S2, no murmur,No JVD.   Abdomen:   Normal bowel sounds, Soft, nontender, nondistended, no rebound tenderness.  Nolan catheter in place   Extremities: Supple, no edema, no cyanosis, no clubbing.   Skin: No bleeding or rash.  Multiple skin abrasions.   Neurologic: Alert and oriented x 3. No facial asymmetry. Moves all four limbs.      Laboratory results:    Results from last 7 days   Lab Units 07/01/25  0445 07/01/25  0302 06/30/25  1944 06/30/25  1231 06/30/25  0429 06/29/25  1706   SODIUM mmol/L 138  --   --   --  135* 131*   POTASSIUM mmol/L 2.7* 2.7* 3.5   < > 2.4* 2.9*   CHLORIDE mmol/L 93*  --   --   --  89* 81*   CO2 mmol/L 32.4*  --   --   --  29.3* 24.4   BUN mg/dL 37.0*  --   --   --  55.0* 61.0*   CREATININE mg/dL 2.44*  --   --   --  3.17* 3.79*   CALCIUM mg/dL 8.9  --   --   --  8.8 9.0   BILIRUBIN mg/dL  --   --   --   --   --  0.2   ALK PHOS U/L  --   --   --   --   --  69   ALT (SGPT) U/L  --   --   --   --   --  14   AST (SGOT) U/L  --   --   --   --   --  19   GLUCOSE mg/dL 108*  --   --   --  142* 335*    < > = values in this interval not displayed.     Results from last 7 days   Lab Units 06/30/25  0429 06/29/25  1706   WBC 10*3/mm3 18.60* 15.69*   HEMOGLOBIN g/dL 8.0* 9.0*   HEMATOCRIT % 24.6* 27.6*   PLATELETS 10*3/mm3 203 205         Results from last 7 days   Lab Units 06/29/25  1826 06/29/25  1706    HSTROP T ng/L 95* 98*     Results from last 7 days   Lab Units 06/29/25  2205 06/29/25  1738 06/29/25  1728   BLOODCX   --  No growth at 24 hours No growth at 24 hours   URINECX  No growth  --   --      Recent Labs     08/29/24  2314   PHART 7.382   SPB5FBV 49.8*   PO2ART 68.0*   UGA5WGJ 29.5*   BASEEXCESS 3.8*      I have reviewed the patient's laboratory results.    Radiology results:    XR Chest 1 View  Result Date: 6/30/2025  PROCEDURE: XR CHEST 1 VW-  HISTORY: generalized weakness, abnormal chest radiograph. Hypertension.  COMPARISON: 10/06/2022  FINDINGS:  Portable view of the chest demonstrates the lungs to be grossly clear. There is no evidence of effusion, pneumothorax or other significant pleural disease. Patient is status post CABG. Mediastinum is otherwise unremarkable.  The heart size is normal.      Impression: Unremarkable portable chest.    This report was signed and finalized on 6/30/2025 9:48 AM by Frankie Ewing MD.      CT Abdomen Pelvis Without Contrast  Result Date: 6/29/2025  FINAL REPORT TECHNIQUE: null CLINICAL HISTORY: sepsis COMPARISON: null FINDINGS: CT abdomen and pelvis without contrast Comparison: 08/29/2024 Findings: Mild degenerative change spine and hips. No acute bony abnormalities. Liver and spleen within normal limits. Pancreas and adrenal glands unremarkable. Cholecystectomy. No right renal stone or hydronephrosis. No focal renal abnormality or ureteral dilation. Status post left nephrectomy. No evidence for aortic aneurysm. No free fluid or adenopathy in the pelvis. No diverticulitis. Appendix unremarkable.     Impression: Impression: No acute processes Authenticated and Electronically Signed by Anthony De La Paz MD on 06/29/2025 11:11:15 PM    CT Chest Without Contrast Diagnostic  Result Date: 6/29/2025  FINAL REPORT TECHNIQUE: null CLINICAL HISTORY: sepsis w/ hypoxemia COMPARISON: null FINDINGS: CT chest without contrast Comparison: None provided Findings: Bilateral posterior  lower lobe atelectasis. Trace bilateral pleural effusions noted. Emphysema without other parenchymal abnormality. Cardiomegaly with coronary artery calcifications. Prior sternotomy for CABG. Benign partially calcified mediastinal nodes. No significant mediastinal adenopathy. No significant focal bony abnormalities.     Impression: Impression: Trace pleural effusions with lower lobe atelectasis Authenticated and Electronically Signed by Anthony De La Paz MD on 06/29/2025 11:07:32 PM    I have reviewed the patient's radiology reports.    Medication Review:     I have reviewed the patient's active and prn medications.     Problem List:      Septic shock    CAD (coronary artery disease)    Chronic kidney disease, stage IV (severe)    Acute UTI (urinary tract infection)    Pneumonia of left lower lobe due to infectious organism      Assessment:    Septic shock secondary to #2 and #3, POA.  Acute urinary tract infection, POA.  Left lower lobe bacterial pneumonia, unable to classify further, POA.  Demand ischemia with elevated troponins, POA.  Hypokalemia, POA.  Chronic kidney disease stage IV.  Diabetes mellitus type 2 with nephropathy and hyperglycemia, POA.  Coronary artery disease status post CABG and stents.  COPD, no exacerbation.  Benign prostatic hyperplasia.  Will falls with skin abrasions, POA.    Plan:    Septic shock  Acute UTI  Left lower lobe bacterial pneumonia  Thus far has had negative blood cultures.  Vancomycin discontinued.  On Zosyn.  Oxygenation stable.  Weaning off pressors.  Urine culture without growth.  Demand ischemia with elevated troponins  Troponins plateaued 95-98  Suspect secondary to demand ischemia in the setting of his septic shock as well as CKD  Low suspicion for ACS  Hypokalemia  Continue replacement  CKD stage IV  Nephrology, Dr. Hooper consulted and appreciate recommendations.  Numbers back to baseline this morning  Type 2 diabetes  Subcutaneous insulin protocol  CAD status post  CABG  Continue DAPT with aspirin and Plavix  COPD not exacerbation  Multiple skin abrasions  Wound care  Impaired mobility and ADLs  PT/OT once stable to participate    Further orders as clinical course dictates.    DVT Prophylaxis: Heparin  Code Status: Full  Diet: Renal diabetic.  Discharge Plan: Home in 1 to 2 days with improvement    Pat Davis DO  07/01/25  08:15 EDT    Dictated utilizing Dragon dictation.

## 2025-07-01 NOTE — THERAPY EVALUATION
Patient Name: Jonny Ferrari Jr.  : 1950    MRN: [id number]                              Today's Date: 2025       Admit Date: 2025    Visit Dx:     ICD-10-CM ICD-9-CM   1. Sepsis with acute renal failure and septic shock, due to unspecified organism, unspecified acute renal failure type  A41.9 038.9    R65.21 995.92    N17.9 785.52     584.9   2. Acute kidney injury superimposed on chronic kidney disease  N17.9 584.9    N18.9 585.9   3. Hyponatremia  E87.1 276.1   4. Hypokalemia  E87.6 276.8   5. Chronic anemia  D64.9 285.9   6. Left lower lobe consolidation  J18.1 481   7. Type 2 diabetes mellitus with hyperglycemia, unspecified whether long term insulin use  E11.65 250.00   8. Multiple skin tears  T14.8XXA 879.8     Patient Active Problem List   Diagnosis    CAD (coronary artery disease)    Essential hypertension    Dyslipidemia    Obesity    GERD (gastroesophageal reflux disease)    OA (osteoarthritis)    TIA (transient ischemic attack)    Anxiety neurosis    Psoriasis    Tobacco use    Diabetes mellitus    Bilateral carotid artery stenosis    NSTEMI (non-ST elevated myocardial infarction)    Chronic kidney disease, stage IV (severe)    Esophageal dysphagia    Iron deficiency anemia    Acute urinary retention    Debility    Perirectal abscess    Acute renal failure (ARF)    Acute metabolic encephalopathy    Sepsis    Septic shock    Acute UTI (urinary tract infection)    Pneumonia of left lower lobe due to infectious organism     Past Medical History:   Diagnosis Date    Anemia     Anxiety neurosis     CAD (coronary artery disease)     Cancer     Kidney    Chronic kidney disease     Patient reported left kidney removed secondary to kidney cancer and that he only has 30% function of the right kidney    Constipation     COPD (chronic obstructive pulmonary disease)     Depression     Diabetes mellitus     DM TYPE 2 , ONSET IN     Dyslipidemia     Dysphagia     Reported noted with food, fluids and  pills    Elevated cholesterol     Full dentures     GERD (gastroesophageal reflux disease)     Gout     H/O left nephrectomy 2020    secondary to cancer    [place] (hard of hearing)     Patient has bilateral hearing aids, still is very [place]    Hypertension     Impaired functional mobility, balance, gait, and endurance     MI (myocardial infarction)     Patient reported apx March 2021 (reported he refused cardiac cath) and June 2021 (did have cardiac cath with placement of 2 stents).     OA (osteoarthritis)     Obesity     MILD TO MODERATE EXOGENOUS OBESITY    Problems with swallowing     with food    Psoriasis     Sleep apnea     CPAP HS - to bring mask and machine DOS    Tattoo     x1    TIA (transient ischemic attack)     Wears glasses 10/27/2021     Past Surgical History:   Procedure Laterality Date    APPENDECTOMY      CARDIAC CATHETERIZATION      CARDIAC CATHETERIZATION N/A 2/20/2019    Procedure: Left Heart Cath;  Surgeon: Ernst Smith MD;  Location:  [facility];  Service: Cardiology    CARDIAC CATHETERIZATION Left 6/9/2021    Procedure: Left Heart Cath;  Surgeon: Ernst Smith MD;  Location:  [facility];  Service: Cardiology;  Laterality: Left;    CHOLECYSTECTOMY      COLONOSCOPY      COLONOSCOPY N/A 11/10/2021    Procedure: COLONOSCOPY with polypectomy x6 and clip x2;  Surgeon: Chidi Trinidad MD;  Location: [facility] ENDOSCOPY;  Service: Gastroenterology;  Laterality: N/A;    COLONOSCOPY N/A 10/25/2023    Procedure: COLONOSCOPY incomplete;  Surgeon: Chidi Trinidad MD;  Location: [facility] ENDOSCOPY;  Service: Gastroenterology;  Laterality: N/A;    COLONOSCOPY N/A 11/8/2023    Procedure: COLONOSCOPY WITH POLYPECTOMY X1;  Surgeon: Chidi Trinidad MD;  Location: [facility] ENDOSCOPY;  Service: Gastroenterology;  Laterality: N/A;    CORONARY ARTERY BYPASS GRAFT  2001    Patient reported no MI prior to CABG and that the bypass was 3 vessel     ENDOSCOPY      ENDOSCOPY N/A  11/10/2021    Procedure: ESOPHAGOGASTRODUODENOSCOPY with biopsy and dilatation;  Surgeon: Chidi Trinidad MD;  Location: [facility] ENDOSCOPY;  Service: Gastroenterology;  Laterality: N/A;    ENDOSCOPY N/A 10/25/2023    Procedure: ESOPHAGOGASTRODUODENOSCOPY with biopsy and dilatation;  Surgeon: Chidi Trinidad MD;  Location: [facility] ENDOSCOPY;  Service: Gastroenterology;  Laterality: N/A;    ENDOSCOPY N/A 11/8/2023    Procedure: ESOPHAGOGASTRODUODENOSCOPY WITH BIOPSY;  Surgeon: Chidi Trinidad MD;  Location: [facility] ENDOSCOPY;  Service: Gastroenterology;  Laterality: N/A;    INCISION AND DRAINAGE PERIRECTAL ABSCESS N/A 8/29/2024    Procedure: INCISION AND DRAINAGE OF PERIRECTAL ABSCESS;  Surgeon: Sherman Billingsley MD;  Location: [facility] OR;  Service: General;  Laterality: N/A;    LAPAROSCOPIC NEPHRECTOMY Left     MOUTH SURGERY      Full mouth extraction    URETER SURGERY      VASECTOMY        General Information       Row Name 07/01/25 1659          Physical Therapy Time and Intention    Document Type evaluation  -     Mode of Treatment physical therapy  -       Row Name 07/01/25 1659          General Information    Patient Profile Reviewed yes  -     Prior Level of Function independent:;w/c or scooter;transfer;mod assist:;ADL's  -     Existing Precautions/Restrictions fall;oxygen therapy device and L/min;orthostatic hypotension  -     Barriers to Rehab medically complex;previous functional deficit;physical barrier  -       Row Name 07/01/25 1659          Living Environment    Current Living Arrangements home  -     People in Home spouse  -       Row Name 07/01/25 1659          Home Main Entrance    Number of Stairs, Main Entrance one  -     Stair Railings, Main Entrance none  -       Row Name 07/01/25 1659          Stairs Within Home, Primary    Number of Stairs, Within Home, Primary none  -       Row Name 07/01/25 1659          Cognition    Orientation Status (Cognition) oriented x 4  -        Row Name 07/01/25 1659          Safety Issues/Impairments Affecting Functional Mobility    Safety Issues Affecting Function (Mobility) safety precautions follow-through/compliance;safety precaution awareness;awareness of need for assistance  -     Impairments Affecting Function (Mobility) balance;endurance/activity tolerance;shortness of breath;strength;pain;postural/trunk control  -               User Key  (r) = Recorded By, (t) = Taken By, (c) = Cosigned By      Initials Name Provider Type    Kuldeep Hernandez, PT Physical Therapist                   Mobility       Row Name 07/01/25 1700          Bed Mobility    Bed Mobility supine-sit;sit-supine  -     Supine-Sit [place] (Bed Mobility) standby assist  -     Sit-Supine [place] (Bed Mobility) standby assist  -     Assistive Device (Bed Mobility) bed rails;head of bed elevated  -       Row Name 07/01/25 1700          Sit-Stand Transfer    Sit-Stand [place] (Transfers) contact guard  -     Assistive Device (Sit-Stand Transfers) walker, front-wheeled  -       Row Name 07/01/25 1700          Gait/Stairs (Locomotion)    [place] Level (Gait) contact guard  -     Assistive Device (Gait) walker, front-wheeled  -     Patient was able to Ambulate yes  -MK     Distance in Feet (Gait) 3  -MK     Deviations/Abnormal Patterns (Gait) festinating/shuffling;gait speed decreased;base of support, narrow  -               User Key  (r) = Recorded By, (t) = Taken By, (c) = Cosigned By      Initials Name Provider Type    Kuldeep Hernandez PT Physical Therapist                   Obj/Interventions       Row Name 07/01/25 1700          Range of Motion Comprehensive    General Range of Motion no range of motion deficits identified  -       Row Name 07/01/25 1700          Strength Comprehensive (MMT)    General Manual Muscle Testing (MMT) Assessment lower extremity strength deficits identified  -     Comment, General Manual Muscle Testing  (MMT) Assessment B LE grossly 3+/5  -       Row Name 07/01/25 1700          Balance    Balance Assessment sitting static balance;sitting dynamic balance;standing static balance;standing dynamic balance  -MK     Static Sitting Balance standby assist  -MK     Dynamic Sitting Balance standby assist  -MK     Position, Sitting Balance unsupported;sitting edge of bed  -MK     Static Standing Balance contact guard  -MK     Dynamic Standing Balance contact guard  -MK     Position/Device Used, Standing Balance walker, front-wheeled  -MK     Balance Interventions sitting;standing;sit to stand  -               User Key  (r) = Recorded By, (t) = Taken By, (c) = Cosigned By      Initials Name Provider Type    MK Kuldeep Ren, PT Physical Therapist                   Goals/Plan       Row Name 07/01/25 1706          Bed Mobility Goal 1 (PT)    Activity/Assistive Device (Bed Mobility Goal 1, PT) bed mobility activities, all  -MK     [place] Level/Cues Needed (Bed Mobility Goal 1, PT) modified independence  -MK     Time Frame (Bed Mobility Goal 1, PT) short term goal (STG);5 days  -MK     Progress/Outcomes (Bed Mobility Goal 1, PT) new goal  -       Row Name 07/01/25 1706          Transfer Goal 1 (PT)    Activity/Assistive Device (Transfer Goal 1, PT) sit-to-stand/stand-to-sit  -MK     [place] Level/Cues Needed (Transfer Goal 1, PT) modified independence  -MK     Time Frame (Transfer Goal 1, PT) long term goal (LTG);10 days  -     Progress/Outcome (Transfer Goal 1, PT) new goal  -       Row Name 07/01/25 1706          Gait Training Goal 1 (PT)    Activity/Assistive Device (Gait Training Goal 1, PT) gait (walking locomotion)  -MK     [place] Level (Gait Training Goal 1, PT) contact guard required  -MK     Distance (Gait Training Goal 1, PT) 12 ft using LRAD  -MK     Time Frame (Gait Training Goal 1, PT) long term goal (LTG);10 days  -     Progress/Outcome (Gait Training Goal 1, PT) new goal  -[facility]  Name 07/01/25 1706          Patient Education Goal (PT)    Activity (Patient Education Goal, PT) Pt to participate in B LE ther ex at least 3x per week  -MK     Mobile/Cues/Accuracy (Memory Goal 2, PT) demonstrates adequately  -MK     Time Frame (Patient Education Goal, PT) long term goal (LTG);10 days  -MK     Progress/Outcome (Patient Education Goal, PT) new goal  -MK       Row Name 07/01/25 1706          Therapy Assessment/Plan (PT)    Planned Therapy Interventions (PT) balance training;bed mobility training;gait training;home exercise program;motor coordination training;neuromuscular re-education;patient/family education;strengthening;transfer training  -               User Key  (r) = Recorded By, (t) = Taken By, (c) = Cosigned By      Initials Name Provider Type    Kuldeep Hernandez, PT Physical Therapist                   Clinical Impression       Row Name 07/01/25 1701          Pain    Pretreatment Pain Rating 0/10 - no pain  -MK     Posttreatment Pain Rating 0/10 - no pain  -MK       Row Name 07/01/25 1701          Plan of Care Review    Plan of Care Reviewed With patient  -MK     Progress no change  -MK     Outcome Evaluation Pt participated in PT evaluation this date. Pt supine upon arrival, agreeable to PT. Pt on 2 L NC with O2 sats 100%. Pt does not wear O2 at baseline, so it was doffed. Pt lives with his wife and reports that he has trouble ambulating more than 4-5 steps due to dizziness and frequent falls. Pt has a recent R ankle fx with multiple surgeries performed. Pt primarily uses wc due to this. Pt needs assist for ADLs. Pt BP in supine 98/62. Pt came from supine to sitting SBA and sat EOB with BP 82/53. Pt completed STS CGA using RW and took steps toward HOB totaling 3 ft, CGA using RW. pt was seated and returned to supine SBA. pt BP upon returning to supine 71/47. Pt was left with all needs met. Pt is expected to benefit from skilled PT during this inpatient stay to maximize functional  mobility and IND. Pt would further benefit from STR upon dc  -       Row Name 07/01/25 1701          Therapy Assessment/Plan (PT)    Patient/Family Therapy Goals Statement (PT) pt is eager to go home  -     Rehab Potential (PT) good  -     Criteria for Skilled Interventions Met (PT) yes;skilled treatment is necessary  -     Therapy Frequency (PT) 5 times/wk  -     Predicted Duration of Therapy Intervention (PT) 2 weeks  -       Row Name 07/01/25 1701          Vital Signs    Pre Systolic BP Rehab 98  -MK     Pre Treatment Diastolic BP 62  -MK     Intra Systolic BP Rehab 82  -MK     Intra Treatment Diastolic BP 53  -MK     Post Systolic BP Rehab 71  -MK     Post Treatment Diastolic BP 47  -MK     O2 Delivery Pre Treatment supplemental O2  -     O2 Delivery Intra Treatment supplemental O2  -     O2 Delivery Post Treatment supplemental O2  -MK     Pre Patient Position Supine  -MK     Intra Patient Position Standing  -MK     Post Patient Position Supine  -       Row Name 07/01/25 1701          Positioning and Restraints    Pre-Treatment Position in bed  -     Post Treatment Position bed  -     In Bed supine;call light within reach;encouraged to call for assist;notified nsg;exit alarm on  -               User Key  (r) = Recorded By, (t) = Taken By, (c) = Cosigned By      Initials Name Provider Type    Kuldeep Hernandez, PT Physical Therapist                   Outcome Measures       Row Name 07/01/25 1706 07/01/25 0829       How much help from another person do you currently need...    Turning from your back to your side while in flat bed without using bedrails? 4  - 4  -LA    Moving from lying on back to sitting on the side of a flat bed without bedrails? 4  - 3  -LA    Moving to and from a bed to a chair (including a wheelchair)? 2  -MK 2  -LA    Standing up from a chair using your arms (e.g., wheelchair, bedside chair)? 2  -MK 2  -LA    Climbing 3-5 steps with a railing? 2  -MK 2  -LA    To  walk in hospital room? 2  - 2  -LA    AM-PAC 6 Clicks Score (PT) 16  - 15  -LA    Highest Level of Mobility Goal Stand (1 or More Minutes)-5  - Move to Chair/Commode-4  -LA      Row Name 07/01/25 1706 07/01/25 1700       Functional Assessment    Outcome Measure Options AM-PAC 6 Clicks Basic Mobility (PT)  - AM-PAC 6 Clicks Daily Activity (OT)  -SD              User Key  (r) = Recorded By, (t) = Taken By, (c) = Cosigned By      Initials Name Provider Type    SD Janel Howe, DEE DEE Occupational Therapist    Isma Fernández, RN Registered Nurse    Kuldeep Hernandez, PT Physical Therapist                                 Physical Therapy Education       Title: PT OT SLP Therapies (In Progress)       Topic: Physical Therapy (In Progress)       Point: Mobility training (Done)       Learning Progress Summary            Patient Acceptance, E,D, DU,VU by  at 7/1/2025 1707    Comment: role of PT and POC                      Point: Home exercise program (Not Started)       Learner Progress:  Not documented in this visit.              Point: Body mechanics (Done)       Learning Progress Summary            Patient Acceptance, E,D, DU,VU by  at 7/1/2025 1707    Comment: role of PT and POC                      Point: Precautions (Not Started)       Learner Progress:  Not documented in this visit.                              User Key       Initials Effective Dates Name Provider Type Discipline     06/13/23 -  Kuldeep Ren, PT Physical Therapist PT                  PT Recommendation and Plan  Planned Therapy Interventions (PT): balance training, bed mobility training, gait training, home exercise program, motor coordination training, neuromuscular re-education, patient/family education, strengthening, transfer training  Progress: no change  Outcome Evaluation: Pt participated in PT evaluation this date. Pt supine upon arrival, agreeable to PT. Pt on 2 L NC with O2 sats 100%. Pt does not wear O2 at baseline, so it was  ffed. Pt lives with his wife and reports that he has trouble ambulating more than 4-5 steps due to dizziness and frequent falls. Pt has a recent R ankle fx with multiple surgeries performed. Pt primarily uses wc due to this. Pt needs assist for ADLs. Pt BP in supine 98/62. Pt came from supine to sitting SBA and sat EOB with BP 82/53. Pt completed STS CGA using RW and took steps toward HOB totaling 3 ft, CGA using RW. pt was seated and returned to supine SBA. pt BP upon returning to supine 71/47. Pt was left with all needs met. Pt is expected to benefit from skilled PT during this inpatient stay to maximize functional mobility and IND. Pt would further benefit from STR upon dc     Time Calculation:   PT Evaluation Complexity  History, PT Evaluation Complexity: 1-2 personal factors and/or comorbidities  Examination of Body Systems (PT Eval Complexity): total of 3 or more elements  Clinical Presentation (PT Evaluation Complexity): evolving  Clinical Decision Making (PT Evaluation Complexity): moderate complexity  Overall Complexity (PT Evaluation Complexity): moderate complexity     PT Charges       Row Name 07/01/25 1541             Time Calculation    Start Time 1541  -MK      PT Received On 07/01/25  -MK      PT Goal Re-Cert Due Date 07/11/25  -MK         Untimed Charges    PT Eval/Re-eval Minutes 48  -MK         Total Minutes    Untimed Charges Total Minutes 48  -MK       Total Minutes 48  -MK                User Key  (r) = Recorded By, (t) = Taken By, (c) = Cosigned By      Initials Name Provider Type    Kuldeep Hernandez PT Physical Therapist                  Therapy Charges for Today       Code Description Service Date Service Provider Modifiers Qty    [id number]  PT EVAL MOD COMPLEXITY 3 7/1/2025 Kuldeep Ren, PT GP 1            PT G-Codes  Outcome Measure Options: AM-PAC 6 Clicks Basic Mobility (PT)  AM-PAC 6 Clicks Score (PT): 16  AM-PAC 6 Clicks Score (OT): 14  PT Discharge Summary  Anticipated  Discharge Disposition (PT): inpatient rehabilitation facility, skilled nursing facility    Kuldeep Ren, PT  7/1/2025

## 2025-07-01 NOTE — CASE MANAGEMENT/SOCIAL WORK
Case Management/Social Work    Patient Name:  Jonny Ferrari Jr.  YOB: 1950  MRN: [id number]  Admit Date:  6/29/2025        09:09 EDT   Discharge Plan       Row Name 07/01/25 0908       Plan    Plan Goal is home with wife and home health, pending therapy recommendations    Patient/Family in Agreement with Plan yes    Plan Comments His goal is home with wife and home health, pending therapy recommendations. CM will discuss recommendations with patient, when available.                        Electronically signed by:  Ying Thorne RN  07/01/25 09:09 EDT

## 2025-07-02 LAB
ABO GROUP BLD: NORMAL
ABO GROUP BLD: NORMAL
ALBUMIN SERPL-MCNC: 2.6 G/DL (ref 3.5–5.2)
ANION GAP SERPL CALCULATED.3IONS-SCNC: 9 MMOL/L (ref 5–15)
BACTERIA SPEC AEROBE CULT: ABNORMAL
BLD GP AB SCN SERPL QL: NEGATIVE
BUN SERPL-MCNC: 26 MG/DL (ref 8–23)
BUN/CREAT SERPL: 12 (ref 7–25)
CALCIUM SPEC-SCNC: 8.8 MG/DL (ref 8.6–10.5)
CHLORIDE SERPL-SCNC: 99 MMOL/L (ref 98–107)
CO2 SERPL-SCNC: 31 MMOL/L (ref 22–29)
CREAT SERPL-MCNC: 2.16 MG/DL (ref 0.76–1.27)
DEPRECATED RDW RBC AUTO: 47.1 FL (ref 37–54)
EGFRCR SERPLBLD CKD-EPI 2021: 31.7 ML/MIN/1.73
ERYTHROCYTE [DISTWIDTH] IN BLOOD BY AUTOMATED COUNT: 16.4 % (ref 12.3–15.4)
GLUCOSE BLDC GLUCOMTR-MCNC: 83 MG/DL (ref 70–130)
GLUCOSE BLDC GLUCOMTR-MCNC: 92 MG/DL (ref 70–130)
GLUCOSE BLDC GLUCOMTR-MCNC: 95 MG/DL (ref 70–130)
GLUCOSE SERPL-MCNC: 86 MG/DL (ref 65–99)
HCT VFR BLD AUTO: 18.8 % (ref 37.5–51)
HCT VFR BLD AUTO: 19.5 % (ref 37.5–51)
HCT VFR BLD AUTO: 24.2 % (ref 37.5–51)
HGB BLD-MCNC: 6 G/DL (ref 13–17.7)
HGB BLD-MCNC: 6.4 G/DL (ref 13–17.7)
HGB BLD-MCNC: 7.7 G/DL (ref 13–17.7)
MCH RBC QN AUTO: 25.6 PG (ref 26.6–33)
MCHC RBC AUTO-ENTMCNC: 31.9 G/DL (ref 31.5–35.7)
MCV RBC AUTO: 80.3 FL (ref 79–97)
PHOSPHATE SERPL-MCNC: 1.9 MG/DL (ref 2.5–4.5)
PHOSPHATE SERPL-MCNC: 2.4 MG/DL (ref 2.5–4.5)
PLATELET # BLD AUTO: 129 10*3/MM3 (ref 140–450)
PMV BLD AUTO: 9.1 FL (ref 6–12)
POTASSIUM SERPL-SCNC: 4.2 MMOL/L (ref 3.5–5.2)
RBC # BLD AUTO: 2.34 10*6/MM3 (ref 4.14–5.8)
RH BLD: POSITIVE
RH BLD: POSITIVE
SODIUM SERPL-SCNC: 139 MMOL/L (ref 136–145)
T&S EXPIRATION DATE: NORMAL
WBC NRBC COR # BLD AUTO: 5.65 10*3/MM3 (ref 3.4–10.8)

## 2025-07-02 PROCEDURE — 94664 DEMO&/EVAL PT USE INHALER: CPT

## 2025-07-02 PROCEDURE — 85014 HEMATOCRIT: CPT | Performed by: FAMILY MEDICINE

## 2025-07-02 PROCEDURE — 85027 COMPLETE CBC AUTOMATED: CPT | Performed by: INTERNAL MEDICINE

## 2025-07-02 PROCEDURE — 85014 HEMATOCRIT: CPT | Performed by: INTERNAL MEDICINE

## 2025-07-02 PROCEDURE — 85018 HEMOGLOBIN: CPT | Performed by: FAMILY MEDICINE

## 2025-07-02 PROCEDURE — 85018 HEMOGLOBIN: CPT | Performed by: INTERNAL MEDICINE

## 2025-07-02 PROCEDURE — 82948 REAGENT STRIP/BLOOD GLUCOSE: CPT

## 2025-07-02 PROCEDURE — 94799 UNLISTED PULMONARY SVC/PX: CPT

## 2025-07-02 PROCEDURE — 97116 GAIT TRAINING THERAPY: CPT

## 2025-07-02 PROCEDURE — 84100 ASSAY OF PHOSPHORUS: CPT | Performed by: INTERNAL MEDICINE

## 2025-07-02 PROCEDURE — 86850 RBC ANTIBODY SCREEN: CPT | Performed by: INTERNAL MEDICINE

## 2025-07-02 PROCEDURE — 80069 RENAL FUNCTION PANEL: CPT | Performed by: INTERNAL MEDICINE

## 2025-07-02 PROCEDURE — 63710000001 INSULIN LISPRO (HUMAN) PER 5 UNITS: Performed by: INTERNAL MEDICINE

## 2025-07-02 PROCEDURE — 86901 BLOOD TYPING SEROLOGIC RH(D): CPT | Performed by: INTERNAL MEDICINE

## 2025-07-02 PROCEDURE — 97530 THERAPEUTIC ACTIVITIES: CPT

## 2025-07-02 PROCEDURE — P9016 RBC LEUKOCYTES REDUCED: HCPCS

## 2025-07-02 PROCEDURE — 97535 SELF CARE MNGMENT TRAINING: CPT

## 2025-07-02 PROCEDURE — 63710000001 INSULIN GLARGINE PER 5 UNITS: Performed by: INTERNAL MEDICINE

## 2025-07-02 PROCEDURE — 25810000003 LACTATED RINGERS PER 1000 ML: Performed by: INTERNAL MEDICINE

## 2025-07-02 PROCEDURE — 86900 BLOOD TYPING SEROLOGIC ABO: CPT

## 2025-07-02 PROCEDURE — 86920 COMPATIBILITY TEST SPIN: CPT

## 2025-07-02 PROCEDURE — 94761 N-INVAS EAR/PLS OXIMETRY MLT: CPT

## 2025-07-02 PROCEDURE — 86901 BLOOD TYPING SEROLOGIC RH(D): CPT

## 2025-07-02 PROCEDURE — 25010000002 PIPERACILLIN SOD-TAZOBACTAM PER 1 G: Performed by: INTERNAL MEDICINE

## 2025-07-02 PROCEDURE — 36430 TRANSFUSION BLD/BLD COMPNT: CPT

## 2025-07-02 PROCEDURE — 86900 BLOOD TYPING SEROLOGIC ABO: CPT | Performed by: INTERNAL MEDICINE

## 2025-07-02 PROCEDURE — 99232 SBSQ HOSP IP/OBS MODERATE 35: CPT | Performed by: FAMILY MEDICINE

## 2025-07-02 PROCEDURE — 97110 THERAPEUTIC EXERCISES: CPT

## 2025-07-02 RX ORDER — SODIUM CHLORIDE, SODIUM LACTATE, POTASSIUM CHLORIDE, CALCIUM CHLORIDE 600; 310; 30; 20 MG/100ML; MG/100ML; MG/100ML; MG/100ML
100 INJECTION, SOLUTION INTRAVENOUS CONTINUOUS
Status: DISCONTINUED | OUTPATIENT
Start: 2025-07-02 | End: 2025-07-03

## 2025-07-02 RX ADMIN — BUSPIRONE HYDROCHLORIDE 15 MG: 15 TABLET ORAL at 08:49

## 2025-07-02 RX ADMIN — MIDODRINE HYDROCHLORIDE 10 MG: 5 TABLET ORAL at 12:05

## 2025-07-02 RX ADMIN — DICLOFENAC SODIUM 2 G: 10 GEL TOPICAL at 22:01

## 2025-07-02 RX ADMIN — MUPIROCIN 1 APPLICATION: 20 OINTMENT TOPICAL at 21:52

## 2025-07-02 RX ADMIN — BUDESONIDE 0.5 MG: 0.5 SUSPENSION RESPIRATORY (INHALATION) at 07:03

## 2025-07-02 RX ADMIN — INSULIN LISPRO 8 UNITS: 100 INJECTION, SOLUTION INTRAVENOUS; SUBCUTANEOUS at 08:50

## 2025-07-02 RX ADMIN — SODIUM CHLORIDE, POTASSIUM CHLORIDE, SODIUM LACTATE AND CALCIUM CHLORIDE 100 ML/HR: 600; 310; 30; 20 INJECTION, SOLUTION INTRAVENOUS at 10:09

## 2025-07-02 RX ADMIN — SODIUM CHLORIDE 3.38 G: 9 INJECTION, SOLUTION INTRAVENOUS at 03:20

## 2025-07-02 RX ADMIN — SODIUM CHLORIDE 3.38 G: 9 INJECTION, SOLUTION INTRAVENOUS at 17:43

## 2025-07-02 RX ADMIN — PANTOPRAZOLE SODIUM 40 MG: 40 TABLET, DELAYED RELEASE ORAL at 06:42

## 2025-07-02 RX ADMIN — SODIUM PHOSPHATE, MONOBASIC, MONOHYDRATE 15 MMOL: 276; 142 INJECTION, SOLUTION INTRAVENOUS at 05:57

## 2025-07-02 RX ADMIN — INSULIN LISPRO 8 UNITS: 100 INJECTION, SOLUTION INTRAVENOUS; SUBCUTANEOUS at 17:43

## 2025-07-02 RX ADMIN — Medication 10 ML: at 21:52

## 2025-07-02 RX ADMIN — BUDESONIDE 0.5 MG: 0.5 SUSPENSION RESPIRATORY (INHALATION) at 18:51

## 2025-07-02 RX ADMIN — BUSPIRONE HYDROCHLORIDE 15 MG: 15 TABLET ORAL at 21:52

## 2025-07-02 RX ADMIN — MIDODRINE HYDROCHLORIDE 10 MG: 5 TABLET ORAL at 17:43

## 2025-07-02 RX ADMIN — Medication 1 CAPSULE: at 21:52

## 2025-07-02 RX ADMIN — Medication 1 CAPSULE: at 08:49

## 2025-07-02 RX ADMIN — MIDODRINE HYDROCHLORIDE 10 MG: 5 TABLET ORAL at 08:49

## 2025-07-02 RX ADMIN — INSULIN LISPRO 8 UNITS: 100 INJECTION, SOLUTION INTRAVENOUS; SUBCUTANEOUS at 12:05

## 2025-07-02 RX ADMIN — MUPIROCIN 1 APPLICATION: 20 OINTMENT TOPICAL at 08:49

## 2025-07-02 RX ADMIN — INSULIN GLARGINE 20 UNITS: 100 INJECTION, SOLUTION SUBCUTANEOUS at 21:52

## 2025-07-02 RX ADMIN — Medication 10 ML: at 08:49

## 2025-07-02 RX ADMIN — SODIUM CHLORIDE 3.38 G: 9 INJECTION, SOLUTION INTRAVENOUS at 08:48

## 2025-07-02 NOTE — PLAN OF CARE
Goal Outcome Evaluation:  Plan of Care Reviewed With: patient        Progress: improving  Outcome Evaluation: Pt participated in PT tx this date. Pt reclined in chair upon arrival, agreeable to PT. Pt came from sitting to standing CGA using RW and ambulated 3 ft [place] [facility] [place]. pt required max A for perineal hygiene. Pt ambulated back to chair 3 ft using RW CGA. Pt /73. Pt completed B LE ther ex including SLR, LAQ, ankle pumps, heel slides x20 each. Pt is progressing toward PT goals, cont per POC.    Anticipated Discharge Disposition (PT): inpatient rehabilitation facility, skilled nursing facility

## 2025-07-02 NOTE — THERAPY TREATMENT NOTE
Patient Name: Jonny Ferrari Jr.  : 1950    MRN: [id number]                              Today's Date: 2025       Admit Date: 2025    Visit Dx:     ICD-10-CM ICD-9-CM   1. Sepsis with acute renal failure and septic shock, due to unspecified organism, unspecified acute renal failure type  A41.9 038.9    R65.21 995.92    N17.9 785.52     584.9   2. Acute kidney injury superimposed on chronic kidney disease  N17.9 584.9    N18.9 585.9   3. Hyponatremia  E87.1 276.1   4. Hypokalemia  E87.6 276.8   5. Chronic anemia  D64.9 285.9   6. Left lower lobe consolidation  J18.1 481   7. Type 2 diabetes mellitus with hyperglycemia, unspecified whether long term insulin use  E11.65 250.00   8. Multiple skin tears  T14.8XXA 879.8     Patient Active Problem List   Diagnosis    CAD (coronary artery disease)    Essential hypertension    Dyslipidemia    Obesity    GERD (gastroesophageal reflux disease)    OA (osteoarthritis)    TIA (transient ischemic attack)    Anxiety neurosis    Psoriasis    Tobacco use    Diabetes mellitus    Bilateral carotid artery stenosis    NSTEMI (non-ST elevated myocardial infarction)    Chronic kidney disease, stage IV (severe)    Esophageal dysphagia    Iron deficiency anemia    Acute urinary retention    Debility    Perirectal abscess    Acute renal failure (ARF)    Acute metabolic encephalopathy    Sepsis    Septic shock    Acute UTI (urinary tract infection)    Pneumonia of left lower lobe due to infectious organism     Past Medical History:   Diagnosis Date    Anemia     Anxiety neurosis     CAD (coronary artery disease)     Cancer     Kidney    Chronic kidney disease     Patient reported left kidney removed secondary to kidney cancer and that he only has 30% function of the right kidney    Constipation     COPD (chronic obstructive pulmonary disease)     Depression     Diabetes mellitus     DM TYPE 2 , ONSET IN     Dyslipidemia     Dysphagia     Reported noted with food, fluids and  pills    Elevated cholesterol     Full dentures     GERD (gastroesophageal reflux disease)     Gout     H/O left nephrectomy 2020    secondary to cancer    [place] (hard of hearing)     Patient has bilateral hearing aids, still is very [place]    Hypertension     Impaired functional mobility, balance, gait, and endurance     MI (myocardial infarction)     Patient reported apx March 2021 (reported he refused cardiac cath) and June 2021 (did have cardiac cath with placement of 2 stents).     OA (osteoarthritis)     Obesity     MILD TO MODERATE EXOGENOUS OBESITY    Problems with swallowing     with food    Psoriasis     Sleep apnea     CPAP HS - to bring mask and machine DOS    Tattoo     x1    TIA (transient ischemic attack)     Wears glasses 10/27/2021     Past Surgical History:   Procedure Laterality Date    APPENDECTOMY      CARDIAC CATHETERIZATION      CARDIAC CATHETERIZATION N/A 2/20/2019    Procedure: Left Heart Cath;  Surgeon: Ernst Smith MD;  Location:  [facility];  Service: Cardiology    CARDIAC CATHETERIZATION Left 6/9/2021    Procedure: Left Heart Cath;  Surgeon: Ernst Smith MD;  Location:  [facility];  Service: Cardiology;  Laterality: Left;    CHOLECYSTECTOMY      COLONOSCOPY      COLONOSCOPY N/A 11/10/2021    Procedure: COLONOSCOPY with polypectomy x6 and clip x2;  Surgeon: Chidi Trinidad MD;  Location: [facility] ENDOSCOPY;  Service: Gastroenterology;  Laterality: N/A;    COLONOSCOPY N/A 10/25/2023    Procedure: COLONOSCOPY incomplete;  Surgeon: Chidi Trinidad MD;  Location: [facility] ENDOSCOPY;  Service: Gastroenterology;  Laterality: N/A;    COLONOSCOPY N/A 11/8/2023    Procedure: COLONOSCOPY WITH POLYPECTOMY X1;  Surgeon: Chidi Trinidad MD;  Location: [facility] ENDOSCOPY;  Service: Gastroenterology;  Laterality: N/A;    CORONARY ARTERY BYPASS GRAFT  2001    Patient reported no MI prior to CABG and that the bypass was 3 vessel     ENDOSCOPY      ENDOSCOPY N/A  11/10/2021    Procedure: ESOPHAGOGASTRODUODENOSCOPY with biopsy and dilatation;  Surgeon: Chidi Trinidad MD;  Location: [facility] ENDOSCOPY;  Service: Gastroenterology;  Laterality: N/A;    ENDOSCOPY N/A 10/25/2023    Procedure: ESOPHAGOGASTRODUODENOSCOPY with biopsy and dilatation;  Surgeon: Chidi Trinidad MD;  Location: [facility] ENDOSCOPY;  Service: Gastroenterology;  Laterality: N/A;    ENDOSCOPY N/A 11/8/2023    Procedure: ESOPHAGOGASTRODUODENOSCOPY WITH BIOPSY;  Surgeon: Chidi Trinidad MD;  Location: [facility] ENDOSCOPY;  Service: Gastroenterology;  Laterality: N/A;    INCISION AND DRAINAGE PERIRECTAL ABSCESS N/A 8/29/2024    Procedure: INCISION AND DRAINAGE OF PERIRECTAL ABSCESS;  Surgeon: Sherman Billingsley MD;  Location: [facility] OR;  Service: General;  Laterality: N/A;    LAPAROSCOPIC NEPHRECTOMY Left     MOUTH SURGERY      Full mouth extraction    URETER SURGERY      VASECTOMY        General Information       Row Name 07/02/25 1333          Physical Therapy Time and Intention    Document Type therapy note (daily note)  -     Mode of Treatment physical therapy  -       Row Name 07/02/25 1333          General Information    Patient Profile Reviewed yes  -MK     Existing Precautions/Restrictions fall;oxygen therapy device and L/min;orthostatic hypotension  -               User Key  (r) = Recorded By, (t) = Taken By, (c) = Cosigned By      Initials Name Provider Type    Kuldeep Hernandez PT Physical Therapist                   Mobility       Row Name 07/02/25 1334          Sit-Stand Transfer    Sit-Stand [place] (Transfers) contact guard  -     Assistive Device (Sit-Stand Transfers) walker, front-wheeled  -       Row Name 07/02/25 1334          Gait/Stairs (Locomotion)    [place] Level (Gait) contact guard  -     Assistive Device (Gait) walker, front-wheeled  -     Patient was able to Ambulate yes  -MK     Distance in Feet (Gait) 3  x2  -               User Key  (r) = Recorded By, (t)  = Taken By, (c) = Cosigned By      Initials Name Provider Type    Kuldeep Hernandez, PT Physical Therapist                   Obj/Interventions    No documentation.                  Goals/Plan    No documentation.                  Clinical Impression       Row Name 07/02/25 1334          Pain    Pretreatment Pain Rating 0/10 - no pain  -     Posttreatment Pain Rating 0/10 - no pain  -       Row Name 07/02/25 1332          Plan of Care Review    Plan of Care Reviewed With patient  -MK     Progress improving  -     Outcome Evaluation Pt participated in PT tx this date. Pt reclined in chair upon arrival, agreeable to PT. Pt came from sitting to standing CGA using RW and ambulated 3 ft to [facility] CGA using RW. pt required max A for perineal hygiene. Pt ambulated back to chair 3 ft using RW CGA. Pt /73. Pt completed B LE ther ex including SLR, LAQ, ankle pumps, heel slides x20 each. Pt is progressing toward PT goals, cont per POC.  -       Row Name 07/02/25 1334          Vital Signs    O2 Delivery Pre Treatment room air  -     O2 Delivery Intra Treatment room air  -     O2 Delivery Post Treatment room air  -     Pre Patient Position Sitting  -     Intra Patient Position Standing  -     Post Patient Position Sitting  -       Row Name 07/02/25 1334          Positioning and Restraints    Pre-Treatment Position sitting in chair/recliner  -     Post Treatment Position chair  -MK     In Chair reclined;call light within reach;encouraged to call for assist;with family/caregiver;notified [facility]               User Key  (r) = Recorded By, (t) = Taken By, (c) = Cosigned By      Initials Name Provider Type    Kuldeep Hernandez, PT Physical Therapist                   Outcome Measures       Row Name 07/02/25 1335          How much help from another person do you currently need...    Turning from your back to your side while in flat bed without using bedrails? 4  -     Moving from lying on back to sitting on  the side of a flat bed without bedrails? 4  -MK     Moving to and from a bed to a chair (including a wheelchair)? 3  -MK     Standing up from a chair using your arms (e.g., wheelchair, bedside chair)? 3  -MK     Climbing 3-5 steps with a railing? 2  -MK     To walk in hospital room? 2  -MK     AM-PAC 6 Clicks Score (PT) 18  -MK     Highest Level of Mobility Goal Walk 10 Steps or More-6  -MK       Row Name 07/02/25 1337 07/02/25 1242       Functional Assessment    Outcome Measure Options AM-PAC 6 Clicks Basic Mobility (PT)  -MK AM-PAC 6 Clicks Daily Activity (OT)  -SD              User Key  (r) = Recorded By, (t) = Taken By, (c) = Cosigned By      Initials Name Provider Type    SD Janel Howe, OT Occupational Therapist    Kuldeep Hernandez, PT Physical Therapist                                 Physical Therapy Education       Title: PT OT SLP Therapies (In Progress)       Topic: Physical Therapy (In Progress)       Point: Mobility training (Done)       Learning Progress Summary            Patient Acceptance, E,D, DU,VU by  at 7/2/2025 1338    Acceptance, E,D, DU,VU by  at 7/1/2025 1707    Comment: role of PT and POC                      Point: Home exercise program (Done)       Learning Progress Summary            Patient Acceptance, E,D, DU,VU by  at 7/2/2025 1338                      Point: Body mechanics (Done)       Learning Progress Summary            Patient Acceptance, E,D, DU,VU by  at 7/2/2025 1338    Acceptance, E,D, DU,VU by  at 7/1/2025 1707    Comment: role of PT and POC                      Point: Precautions (Not Started)       Learner Progress:  Not documented in this visit.                              User Key       Initials Effective Dates Name Provider Type Discipline     06/13/23 -  Kuldeep Ren, PT Physical Therapist PT                  PT Recommendation and Plan  Planned Therapy Interventions (PT): balance training, bed mobility training, gait training, home exercise program,  motor coordination training, neuromuscular re-education, patient/family education, strengthening, transfer training  Progress: improving  Outcome Evaluation: Pt participated in PT tx this date. Pt reclined in chair upon arrival, agreeable to PT. Pt came from sitting to standing CGA using RW and ambulated 3 ft to [facility] CGA using RW. pt required max A for perineal hygiene. Pt ambulated back to chair 3 ft using RW CGA. Pt /73. Pt completed B LE ther ex including SLR, LAQ, ankle pumps, heel slides x20 each. Pt is progressing toward PT goals, cont per POC.     Time Calculation:   PT Evaluation Complexity  History, PT Evaluation Complexity: 1-2 personal factors and/or comorbidities  Examination of Body Systems (PT Eval Complexity): total of 3 or more elements  Clinical Presentation (PT Evaluation Complexity): evolving  Clinical Decision Making (PT Evaluation Complexity): moderate complexity  Overall Complexity (PT Evaluation Complexity): moderate complexity     PT Charges       Row Name 07/02/25 1105             Time Calculation    Start Time 1105  -MK      Stop Time 1130  -MK      Time Calculation (min) 25 min  -MK      PT Received On 07/02/25  -MK      PT Goal Re-Cert Due Date 07/11/25  -MK         Timed Charges    34066 - Gait Training Minutes  10  -MK      80271 - PT Therapeutic Activity Minutes 15  -MK         Total Minutes    Timed Charges Total Minutes 25  -MK       Total Minutes 25  -MK                User Key  (r) = Recorded By, (t) = Taken By, (c) = Cosigned By      Initials Name Provider Type    Kuldeep Hernandez PT Physical Therapist                  Therapy Charges for Today       Code Description Service Date Service Provider Modifiers Qty    81010533456 HC PT EVAL MOD COMPLEXITY 3 7/1/2025 Kuldeep Ren, PT GP 1    63340385034 HC GAIT TRAINING EA 15 MIN 7/2/2025 Kuldeep Ren, PT GP 1    67316924848 HC PT THERAPEUTIC ACT EA 15 MIN 7/2/2025 Kuldeep Ren, PT GP 1            PT G-Codes  Outcome Measure  Options: AM-PAC 6 Clicks Basic Mobility (PT)  AM-PAC 6 Clicks Score (PT): 18  AM-PAC 6 Clicks Score (OT): 15  PT Discharge Summary  Anticipated Discharge Disposition (PT): inpatient rehabilitation facility, skilled nursing facility    Kuldeep Ren, MAYR  7/2/2025

## 2025-07-02 NOTE — PROGRESS NOTES
[facility]. [place]      [facility].   Progress Note          Patient Name: Jonny Ferrari Jr.  : 1950  MRN: [id number]   LOS: 3 days    Patient Care Team:  Amaury Brice MD as PCP - General (Family Medicine)  Ernst Smith MD as Consulting Physician (Cardiology)  Panfilo Bolden MD as Consulting Physician (Family Medicine)  Chidi Trinidad MD as Consulting Physician (General Surgery)  Jaren Sterling MD as Consulting Physician (Gastroenterology)    Chief Complaint:    Chief Complaint   Patient presents with    Weakness - Generalized     Pt called ems for sick, unable to urinate and weakness. Pt was on the toilet with a pressure in the 80's. Pt has multiple skin tears. Pt has 18 ga iv in left ac, 22 ga right bicep by ems.      Primary Care Physician:  Amaury Brice MD  Date of admission: 2025    Subjective     Interval History:   Follow-up on chronic kidney disease stage IV.   Patient is a lot more awake alert and interactive, he feels like he is ready to go home.  Still on pressors.  Denies having any chest pain or shortness of breath.  Still feels weak.  Events noted from last 24 hours.  I reviewed the chart and other providers notes, labs and procedures done since my last note.    Review of Systems:   As noted above.    Objective     Vitals:   Temp:  [97.7 °F (36.5 °C)-98.6 °F (37 °C)] 98.5 °F (36.9 °C)  Heart Rate:  [] 75  Resp:  [11-18] 11  BP: ()/(47-79) 103/58  Flow (L/min) (Oxygen Therapy):  [2] 2    Intake/Output Summary (Last 24 hours) at 2025 0703  Last data filed at 2025 0500  Gross per 24 hour   Intake 3167.8 ml   Output 2200 ml   Net 967.8 ml       Physical Exam:    General Appearance: alert, oriented x 3, no acute distress   Skin: warm and dry  HEENT: oral mucosa normal, nonicteric sclera  Neck: supple, no JVD  Lungs: CTA  Heart: RRR, normal S1 and S2  Abdomen: obese, soft, nontender, non distended and  positive bowel sounds.  : no palpable bladder  Extremities: Trace edema, no cyanosis or clubbing  Neuro: normal speech and mental status     Scheduled Meds:     Current Facility-Administered Medications   Medication Dose Route Frequency Provider Last Rate Last Admin    acetaminophen (TYLENOL) tablet 650 mg  650 mg Oral Q4H PRN Silviano Cisneros MD        Or    acetaminophen (TYLENOL) 160 MG/5ML oral solution 650 mg  650 mg Oral Q4H PRN Silviano Cisneros MD        Or    acetaminophen (TYLENOL) suppository 650 mg  650 mg Rectal Q4H PRN Silviano Cisneros MD        aspirin EC tablet 81 mg  81 mg Oral Daily Silviano Cisneros MD   81 mg at 07/01/25 0823    sennosides-docusate (PERICOLACE) 8.6-50 MG per tablet 2 tablet  2 tablet Oral BID Silviano Cisneros MD   2 tablet at 07/01/25 0823    And    polyethylene glycol (MIRALAX) packet 17 g  17 g Oral Daily PRN Silviano Cisneros MD        And    bisacodyl (DULCOLAX) EC tablet 5 mg  5 mg Oral Daily PRN Silviano Cisneros MD        And    bisacodyl (DULCOLAX) suppository 10 mg  10 mg Rectal Daily PRN Silviano Cisneros MD        budesonide (PULMICORT) nebulizer solution 0.5 mg  0.5 mg Nebulization BID - RT Silviano Cisneros MD   0.5 mg at 07/01/25 1908    busPIRone (BUSPAR) tablet 15 mg  15 mg Oral Q12H Silviano Cisneros MD   15 mg at 07/01/25 2100    Calcium Replacement - Follow Nurse / BPA Driven Protocol   Not Applicable PRN Agustin Will DO        clopidogrel (PLAVIX) tablet 75 mg  75 mg Oral Daily Silviano Cisneros MD   75 mg at 07/01/25 0823    dextrose (D50W) (25 g/50 mL) IV injection 25 g  25 g Intravenous Q15 Min PRN Silviano Cisneros MD        dextrose (GLUTOSE) oral gel 15 g  15 g Oral Q15 Min PRN Silviano Cisneros MD        glucagon (GLUCAGEN) injection 1 mg  1 mg Intramuscular Q15 Min PRN Silviano Cisneros MD        insulin glargine (LANTUS, SEMGLEE) injection 20 Units  20 Units Subcutaneous Nightly Silviano Cisneros MD   20 Units at 07/01/25 2101    Insulin Lispro (humaLOG) injection 2-9 Units  2-9 Units Subcutaneous  4x Daily AC & at Bedtime Silviano Cisneros MD   2 Units at 06/30/25 1129    Insulin Lispro (humaLOG) injection 8 Units  8 Units Subcutaneous TID With Meals Silviano Cisneros MD   8 Units at 07/01/25 0823    ipratropium-albuterol (DUO-NEB) nebulizer solution 3 mL  3 mL Nebulization Q4H PRN Silviano Cisneros MD        lactated ringers infusion  100 mL/hr Intravenous Continuous Jefry Hooper MD, FASN   Stopped at 07/02/25 0028    lactobacillus acidophilus (RISAQUAD) capsule 1 capsule  1 capsule Oral BID Silviano Cisneros MD   1 capsule at 07/01/25 2100    Magnesium Standard Dose Replacement - Follow Nurse / BPA Driven Protocol   Not Applicable PRN Silviano Cisneros MD        Magnesium Standard Dose Replacement - Follow Nurse / BPA Driven Protocol   Not Applicable PRN Agustin Will, DO        midodrine (PROAMATINE) tablet 10 mg  10 mg Oral TID AC Jefry Hooper MD, FASN   10 mg at 07/01/25 1637    mupirocin (BACTROBAN) 2 % nasal ointment 1 Application  1 Application Each Nare BID Silviano Cisneros MD   1 Application at 07/01/25 2100    norepinephrine (LEVOPHED) 8 mg in 250mL D5W infusion  0.02-0.3 mcg/kg/min Intravenous Titrated Fady Guevara MD   Stopped at 07/01/25 0834    ondansetron (ZOFRAN) injection 4 mg  4 mg Intravenous Q6H PRN Silviano Cisneros MD        pantoprazole (PROTONIX) EC tablet 40 mg  40 mg Oral Q AM Silviano Cisneros MD   40 mg at 07/02/25 0642    Pharmacy To Dose: Piperacillin-tazobactam (Zosyn)   Not Applicable Continuous PRN Silviano Cisneros MD        Phosphorus Replacement - Follow Nurse / BPA Driven Protocol   Not Applicable PRN Agustin Will DO        piperacillin-tazobactam (ZOSYN) IVPB 3.375 g IVPB in 100 mL NS (VTB)  3.375 g Intravenous Q8H Silviano Cisneros MD   3.375 g at 07/02/25 0320    Potassium Replacement - Follow Nurse / BPA Driven Protocol   Not Applicable PRN Fady Guevaraley, MD        Potassium Replacement - Follow Nurse / BPA Driven Protocol   Not Applicable PRSilviano Arellano MD         Potassium Replacement - Follow Nurse / BPA Driven Protocol   Not Applicable PRN Agustin Will,         sodium chloride 0.9 % flush 10 mL  10 mL Intravenous PRN Fady Guevara MD        sodium chloride 0.9 % flush 10 mL  10 mL Intravenous Q12H Silviano Cisneros MD   10 mL at 07/01/25 2102    sodium chloride 0.9 % flush 10 mL  10 mL Intravenous PRN Silviano Cisneros MD        sodium chloride 0.9 % infusion 40 mL  40 mL Intravenous PRN Silviano Cisneros MD           aspirin, 81 mg, Oral, Daily  budesonide, 0.5 mg, Nebulization, BID - RT  busPIRone, 15 mg, Oral, Q12H  clopidogrel, 75 mg, Oral, Daily  insulin glargine, 20 Units, Subcutaneous, Nightly  insulin lispro, 2-9 Units, Subcutaneous, 4x Daily AC & at Bedtime  insulin lispro, 8 Units, Subcutaneous, TID With Meals  lactobacillus acidophilus, 1 capsule, Oral, BID  midodrine, 10 mg, Oral, TID AC  mupirocin, 1 Application, Each Nare, BID  pantoprazole, 40 mg, Oral, Q AM  piperacillin-tazobactam, 3.375 g, Intravenous, Q8H  senna-docusate sodium, 2 tablet, Oral, BID  sodium chloride, 10 mL, Intravenous, Q12H        IV Meds:   lactated ringers, 100 mL/hr, Last Rate: Stopped (07/02/25 0028)  norepinephrine, 0.02-0.3 mcg/kg/min, Last Rate: Stopped (07/01/25 0834)  Pharmacy To Dose:,         Results Reviewed:   I have personally reviewed the results from the time of this admission to 7/2/2025 07:03 EDT     Results from last 7 days   Lab Units 07/02/25  0407 07/01/25  0830 07/01/25  0445 06/30/25  1231 06/30/25  0429 06/29/25  1706   SODIUM mmol/L 139  --  138  --  135* 131*   POTASSIUM mmol/L 4.2 3.4* 2.7*   < > 2.4* 2.9*   CHLORIDE mmol/L 99  --  93*  --  89* 81*   CO2 mmol/L 31.0*  --  32.4*  --  29.3* 24.4   BUN mg/dL 26.0*  --  37.0*  --  55.0* 61.0*   CREATININE mg/dL 2.16*  --  2.44*  --  3.17* 3.79*   CALCIUM mg/dL 8.8  --  8.9  --  8.8 9.0   BILIRUBIN mg/dL  --   --   --   --   --  0.2   ALK PHOS U/L  --   --   --   --   --  69   ALT (SGPT) U/L  --   --   --   --  "  --  14   AST (SGOT) U/L  --   --   --   --   --  19   GLUCOSE mg/dL 86  --  108*  --  142* 335*    < > = values in this interval not displayed.       Estimated Creatinine Clearance: 37 mL/min (A) (by C-G formula based on SCr of 2.16 mg/dL (H)).    Results from last 7 days   Lab Units 07/02/25  0407 07/01/25  0445 06/29/25  1826   MAGNESIUM mg/dL  --   --  1.8   PHOSPHORUS mg/dL 1.9* 2.8  --              Results from last 7 days   Lab Units 07/02/25  0407 07/02/25  0315 07/01/25  1945 07/01/25  0830 06/30/25  0429 06/29/25  1706   WBC 10*3/mm3 5.65  --  5.67 8.15 18.60* 15.69*   HEMOGLOBIN g/dL 6.0* 6.4* 8.4* 7.0* 8.0* 9.0*   PLATELETS 10*3/mm3 129*  --  148 154 203 205       Results from last 7 days   Lab Units 07/01/25  1945   INR  1.03       Brief Urine Lab Results  (Last result in the past 365 days)        Color   Clarity   Blood   Leuk Est   Nitrite   Protein   CREAT   Urine HCG        06/29/25 2205 Yellow   Clear   Negative   Moderate (2+)   Negative   Negative                   No results found for: \"UTPCR\"    Imaging Results (Last 24 Hours)       ** No results found for the last 24 hours. **                Assessment / Plan     ASSESSMENT:    Septic shock    CAD (coronary artery disease)    Chronic kidney disease, stage IV (severe)    Acute UTI (urinary tract infection)    Pneumonia of left lower lobe due to infectious organism    Acute kidney injury: Patient does have significant worsening of his renal function.  Most likely secondary to volume depletion and septic shock.  Bicarb is stable low potassium noted.  Chronic kidney disease stage IV: Underlying chronic kidney disease secondary to diabetes and hypertension as well as volume loss, status post left nephrectomy in March of 2020.  Baseline creatinine of 2.9 with a EGFR of 21 mL/min.  Type 2 diabetes: Longstanding history of type 2 diabetes likely with some complications.  Septic shock: Treated as per hospitalist service still on pressors.  Left lower " lobe pneumonia: On IV antibiotics as per hospitalist service.  Urinary tract infection: Treated  Hypokalemia: Replace per protocol  Coronary artery disease: Status post CABG and stent placements.  No acute issues  COPD: Longstanding history of smoking.  Untreated sleep apnea: Longstanding known history of obstructive sleep apnea, has not been able to use his BiPAP      PLAN:  Low hemoglobin noted likely will end up requiring blood transfusion.  Blood pressure is holding fair with midodrine 10 mg 3 times a day.  He is off the pressors.  Continue with IV fluids at 100 cc an hour for another 24 hours.  Details were discussed with the patient as well as family in the room.  Wife is at the bedside.  Details were also discussed with the hospitalist service and or other providers as needed.   Continue with rest of the current treatment plan, and monitor with surveillance labs, adjust medication doses to the eGFR.  Further recommendations will depend on clinical course of the patient during the current hospitalization.   I have reviewed the copied text to this note, it was edited and the changes made as needed.  It is accurate to the point, when the note was signed today.     Thank you for involving us in the care of Jonny Ferrari Jr..  Please feel free to call with any questions.    Jefry Hooper MD, FÁTIMA  07/02/25  07:03 EDT    [facility]  [phone]      Part of this note may be an electronic transcription/translation of spoken language to printed text using the Dragon Dictation System.

## 2025-07-02 NOTE — PLAN OF CARE
Goal Outcome Evaluation:  Plan of Care Reviewed With: patient        Progress: improving     Pt is improving in relation to infection status, BP is stable on midodrine, vasopressors stopped yesterday. Pt did require blood transfusion through the shift, due to complete within the hour.

## 2025-07-02 NOTE — CASE MANAGEMENT/SOCIAL WORK
Case Management/Social Work    Patient Name:  Jonny Ferrari Jr.  YOB: 1950  MRN: [id number]  Admit Date:  6/29/2025        09:56 EDT   Discharge Plan       Row Name 07/02/25 0954       Plan    Plan STR    Patient/Family in Agreement with Plan yes    Plan Comments Spoke to patient at bedside. Therapy recommends IRF/SNF.  Patient remains agreeable & wants referrals sent to [facility], [facility] or [facility]. Updated patient that [facility] does not have any available beds. Updated MSW Marilyn. CM will continue to follow.                        Electronically signed by:  Ying Thorne RN  07/02/25 09:56 EDT

## 2025-07-02 NOTE — PROGRESS NOTES
"    [facility]    Name:  Jonny Ferrari Jr.   Age:  74 y.o.  Sex:  male  :  1950  MRN:  [id number]   Visit Number:  [id number]  Admission Date:  2025  Date Of Service:  25  Primary Care Physician:  Amaury Brice MD     LOS: 3 days :    Chief Complaint:      Follow-up septic shock    Subjective:    Patient sitting up in bedside chair today.  He denied any acute issues.  Notes he is feeling better overall.  Nursing did note that he had some bleeding issue around his skin tears on his arms, also some blood loss with his catheter after reinsertion but urine is clear now.  He is receiving blood transfusion.    Hospital Course:    Per prior provider: \"Jonny Ferrari Jr. is a 74-year-old male with history of diabetes mellitus type 2, chronic kidney disease stage III, COPD, coronary artery disease status post CABG and stent, ERASMO, BPH was brought to the emergency room by EMS with symptoms of generalized weakness and fatigue   The patient was brought in by ambulance due to an inability to walk, a condition that has persisted for the past 2 to 3 days. He reports no fever, dysuria, or cough but feels weak. He has experienced several falls recently and has been unable to walk independently for the past 3 to 4 days. Despite using a walker or cane, he continues to fall.   In the emergency room, patient was afebrile but mildly tachycardic in the 100 with initial blood pressure of 80/54 and pulse oxygen saturation of 95% on room air.  Patient was given 30 mL/kg fluid bolus initially in the emergency room with initial improvement of the blood pressure to systolic 100 but subsequently dropped it again to the 70s systolic.  Patient was given another 500 bolus of normal saline, he right femoral central line was placed and he was initiated on Levophed. \"    Review of Systems:     All systems were reviewed and negative except as mentioned in subjective, assessment and " plan.    Vital Signs:    Temp:  [97.7 °F (36.5 °C)-98.6 °F (37 °C)] 97.7 °F (36.5 °C)  Heart Rate:  [] 68  Resp:  [11-18] 16  BP: ()/(47-75) 102/67    Intake and output:    I/O last 3 completed shifts:  In: 4979.4 [P.O.:1020; I.V.:3459.4; IV Piggyback:500]  Out: 4400 [Urine:4400]  I/O this shift:  In: 639.2 [P.O.:240; Blood:399.2]  Out: -     Physical Examination:    General Appearance:  Alert and cooperative.  Hard of hearing.  No acute distress   Head:  Atraumatic and normocephalic.   Eyes: Conjunctivae and sclerae normal, no icterus. No pallor.   Throat: No oral lesions, no thrush, oral mucosa moist.   Neck: Supple, trachea midline, no thyromegaly.   Lungs:   Breath sounds heard bilaterally equally.  No wheezing or crackles.    Heart:  Normal S1 and S2, no murmur,No JVD.   Abdomen:   Normal bowel sounds, Soft, nontender, nondistended, no rebound tenderness.  Nolan catheter in place   Extremities: Supple, trace edema   Skin: No bleeding or rash.  Multiple skin abrasions.   Neurologic: Alert and oriented x 3. No facial asymmetry. Moves all four limbs.      Laboratory results:    Results from last 7 days   Lab Units 07/02/25  0407 07/01/25  0830 07/01/25  0445 06/30/25  1231 06/30/25  0429 06/29/25  1706   SODIUM mmol/L 139  --  138  --  135* 131*   POTASSIUM mmol/L 4.2 3.4* 2.7*   < > 2.4* 2.9*   CHLORIDE mmol/L 99  --  93*  --  89* 81*   CO2 mmol/L 31.0*  --  32.4*  --  29.3* 24.4   BUN mg/dL 26.0*  --  37.0*  --  55.0* 61.0*   CREATININE mg/dL 2.16*  --  2.44*  --  3.17* 3.79*   CALCIUM mg/dL 8.8  --  8.9  --  8.8 9.0   BILIRUBIN mg/dL  --   --   --   --   --  0.2   ALK PHOS U/L  --   --   --   --   --  69   ALT (SGPT) U/L  --   --   --   --   --  14   AST (SGOT) U/L  --   --   --   --   --  19   GLUCOSE mg/dL 86  --  108*  --  142* 335*    < > = values in this interval not displayed.     Results from last 7 days   Lab Units 07/02/25  0407 07/02/25  0315 07/01/25  1945 07/01/25  0830   WBC 10*3/mm3  5.65  --  5.67 8.15   HEMOGLOBIN g/dL 6.0* 6.4* 8.4* 7.0*   HEMATOCRIT % 18.8* 19.5* 26.7* 22.4*   PLATELETS 10*3/mm3 129*  --  148 154     Results from last 7 days   Lab Units 07/01/25  1945   INR  1.03     Results from last 7 days   Lab Units 06/29/25  1826 06/29/25  1706   HSTROP T ng/L 95* 98*     Results from last 7 days   Lab Units 06/29/25  2205 06/29/25  1738 06/29/25  1728   BLOODCX   --  No growth at 2 days No growth at 2 days   URINECX  25,000 CFU/mL Enterococcus faecalis*  --   --      Recent Labs     08/29/24  2314   PHART 7.382   JDH0QRN 49.8*   PO2ART 68.0*   [id number] 29.5*   BASEEXCESS 3.8*      I have reviewed the patient's laboratory results.    Radiology results:    No radiology results from the last 24 hrs    I have reviewed the patient's radiology reports.    Medication Review:     I have reviewed the patient's active and prn medications.     Problem List:      Septic shock    CAD (coronary artery disease)    Chronic kidney disease, stage IV (severe)    Acute UTI (urinary tract infection)    Pneumonia of left lower lobe due to infectious organism      Assessment:    Septic shock secondary to #2 and #3, POA.  Acute urinary tract infection, POA.  Left lower lobe bacterial pneumonia, unable to classify further, POA.  Demand ischemia with elevated troponins, POA.  Hypokalemia, POA.  Chronic kidney disease stage IV.  Diabetes mellitus type 2 with nephropathy and hyperglycemia, POA.  Coronary artery disease status post CABG and stents.  COPD, no exacerbation.  Benign prostatic hyperplasia.  Will falls with skin abrasions, POA.    Plan:    Septic shock  Acute UTI  Left lower lobe bacterial pneumonia  Thus far has had negative blood cultures.  Vancomycin discontinued.  On Zosyn.  Oxygenation stable.  Weaning off pressors.  Urine culture without growth.  Demand ischemia with elevated troponins  Troponins plateaued 95-98  Suspect secondary to demand ischemia in the setting of his septic shock as well as  CKD  Low suspicion for ACS  Hypokalemia  Continue replacement  CKD stage IV  Nephrology, Dr. Hooper consulted and appreciate recommendations.  Numbers back to baseline this morning  Type 2 diabetes  Subcutaneous insulin protocol  Anemia  Does have chronic anemia likely due to renal failure, however acute on chronic currently.  Received 1 unit of PRBCs.  There was reportedly some blood loss with catheter insertion but urine is clear now.  I have stopped his heparin will hold his aspirin and Plavix this morning.  He did had some blood loss from skin tears.  May not be able to tolerate all the anticoagulant/antiplatelets  CAD status post CABG  Continue DAPT with aspirin and Plavix, but holding for now  COPD not exacerbation  Multiple skin abrasions  Wound care  Impaired mobility and ADLs  PT/OT once stable to participate    Further orders as clinical course dictates.    DVT Prophylaxis: SCD  Code Status: Full  Diet: Renal diabetic.  Discharge Plan: KRISTINE Davis DO  07/02/25  12:13 EDT    Dictated utilizing Dragon dictation.

## 2025-07-02 NOTE — THERAPY TREATMENT NOTE
Patient Name: Jonny Ferrari Jr.  : 1950    MRN: [id number]                              Today's Date: 2025       Admit Date: 2025    Visit Dx:     ICD-10-CM ICD-9-CM   1. Sepsis with acute renal failure and septic shock, due to unspecified organism, unspecified acute renal failure type  A41.9 038.9    R65.21 995.92    N17.9 785.52     584.9   2. Acute kidney injury superimposed on chronic kidney disease  N17.9 584.9    N18.9 585.9   3. Hyponatremia  E87.1 276.1   4. Hypokalemia  E87.6 276.8   5. Chronic anemia  D64.9 285.9   6. Left lower lobe consolidation  J18.1 481   7. Type 2 diabetes mellitus with hyperglycemia, unspecified whether long term insulin use  E11.65 250.00   8. Multiple skin tears  T14.8XXA 879.8     Patient Active Problem List   Diagnosis    CAD (coronary artery disease)    Essential hypertension    Dyslipidemia    Obesity    GERD (gastroesophageal reflux disease)    OA (osteoarthritis)    TIA (transient ischemic attack)    Anxiety neurosis    Psoriasis    Tobacco use    Diabetes mellitus    Bilateral carotid artery stenosis    NSTEMI (non-ST elevated myocardial infarction)    Chronic kidney disease, stage IV (severe)    Esophageal dysphagia    Iron deficiency anemia    Acute urinary retention    Debility    Perirectal abscess    Acute renal failure (ARF)    Acute metabolic encephalopathy    Sepsis    Septic shock    Acute UTI (urinary tract infection)    Pneumonia of left lower lobe due to infectious organism     Past Medical History:   Diagnosis Date    Anemia     Anxiety neurosis     CAD (coronary artery disease)     Cancer     Kidney    Chronic kidney disease     Patient reported left kidney removed secondary to kidney cancer and that he only has 30% function of the right kidney    Constipation     COPD (chronic obstructive pulmonary disease)     Depression     Diabetes mellitus     DM TYPE 2 , ONSET IN     Dyslipidemia     Dysphagia     Reported noted with food, fluids and  pills    Elevated cholesterol     Full dentures     GERD (gastroesophageal reflux disease)     Gout     H/O left nephrectomy 2020    secondary to cancer    [place] (hard of hearing)     Patient has bilateral hearing aids, still is very [place]    Hypertension     Impaired functional mobility, balance, gait, and endurance     MI (myocardial infarction)     Patient reported apx March 2021 (reported he refused cardiac cath) and June 2021 (did have cardiac cath with placement of 2 stents).     OA (osteoarthritis)     Obesity     MILD TO MODERATE EXOGENOUS OBESITY    Problems with swallowing     with food    Psoriasis     Sleep apnea     CPAP HS - to bring mask and machine DOS    Tattoo     x1    TIA (transient ischemic attack)     Wears glasses 10/27/2021     Past Surgical History:   Procedure Laterality Date    APPENDECTOMY      CARDIAC CATHETERIZATION      CARDIAC CATHETERIZATION N/A 2/20/2019    Procedure: Left Heart Cath;  Surgeon: Ernst Smith MD;  Location:  [facility];  Service: Cardiology    CARDIAC CATHETERIZATION Left 6/9/2021    Procedure: Left Heart Cath;  Surgeon: Ernst Smith MD;  Location:  [facility];  Service: Cardiology;  Laterality: Left;    CHOLECYSTECTOMY      COLONOSCOPY      COLONOSCOPY N/A 11/10/2021    Procedure: COLONOSCOPY with polypectomy x6 and clip x2;  Surgeon: Chidi Trinidad MD;  Location: [facility] ENDOSCOPY;  Service: Gastroenterology;  Laterality: N/A;    COLONOSCOPY N/A 10/25/2023    Procedure: COLONOSCOPY incomplete;  Surgeon: Chidi Trinidad MD;  Location: [facility] ENDOSCOPY;  Service: Gastroenterology;  Laterality: N/A;    COLONOSCOPY N/A 11/8/2023    Procedure: COLONOSCOPY WITH POLYPECTOMY X1;  Surgeon: Chidi Trinidad MD;  Location: [facility] ENDOSCOPY;  Service: Gastroenterology;  Laterality: N/A;    CORONARY ARTERY BYPASS GRAFT  2001    Patient reported no MI prior to CABG and that the bypass was 3 vessel     ENDOSCOPY      ENDOSCOPY N/A  11/10/2021    Procedure: ESOPHAGOGASTRODUODENOSCOPY with biopsy and dilatation;  Surgeon: Chidi Trinidad MD;  Location: [facility] ENDOSCOPY;  Service: Gastroenterology;  Laterality: N/A;    ENDOSCOPY N/A 10/25/2023    Procedure: ESOPHAGOGASTRODUODENOSCOPY with biopsy and dilatation;  Surgeon: Chidi Trinidad MD;  Location: [facility] ENDOSCOPY;  Service: Gastroenterology;  Laterality: N/A;    ENDOSCOPY N/A 11/8/2023    Procedure: ESOPHAGOGASTRODUODENOSCOPY WITH BIOPSY;  Surgeon: Chidi Trinidad MD;  Location: [facility] ENDOSCOPY;  Service: Gastroenterology;  Laterality: N/A;    INCISION AND DRAINAGE PERIRECTAL ABSCESS N/A 8/29/2024    Procedure: INCISION AND DRAINAGE OF PERIRECTAL ABSCESS;  Surgeon: Sherman Billingsley MD;  Location: [facility] OR;  Service: General;  Laterality: N/A;    LAPAROSCOPIC NEPHRECTOMY Left     MOUTH SURGERY      Full mouth extraction    URETER SURGERY      VASECTOMY        General Information       Row Name 07/02/25 1238          OT Time and Intention    Subjective Information no complaints  -SD     Document Type therapy note (daily note)  -SD     Mode of Treatment occupational therapy  -SD     Patient Effort good  -SD     Symptoms Noted During/After Treatment none  -SD       Row Name 07/02/25 1238          General Information    Patient Profile Reviewed yes  -SD               User Key  (r) = Recorded By, (t) = Taken By, (c) = Cosigned By      Initials Name Provider Type    SD Janel Howe OT Occupational Therapist                     Mobility/ADL's       Row Name 07/02/25 1238          Bed Mobility    Comment, (Bed Mobility) in chair  -SD       Row Name 07/02/25 1238          Sit-Stand Transfer    Sit-Stand [place] (Transfers) contact guard  -SD     Assistive Device (Sit-Stand Transfers) walker, front-wheeled  -SD       Row Name 07/02/25 1238          Functional Mobility    Functional Mobility- Ind. Level contact guard assist  -SD     Functional Mobility- Device walker, front-wheeled   -SD     Functional Mobility-Distance (Feet) 3  x2  -SD       Row Name 07/02/25 1238          Lower Body Dressing Assessment/Training    [place] Level (Lower Body Dressing) don;socks;maximum assist (25% patient effort)  -SD     Position (Lower Body Dressing) supported sitting  -SD       Row Name 07/02/25 1238          Toileting Assessment/Training    [place] Level (Toileting) perform perineal hygiene;maximum assist (25% patient effort)  -SD     Assistive Devices (Toileting) commode, bedside without drop arms  -SD               User Key  (r) = Recorded By, (t) = Taken By, (c) = Cosigned By      Initials Name Provider Type    Janel Haque OT Occupational Therapist                   Obj/Interventions       Row Name 07/02/25 1238          Motor Skills    Therapeutic Exercise shoulder;elbow/forearm;wrist;hand  UB AROM exercises 1 set 15 reps  -SD               User Key  (r) = Recorded By, (t) = Taken By, (c) = Cosigned By      Initials Name Provider Type    Janel Haque OT Occupational Therapist                   Goals/Plan    No documentation.                  Clinical Impression       Row Name 07/02/25 1239          Pain Assessment    Pretreatment Pain Rating 0/10 - no pain  -SD     Posttreatment Pain Rating 0/10 - no pain  -SD       [facility] Name 07/02/25 1239          Plan of Care Review    Plan of Care Reviewed With patient  -SD     Progress improving  -SD     Outcome Evaluation OT tx completed. Patient is reclined in chair, denies pain. Patient required max A to emery socks. Performed sit to stand CGA using RW, walked 3' to C CGA. Required max A for perineal hygiene. Walked 3' back to chair. BP checked and 128/73. Patient tolerated BUE AROM exercises sitting in chair. Continue OT POC  -SD       [facility] Name 07/02/25 1239          Vital Signs    Post Systolic BP Rehab 128  -SD     Post Treatment Diastolic BP 73  -SD     Posttreatment Heart Rate (beats/min) 81  -SD     O2 Delivery Pre Treatment room  air  -SD     O2 Delivery Intra Treatment room air  -SD     Post SpO2 (%) 100  -SD     O2 Delivery Post Treatment room air  -SD       Row Name 07/02/25 1239          Positioning and Restraints    Pre-Treatment Position sitting in chair/recliner  -SD     Post Treatment Position chair  -SD     In Chair reclined;call light within reach;encouraged to call for assist;with family/caregiver;notified nsg  -SD               User Key  (r) = Recorded By, (t) = Taken By, (c) = Cosigned By      Initials Name Provider Type    Janel Haque OT Occupational Therapist                   Outcome Measures       Row Name 07/02/25 1242          How much help from another is currently needed...    Putting on and taking off regular lower body clothing? 2  -SD     Bathing (including washing, rinsing, and drying) 2  -SD     Toileting (which includes using toilet bed pan or urinal) 2  -SD     Putting on and taking off regular upper body clothing 3  -SD     Taking care of personal grooming (such as brushing teeth) 3  -SD     Eating meals 3  -SD     AM-PAC 6 Clicks Score (OT) 15  -SD       Row Name 07/02/25 1242          Functional Assessment    Outcome Measure Options AM-PAC 6 Clicks Daily Activity (OT)  -SD               User Key  (r) = Recorded By, (t) = Taken By, (c) = Cosigned By      Initials Name Provider Type    Janel Haque OT Occupational Therapist                    Occupational Therapy Education       Title: PT OT SLP Therapies (In Progress)       Topic: Occupational Therapy (In Progress)       Point: ADL training (Done)       Learning Progress Summary            Patient Acceptance, E,TB, VU by SD at 7/2/2025 1243    Comment: Safety during tf    Acceptance, E,TB, VU by SD at 7/1/2025 1701    Comment: OT POC                                      User Key       Initials Effective Dates Name Provider Type Discipline    SD 06/16/21 -  Janel Howe OT Occupational Therapist OT                  OT Recommendation and  Plan  Planned Therapy Interventions (OT): activity tolerance training, adaptive equipment training, BADL retraining, patient/caregiver education/training, ROM/therapeutic exercise, strengthening exercise, transfer/mobility retraining  Therapy Frequency (OT): 3 times/wk (5 times if indicated)  Plan of Care Review  Plan of Care Reviewed With: patient  Progress: improving  Outcome Evaluation: OT tx completed. Patient is reclined in chair, denies pain. Patient required max A to emery socks. Performed sit to stand CGA using RW, walked 3' to C CGA. Required max A for perineal hygiene. Walked 3' back to chair. BP checked and 128/73. Patient tolerated BUE AROM exercises sitting in chair. Continue OT POC     Time Calculation:   Evaluation Complexity (OT)  Review Occupational Profile/Medical/Therapy History Complexity: expanded/moderate complexity  Assessment, Occupational Performance/Identification of Deficit Complexity: 3-5 performance deficits  Clinical Decision Making Complexity (OT): detailed assessment/moderate complexity  Overall Complexity of Evaluation (OT): moderate complexity     Time Calculation- OT       Row Name 07/02/25 1243             Time Calculation- OT    OT Start Time 1108  -SD      OT Stop Time 1131  -SD      OT Time Calculation (min) 23 min  -SD      OT Received On 07/02/25  -SD      OT Goal Re-Cert Due Date 07/11/25  -SD         Timed Charges    37016 - OT Therapeutic Exercise Minutes 8  -SD      94258 - OT Self Care/Mgmt Minutes 15  -SD         Total Minutes    Timed Charges Total Minutes 23  -SD       Total Minutes 23  -SD                User Key  (r) = Recorded By, (t) = Taken By, (c) = Cosigned By      Initials Name Provider Type    SD Janel Howe OT Occupational Therapist                  Therapy Charges for Today       Code Description Service Date Service Provider Modifiers Qty    [id number]  OT EVAL MOD COMPLEXITY 3 7/1/2025 Janel Howe OT GO 1    [id number]  OT THER PROC EA  15 MIN 7/2/2025 Janel Howe OT GO 1    15732174024 HC OT SELF CARE/MGMT/TRAIN EA 15 MIN 7/2/2025 Janel Howe OT GO 1                 Janel Howe OT  7/2/2025

## 2025-07-02 NOTE — PLAN OF CARE
Goal Outcome Evaluation:  Plan of Care Reviewed With: patient        Progress: improving  Outcome Evaluation: OT tx completed. Patient is reclined in chair, denies pain. Patient required max A to emery socks. Performed sit to stand CGA using RW, walked 3' to C CGA. Required max A for perineal hygiene. Walked 3' back to chair. BP checked and 128/73. Patient tolerated BUE AROM exercises sitting in chair. Continue OT POC    Anticipated Discharge Disposition (OT): inpatient rehabilitation facility

## 2025-07-03 LAB
ALBUMIN SERPL-MCNC: 2.9 G/DL (ref 3.5–5.2)
ANION GAP SERPL CALCULATED.3IONS-SCNC: 11.6 MMOL/L (ref 5–15)
BH BB BLOOD EXPIRATION DATE: NORMAL
BH BB BLOOD TYPE BARCODE: 6200
BH BB DISPENSE STATUS: NORMAL
BH BB PRODUCT CODE: NORMAL
BH BB UNIT NUMBER: NORMAL
BUN SERPL-MCNC: 19 MG/DL (ref 8–23)
BUN/CREAT SERPL: 9 (ref 7–25)
CALCIUM SPEC-SCNC: 9 MG/DL (ref 8.6–10.5)
CHLORIDE SERPL-SCNC: 100 MMOL/L (ref 98–107)
CO2 SERPL-SCNC: 26.4 MMOL/L (ref 22–29)
CREAT SERPL-MCNC: 2.1 MG/DL (ref 0.76–1.27)
CROSSMATCH INTERPRETATION: NORMAL
EGFRCR SERPLBLD CKD-EPI 2021: 32.8 ML/MIN/1.73
GLUCOSE BLDC GLUCOMTR-MCNC: 126 MG/DL (ref 70–130)
GLUCOSE BLDC GLUCOMTR-MCNC: 140 MG/DL (ref 70–130)
GLUCOSE BLDC GLUCOMTR-MCNC: 81 MG/DL (ref 70–130)
GLUCOSE BLDC GLUCOMTR-MCNC: 93 MG/DL (ref 70–130)
GLUCOSE SERPL-MCNC: 87 MG/DL (ref 65–99)
HCT VFR BLD AUTO: 27.9 % (ref 37.5–51)
HGB BLD-MCNC: 8.6 G/DL (ref 13–17.7)
PHOSPHATE SERPL-MCNC: 2.6 MG/DL (ref 2.5–4.5)
POTASSIUM SERPL-SCNC: 4.2 MMOL/L (ref 3.5–5.2)
SODIUM SERPL-SCNC: 138 MMOL/L (ref 136–145)
UNIT  ABO: NORMAL
UNIT  RH: NORMAL

## 2025-07-03 PROCEDURE — 94799 UNLISTED PULMONARY SVC/PX: CPT

## 2025-07-03 PROCEDURE — 99232 SBSQ HOSP IP/OBS MODERATE 35: CPT | Performed by: FAMILY MEDICINE

## 2025-07-03 PROCEDURE — 94664 DEMO&/EVAL PT USE INHALER: CPT

## 2025-07-03 PROCEDURE — 80069 RENAL FUNCTION PANEL: CPT | Performed by: INTERNAL MEDICINE

## 2025-07-03 PROCEDURE — 85014 HEMATOCRIT: CPT | Performed by: FAMILY MEDICINE

## 2025-07-03 PROCEDURE — 94761 N-INVAS EAR/PLS OXIMETRY MLT: CPT

## 2025-07-03 PROCEDURE — 63710000001 INSULIN GLARGINE PER 5 UNITS: Performed by: INTERNAL MEDICINE

## 2025-07-03 PROCEDURE — 82948 REAGENT STRIP/BLOOD GLUCOSE: CPT | Performed by: INTERNAL MEDICINE

## 2025-07-03 PROCEDURE — 82948 REAGENT STRIP/BLOOD GLUCOSE: CPT

## 2025-07-03 PROCEDURE — 25010000002 PIPERACILLIN SOD-TAZOBACTAM PER 1 G: Performed by: INTERNAL MEDICINE

## 2025-07-03 PROCEDURE — 85018 HEMOGLOBIN: CPT | Performed by: FAMILY MEDICINE

## 2025-07-03 PROCEDURE — 25010000002 FUROSEMIDE PER 20 MG: Performed by: INTERNAL MEDICINE

## 2025-07-03 RX ORDER — BUPRENORPHINE AND NALOXONE 8; 2 MG/1; MG/1
1 FILM, SOLUBLE BUCCAL; SUBLINGUAL 2 TIMES DAILY
Status: DISCONTINUED | OUTPATIENT
Start: 2025-07-03 | End: 2025-07-07

## 2025-07-03 RX ORDER — BUPRENORPHINE HYDROCHLORIDE AND NALOXONE HYDROCHLORIDE DIHYDRATE 8; 2 MG/1; MG/1
8 TABLET SUBLINGUAL 2 TIMES DAILY
Status: DISCONTINUED | OUTPATIENT
Start: 2025-07-03 | End: 2025-07-03

## 2025-07-03 RX ORDER — FUROSEMIDE 10 MG/ML
20 INJECTION INTRAMUSCULAR; INTRAVENOUS ONCE
Status: COMPLETED | OUTPATIENT
Start: 2025-07-03 | End: 2025-07-03

## 2025-07-03 RX ADMIN — Medication 1 CAPSULE: at 20:45

## 2025-07-03 RX ADMIN — BUPRENORPHINE AND NALOXONE 1 FILM: 8; 2 FILM BUCCAL; SUBLINGUAL at 20:44

## 2025-07-03 RX ADMIN — Medication 1 CAPSULE: at 08:13

## 2025-07-03 RX ADMIN — PANTOPRAZOLE SODIUM 40 MG: 40 TABLET, DELAYED RELEASE ORAL at 06:16

## 2025-07-03 RX ADMIN — SODIUM CHLORIDE 3.38 G: 9 INJECTION, SOLUTION INTRAVENOUS at 16:40

## 2025-07-03 RX ADMIN — FUROSEMIDE 20 MG: 10 INJECTION, SOLUTION INTRAMUSCULAR; INTRAVENOUS at 10:05

## 2025-07-03 RX ADMIN — BUSPIRONE HYDROCHLORIDE 15 MG: 15 TABLET ORAL at 08:13

## 2025-07-03 RX ADMIN — INSULIN GLARGINE 20 UNITS: 100 INJECTION, SOLUTION SUBCUTANEOUS at 20:45

## 2025-07-03 RX ADMIN — CLOPIDOGREL BISULFATE 75 MG: 75 TABLET, FILM COATED ORAL at 08:13

## 2025-07-03 RX ADMIN — BUDESONIDE 0.5 MG: 0.5 SUSPENSION RESPIRATORY (INHALATION) at 07:22

## 2025-07-03 RX ADMIN — SODIUM CHLORIDE 3.38 G: 9 INJECTION, SOLUTION INTRAVENOUS at 00:34

## 2025-07-03 RX ADMIN — SODIUM CHLORIDE 3.38 G: 9 INJECTION, SOLUTION INTRAVENOUS at 08:12

## 2025-07-03 RX ADMIN — MIDODRINE HYDROCHLORIDE 10 MG: 5 TABLET ORAL at 08:13

## 2025-07-03 RX ADMIN — Medication 10 ML: at 08:13

## 2025-07-03 RX ADMIN — Medication 10 ML: at 20:45

## 2025-07-03 RX ADMIN — BUSPIRONE HYDROCHLORIDE 15 MG: 15 TABLET ORAL at 20:45

## 2025-07-03 RX ADMIN — MUPIROCIN 1 APPLICATION: 20 OINTMENT TOPICAL at 08:13

## 2025-07-03 RX ADMIN — ASPIRIN 81 MG: 81 TABLET, COATED ORAL at 08:12

## 2025-07-03 RX ADMIN — MUPIROCIN 1 APPLICATION: 20 OINTMENT TOPICAL at 20:45

## 2025-07-03 RX ADMIN — BUDESONIDE 0.5 MG: 0.5 SUSPENSION RESPIRATORY (INHALATION) at 18:51

## 2025-07-03 NOTE — PLAN OF CARE
"Goal Outcome Evaluation:  Plan of Care Reviewed With: patient        Progress: improving     VSS throughout shift. Maintaining O2 sat >94% on RA, however placed on 2L NC due to c/o SOA. Midodrine and LR orders D/C this shift. Patient given a one time dose of 20mg Lasix IV. Pt able to get up to BSC with x1 assist. Refused to work with PT today due to \"not feeling well\".                           "

## 2025-07-03 NOTE — PROGRESS NOTES
[facility]. [place]      [facility].   Progress Note          Patient Name: Jonny Ferrari Jr.  : 1950  MRN: [id number]   LOS: 4 days    Patient Care Team:  Amaury Brice MD as PCP - General (Family Medicine)  Ernst Smith MD as Consulting Physician (Cardiology)  Panfilo Bolden MD as Consulting Physician (Family Medicine)  Chidi Trinidad MD as Consulting Physician (General Surgery)  Jaren Sterling MD as Consulting Physician (Gastroenterology)    Chief Complaint:    Chief Complaint   Patient presents with    Weakness - Generalized     Pt called ems for sick, unable to urinate and weakness. Pt was on the toilet with a pressure in the 80's. Pt has multiple skin tears. Pt has 18 ga iv in left ac, 22 ga right bicep by ems.      Primary Care Physician:  Amaury Brice MD  Date of admission: 2025    Subjective     Interval History:   Follow-up on chronic kidney disease stage IV.   Patient is a lot more awake alert and interactive, he feels like he is ready to go home.    He is not on any pressors, did get his midodrine this morning.  Blood pressure is significantly better.  Renal function is close to baseline.  Denies having any chest pain or shortness of breath.  Still feels weak.  Events noted from last 24 hours.  I reviewed the chart and other providers notes, labs and procedures done since my last note.    Review of Systems:   As noted above.    Objective     Vitals:   Temp:  [97.6 °F (36.4 °C)-98.5 °F (36.9 °C)] 98.1 °F (36.7 °C)  Heart Rate:  [] 66  Resp:  [16-20] 18  BP: ()/() 130/67    Intake/Output Summary (Last 24 hours) at 7/3/2025 0701  Last data filed at 7/3/2025 0600  Gross per 24 hour   Intake 2699.17 ml   Output 4100 ml   Net -1400.83 ml       Physical Exam:    General Appearance: alert, oriented x 3, no acute distress   Skin: warm and dry  HEENT: oral mucosa normal, nonicteric sclera  Neck: supple, no  JVD  Lungs: CTA  Heart: RRR, normal S1 and S2  Abdomen: obese, soft, nontender, non distended and positive bowel sounds.  : no palpable bladder  Extremities: Trace edema, no cyanosis or clubbing  Neuro: normal speech and mental status     Scheduled Meds:     Current Facility-Administered Medications   Medication Dose Route Frequency Provider Last Rate Last Admin    acetaminophen (TYLENOL) tablet 650 mg  650 mg Oral Q4H PRN Silviano Cisneros MD        Or    acetaminophen (TYLENOL) 160 MG/5ML oral solution 650 mg  650 mg Oral Q4H PRN Silviano Cisneros MD        Or    acetaminophen (TYLENOL) suppository 650 mg  650 mg Rectal Q4H PRN Silviano Cisneros MD        aspirin EC tablet 81 mg  81 mg Oral Daily Silviano Cisneros MD   81 mg at 07/01/25 0823    sennosides-docusate (PERICOLACE) 8.6-50 MG per tablet 2 tablet  2 tablet Oral BID Silviano Cisneros MD   2 tablet at 07/01/25 0823    And    polyethylene glycol (MIRALAX) packet 17 g  17 g Oral Daily PRN Silviano Cisneros MD        And    bisacodyl (DULCOLAX) EC tablet 5 mg  5 mg Oral Daily PRN Silviano Cisneros MD        And    bisacodyl (DULCOLAX) suppository 10 mg  10 mg Rectal Daily PRN Silviano Cisneros MD        budesonide (PULMICORT) nebulizer solution 0.5 mg  0.5 mg Nebulization BID - RT Silviano Cisneros MD   0.5 mg at 07/02/25 1851    busPIRone (BUSPAR) tablet 15 mg  15 mg Oral Q12H Silviano Cisneros MD   15 mg at 07/02/25 2152    Calcium Replacement - Follow Nurse / BPA Driven Protocol   Not Applicable PRN Agustin Will DO        clopidogrel (PLAVIX) tablet 75 mg  75 mg Oral Daily Silviano Cisneros MD   75 mg at 07/01/25 0823    dextrose (D50W) (25 g/50 mL) IV injection 25 g  25 g Intravenous Q15 Min PRN Silviano Cisneros MD        dextrose (GLUTOSE) oral gel 15 g  15 g Oral Q15 Min PRN Silviano Cisneros MD        Diclofenac Sodium (VOLTAREN) 1 % gel 2 g  2 g Topical 4x Daily PRN Kuldeep Patel DO   2 g at 07/02/25 2201    glucagon (GLUCAGEN) injection 1 mg  1 mg Intramuscular Q15 Min PRN Silviano Cisneros,  MD        insulin glargine (LANTUS, SEMGLEE) injection 20 Units  20 Units Subcutaneous Nightly Silviano Cisneros MD   20 Units at 07/02/25 2152    Insulin Lispro (humaLOG) injection 2-9 Units  2-9 Units Subcutaneous 4x Daily AC & at Bedtime Silviano Cisneros MD   2 Units at 06/30/25 1129    Insulin Lispro (humaLOG) injection 8 Units  8 Units Subcutaneous TID With Meals Silviano Cisneros MD   8 Units at 07/02/25 1743    ipratropium-albuterol (DUO-NEB) nebulizer solution 3 mL  3 mL Nebulization Q4H PRN Silviano Cisneros MD        lactated ringers infusion  100 mL/hr Intravenous Continuous Jefry Hooper MD, FASN 100 mL/hr at 07/02/25 1009 100 mL/hr at 07/02/25 1009    lactobacillus acidophilus (RISAQUAD) capsule 1 capsule  1 capsule Oral BID Silviano Cisneros MD   1 capsule at 07/02/25 2152    Magnesium Standard Dose Replacement - Follow Nurse / BPA Driven Protocol   Not Applicable PRN Silviano Cisneros MD        Magnesium Standard Dose Replacement - Follow Nurse / BPA Driven Protocol   Not Applicable PRN Agustin Will DO        midodrine (PROAMATINE) tablet 10 mg  10 mg Oral TID AC Jefry Hooper MD, FASN   10 mg at 07/02/25 1743    mupirocin (BACTROBAN) 2 % nasal ointment 1 Application  1 Application Each Nare BID Silviano Cisneros MD   1 Application at 07/02/25 2152    ondansetron (ZOFRAN) injection 4 mg  4 mg Intravenous Q6H PRN Silviano Cisneros MD        pantoprazole (PROTONIX) EC tablet 40 mg  40 mg Oral Q AM Silviano Cisneros MD   40 mg at 07/03/25 0616    Pharmacy To Dose: Piperacillin-tazobactam (Zosyn)   Not Applicable Continuous PRN Silviano Cisneros MD        Phosphorus Replacement - Follow Nurse / BPA Driven Protocol   Not Applicable PRN Agustin Will DO        piperacillin-tazobactam (ZOSYN) IVPB 3.375 g IVPB in 100 mL NS (VTB)  3.375 g Intravenous Q8H Silviano Cisneros MD   3.375 g at 07/03/25 0034    Potassium Replacement - Follow Nurse / BPA Driven Protocol   Not Applicable PRN Fady Guevara MD        Potassium  Replacement - Follow Nurse / BPA Driven Protocol   Not Applicable PRN Silviano Cisneros MD        Potassium Replacement - Follow Nurse / BPA Driven Protocol   Not Applicable PRN Agustin Will,         sodium chloride 0.9 % flush 10 mL  10 mL Intravenous PRN Fady Guevara MD        sodium chloride 0.9 % flush 10 mL  10 mL Intravenous Q12H Silviano Cisneros MD   10 mL at 07/02/25 2152    sodium chloride 0.9 % flush 10 mL  10 mL Intravenous PRN Silviano Cisneros MD        sodium chloride 0.9 % infusion 40 mL  40 mL Intravenous PRN Silviano Cisneros MD           aspirin, 81 mg, Oral, Daily  budesonide, 0.5 mg, Nebulization, BID - RT  busPIRone, 15 mg, Oral, Q12H  clopidogrel, 75 mg, Oral, Daily  insulin glargine, 20 Units, Subcutaneous, Nightly  insulin lispro, 2-9 Units, Subcutaneous, 4x Daily AC & at Bedtime  insulin lispro, 8 Units, Subcutaneous, TID With Meals  lactobacillus acidophilus, 1 capsule, Oral, BID  midodrine, 10 mg, Oral, TID AC  mupirocin, 1 Application, Each Nare, BID  pantoprazole, 40 mg, Oral, Q AM  piperacillin-tazobactam, 3.375 g, Intravenous, Q8H  senna-docusate sodium, 2 tablet, Oral, BID  sodium chloride, 10 mL, Intravenous, Q12H        IV Meds:   lactated ringers, 100 mL/hr, Last Rate: 100 mL/hr (07/02/25 1009)  Pharmacy To Dose:,         Results Reviewed:   I have personally reviewed the results from the time of this admission to 7/3/2025 07:01 EDT     Results from last 7 days   Lab Units 07/03/25  0503 07/02/25  0407 07/01/25  0830 07/01/25  0445 06/30/25  0429 06/29/25  1706   SODIUM mmol/L 138 139  --  138   < > 131*   POTASSIUM mmol/L 4.2 4.2 3.4* 2.7*   < > 2.9*   CHLORIDE mmol/L 100 99  --  93*   < > 81*   CO2 mmol/L 26.4 31.0*  --  32.4*   < > 24.4   BUN mg/dL 19.0 26.0*  --  37.0*   < > 61.0*   CREATININE mg/dL 2.10* 2.16*  --  2.44*   < > 3.79*   CALCIUM mg/dL 9.0 8.8  --  8.9   < > 9.0   BILIRUBIN mg/dL  --   --   --   --   --  0.2   ALK PHOS U/L  --   --   --   --   --  69   ALT  "(SGPT) U/L  --   --   --   --   --  14   AST (SGOT) U/L  --   --   --   --   --  19   GLUCOSE mg/dL 87 86  --  108*   < > 335*    < > = values in this interval not displayed.       Estimated Creatinine Clearance: 37.9 mL/min (A) (by C-G formula based on SCr of 2.1 mg/dL (H)).    Results from last 7 days   Lab Units 07/03/25  0503 07/02/25  1517 07/02/25  0407 07/01/25  0445 06/29/25  1826   MAGNESIUM mg/dL  --   --   --   --  1.8   PHOSPHORUS mg/dL 2.6 2.4* 1.9*   < >  --     < > = values in this interval not displayed.             Results from last 7 days   Lab Units 07/03/25  0503 07/02/25  1517 07/02/25  0407 07/02/25  0315 07/01/25  1945 07/01/25  0830 06/30/25  0429 06/29/25  1706   WBC 10*3/mm3  --   --  5.65  --  5.67 8.15 18.60* 15.69*   HEMOGLOBIN g/dL 8.6* 7.7* 6.0* 6.4* 8.4* 7.0* 8.0* 9.0*   PLATELETS 10*3/mm3  --   --  129*  --  148 154 203 205       Results from last 7 days   Lab Units 07/01/25  1945   INR  1.03       Brief Urine Lab Results  (Last result in the past 365 days)        Color   Clarity   Blood   Leuk Est   Nitrite   Protein   CREAT   Urine HCG        06/29/25 2205 Yellow   Clear   Negative   Moderate (2+)   Negative   Negative                   No results found for: \"UTPCR\"    Imaging Results (Last 24 Hours)       ** No results found for the last 24 hours. **                Assessment / Plan     ASSESSMENT:    Septic shock    CAD (coronary artery disease)    Chronic kidney disease, stage IV (severe)    Acute UTI (urinary tract infection)    Pneumonia of left lower lobe due to infectious organism    Acute kidney injury: Patient does have significant worsening of his renal function.  Most likely secondary to volume depletion and septic shock.  Bicarb is stable low potassium noted.  Chronic kidney disease stage IV: Underlying chronic kidney disease secondary to diabetes and hypertension as well as volume loss, status post left nephrectomy in March of 2020.  Baseline creatinine of 2.9 with a " EGFR of 21 mL/min.  Type 2 diabetes: Longstanding history of type 2 diabetes likely with some complications.  Septic shock: Treated as per hospitalist service still on pressors.  Left lower lobe pneumonia: On IV antibiotics as per hospitalist service.  Urinary tract infection: Treated  Hypokalemia: Replace per protocol  Coronary artery disease: Status post CABG and stent placements.  No acute issues  COPD: Longstanding history of smoking.  Untreated sleep apnea: Longstanding known history of obstructive sleep apnea, has not been able to use his BiPAP      PLAN:  Hemoglobin improved to 8.6 this morning.  Will go ahead and stop the midodrine blood pressure is doing much better.  Renal function is at about baseline.  Will go ahead and stop the IV fluids and give him 1 dose of Lasix 20 mg IV.  Details were discussed with the patient as well as family in the room.  Wife is at the bedside.  Details were also discussed with the hospitalist service and or other providers as needed.  Short-term rehab placement is in progress.  Continue with rest of the current treatment plan, and monitor with surveillance labs, adjust medication doses to the eGFR.  Further recommendations will depend on clinical course of the patient during the current hospitalization.   I have reviewed the copied text to this note, it was edited and the changes made as needed.  It is accurate to the point, when the note was signed today.     Thank you for involving us in the care of Jonny Ferrari Jr..  Please feel free to call with any questions.    Jefry Hooper MD, FÁTIMA  07/03/25  07:01 EDT    [facility]  [phone]      Part of this note may be an electronic transcription/translation of spoken language to printed text using the Dragon Dictation System.

## 2025-07-03 NOTE — PAYOR COMM NOTE
"To:  Zoar  From: Amber Ferrari RN  Phone: [phone]  Fax: [phone]  NPI: [id number]  TIN: [id number]  Member ID: [id number]   MRN: [id number]    Nuzhat Ferrari Jr. (74 y.o. Male)       Date of Birth   1950    Social Security Number       Address   [facility] [address] [place] [address]    Home Phone   [phone]    MRN   [id number]       [facility]    Marital Status                               Admission Date   6/29/2025    Admission Type   Emergency    Admitting Provider   Silviano Cisneros MD    Attending Provider   Pat Davis DO    Department, Room/Bed   [facility] INTENSIVE [facility], I05/1       Discharge Date       Discharge Disposition       Discharge Destination                                 Attending Provider: Pat Davis DO    Allergies: Metformin    Isolation: None   Infection: None   Code Status: CPR    Ht: 182.9 cm (72\")   Wt: 94.1 kg (207 lb 7.3 oz)    Admission Cmt: None   Principal Problem: Septic shock [A41.9,R65.21]                   Active Insurance as of 6/29/2025       Primary Coverage       Payor Plan Insurance Group Employer/Plan Group    ANTH MEDICARE REPLACEMENT ANTH MEDICARE ADVANTAGE HMO [id number]       Payor Plan Address Payor Plan Phone Number Payor Plan Fax Number Effective Dates    [address] [phone]  1/1/2025 - None Entered    [facility] [id number]         Subscriber Name Subscriber Birth Date Member ID       NUZHAT FERRARI JR. 1950 [id number]                     Emergency Contacts        (Rel.) Home Phone Work Phone Mobile Phone    Lashon Ferrari (Spouse) [phone] -- [phone]    ISISCATE (Relative) -- -- [phone]    ISISNEGRA (Son) [phone] -- --              Vital Signs (last day)       Date/Time Temp Temp src Pulse Resp BP Patient Position SpO2    07/03/25 1121 -- -- 73 -- 143/83 -- 100    07/03/25 1000 -- -- 79 -- 143/91 -- 99    07/03/25 0900 -- -- 66 -- 139/78 -- 97 "    07/03/25 0800 -- -- 71 19 138/73 Lying 98    07/03/25 0722 -- -- -- 20 -- -- --    07/03/25 0700 98.2 (36.8) Oral 65 -- 125/63 -- 97    07/03/25 0600 -- -- 66 18 130/67 Lying 94    07/03/25 0500 -- -- 101 -- 152/104 -- 98    07/03/25 0400 98.1 (36.7) Oral 71 20 144/75 Lying 95    07/03/25 0300 -- -- 68 -- 145/82 -- 95    07/03/25 0200 -- -- 78 20 156/89 Lying 97    07/03/25 0100 -- -- 66 -- 136/72 -- 96    07/03/25 0000 98.4 (36.9) Oral 67 20 131/73 Lying 96    07/02/25 2300 -- -- 74 -- 145/77 -- 95    07/02/25 2200 -- -- 73 20 140/77 Sitting 98    07/02/25 2100 -- -- 68 -- 141/78 -- 97    07/02/25 2000 -- -- 92 20 -- Sitting 98    07/02/25 1900 98.5 (36.9) Oral 74 -- 152/87 -- 100    07/02/25 1851 -- -- -- 16 -- Lying --    07/02/25 1600 97.6 (36.4) Oral 75 16 150/71 Lying 97    07/02/25 1500 -- -- 68 -- -- -- 99    07/02/25 1400 -- -- 68 -- 131/79 -- 99    07/02/25 1300 -- -- 71 -- 111/56 -- 100    07/02/25 1200 -- -- 76 16 99/62 Sitting 100    07/02/25 1100 -- -- 70 -- 118/62 -- 99    07/02/25 1000 -- -- 74 -- 93/61 -- 100    07/02/25 0935 97.7 (36.5) Oral 68 16 102/67 Lying 99    07/02/25 0900 -- -- 84 -- 102/75 -- 100    07/02/25 0800 -- -- 80 18 105/66 Sitting 96    07/02/25 0703 -- -- 75 -- -- -- 94    07/02/25 0700 97.7 (36.5) Oral 80 -- 103/52 -- 97    07/02/25 0600 -- -- 73 -- 97/56 -- 97    07/02/25 0543 98.5 (36.9) Axillary 72 11 103/58 -- --    07/02/25 0533 98.6 (37) Axillary 75 13 98/52 -- 92    07/02/25 0505 98.6 (37) Axillary 79 14 90/50 Lying 97    07/02/25 0400 98.2 (36.8) Oral 80 -- 101/50 -- 97    07/02/25 0300 -- -- 77 -- 100/61 -- 95    07/02/25 0200 -- -- 74 -- 98/51 -- 91    07/02/25 0100 -- -- 79 -- 98/50 -- 90    07/02/25 0000 98.2 (36.8) Oral 75 -- 93/47 -- 89          Current Facility-Administered Medications   Medication Dose Route Frequency Provider Last Rate Last Admin    acetaminophen (TYLENOL) tablet 650 mg  650 mg Oral Q4H PRN Silviano Cisneros MD        Or    acetaminophen  (TYLENOL) 160 MG/5ML oral solution 650 mg  650 mg Oral Q4H PRN Silviano Cisneros MD        Or    acetaminophen (TYLENOL) suppository 650 mg  650 mg Rectal Q4H PRN Silviano Cisneros MD        aspirin EC tablet 81 mg  81 mg Oral Daily Silviano Cisneros MD   81 mg at 07/03/25 0812    sennosides-docusate (PERICOLACE) 8.6-50 MG per tablet 2 tablet  2 tablet Oral BID Silviano Cisneros MD   2 tablet at 07/01/25 0823    And    polyethylene glycol (MIRALAX) packet 17 g  17 g Oral Daily PRN Silviano Cisneros MD        And    bisacodyl (DULCOLAX) EC tablet 5 mg  5 mg Oral Daily PRN Silviano Cisneros MD        And    bisacodyl (DULCOLAX) suppository 10 mg  10 mg Rectal Daily PRN Silviano Cisneros MD        budesonide (PULMICORT) nebulizer solution 0.5 mg  0.5 mg Nebulization BID - RT Silviano Cisneros MD   0.5 mg at 07/03/25 0722    busPIRone (BUSPAR) tablet 15 mg  15 mg Oral Q12H Silviano Cisneros MD   15 mg at 07/03/25 0813    Calcium Replacement - Follow Nurse / BPA Driven Protocol   Not Applicable PRN Agustin Will DO        clopidogrel (PLAVIX) tablet 75 mg  75 mg Oral Daily Silviano Cisneros MD   75 mg at 07/03/25 0813    dextrose (D50W) (25 g/50 mL) IV injection 25 g  25 g Intravenous Q15 Min PRN Silviano Cisneros MD        dextrose (GLUTOSE) oral gel 15 g  15 g Oral Q15 Min PRN Silviano Cisneros MD        Diclofenac Sodium (VOLTAREN) 1 % gel 2 g  2 g Topical 4x Daily PRN Kuldeep Patel DO   2 g at 07/02/25 2201    glucagon (GLUCAGEN) injection 1 mg  1 mg Intramuscular Q15 Min PRN Silviano Cisneros MD        insulin glargine (LANTUS, SEMGLEE) injection 20 Units  20 Units Subcutaneous Nightly Silviano Cisneros MD   20 Units at 07/02/25 2152    Insulin Lispro (humaLOG) injection 2-9 Units  2-9 Units Subcutaneous 4x Daily AC & at Bedtime Silviano Cisneros MD   2 Units at 06/30/25 1129    ipratropium-albuterol (DUO-NEB) nebulizer solution 3 mL  3 mL Nebulization Q4H PRN Silviano Cisneros MD        lactobacillus acidophilus (RISAQUAD) capsule 1 capsule  1 capsule Oral BID Amelia  MD Silviano   1 capsule at 07/03/25 0813    Magnesium Standard Dose Replacement - Follow Nurse / BPA Driven Protocol   Not Applicable PRN Silviano Cisneros MD        Magnesium Standard Dose Replacement - Follow Nurse / BPA Driven Protocol   Not Applicable PRN Agustin Will DO        mupirocin (BACTROBAN) 2 % nasal ointment 1 Application  1 Application Each Nare BID Silviano Cisneros MD   1 Application at 07/03/25 0813    ondansetron (ZOFRAN) injection 4 mg  4 mg Intravenous Q6H PRN Silviano Cisneros MD        pantoprazole (PROTONIX) EC tablet 40 mg  40 mg Oral Q AM Silviano Cisneros MD   40 mg at 07/03/25 0616    Pharmacy To Dose: Piperacillin-tazobactam (Zosyn)   Not Applicable Continuous PRN Silviano Cisneros MD        Phosphorus Replacement - Follow Nurse / BPA Driven Protocol   Not Applicable PRN Agustin Will DO        piperacillin-tazobactam (ZOSYN) IVPB 3.375 g IVPB in 100 mL NS (VTB)  3.375 g Intravenous Q8H Silviano Cisneros MD   3.375 g at 07/03/25 0812    Potassium Replacement - Follow Nurse / BPA Driven Protocol   Not Applicable PRN Fady Guevara MD        Potassium Replacement - Follow Nurse / BPA Driven Protocol   Not Applicable PRN Silviano Cisneros MD        Potassium Replacement - Follow Nurse / BPA Driven Protocol   Not Applicable PRN Agustin Will, DO        sodium chloride 0.9 % flush 10 mL  10 mL Intravenous PRN Fady Guevara MD        sodium chloride 0.9 % flush 10 mL  10 mL Intravenous Q12H Silviano Cisneros MD   10 mL at 07/03/25 0813    sodium chloride 0.9 % flush 10 mL  10 mL Intravenous PRN Silviano Cisneros MD        sodium chloride 0.9 % infusion 40 mL  40 mL Intravenous PRN Silviano Cisneros MD         Lab Results (last 24 hours)       Procedure Component Value Units Date/Time    POC Glucose 4x Daily Before Meals & at Bedtime [[id number]]  (Normal) Collected: 07/03/25 1115    Specimen: Blood Updated: 07/03/25 1119     Glucose 93 mg/dL      Comment: Serial Number: [id number]:  [id number]        POC Glucose Once [[id number]]  (Normal) Collected: 07/03/25 0654    Specimen: Blood Updated: 07/03/25 0658     Glucose 81 mg/dL      Comment: Serial Number: [id number]:  [id number]       Renal Function Panel [[id number]]  (Abnormal) Collected: 07/03/25 0503    Specimen: Blood Updated: 07/03/25 0532     Glucose 87 mg/dL      BUN 19.0 mg/dL      Creatinine 2.10 mg/dL      Sodium 138 mmol/L      Potassium 4.2 mmol/L      Chloride 100 mmol/L      CO2 26.4 mmol/L      Calcium 9.0 mg/dL      Albumin 2.9 g/dL      Phosphorus 2.6 mg/dL      Anion Gap 11.6 mmol/L      BUN/Creatinine Ratio 9.0     eGFR 32.8 mL/min/1.73     Narrative:      GFR Categories in Chronic Kidney Disease (CKD)              GFR Category          GFR (mL/min/1.73)    Interpretation  G1                    90 or greater        Normal or high (1)  G2                    60-89                Mild decrease (1)  G3a                   45-59                Mild to moderate decrease  G3b                   30-44                Moderate to severe decrease  G4                    15-29                Severe decrease  G5                    14 or less           Kidney failure    (1)In the absence of evidence of kidney disease, neither GFR category G1 or G2 fulfill the criteria for CKD.    eGFR calculation 2021 CKD-EPI creatinine equation, which does not include race as a factor    Hemoglobin & Hematocrit, Blood [[id number]]  (Abnormal) Collected: 07/03/25 0503    Specimen: Blood Updated: 07/03/25 0512     Hemoglobin 8.6 g/dL      Hematocrit 27.9 %     Blood Culture - Blood, Arm, Right [[id number]]  (Normal) Collected: 06/29/25 1728    Specimen: Blood from Arm, Right Updated: 07/02/25 1745     Blood Culture No growth at 3 days    Blood Culture - Blood, Arm, Right [[id number]]  (Normal) Collected: 06/29/25 1738    Specimen: Blood from Arm, Right Updated: 07/02/25 1745     Blood Culture No growth at 3 days    POC Glucose Once [[id number]]  (Normal) Collected:  07/02/25 1603    Specimen: Blood Updated: 07/02/25 1607     Glucose 95 mg/dL      Comment: Serial Number: [id number]:  [id number]       Phosphorus [[id number]]  (Abnormal) Collected: 07/02/25 1517    Specimen: Blood Updated: 07/02/25 1604     Phosphorus 2.4 mg/dL     Hemoglobin & Hematocrit, Blood [[id number]]  (Abnormal) Collected: 07/02/25 1517    Specimen: Blood Updated: 07/02/25 1535     Hemoglobin 7.7 g/dL      Hematocrit 24.2 %           Imaging Results (Last 24 Hours)       ** No results found for the last 24 hours. **          Orders (last 24 hrs)        Start     Ordered    07/04/25 0600  Basic Metabolic Panel  Morning Draw         07/03/25 1001    07/04/25 0600  Hemoglobin & Hematocrit, Blood  Morning Draw         07/03/25 1001    07/03/25 1049  D / C Central Line  Once         07/03/25 1049    07/03/25 1005  Transfer Patient  Once         07/03/25 1004    07/03/25 0945  furosemide (LASIX) injection 20 mg  Once         07/03/25 0850    07/03/25 0745  Telemetry Scan  Once         07/03/25 0745    07/03/25 0659  POC Glucose Once  PROCEDURE ONCE        Comments: Complete no more than 45 minutes prior to patient eating      07/03/25 0654    07/03/25 0600  Hemoglobin & Hematocrit, Blood  Morning Draw         07/02/25 1212    07/02/25 1958  Telemetry Scan  Once         07/02/25 1958    07/02/25 1940  Diclofenac Sodium (VOLTAREN) 1 % gel 2 g  4 Times Daily PRN         07/02/25 1943    07/02/25 1608  POC Glucose Once  PROCEDURE ONCE        Comments: Complete no more than 45 minutes prior to patient eating      07/02/25 1603    07/02/25 1045  lactated ringers infusion  Continuous,   Status:  Discontinued         07/02/25 0958    07/02/25 0448  Potassium Replacement - Follow Nurse / BPA Driven Protocol  As Needed         07/02/25 0448    07/02/25 0448  Magnesium Standard Dose Replacement - Follow Nurse / BPA Driven Protocol  As Needed         07/02/25 0448    07/02/25 0448  Phosphorus Replacement - Follow Nurse  "/ BPA Driven Protocol  As Needed         07/02/25 0448    07/02/25 0448  Calcium Replacement - Follow Nurse / BPA Driven Protocol  As Needed         07/02/25 0448    07/01/25 1359  Urinary Catheter Care  Every Shift      Placed in \"And\" Linked Group    07/01/25 1358    07/01/25 1200  Dietary Nutrition Supplements Boost Glucose Control (Glucerna Shake)  Daily With Lunch       07/01/25 0842    07/01/25 0815  midodrine (PROAMATINE) tablet 10 mg  3 Times Daily Before Meals,   Status:  Discontinued         07/01/25 0719    07/01/25 0600  Renal Function Panel  Daily       06/30/25 0920    06/30/25 0957  Silicone Border Dressing to Bony Prominences  Every Shift       06/30/25 0959    06/30/25 0900  aspirin EC tablet 81 mg  Daily         06/30/25 0004    06/30/25 0900  clopidogrel (PLAVIX) tablet 75 mg  Daily         06/30/25 0004    06/30/25 0800  Oral Care  2 Times Daily       06/29/25 2348    06/30/25 0800  Insulin Lispro (humaLOG) injection 8 Units  3 Times Daily With Meals,   Status:  Discontinued         06/29/25 2348    06/30/25 0730  Insulin Lispro (humaLOG) injection 2-9 Units  4 Times Daily Before Meals & Nightly         06/29/25 2348 06/30/25 0700  POC Glucose 4x Daily Before Meals & at Bedtime  4 Times Daily Before Meals & at Bedtime      Comments: Complete no more than 45 minutes prior to patient eating      06/29/25 2348 06/30/25 0700  budesonide (PULMICORT) nebulizer solution 0.5 mg  2 Times Daily - RT         06/30/25 0005    06/30/25 0600  Incentive Spirometry  Every 4 Hours While Awake       06/29/25 2348    06/30/25 0600  pantoprazole (PROTONIX) EC tablet 40 mg  Every Early Morning         06/30/25 0004    06/30/25 0100  lactobacillus acidophilus (RISAQUAD) capsule 1 capsule  2 Times Daily         06/30/25 0003    06/30/25 0100  busPIRone (BUSPAR) tablet 15 mg  Every 12 Hours Scheduled         06/30/25 0004    06/30/25 0100  piperacillin-tazobactam (ZOSYN) IVPB 3.375 g IVPB in 100 mL NS (VTB)  Every " "8 Hours         06/30/25 0013    06/30/25 0045  insulin glargine (LANTUS, SEMGLEE) injection 20 Units  Nightly         06/29/25 2346    06/30/25 0045  sodium chloride 0.9 % flush 10 mL  Every 12 Hours Scheduled         06/29/25 2348    06/30/25 0045  mupirocin (BACTROBAN) 2 % nasal ointment 1 Application  2 Times Daily         06/29/25 2348    06/30/25 0045  sennosides-docusate (PERICOLACE) 8.6-50 MG per tablet 2 tablet  2 Times Daily        Placed in \"And\" Linked Group    06/29/25 2348    06/30/25 0007  Potassium Replacement - Follow Nurse / BPA Driven Protocol  As Needed         06/30/25 0007    06/30/25 0006  Magnesium Standard Dose Replacement - Follow Nurse / BPA Driven Protocol  As Needed         06/30/25 0007    06/30/25 0005  ipratropium-albuterol (DUO-NEB) nebulizer solution 3 mL  Every 4 Hours PRN         06/30/25 0005    06/30/25 0005  Pharmacy To Dose: Piperacillin-tazobactam (Zosyn)  Continuous PRN         06/30/25 0005    06/30/25 0000  Vital Signs  Every 4 Hours       06/29/25 2348    06/29/25 2349  Daily CHG Bath While in ICU  Daily      Comments: Use for admission bath & daily bath for duration of critical care stay    06/29/25 2348    06/29/25 2348  glucagon (GLUCAGEN) injection 1 mg  Every 15 Minutes PRN         06/29/25 2348    06/29/25 2348  dextrose (GLUTOSE) oral gel 15 g  Every 15 Minutes PRN         06/29/25 2348    06/29/25 2348  dextrose (D50W) (25 g/50 mL) IV injection 25 g  Every 15 Minutes PRN         06/29/25 2348    06/29/25 2348  polyethylene glycol (MIRALAX) packet 17 g  Daily PRN        Placed in \"And\" Linked Group    06/29/25 2348    06/29/25 2348  bisacodyl (DULCOLAX) EC tablet 5 mg  Daily PRN        Placed in \"And\" Linked Group    06/29/25 2348    06/29/25 2348  bisacodyl (DULCOLAX) suppository 10 mg  Daily PRN        Placed in \"And\" Linked Group    06/29/25 2348    06/29/25 2347  Intake & Output  Every Shift       06/29/25 2348    06/29/25 2346  sodium chloride 0.9 % flush 10 " "mL  As Needed         06/29/25 2348    06/29/25 2346  sodium chloride 0.9 % infusion 40 mL  As Needed         06/29/25 2348    06/29/25 2346  acetaminophen (TYLENOL) tablet 650 mg  Every 4 Hours PRN        Placed in \"Or\" Linked Group    06/29/25 2348    06/29/25 2346  acetaminophen (TYLENOL) 160 MG/5ML oral solution 650 mg  Every 4 Hours PRN        Placed in \"Or\" Linked Group    06/29/25 2348    06/29/25 2346  acetaminophen (TYLENOL) suppository 650 mg  Every 4 Hours PRN        Placed in \"Or\" Linked Group    06/29/25 2348    06/29/25 2346  ondansetron (ZOFRAN) injection 4 mg  Every 6 Hours PRN         06/29/25 2348    06/29/25 1858  Potassium Replacement - Follow Nurse / BPA Driven Protocol  As Needed         06/29/25 1858    06/29/25 1705  sodium chloride 0.9 % flush 10 mL  As Needed        Placed in \"And\" Linked Group    06/29/25 1705    Unscheduled  Straight cath  As Needed       06/29/25 2207    Unscheduled  Up With Assistance  As Needed       06/29/25 2348    Unscheduled  Follow Hypoglycemia Standing Orders For Blood Glucose <70 & Notify Provider of Treatment  As Needed      Comments: Follow Hypoglycemia Orders As Outlined in Process Instructions (Open Order Report to View Full Instructions)  Notify Provider Any Time Hypoglycemia Treatment is Administered    06/29/25 2348    Unscheduled  Wound Care  As Needed       06/30/25 0959    Unscheduled  Skin Tear Care - Minimal Drainage PRN  As Needed      Comments: - Realign Skin Flap (if Viable) Gently Ease Flap Into Place Using a Dampened Cotton-Tipped Applicator or Gloved Finger  - Gently Cleanse Area & Pat Dry  - Apply Hydrogel & Non-Adherent Layer (Silicone Sheet, Oil Emulsion Dressing or Vaseline Gauze)  - Secure With Silicone Border Dressing (Unless Skin Fragile)  - If Skin is Fragile Secure With Roll Gauze - Do NOT Put Tape Directly on Skin  - Change Every 3 Days & As Needed    06/30/25 0959                     Physician Progress Notes (most recent note)    " "    Pat Davis, DO at 25 1002              [facility]    Name:  Jonny Ferrari Jr.   Age:  74 y.o.  Sex:  male  :  1950  MRN:  [id number]   Visit Number:  [id number]  Admission Date:  2025  Date Of Service:  25  Primary Care Physician:  Amaury Brice MD     LOS: 4 days :    Chief Complaint:      Follow-up septic shock    Subjective:    Patient overall feeling okay.  A little short of breath this morning, discussed likely fluid related and we are discontinuing this as his blood pressure and kidney functions are improving.  Will give diuretic.  Agreeable for rehab    Hospital Course:    Per prior provider: \"Jonny Ferrari Jr. is a 74-year-old male with history of diabetes mellitus type 2, chronic kidney disease stage III, COPD, coronary artery disease status post CABG and stent, ERASMO, BPH was brought to the emergency room by EMS with symptoms of generalized weakness and fatigue   The patient was brought in by ambulance due to an inability to walk, a condition that has persisted for the past 2 to 3 days. He reports no fever, dysuria, or cough but feels weak. He has experienced several falls recently and has been unable to walk independently for the past 3 to 4 days. Despite using a walker or cane, he continues to fall.   In the emergency room, patient was afebrile but mildly tachycardic in the 100 with initial blood pressure of 80/54 and pulse oxygen saturation of 95% on room air.  Patient was given 30 mL/kg fluid bolus initially in the emergency room with initial improvement of the blood pressure to systolic 100 but subsequently dropped it again to the 70s systolic.  Patient was given another 500 bolus of normal saline, he right femoral central line was placed and he was initiated on Levophed. \"    Review of Systems:     All systems were reviewed and negative except as mentioned in subjective, assessment and plan.    Vital " Signs:    Temp:  [97.6 °F (36.4 °C)-98.5 °F (36.9 °C)] 98.2 °F (36.8 °C)  Heart Rate:  [] 65  Resp:  [16-20] 20  BP: ()/() 125/63    Intake and output:    I/O last 3 completed shifts:  In: 3659 [P.O.:600; I.V.:2259.8; Blood:399.2; IV Piggyback:400]  Out: 5250 [Urine:5250]  I/O this shift:  In: 120 [P.O.:120]  Out: -     Physical Examination:    General Appearance:  Alert and cooperative.  Hard of hearing.  No acute distress   Head:  Atraumatic and normocephalic.   Eyes: Conjunctivae and sclerae normal, no icterus. No pallor.   Throat: No oral lesions, no thrush, oral mucosa moist.   Neck: Supple, trachea midline, no thyromegaly.   Lungs:   Breath sounds heard bilaterally equally.  Some scattered crackles   Heart:  Normal S1 and S2, no murmur,No JVD.   Abdomen:   Normal bowel sounds, Soft, nontender, nondistended, no rebound tenderness.  Nolan catheter in place   Extremities: Supple, trace edema   Skin: No bleeding or rash.  Multiple skin abrasions.   Neurologic: Alert and oriented x 3. No facial asymmetry. Moves all four limbs.      Laboratory results:    Results from last 7 days   Lab Units 07/03/25  0503 07/02/25  0407 07/01/25  0830 07/01/25  0445 06/30/25  0429 06/29/25  1706   SODIUM mmol/L 138 139  --  138   < > 131*   POTASSIUM mmol/L 4.2 4.2 3.4* 2.7*   < > 2.9*   CHLORIDE mmol/L 100 99  --  93*   < > 81*   CO2 mmol/L 26.4 31.0*  --  32.4*   < > 24.4   BUN mg/dL 19.0 26.0*  --  37.0*   < > 61.0*   CREATININE mg/dL 2.10* 2.16*  --  2.44*   < > 3.79*   CALCIUM mg/dL 9.0 8.8  --  8.9   < > 9.0   BILIRUBIN mg/dL  --   --   --   --   --  0.2   ALK PHOS U/L  --   --   --   --   --  69   ALT (SGPT) U/L  --   --   --   --   --  14   AST (SGOT) U/L  --   --   --   --   --  19   GLUCOSE mg/dL 87 86  --  108*   < > 335*    < > = values in this interval not displayed.     Results from last 7 days   Lab Units 07/03/25  0503 07/02/25  1517 07/02/25  0407 07/02/25  0315 07/01/25  1945 07/01/25  0830   WBC  10*3/mm3  --   --  5.65  --  5.67 8.15   HEMOGLOBIN g/dL 8.6* 7.7* 6.0*   < > 8.4* 7.0*   HEMATOCRIT % 27.9* 24.2* 18.8*   < > 26.7* 22.4*   PLATELETS 10*3/mm3  --   --  129*  --  148 154    < > = values in this interval not displayed.     Results from last 7 days   Lab Units 07/01/25  1945   INR  1.03     Results from last 7 days   Lab Units 06/29/25  1826 06/29/25  1706   HSTROP T ng/L 95* 98*     Results from last 7 days   Lab Units 06/29/25  2205 06/29/25  1738 06/29/25  1728   BLOODCX   --  No growth at 3 days No growth at 3 days   URINECX  25,000 CFU/mL Enterococcus faecalis*  --   --      Recent Labs     08/29/24  2314   PHART 7.382   CLE7OLO 49.8*   PO2ART 68.0*   XRD7EEO 29.5*   BASEEXCESS 3.8*      I have reviewed the patient's laboratory results.    Radiology results:    No radiology results from the last 24 hrs    I have reviewed the patient's radiology reports.    Medication Review:     I have reviewed the patient's active and prn medications.     Problem List:      Septic shock    CAD (coronary artery disease)    Chronic kidney disease, stage IV (severe)    Acute UTI (urinary tract infection)    Pneumonia of left lower lobe due to infectious organism      Assessment:    Septic shock secondary to #2 and #3, POA.  Acute urinary tract infection, POA.  Left lower lobe bacterial pneumonia, unable to classify further, POA.  Demand ischemia with elevated troponins, POA.  Hypokalemia, POA.  Chronic kidney disease stage IV.  Diabetes mellitus type 2 with nephropathy and hyperglycemia, POA.  Coronary artery disease status post CABG and stents.  COPD, no exacerbation.  Benign prostatic hyperplasia.  Will falls with skin abrasions, POA.    Plan:    Septic shock  Acute UTI  Left lower lobe bacterial pneumonia  Negative blood culture, on Zosyn.  Oxygenation stable.  Off pressors.  Urine culture had E faecalis.  Will talk with pharmacy about duration of antibiotic and need for switching necessary.  Demand ischemia  with elevated troponins  Troponins plateaued 95-98  Suspect secondary to demand ischemia in the setting of his septic shock as well as CKD  Low suspicion for ACS  Hypokalemia  Continue replacement  CKD stage IV  Nephrology, Dr. Hooper consulted and appreciate recommendations.  Numbers now stabilized.  Off fluids.  Type 2 diabetes  Subcutaneous insulin protocol  Anemia  Does have chronic anemia likely due to renal failure, however acute on chronic currently.  Received 1 unit of PRBCs.  There was reportedly some blood loss with catheter insertion but urine is clear now.  Restart aspirin and Plavix today.  No further bleeding noted  CAD status post CABG  Continue DAPT with aspirin and Plavix  COPD not exacerbation  Multiple skin abrasions  Wound care  Impaired mobility and ADLs  PT/OT once stable to participate    Further orders as clinical course dictates.    DVT Prophylaxis: SCD  Code Status: Full  Diet: Renal diabetic.  Discharge Plan: STR    Pat Davis DO  07/03/25  10:02 EDT    Dictated utilizing Dragon dictation.        Electronically signed by Pat Davis DO at 07/03/25 1004          Consult Notes (most recent note)        Jefry Hooper MD, FASN at 06/30/25 0806        Consult Orders    1. Inpatient Nephrology Consult [[id number]] ordered by Silviano Cisneros MD at 06/29/25 2348                         [facility]      Nephrology Consultation  [facility]      Referring Provider:   Fady Guevara MD    Reason for Consultation:  Acute on chronic kidney disease 4 and associated problems.    Subjective:  Chief complaint   Chief Complaint   Patient presents with    Weakness - Generalized     Pt called ems for sick, unable to urinate and weakness. Pt was on the toilet with a pressure in the 80's. Pt has multiple skin tears. Pt has 18 ga iv in left ac, 22 ga right bicep by ems.      History of present illness:    Patient is 74-year-old male with  multimedical problems including chronic kidney disease stage IV, type 2 diabetes, COPD, coronary artery disease status post CABG stent placement, obstructive sleep apnea, BPH, solitary kidney status post left nephrectomy secondary to cancer who was brought into the emergency room via EMS after he has been feeling weak and had not been able to get up and walk even with the help of walker or cane.  He had multiple falls in the last few days.  He is hard of hearing.  Patient does follow-up with our office as an outpatient.  In the ER he was noted to have low blood pressure was given boluses, central line was placed and pressors were started.  He was noted to have lactic acid of 9.7 with a white count of 15.7 and a hemoglobin of 9 UA shows 21-50 WBCs.  CT of the chest shows pleural effusion with lower lobe atelectasis versus pneumonia.  He was started on IV antibiotics including Zosyn and vancomycin.  He was admitted through the hospitalist service for further evaluation and treatment.  Currently patient is laying in the bed awake alert and interactive, he is hard of hearing wife walked in with his hearing aids and he can hear fairly well.  Denies having any chest pain or shortness of breath, denies any nausea vomiting or diarrhea.  No fever or chills.  I have reviewed labs/imaging/records from this hospitalization, including ER staff and admitting/attending physicians H/P's and progress notes to establish a comprehensive understanding of this patient's clinical hospital course, as well as to establish plan of care appropriately.     Past Medical History:   Diagnosis Date    Anemia     Anxiety neurosis     CAD (coronary artery disease)     Cancer     Kidney    Chronic kidney disease     Patient reported left kidney removed secondary to kidney cancer and that he only has 30% function of the right kidney    Constipation     COPD (chronic obstructive pulmonary disease)     Depression     Diabetes mellitus     DM TYPE 2 ,  ONSET IN 2009    Dyslipidemia     Dysphagia     Reported noted with food, fluids and pills    Elevated cholesterol     Full dentures     GERD (gastroesophageal reflux disease)     Gout     H/O left nephrectomy 2020    secondary to cancer    [place] (hard of hearing)     Patient has bilateral hearing aids, still is very [place]    Hypertension     Impaired functional mobility, balance, gait, and endurance     MI (myocardial infarction)     Patient reported apx March 2021 (reported he refused cardiac cath) and June 2021 (did have cardiac cath with placement of 2 stents).     OA (osteoarthritis)     Obesity     MILD TO MODERATE EXOGENOUS OBESITY    Problems with swallowing     with food    Psoriasis     Sleep apnea     CPAP HS - to bring mask and machine DOS    Tattoo     x1    TIA (transient ischemic attack)     Wears glasses 10/27/2021       Past Surgical History:   Procedure Laterality Date    APPENDECTOMY      CARDIAC CATHETERIZATION      CARDIAC CATHETERIZATION N/A 2/20/2019    Procedure: Left Heart Cath;  Surgeon: Ernst Smith MD;  Location:  [facility];  Service: Cardiology    CARDIAC CATHETERIZATION Left 6/9/2021    Procedure: Left Heart Cath;  Surgeon: Ernst Smith MD;  Location:  [facility];  Service: Cardiology;  Laterality: Left;    CHOLECYSTECTOMY      COLONOSCOPY      COLONOSCOPY N/A 11/10/2021    Procedure: COLONOSCOPY with polypectomy x6 and clip x2;  Surgeon: Chidi Trinidad MD;  Location: [facility] ENDOSCOPY;  Service: Gastroenterology;  Laterality: N/A;    COLONOSCOPY N/A 10/25/2023    Procedure: COLONOSCOPY incomplete;  Surgeon: Chidi Trinidad MD;  Location: [facility] ENDOSCOPY;  Service: Gastroenterology;  Laterality: N/A;    COLONOSCOPY N/A 11/8/2023    Procedure: COLONOSCOPY WITH POLYPECTOMY X1;  Surgeon: Chidi Trinidad MD;  Location: [facility] ENDOSCOPY;  Service: Gastroenterology;  Laterality: N/A;    CORONARY ARTERY BYPASS GRAFT  2001    Patient reported no MI  prior to CABG and that the bypass was 3 vessel     ENDOSCOPY      ENDOSCOPY N/A 11/10/2021    Procedure: ESOPHAGOGASTRODUODENOSCOPY with biopsy and dilatation;  Surgeon: Chidi Trinidad MD;  Location: [facility] ENDOSCOPY;  Service: Gastroenterology;  Laterality: N/A;    ENDOSCOPY N/A 10/25/2023    Procedure: ESOPHAGOGASTRODUODENOSCOPY with biopsy and dilatation;  Surgeon: Chidi Trinidad MD;  Location: [facility] ENDOSCOPY;  Service: Gastroenterology;  Laterality: N/A;    ENDOSCOPY N/A 11/8/2023    Procedure: ESOPHAGOGASTRODUODENOSCOPY WITH BIOPSY;  Surgeon: Chidi Trinidad MD;  Location: [facility] ENDOSCOPY;  Service: Gastroenterology;  Laterality: N/A;    INCISION AND DRAINAGE PERIRECTAL ABSCESS N/A 8/29/2024    Procedure: INCISION AND DRAINAGE OF PERIRECTAL ABSCESS;  Surgeon: Sherman Billingsley MD;  Location: [facility] OR;  Service: General;  Laterality: N/A;    LAPAROSCOPIC NEPHRECTOMY Left     MOUTH SURGERY      Full mouth extraction    URETER SURGERY      VASECTOMY       Family History   Problem Relation Age of Onset    Lung disease Mother     No Known Problems Father     No Known Problems Sister     No Known Problems Brother     No Known Problems Maternal Grandmother     No Known Problems Maternal Grandfather     No Known Problems Paternal Grandmother     No Known Problems Paternal Grandfather     No Known Problems Sister     No Known Problems Brother     No Known Problems Sister      negative h/o ESRD     Social History     Tobacco Use    Smoking status: Former     Current packs/day: 1.00     Average packs/day: 1 pack/day for 45.5 years (45.5 ttl pk-yrs)     Types: Cigarettes     Start date: 1980    Smokeless tobacco: Current     Types: Snuff   Vaping Use    Vaping status: Never Used   Substance Use Topics    Alcohol use: No    Drug use: No     Home medications:   Prior to Admission Medications       Prescriptions Last Dose Informant Patient Reported? Taking?    acetaminophen (TYLENOL) 325 MG tablet   Spouse/Significant Other Yes No    Take 2 tablets by mouth Every 6 (Six) Hours As Needed.    alfuzosin (UROXATRAL) 10 MG 24 hr tablet   Yes No    Take 1 tablet by mouth Daily.    allopurinol (ZYLOPRIM) 100 MG tablet  Spouse/Significant Other Yes No    Take 2 tablets by mouth Daily.    aspirin 81 MG EC tablet  Spouse/Significant Other Yes No    Take 1 tablet by mouth Daily.    buprenorphine-naloxone (SUBOXONE) 8-2 MG per SL tablet  Spouse/Significant Other Yes No    Place 1 tablet under the tongue 2 (Two) Times a Day.    busPIRone (BUSPAR) 15 MG tablet  Spouse/Significant Other Yes No    Take 1 tablet by mouth 2 (two) times a day.    calcitriol (ROCALTROL) 0.25 MCG capsule  Spouse/Significant Other Yes No    Take 1 capsule by mouth Daily.    Cholecalciferol (Vitamin D) 50 MCG (2000 UT) tablet  Spouse/Significant Other Yes No    Take 1 tablet by mouth Daily.    Patient not taking:  Reported on 6/24/2025    clopidogrel (PLAVIX) 75 MG tablet  Spouse/Significant Other No No    Take 1 tablet by mouth Daily.    dapagliflozin Propanediol (Farxiga) 10 MG tablet  Spouse/Significant Other Yes No    Take  by mouth Daily.    dutasteride (AVODART) 0.5 MG capsule  Spouse/Significant Other Yes No    Take 1 capsule by mouth Daily.    ezetimibe (ZETIA) 10 MG tablet  Spouse/Significant Other Yes No    Take 1 tablet by mouth Daily.    Lantus SoloStar 100 UNIT/ML injection pen  Spouse/Significant Other Yes No    Inject 20 Units under the skin into the appropriate area as directed 4 (Four) Times a Day After Meals & at Bedtime.    linagliptin (TRADJENTA) 5 MG tablet tablet   No No    Take 1 tablet by mouth Daily.    metoprolol tartrate (LOPRESSOR) 25 MG tablet   No No    Take 1 tablet by mouth Daily. Take for systolic blood pressure greater than 110    midodrine (PROAMATINE) 5 MG tablet   No No    Take 1 tablet by mouth 3 (Three) Times a Day Before Meals.    nitroglycerin (NITROSTAT) 0.4 MG SL tablet  Spouse/Significant Other No No     Place 1 tablet under the tongue Every 5 (Five) Minutes As Needed for Chest Pain. Take no more than 3 doses in 15 minutes.    omeprazole (priLOSEC) 20 MG capsule  Spouse/Significant Other Yes No    Take 1 capsule by mouth 2 (Two) Times a Day.    potassium chloride ER (K-TAB) 20 MEQ tablet controlled-release ER tablet   Yes No    Take 1 tablet by mouth 2 (Two) Times a Day.    sertraline (ZOLOFT) 100 MG tablet  Spouse/Significant Other Yes No    Take 1 tablet by mouth Daily. 2 tablet daily    Spiriva HandiHaler 18 MCG per inhalation capsule   No No    Place 1 capsule into inhaler and inhale Daily for 30 days.    Symbicort 160-4.5 MCG/ACT inhaler   No No    Inhale 2 puffs 2 (Two) Times a Day for 30 days.    tamsulosin (FLOMAX) 0.4 MG capsule 24 hr capsule  Spouse/Significant Other Yes No    Take 1 capsule by mouth Every Night.    torsemide (DEMADEX) 100 MG tablet   No No    Take 1 tablet by mouth Daily As Needed (Take as needed for fluid retention/swelling).    True Metrix Blood Glucose Test test strip  Spouse/Significant Other Yes No    See Admin Instructions.          Emergency department medications:   Medications   sodium chloride 0.9 % flush 10 mL (has no administration in time range)   Potassium Replacement - Follow Nurse / BPA Driven Protocol (has no administration in time range)   lactated ringers infusion (0 mL/hr Intravenous Stopped 6/30/25 0713)   norepinephrine (LEVOPHED) 8 mg in 250mL D5W infusion (0.18 mcg/kg/min × 95.3 kg Intravenous New Bag 6/30/25 0650)   insulin glargine (LANTUS, SEMGLEE) injection 20 Units (20 Units Subcutaneous Given 6/30/25 0306)   sodium chloride 0.9 % flush 10 mL (10 mL Intravenous Given 6/30/25 0307)   sodium chloride 0.9 % flush 10 mL (has no administration in time range)   sodium chloride 0.9 % infusion 40 mL (has no administration in time range)   mupirocin (BACTROBAN) 2 % nasal ointment 1 Application (1 Application Each Nare Given 6/30/25 0315)   acetaminophen (TYLENOL)  tablet 650 mg (has no administration in time range)     Or   acetaminophen (TYLENOL) 160 MG/5ML oral solution 650 mg (has no administration in time range)     Or   acetaminophen (TYLENOL) suppository 650 mg (has no administration in time range)   ondansetron (ZOFRAN) injection 4 mg (has no administration in time range)   heparin (porcine) 5000 UNIT/ML injection 5,000 Units (5,000 Units Subcutaneous Given 6/30/25 0305)   sennosides-docusate (PERICOLACE) 8.6-50 MG per tablet 2 tablet (2 tablets Oral Not Given 6/30/25 0307)     And   polyethylene glycol (MIRALAX) packet 17 g (has no administration in time range)     And   bisacodyl (DULCOLAX) EC tablet 5 mg (has no administration in time range)     And   bisacodyl (DULCOLAX) suppository 10 mg (has no administration in time range)   dextrose (GLUTOSE) oral gel 15 g (has no administration in time range)   dextrose (D50W) (25 g/50 mL) IV injection 25 g (has no administration in time range)   glucagon (GLUCAGEN) injection 1 mg (has no administration in time range)   Insulin Lispro (humaLOG) injection 2-9 Units (has no administration in time range)   Insulin Lispro (humaLOG) injection 8 Units (has no administration in time range)   lactobacillus acidophilus (RISAQUAD) capsule 1 capsule (1 capsule Oral Given 6/30/25 0306)   aspirin EC tablet 81 mg (has no administration in time range)   busPIRone (BUSPAR) tablet 15 mg (15 mg Oral Given 6/30/25 0306)   clopidogrel (PLAVIX) tablet 75 mg (has no administration in time range)   midodrine (PROAMATINE) tablet 5 mg (has no administration in time range)   pantoprazole (PROTONIX) EC tablet 40 mg (has no administration in time range)   Pharmacy To Dose: Piperacillin-tazobactam (Zosyn) (has no administration in time range)   Pharmacy to dose vancomycin (has no administration in time range)   budesonide (PULMICORT) nebulizer solution 0.5 mg (0.5 mg Nebulization Given 6/30/25 0717)   ipratropium-albuterol (DUO-NEB) nebulizer solution 3  "mL (has no administration in time range)   Magnesium Standard Dose Replacement - Follow Nurse / BPA Driven Protocol (has no administration in time range)   Potassium Replacement - Follow Nurse / BPA Driven Protocol (has no administration in time range)   piperacillin-tazobactam (ZOSYN) IVPB 3.375 g IVPB in 100 mL NS (VTB) (3.375 g Intravenous New Bag 6/30/25 0305)   vancomycin (dosing per levels) ( Not Applicable Canceled Entry 6/30/25 0900)   potassium chloride 20 mEq in 50 mL IVPB (20 mEq Intravenous New Bag 6/30/25 0712)   vancomycin (VANCOCIN) 1,000 mg in sodium chloride 0.9 % 250 mL IVPB-VTB (has no administration in time range)   sodium chloride 0.9 % bolus 500 mL (0 mL Intravenous Stopped 6/29/25 1750)   piperacillin-tazobactam (ZOSYN) IVPB 3.375 g IVPB in 100 mL NS (VTB) (0 g Intravenous Stopped 6/29/25 1815)   vancomycin IVPB 2000 mg in 0.9% Sodium Chloride 500 mL (0 mg Intravenous Stopped 6/29/25 2033)   lactated ringers bolus 2,859 mL (0 mL Intravenous Stopped 6/29/25 2134)   potassium chloride (KLOR-CON M20) CR tablet 40 mEq (40 mEq Oral Given 6/29/25 1953)   lidocaine 1% - EPINEPHrine 1:145188 (XYLOCAINE W/EPI) 1 %-1:701245 injection 10 mL (10 mL Injection Given 6/29/25 2227)       Allergies:  Metformin    Review of Systems  14 point review of system were done and are negative except as mentioned in the history of present illness and assessment and plan.      Physical Exam:  Objective:  Vital Signs  /79   Pulse 98   Temp 98.5 °F (36.9 °C) (Oral)   Resp 18   Ht 182.9 cm (72\")   Wt 95.3 kg (210 lb)   SpO2 93%   BMI 28.48 kg/m²   Objective    No intake/output data recorded.    Intake/Output Summary (Last 24 hours) at 6/30/2025 0806  Last data filed at 6/30/2025 0633  Gross per 24 hour   Intake 3959 ml   Output 1000 ml   Net 2959 ml        Physical Exam     Constitutional: Awake, alert  Eyes: sclerae anicteric, no conjunctival injection  HEENT: mucous membranes dry  Neck: Supple, no " "thyromegaly, no lymphadenopathy, trachea midline, No JVD  Respiratory: Decreased breath sounds all over the chest with occasional rhonchi.  Cardiovascular: RRR, no murmurs, rubs, or gallops.  Gastrointestinal: Positive bowel sounds, obese, soft, nontender, nondistended  Musculoskeletal: Trace to 1+ edema, no clubbing or cyanosis  Psychiatric: Appropriate affect, cooperative  Neurologic: Oriented x 3, moving all extremities, Cranial Nerves grossly intact, speech clear  Skin: warm and dry, no rashes            Results Review:   Results from last 7 days   Lab Units 06/30/25  0429 06/29/25  1706   SODIUM mmol/L 135* 131*   POTASSIUM mmol/L 2.4* 2.9*   CHLORIDE mmol/L 89* 81*   CO2 mmol/L 29.3* 24.4   BUN mg/dL 55.0* 61.0*   CREATININE mg/dL 3.17* 3.79*   CALCIUM mg/dL 8.8 9.0   ALBUMIN g/dL  --  3.3*   BILIRUBIN mg/dL  --  0.2   ALK PHOS U/L  --  69   ALT (SGPT) U/L  --  14   AST (SGOT) U/L  --  19   GLUCOSE mg/dL 142* 335*     Estimated Creatinine Clearance: 25.2 mL/min (A) (by C-G formula based on SCr of 3.17 mg/dL (H)).  Results from last 7 days   Lab Units 06/29/25  1826   MAGNESIUM mg/dL 1.8         Results from last 7 days   Lab Units 06/30/25  0429 06/29/25  1706   WBC 10*3/mm3 18.60* 15.69*   HEMOGLOBIN g/dL 8.0* 9.0*   PLATELETS 10*3/mm3 203 205         Brief Urine Lab Results  (Last result in the past 365 days)        Color   Clarity   Blood   Leuk Est   Nitrite   Protein   CREAT   Urine HCG        06/29/25 2205 Yellow   Clear   Negative   Moderate (2+)   Negative   Negative                 No results found for: \"UTPCR\"  Imaging Results (Last 24 Hours)       Procedure Component Value Units Date/Time    CT Abdomen Pelvis Without Contrast [[id number]] Collected: 06/29/25 2311     Updated: 06/29/25 2312    Narrative:      FINAL REPORT    TECHNIQUE:  null    CLINICAL HISTORY:  sepsis    COMPARISON:  null    FINDINGS:  CT abdomen and pelvis without contrast    Comparison: 08/29/2024    Findings:    Mild degenerative " change spine and hips.    No acute bony abnormalities.    Liver and spleen within normal limits.    Pancreas and adrenal glands unremarkable.    Cholecystectomy.    No right renal stone or hydronephrosis.    No focal renal abnormality or ureteral dilation.    Status post left nephrectomy.    No evidence for aortic aneurysm.    No free fluid or adenopathy in the pelvis.    No diverticulitis. Appendix unremarkable.      Impression:      Impression:    No acute processes    Authenticated and Electronically Signed by Anthony De La Paz MD on  06/29/2025 11:11:15 PM    CT Chest Without Contrast Diagnostic [[id number]] Collected: 06/29/25 2307     Updated: 06/29/25 2308    Narrative:      FINAL REPORT    TECHNIQUE:  null    CLINICAL HISTORY:  sepsis w/ hypoxemia    COMPARISON:  null    FINDINGS:  CT chest without contrast    Comparison: None provided    Findings:    Bilateral posterior lower lobe atelectasis.    Trace bilateral pleural effusions noted.    Emphysema without other parenchymal abnormality.    Cardiomegaly with coronary artery calcifications.    Prior sternotomy for CABG.    Benign partially calcified mediastinal nodes.    No significant mediastinal adenopathy.    No significant focal bony abnormalities.      Impression:      Impression:    Trace pleural effusions with lower lobe atelectasis    Authenticated and Electronically Signed by Anthony De La Paz MD on  06/29/2025 11:07:32 PM    XR Chest 1 View - In process [[id number]] Resulted: 06/29/25 1712     Updated: 06/29/25 1714    This result has not been signed. Information might be incomplete.            aspirin, 81 mg, Oral, Daily  budesonide, 0.5 mg, Nebulization, BID - RT  busPIRone, 15 mg, Oral, Q12H  clopidogrel, 75 mg, Oral, Daily  heparin (porcine), 5,000 Units, Subcutaneous, Q12H  insulin glargine, 20 Units, Subcutaneous, Nightly  insulin lispro, 2-9 Units, Subcutaneous, 4x Daily AC & at Bedtime  insulin lispro, 8 Units, Subcutaneous, TID With  Meals  lactobacillus acidophilus, 1 capsule, Oral, BID  midodrine, 5 mg, Oral, TID AC  mupirocin, 1 Application, Each Nare, BID  pantoprazole, 40 mg, Oral, Q AM  piperacillin-tazobactam, 3.375 g, Intravenous, Q8H  potassium chloride, 20 mEq, Intravenous, Q1H  senna-docusate sodium, 2 tablet, Oral, BID  sodium chloride, 10 mL, Intravenous, Q12H  vancomycin (dosing per levels), , Not Applicable, Daily  vancomycin, 1,000 mg, Intravenous, Once      norepinephrine, 0.02-0.3 mcg/kg/min, Last Rate: 0.18 mcg/kg/min (06/30/25 0650)  Pharmacy to dose vancomycin,   Pharmacy To Dose:,         Assessment/Plan:      Septic shock    CAD (coronary artery disease)    Chronic kidney disease, stage IV (severe)    Acute UTI (urinary tract infection)    Pneumonia of left lower lobe due to infectious organism    Acute kidney injury: Patient does have significant worsening of his renal function.  Most likely secondary to volume depletion and septic shock.  Bicarb is stable low potassium noted.  Chronic kidney disease stage IV: Underlying chronic kidney disease secondary to diabetes and hypertension as well as volume loss, status post left nephrectomy in March of 2020.  Baseline creatinine of 2.9 with a EGFR of 21 mL/min.  Type 2 diabetes: Longstanding history of type 2 diabetes likely with some complications.  Septic shock: Treated as per hospitalist service still on pressors.  Left lower lobe pneumonia: On IV antibiotics as per hospitalist service.  Urinary tract infection: Treated  Hypokalemia: Replace per protocol  Coronary artery disease: Status post CABG and stent placements.  No acute issues  COPD: Longstanding history of smoking.  Untreated sleep apnea: Longstanding known history of obstructive sleep apnea, has not been able to use his BiPAP      Risk and complexity: High       Plan:  Continue with LR at 100 cc an hour, will reassess every 24 hours.  Electrolyte replacement per protocol.  Patient will need close monitoring of  electrolytes.  Check echo once he is more stable to assess status right-sided pressures.  Continue with rest of the current treatment plan and surveillance labs, adjust medications to patient's GFR.  Details were discussed with the patient as well as family in the room.  Wife at the bedside.  Details were also discussed with the hospitalist service.   Further recommendations will depend on clinical course of the patient during the current hospitalization.    I also discussed the details with the nursing staff.  Rest as ordered.    In closing, I sincerely appreciate opportunity to participate in care of this patient. If I can be of any further assistance with the management of this patient, please don’t hesitate to contact me.    Jefry Hooper MD, FÁTIMA    06/30/25  08:06 EDT    Dictated using Dragon.              Electronically signed by Jefry Hooper MD, FÁTIMA at 06/30/25 5157

## 2025-07-03 NOTE — PROGRESS NOTES
Patient Name: Jonny Ferrari Jr.  YOB: 1950  MRN: [id number]  Admission date: 6/29/2025  Reason for Encounter: Follow-up/Progress Note    [facility] Clinical Nutrition Assessment     Subjective    Subjective Information     7/3: Pt is no longer requiring pressor support. Average PO intake 55% x 5 meals. Pt is consuming Boost Glucose Control that is ordered.     7/1: Pt is in the ICU and is on pressor support. Levophed running at .02 mcg/kg/min. Pt has had wt loss of 36# (14.6%) within 11 months. Unsure of what caused the weight loss and suspect pt will have more d/t edema. Average PO intake 56% x 4 meals. Hypokalemia and K+ is being replaced. Will d/c K+ diet restriction and order supplement to promote PO intake.      Assessment    H&P and Current Problems      H&P  Past Medical History:   Diagnosis Date    Anemia     Anxiety neurosis     CAD (coronary artery disease)     Cancer     Kidney    Chronic kidney disease     Patient reported left kidney removed secondary to kidney cancer and that he only has 30% function of the right kidney    Constipation     COPD (chronic obstructive pulmonary disease)     Depression     Diabetes mellitus     DM TYPE 2 , ONSET IN 2009    Dyslipidemia     Dysphagia     Reported noted with food, fluids and pills    Elevated cholesterol     Full dentures     GERD (gastroesophageal reflux disease)     Gout     H/O left nephrectomy 2020    secondary to cancer    [place] (hard of hearing)     Patient has bilateral hearing aids, still is very [place]    Hypertension     Impaired functional mobility, balance, gait, and endurance     MI (myocardial infarction)     Patient reported apx March 2021 (reported he refused cardiac cath) and June 2021 (did have cardiac cath with placement of 2 stents).     OA (osteoarthritis)     Obesity     MILD TO MODERATE EXOGENOUS OBESITY    Problems with swallowing     with food    Psoriasis     Sleep apnea     CPAP HS - to bring mask and machine DOS     Tattoo     x1    TIA (transient ischemic attack)     Wears glasses 10/27/2021      Past Surgical History:   Procedure Laterality Date    APPENDECTOMY      CARDIAC CATHETERIZATION      CARDIAC CATHETERIZATION N/A 2/20/2019    Procedure: Left Heart Cath;  Surgeon: Ernst Smith MD;  Location:  [facility];  Service: Cardiology    CARDIAC CATHETERIZATION Left 6/9/2021    Procedure: Left Heart Cath;  Surgeon: Ernst Smith MD;  Location:  [facility];  Service: Cardiology;  Laterality: Left;    CHOLECYSTECTOMY      COLONOSCOPY      COLONOSCOPY N/A 11/10/2021    Procedure: COLONOSCOPY with polypectomy x6 and clip x2;  Surgeon: Chidi Trinidad MD;  Location: [facility] ENDOSCOPY;  Service: Gastroenterology;  Laterality: N/A;    COLONOSCOPY N/A 10/25/2023    Procedure: COLONOSCOPY incomplete;  Surgeon: Chidi Trinidad MD;  Location: [facility] ENDOSCOPY;  Service: Gastroenterology;  Laterality: N/A;    COLONOSCOPY N/A 11/8/2023    Procedure: COLONOSCOPY WITH POLYPECTOMY X1;  Surgeon: Chidi Trinidad MD;  Location: [facility] ENDOSCOPY;  Service: Gastroenterology;  Laterality: N/A;    CORONARY ARTERY BYPASS GRAFT  2001    Patient reported no MI prior to CABG and that the bypass was 3 vessel     ENDOSCOPY      ENDOSCOPY N/A 11/10/2021    Procedure: ESOPHAGOGASTRODUODENOSCOPY with biopsy and dilatation;  Surgeon: Chidi Trinidad MD;  Location: [facility] ENDOSCOPY;  Service: Gastroenterology;  Laterality: N/A;    ENDOSCOPY N/A 10/25/2023    Procedure: ESOPHAGOGASTRODUODENOSCOPY with biopsy and dilatation;  Surgeon: Chidi Trinidad MD;  Location: [facility] ENDOSCOPY;  Service: Gastroenterology;  Laterality: N/A;    ENDOSCOPY N/A 11/8/2023    Procedure: ESOPHAGOGASTRODUODENOSCOPY WITH BIOPSY;  Surgeon: Chidi Trinidad MD;  Location: [facility] ENDOSCOPY;  Service: Gastroenterology;  Laterality: N/A;    INCISION AND DRAINAGE PERIRECTAL ABSCESS N/A 8/29/2024    Procedure: INCISION AND DRAINAGE OF  "PERIRECTAL ABSCESS;  Surgeon: Sherman Billingsley MD;  Location: [facility];  Service: General;  Laterality: N/A;    LAPAROSCOPIC NEPHRECTOMY Left     MOUTH SURGERY      Full mouth extraction    URETER SURGERY      VASECTOMY        Current Problems   Admission Diagnosis:  Septic shock [A41.9, R65.21]    Problem List:    Septic shock    CAD (coronary artery disease)    Chronic kidney disease, stage IV (severe)    Acute UTI (urinary tract infection)    Pneumonia of left lower lobe due to infectious organism      Other Applicable Nutrition Information:        Anthropometrics      BMI, Height, Weight Estimated body mass index is 28.14 kg/m² as calculated from the following:    Height as of this encounter: 182.9 cm (72\").    Weight as of this encounter: 94.1 kg (207 lb 7.3 oz).    Weight Method: Bed scale       Trending Weight Changes weight loss of 36 lbs (14.6%) over 11 month(s)    Significant?  No       Weight History  Wt Readings from Last 20 Encounters:   07/03/25 0400 94.1 kg (207 lb 7.3 oz)   07/02/25 0400 95.2 kg (209 lb 14.1 oz)   07/01/25 0400 95 kg (209 lb 7 oz)   06/29/25 1658 95.3 kg (210 lb)   06/24/25 1310 96.2 kg (212 lb)   08/31/24 0400 112 kg (245 lb 13 oz)   08/30/24 0045 110 kg (243 lb 9.7 oz)   08/29/24 1929 111 kg (244 lb)   08/28/24 1441 111 kg (244 lb)   08/27/24 1253 111 kg (244 lb)   11/16/23 1356 111 kg (245 lb)   10/25/23 0753 99.7 kg (219 lb 12.8 oz)   10/19/23 1333 99.8 kg (220 lb)   10/03/23 0947 101 kg (223 lb)   06/13/23 0911 111 kg (245 lb)   10/07/22 0400 114 kg (251 lb 8.7 oz)   10/06/22 0600 117 kg (258 lb 9.6 oz)   10/05/22 0300 120 kg (264 lb 5.3 oz)   10/04/22 1920 120 kg (264 lb 5.3 oz)   10/04/22 1331 120 kg (265 lb)   08/30/22 0940 121 kg (267 lb)   02/22/22 1050 116 kg (255 lb)   11/22/21 1359 119 kg (263 lb)   11/09/21 1048 119 kg (263 lb)   10/27/21 1737 120 kg (265 lb)   09/27/21 1300 118 kg (260 lb)   07/27/21 1022 124 kg (274 lb)   06/25/21 1434 120 kg (265 lb 4 oz)   06/09/21 " 0910 68.9 kg (151 lb 14.4 oz)   05/28/21 1251 112 kg (248 lb)   05/27/21 1800 113 kg (248 lb 6.4 oz)        Labs      Comment:      Results from last 7 days   Lab Units 07/03/25  0503 07/02/25  1517 07/02/25  0407 07/01/25  0830 07/01/25  0445 06/30/25  0429 06/29/25  2307 06/29/25 2005 06/29/25  1826 06/29/25  1706   SODIUM mmol/L 138  --  139  --  138   < >  --   --   --  131*   POTASSIUM mmol/L 4.2  --  4.2 3.4* 2.7*   < >  --   --   --  2.9*   GLUCOSE mg/dL 87  --  86  --  108*   < >  --   --   --  335*   BUN mg/dL 19.0  --  26.0*  --  37.0*   < >  --   --   --  61.0*   CREATININE mg/dL 2.10*  --  2.16*  --  2.44*   < >  --   --   --  3.79*   CALCIUM mg/dL 9.0  --  8.8  --  8.9   < >  --   --   --  9.0   PHOSPHORUS mg/dL 2.6 2.4* 1.9*  --  2.8   < >  --   --   --   --    MAGNESIUM mg/dL  --   --   --   --   --   --   --   --  1.8  --    ALBUMIN g/dL 2.9*  --  2.6*  --  2.8*  --   --   --   --  3.3*   LACTATE mmol/L  --   --   --   --   --   --  2.0 4.2*  --  9.7*   BILIRUBIN mg/dL  --   --   --   --   --   --   --   --   --  0.2   ALK PHOS U/L  --   --   --   --   --   --   --   --   --  69   AST (SGOT) U/L  --   --   --   --   --   --   --   --   --  19   ALT (SGPT) U/L  --   --   --   --   --   --   --   --   --  14   PROBNP pg/mL  --   --   --   --   --   --   --   --   --  1,835.0*    < > = values in this interval not displayed.     Results from last 7 days   Lab Units 07/03/25  0503 07/02/25  1517 07/02/25  0407 07/02/25  0315 07/01/25  1945 07/01/25  0830   PLATELETS 10*3/mm3  --   --  129*  --  148 154   HEMOGLOBIN g/dL 8.6* 7.7* 6.0*   < > 8.4* 7.0*   HEMATOCRIT % 27.9* 24.2* 18.8*   < > 26.7* 22.4*    < > = values in this interval not displayed.     Lab Results   Component Value Date    HGBA1C 7.60 (H) 06/29/2025          Medications       Scheduled Medications aspirin, 81 mg, Oral, Daily  budesonide, 0.5 mg, Nebulization, BID - RT  busPIRone, 15 mg, Oral, Q12H  clopidogrel, 75 mg, Oral, Daily  insulin  glargine, 20 Units, Subcutaneous, Nightly  insulin lispro, 2-9 Units, Subcutaneous, 4x Daily AC & at Bedtime  lactobacillus acidophilus, 1 capsule, Oral, BID  mupirocin, 1 Application, Each Nare, BID  pantoprazole, 40 mg, Oral, Q AM  piperacillin-tazobactam, 3.375 g, Intravenous, Q8H  senna-docusate sodium, 2 tablet, Oral, BID  sodium chloride, 10 mL, Intravenous, Q12H        Infusions Pharmacy To Dose:,         PRN Medications   acetaminophen **OR** acetaminophen **OR** acetaminophen    senna-docusate sodium **AND** polyethylene glycol **AND** bisacodyl **AND** bisacodyl    Calcium Replacement - Follow Nurse / BPA Driven Protocol    dextrose    dextrose    Diclofenac Sodium    glucagon (human recombinant)    ipratropium-albuterol    Magnesium Standard Dose Replacement - Follow Nurse / BPA Driven Protocol    Magnesium Standard Dose Replacement - Follow Nurse / BPA Driven Protocol    ondansetron    Pharmacy To Dose:    Phosphorus Replacement - Follow Nurse / BPA Driven Protocol    Potassium Replacement - Follow Nurse / BPA Driven Protocol    Potassium Replacement - Follow Nurse / BPA Driven Protocol    Potassium Replacement - Follow Nurse / BPA Driven Protocol    [COMPLETED] Insert Peripheral IV **AND** sodium chloride    sodium chloride    sodium chloride     Physical Findings      Chewing/Swallowing  Teeth Status: Mouth/Teeth WDL: .WDL except, teeth Teeth Symptoms: teeth absent, no dental appliances present  Chewing/Swallowing Issues: No issues identified at this time   Edema  Generalized Edema: 2+ (Mild)              Ankle, Left Edema: 2+ (Mild) Ankle, Right Edema: 3+ (Moderate)  Foot, Left Edema: 2+ (Mild) Foot, Right Edema: 3+ (Moderate)   Bowel Function  Stool Output  Stool Unmeasured Occurrence: 1 (07/03/25 1125)  Bowel Incontinence: No (07/03/25 1125)  Stool Amount: Medium (07/03/25 1125)  Stool Consistency: soft (07/03/25 1125)  Perineal Care: catheter care provided, perineal hygiene encouraged, perineum  cleansed, skin sealant/barrier applied (07/03/25 0400)  Last Bowel Movement: 07/03/25   I/Os  Intake & Output (last 3 days)         06/30 0701  07/01 0700 07/01 0701  07/02 0700 07/02 0701  07/03 0700 07/03 0701  07/04 0700    P.O. 530 1020 600 170    I.V. (mL/kg) 4183.6 (44) 2047.8 (21.5) 1400 (14.9)     Blood   399.2     IV Piggyback 400 100 300     Total Intake(mL/kg) 5113.6 (53.8) 3167.8 (33.3) 2699.2 (28.7) 170 (1.8)    Urine (mL/kg/hr) 4250 (1.9) 2200 (1) 4100 (1.8)     Stool  0 0     Total Output 4250 2200 4100     Net +863.6 +967.8 -1400.8 +170            Stool Unmeasured Occurrence  2 x 2 x 1 x           Lines, Drains, Airways, & Wounds       Active LDAs       Name Placement date Placement time Site Days Last dressing change    CVC Triple Lumen 06/29/25 Right Femoral 06/29/25  2246  Femoral  1     Peripheral IV 06/29/25 1701 18 G Left Antecubital 06/29/25  1701  Antecubital  1     Urethral Catheter Non-latex 16 Fr. 06/30/25  0632  -- 1     Wound 08/30/24 0044 Left elbow Skin Tear 08/30/24  0044  -- 305     Wound 08/30/24 0046 Left lower leg 08/30/24  0046  -- 305     Wound 08/30/24 0049 Right anterior great toe 08/30/24  0049  -- 305     Wound 06/29/25 2053 Right scalp Traumatic 06/29/25 2053  -- 1     Wound 06/29/25 2055 Right lower arm Traumatic 06/29/25 2055  -- 1     Wound 06/29/25 2055 Right upper arm Traumatic 06/29/25 2055  -- 1     Wound 06/29/25 2057 Right shoulder 06/29/25 2057  -- 1     Wound 06/29/25 2126 Left upper arm 06/29/25 2126  -- 1     Wound 06/29/25 2126 Left lower arm 06/29/25 2126  -- 1     Wound 06/29/25 2127 Left anterior 06/29/25 2127  -- 1     Wound 06/29/25 2128 Left anterior greater trochanter 06/29/25 2128  -- 1     Wound 06/29/25 2131 Left distal leg Traumatic 06/29/25 2131  -- 1     Wound 06/29/25 2134 Right anterior ankle 06/29/25 2134  -- 1     Wound 06/29/25 2331 Right posterior elbow Other (Comments) 06/29/25 2331  -- 1                       Nutrition Focused  Physical Exam     Trending NFPE      Malnutrition Severity Assessment            (1)   Current Nutrition Orders & Evaluation of Intake      Oral Nutrition     Food Allergies  and Intolerances NKFA   Current PO Diet Diet: Diabetic, Renal; Consistent Carbohydrate; Low Sodium (2-3g); Texture: Regular (IDDSI 7); Fluid Consistency: Thin (IDDSI 0)   Oral Nutrition Supplement None     Trending % PO Intake 7/1: 56% x 4 meals   (2)  Assessment & Plan   Nutrition Diagnosis and Goals       Nutrition Diagnosis 1 Altered Nutrition Related Lab Values  r/t DM AEB A1C=7.6.       Nutrition Diagnosis 2 Unintended Weight Loss r/t CKD AEB EMR shows wt loss of 36# (14.6%) within 11 months.         Goal(s) Increase PO Intake  and No Significant Weight Loss     Nutrition Intervention and Prescription       Intervention  Continue to monitor for plan of care and Continue with current interventions      Diet Prescription CCD, Renal Low sodium     Supplement Prescription Boost glucose control daily     Education Provided     (3)  Monitoring/Evaluation       Monitor/Evaluation Per Protocol, I&O, PO Intake, Pertinent Labs, Weight, Skin Status, GI Status, Symptoms, and POC/GOC     RD Follow-Up Encounter 3-5 days     Electronically signed by:  Shirley Sanchez RD  07/03/25 13:58 EDT

## 2025-07-03 NOTE — THERAPY TREATMENT NOTE
Patient requests to hold therapy today as he is very tired and feels more short of air. Will follow up tomorrow.

## 2025-07-03 NOTE — PLAN OF CARE
Goal Outcome Evaluation:  Plan of Care Reviewed With: patient        Progress: improving  Outcome Evaluation: VSS, patient AOx4, cooperative with staff, up with assist of 1, still recieving abx and fluids

## 2025-07-03 NOTE — PROGRESS NOTES
"    [facility]    Name:  Jonny Ferrari Jr.   Age:  74 y.o.  Sex:  male  :  1950  MRN:  [id number]   Visit Number:  [id number]  Admission Date:  2025  Date Of Service:  25  Primary Care Physician:  Amaury Brice MD     LOS: 4 days :    Chief Complaint:      Follow-up septic shock    Subjective:    Patient overall feeling okay.  A little short of breath this morning, discussed likely fluid related and we are discontinuing this as his blood pressure and kidney functions are improving.  Will give diuretic.  Agreeable for rehab    Hospital Course:    Per prior provider: \"Jonny Ferrari Jr. is a 74-year-old male with history of diabetes mellitus type 2, chronic kidney disease stage III, COPD, coronary artery disease status post CABG and stent, ERASMO, BPH was brought to the emergency room by EMS with symptoms of generalized weakness and fatigue   The patient was brought in by ambulance due to an inability to walk, a condition that has persisted for the past 2 to 3 days. He reports no fever, dysuria, or cough but feels weak. He has experienced several falls recently and has been unable to walk independently for the past 3 to 4 days. Despite using a walker or cane, he continues to fall.   In the emergency room, patient was afebrile but mildly tachycardic in the 100 with initial blood pressure of 80/54 and pulse oxygen saturation of 95% on room air.  Patient was given 30 mL/kg fluid bolus initially in the emergency room with initial improvement of the blood pressure to systolic 100 but subsequently dropped it again to the 70s systolic.  Patient was given another 500 bolus of normal saline, he right femoral central line was placed and he was initiated on Levophed. \"    Review of Systems:     All systems were reviewed and negative except as mentioned in subjective, assessment and plan.    Vital Signs:    Temp:  [97.6 °F (36.4 °C)-98.5 °F (36.9 °C)] 98.2 °F (36.8 " °C)  Heart Rate:  [] 65  Resp:  [16-20] 20  BP: ()/() 125/63    Intake and output:    I/O last 3 completed shifts:  In: 3659 [P.O.:600; I.V.:2259.8; Blood:399.2; IV Piggyback:400]  Out: 5250 [Urine:5250]  I/O this shift:  In: 120 [P.O.:120]  Out: -     Physical Examination:    General Appearance:  Alert and cooperative.  Hard of hearing.  No acute distress   Head:  Atraumatic and normocephalic.   Eyes: Conjunctivae and sclerae normal, no icterus. No pallor.   Throat: No oral lesions, no thrush, oral mucosa moist.   Neck: Supple, trachea midline, no thyromegaly.   Lungs:   Breath sounds heard bilaterally equally.  Some scattered crackles   Heart:  Normal S1 and S2, no murmur,No JVD.   Abdomen:   Normal bowel sounds, Soft, nontender, nondistended, no rebound tenderness.  Nolan catheter in place   Extremities: Supple, trace edema   Skin: No bleeding or rash.  Multiple skin abrasions.   Neurologic: Alert and oriented x 3. No facial asymmetry. Moves all four limbs.      Laboratory results:    Results from last 7 days   Lab Units 07/03/25  0503 07/02/25  0407 07/01/25  0830 07/01/25  0445 06/30/25  0429 06/29/25  1706   SODIUM mmol/L 138 139  --  138   < > 131*   POTASSIUM mmol/L 4.2 4.2 3.4* 2.7*   < > 2.9*   CHLORIDE mmol/L 100 99  --  93*   < > 81*   CO2 mmol/L 26.4 31.0*  --  32.4*   < > 24.4   BUN mg/dL 19.0 26.0*  --  37.0*   < > 61.0*   CREATININE mg/dL 2.10* 2.16*  --  2.44*   < > 3.79*   CALCIUM mg/dL 9.0 8.8  --  8.9   < > 9.0   BILIRUBIN mg/dL  --   --   --   --   --  0.2   ALK PHOS U/L  --   --   --   --   --  69   ALT (SGPT) U/L  --   --   --   --   --  14   AST (SGOT) U/L  --   --   --   --   --  19   GLUCOSE mg/dL 87 86  --  108*   < > 335*    < > = values in this interval not displayed.     Results from last 7 days   Lab Units 07/03/25  0503 07/02/25  1517 07/02/25  0407 07/02/25  0315 07/01/25  1945 07/01/25  0830   WBC 10*3/mm3  --   --  5.65  --  5.67 8.15   HEMOGLOBIN g/dL 8.6* 7.7*  6.0*   < > 8.4* 7.0*   HEMATOCRIT % 27.9* 24.2* 18.8*   < > 26.7* 22.4*   PLATELETS 10*3/mm3  --   --  129*  --  148 154    < > = values in this interval not displayed.     Results from last 7 days   Lab Units 07/01/25  1945   INR  1.03     Results from last 7 days   Lab Units 06/29/25  1826 06/29/25  1706   HSTROP T ng/L 95* 98*     Results from last 7 days   Lab Units 06/29/25  2205 06/29/25  1738 06/29/25  1728   BLOODCX   --  No growth at 3 days No growth at 3 days   URINECX  25,000 CFU/mL Enterococcus faecalis*  --   --      Recent Labs     08/29/24  2314   PHART 7.382   DAQ1MTD 49.8*   PO2ART 68.0*   [id number] 29.5*   BASEEXCESS 3.8*      I have reviewed the patient's laboratory results.    Radiology results:    No radiology results from the last 24 hrs    I have reviewed the patient's radiology reports.    Medication Review:     I have reviewed the patient's active and prn medications.     Problem List:      Septic shock    CAD (coronary artery disease)    Chronic kidney disease, stage IV (severe)    Acute UTI (urinary tract infection)    Pneumonia of left lower lobe due to infectious organism      Assessment:    Septic shock secondary to #2 and #3, POA.  Acute urinary tract infection, POA.  Left lower lobe bacterial pneumonia, unable to classify further, POA.  Demand ischemia with elevated troponins, POA.  Hypokalemia, POA.  Chronic kidney disease stage IV.  Diabetes mellitus type 2 with nephropathy and hyperglycemia, POA.  Coronary artery disease status post CABG and stents.  COPD, no exacerbation.  Benign prostatic hyperplasia.  Will falls with skin abrasions, POA.    Plan:    Septic shock  Acute UTI  Left lower lobe bacterial pneumonia  Negative blood culture, on Zosyn.  Oxygenation stable.  Off pressors.  Urine culture had E faecalis.  Will talk with pharmacy about duration of antibiotic and need for switching necessary.  Demand ischemia with elevated troponins  Troponins plateaued 95-98  Suspect secondary  to demand ischemia in the setting of his septic shock as well as CKD  Low suspicion for ACS  Hypokalemia  Continue replacement  CKD stage IV  Nephrology, Dr. Hooper consulted and appreciate recommendations.  Numbers now stabilized.  Off fluids.  Type 2 diabetes  Subcutaneous insulin protocol  Anemia  Does have chronic anemia likely due to renal failure, however acute on chronic currently.  Received 1 unit of PRBCs.  There was reportedly some blood loss with catheter insertion but urine is clear now.  Restart aspirin and Plavix today.  No further bleeding noted  CAD status post CABG  Continue DAPT with aspirin and Plavix  COPD not exacerbation  Multiple skin abrasions  Wound care  Impaired mobility and ADLs  PT/OT once stable to participate    Further orders as clinical course dictates.    DVT Prophylaxis: SCD  Code Status: Full  Diet: Renal diabetic.  Discharge Plan: KRISTINE Davis DO  07/03/25  10:02 EDT    Dictated utilizing Dragon dictation.

## 2025-07-03 NOTE — DISCHARGE PLACEMENT REQUEST
"Fernandez Marinellise CallahanBuffy (74 y.o. Male)       Date of Birth   1950    Social Security Number       [place]   [place] [address]    Home Phone   [phone]    MRN   [id number]       [facility]    Marital Status                               Admission Date   2025    Admission Type   Emergency    Admitting Provider   Silviano Cisneros MD    Attending Provider   Pat Davis DO    Department, Room/Bed   [facility] INTENSIVE CARE, I       Discharge Date       Discharge Disposition       Discharge Destination                                 Attending Provider: Pat Davis DO    Allergies: Metformin    Isolation: None   Infection: None   Code Status: CPR    Ht: 182.9 cm (72\")   Wt: 94.1 kg (207 lb 7.3 oz)    Admission Cmt: None   Principal Problem: Septic shock [A41.9,R65.21]                   Active Insurance as of 2025       Primary Coverage       Payor Plan Insurance Group Employer/Plan Group    ANTH MEDICARE REPLACEMENT [facility] MEDICARE ADVANTAGE O [id number]       Payor Plan Address Payor Plan Phone Number Payor Plan Fax Number Effective Dates    [address] [phone]  2025 - None Entered    [facility] [place] [id number]         Subscriber Name Subscriber Birth Date Member ID       NUZHAT MARINELLI JR. 1950 [id number]                     Emergency Contacts        (Rel.) Home Phone Work Phone Mobile Phone    IsisLashon (Spouse) [phone] -- [phone]    ISISCATE (Relative) -- -- [phone]    NEGRA MARINELLI (Son) [phone] -- --                 History & Physical        Silviano Cisneros MD at [address] [place] [address]   HISTORY AND PHYSICAL      Name:  Nuzhat Marinelli Jr.   Age:  74 y.o.  Sex:  male  :  1950  MRN:  [id number]   Visit Number:  [id number]  Admission Date:  2025  Date Of Service:  25  Primary Care Physician:  Amaury Brice, " MD    Chief Complaint:     Generalized weakness and falls.    History Of Presenting Illness:      Jonny Ferrari Jr. is a 74-year-old male with history of diabetes mellitus type 2, chronic kidney disease stage III, COPD, coronary artery disease status post CABG and stent, ERASMO, BPH was brought to the emergency room by EMS with symptoms of generalized weakness and fatigue that has been going on for the last 2 days.  History of Present Illness  Reason for Admit: Fatigue, sepsis, and chronic kidney disease.    The patient was brought in by ambulance due to an inability to walk, a condition that has persisted for the past 2 to 3 days. He reports no fever, dysuria, or cough but feels weak. He has experienced several falls recently and has been unable to walk independently for the past 3 to 4 days. Despite using a walker or cane, he continues to fall. He also reports swelling in his legs and is hard of hearing in his left ear. He reports feeling slightly better this evening.    He has stage 4 chronic kidney disease and is under the care of Dr. Hooper. He has been taking torsemide 100 mg.    He underwent bypass surgery approximately 20 years ago and is currently on baby aspirin.    MEDICATIONS  Current: Aspirin, torsemide     In the emergency room, patient was afebrile but mildly tachycardic in the 100 with initial blood pressure of 80/54 and pulse oxygen saturation of 95% on room air.  Patient was given 30 mL/kg fluid bolus initially in the emergency room with initial improvement of the blood pressure to systolic 100 but subsequently dropped it again to the 70s systolic.  Patient was given another 500 bolus of normal saline, he right femoral central line was placed and he was initiated on Levophed.    Blood work done in the emergency room showed initial troponin of 98 with repeat at 95.  proBNP 1835.  Sodium 131, potassium 2.9, BUN 61, creatinine 3.79 (baseline), glucose 335.  LFT was unremarkable.  Magnesium 1.8.  Initial  lactic acid was 9.7 with repeat at 4.2.  Procalcitonin 0.79.  WBC 15.7, hemoglobin 9.  Urine analysis shows 21-50 WBCs with trace bacteria.  Blood cultures were drawn.  Fecal occult blood was negative.  Urine culture was sent.  Portable chest x-ray done in the emergency room showed chronic appearing bilateral parenchymal changes with prominent right transverse pressure.  Possible on homogenous opacity noted on the left lower zone.  Patient was given Zosyn and vancomycin in the emergency room, oral potassium chloride and was subsequently initiated on lactated Ringer's IV fluids.  CT abdomen and pelvis showed no acute process.  CT chest without contrast showed pleural effusion with lower lobe atelectasis.  Patient was given Zosyn and vancomycin and was subsequently admitted to the medical ICU for management of septic shock, urinary tract infection, left lower lobe pneumonia.    Review Of Systems:    All systems were reviewed and negative except as mentioned in history of presenting illness, assessment and plan.    Past Medical History: Patient's  has a past medical history of Anemia, Anxiety neurosis, CAD (coronary artery disease), Cancer, Chronic kidney disease, Constipation, COPD (chronic obstructive pulmonary disease), Depression, Diabetes mellitus, Dyslipidemia, Dysphagia, Elevated cholesterol, Full dentures, GERD (gastroesophageal reflux disease), Gout, H/O left nephrectomy (2020), [place] (hard of hearing), Hypertension, Impaired functional mobility, balance, gait, and endurance, MI (myocardial infarction), OA (osteoarthritis), Obesity, Problems with swallowing, Psoriasis, Sleep apnea, Tattoo, TIA (transient ischemic attack), and Wears glasses (10/27/2021).    Past Surgical History: Patient's  has a past surgical history that includes Appendectomy; Cardiac catheterization; Coronary artery bypass graft (2001); Cholecystectomy; Cardiac catheterization (N/A, 2/20/2019); Ureter surgery; Laparoscopic nephrectomy (Left);  Cardiac catheterization (Left, 6/9/2021); Mouth surgery; Colonoscopy; Esophagogastroduodenoscopy; Vasectomy; Colonoscopy (N/A, 11/10/2021); Esophagogastroduodenoscopy (N/A, 11/10/2021); Colonoscopy (N/A, 10/25/2023); Esophagogastroduodenoscopy (N/A, 10/25/2023); Colonoscopy (N/A, 11/8/2023); Esophagogastroduodenoscopy (N/A, 11/8/2023); and Incision and drainage perirectal abscess (N/A, 8/29/2024).    Social History: Patient's  reports that he has quit smoking. His smoking use included cigarettes. He started smoking about 45 years ago. He has a 45.5 pack-year smoking history. His smokeless tobacco use includes snuff. He reports that he does not drink alcohol and does not use drugs.    Family History:  Patient's family history includes Lung disease in his mother; No Known Problems in his brother, brother, father, maternal grandfather, maternal grandmother, paternal grandfather, paternal grandmother, sister, sister, and sister.     Allergies:      Metformin    Home Medications:    Prior to Admission Medications       Prescriptions Last Dose Informant Patient Reported? Taking?    acetaminophen (TYLENOL) 325 MG tablet  Spouse/Significant Other Yes No    Take 2 tablets by mouth Every 6 (Six) Hours As Needed.    alfuzosin (UROXATRAL) 10 MG 24 hr tablet   Yes No    Take 1 tablet by mouth Daily.    allopurinol (ZYLOPRIM) 100 MG tablet  Spouse/Significant Other Yes No    Take 2 tablets by mouth Daily.    aspirin 81 MG EC tablet  Spouse/Significant Other Yes No    Take 1 tablet by mouth Daily.    buprenorphine-naloxone (SUBOXONE) 8-2 MG per SL tablet  Spouse/Significant Other Yes No    Place 1 tablet under the tongue 2 (Two) Times a Day.    busPIRone (BUSPAR) 15 MG tablet  Spouse/Significant Other Yes No    Take 1 tablet by mouth 2 (two) times a day.    calcitriol (ROCALTROL) 0.25 MCG capsule  Spouse/Significant Other Yes No    Take 1 capsule by mouth Daily.    Cholecalciferol (Vitamin D) 50 MCG (2000 UT) tablet   Spouse/Significant Other Yes No    Take 1 tablet by mouth Daily.    Patient not taking:  Reported on 6/24/2025    clopidogrel (PLAVIX) 75 MG tablet  Spouse/Significant Other No No    Take 1 tablet by mouth Daily.    dapagliflozin Propanediol (Farxiga) 10 MG tablet  Spouse/Significant Other Yes No    Take  by mouth Daily.    dutasteride (AVODART) 0.5 MG capsule  Spouse/Significant Other Yes No    Take 1 capsule by mouth Daily.    ezetimibe (ZETIA) 10 MG tablet  Spouse/Significant Other Yes No    Take 1 tablet by mouth Daily.    Lantus SoloStar 100 UNIT/ML injection pen  Spouse/Significant Other Yes No    Inject 20 Units under the skin into the appropriate area as directed 4 (Four) Times a Day After Meals & at Bedtime.    linagliptin (TRADJENTA) 5 MG tablet tablet   No No    Take 1 tablet by mouth Daily.    metoprolol tartrate (LOPRESSOR) 25 MG tablet   No No    Take 1 tablet by mouth Daily. Take for systolic blood pressure greater than 110    midodrine (PROAMATINE) 5 MG tablet   No No    Take 1 tablet by mouth 3 (Three) Times a Day Before Meals.    nitroglycerin (NITROSTAT) 0.4 MG SL tablet  Spouse/Significant Other No No    Place 1 tablet under the tongue Every 5 (Five) Minutes As Needed for Chest Pain. Take no more than 3 doses in 15 minutes.    omeprazole (priLOSEC) 20 MG capsule  Spouse/Significant Other Yes No    Take 1 capsule by mouth 2 (Two) Times a Day.    potassium chloride ER (K-TAB) 20 MEQ tablet controlled-release ER tablet   Yes No    Take 1 tablet by mouth 2 (Two) Times a Day.    sertraline (ZOLOFT) 100 MG tablet  Spouse/Significant Other Yes No    Take 1 tablet by mouth Daily. 2 tablet daily    Spiriva HandiHaler 18 MCG per inhalation capsule   No No    Place 1 capsule into inhaler and inhale Daily for 30 days.    Symbicort 160-4.5 MCG/ACT inhaler   No No    Inhale 2 puffs 2 (Two) Times a Day for 30 days.    tamsulosin (FLOMAX) 0.4 MG capsule 24 hr capsule  Spouse/Significant Other Yes No    Take 1  "capsule by mouth Every Night.    torsemide (DEMADEX) 100 MG tablet   No No    Take 1 tablet by mouth Daily As Needed (Take as needed for fluid retention/swelling).    True Metrix Blood Glucose Test test strip  Spouse/Significant Other Yes No    See Admin Instructions.     ED Medications:    Medications   sodium chloride 0.9 % flush 10 mL (has no administration in time range)   Potassium Replacement - Follow Nurse / BPA Driven Protocol (has no administration in time range)   lactated ringers infusion (125 mL/hr Intravenous New Bag 6/29/25 2212)   norepinephrine (LEVOPHED) 8 mg in 250mL D5W infusion (0.1 mcg/kg/min × 95.3 kg Intravenous Rate/Dose Change 6/29/25 2306)   sodium chloride 0.9 % bolus 500 mL (0 mL Intravenous Stopped 6/29/25 1750)   piperacillin-tazobactam (ZOSYN) IVPB 3.375 g IVPB in 100 mL NS (VTB) (0 g Intravenous Stopped 6/29/25 1815)   vancomycin IVPB 2000 mg in 0.9% Sodium Chloride 500 mL (0 mg Intravenous Stopped 6/29/25 2033)   lactated ringers bolus 2,859 mL (0 mL Intravenous Stopped 6/29/25 2134)   potassium chloride (KLOR-CON M20) CR tablet 40 mEq (40 mEq Oral Given 6/29/25 1953)   lidocaine 1% - EPINEPHrine 1:068583 (XYLOCAINE W/EPI) 1 %-1:901389 injection 10 mL (10 mL Injection Given 6/29/25 2227)     Vital Signs:  Temp:  [97.9 °F (36.6 °C)] 97.9 °F (36.6 °C)  Heart Rate:  [] 107  Resp:  [18] 18  BP: ()/(42-68) 91/56        06/29/25  1658   Weight: 95.3 kg (210 lb)     Body mass index is 28.48 kg/m².    Physical Exam:     Most recent vital Signs: BP 91/56   Pulse 107   Temp 97.9 °F (36.6 °C) (Rectal)   Resp 18   Ht 182.9 cm (72\")   Wt 95.3 kg (210 lb)   SpO2 93%   BMI 28.48 kg/m²     Physical Exam  Constitutional:       General: He is not in acute distress.     Appearance: He is ill-appearing.      Comments: Does not seem to be in any distress at this time.   HENT:      Head: Normocephalic.      Comments: Healing abrasion noted on the scalp/vertex.     Right Ear: External " ear normal.      Left Ear: External ear normal.      Nose: Nose normal.      Mouth/Throat:      Mouth: Mucous membranes are moist.   Eyes:      Extraocular Movements: Extraocular movements intact.      Conjunctiva/sclera: Conjunctivae normal.   Cardiovascular:      Rate and Rhythm: Normal rate and regular rhythm.      Pulses: Normal pulses.      Heart sounds: Normal heart sounds. No murmur heard.     Comments: Midline CABG scar noted.  Pulmonary:      Effort: Pulmonary effort is normal.      Breath sounds: Rales present. No wheezing.   Abdominal:      General: Bowel sounds are normal.      Palpations: Abdomen is soft.      Tenderness: There is no abdominal tenderness. There is no guarding or rebound.      Comments: Surgical scars noted on the epigastric region.   Musculoskeletal:         General: Normal range of motion.      Cervical back: Neck supple.      Right lower leg: Edema present.      Left lower leg: Edema present.      Comments: 2+ pitting edema noted bilateral lower extremities up to the knees.  Surgical scar noted on the right knee joint.  Right femoral central venous line noted.   Skin:     General: Skin is warm.      Findings: Bruising and lesion present.      Comments: Multiple abrasions noted in bilateral lower and upper extremities, scalp.   Neurological:      General: No focal deficit present.      Mental Status: He is alert and oriented to person, place, and time. Mental status is at baseline.      Comments: Hard of hearing.   Psychiatric:         Mood and Affect: Mood normal.         Behavior: Behavior normal.       Physical Exam  Skin: Bruising is present all over the skin. There are numerous skin tears.  Extremities: Swelling is noted in the legs.    Laboratory data:    I have reviewed the labs done in the emergency room.    Results from last 7 days   Lab Units 06/29/25  1706   SODIUM mmol/L 131*   POTASSIUM mmol/L 2.9*   CHLORIDE mmol/L 81*   CO2 mmol/L 24.4   BUN mg/dL 61.0*   CREATININE  mg/dL 3.79*   CALCIUM mg/dL 9.0   BILIRUBIN mg/dL 0.2   ALK PHOS U/L 69   ALT (SGPT) U/L 14   AST (SGOT) U/L 19   GLUCOSE mg/dL 335*     Results from last 7 days   Lab Units 06/29/25  1706   WBC 10*3/mm3 15.69*   HEMOGLOBIN g/dL 9.0*   HEMATOCRIT % 27.6*   PLATELETS 10*3/mm3 205       Results from last 7 days   Lab Units 06/29/25  1826 06/29/25  1706   HSTROP T ng/L 95* 98*     Results from last 7 days   Lab Units 06/29/25  1706   PROBNP pg/mL 1,835.0*       Results from last 7 days   Lab Units 06/29/25  1706   LIPASE U/L 19       Results from last 7 days   Lab Units 06/29/25  2205   COLOR UA  Yellow   GLUCOSE UA  500 mg/dL (2+)*   KETONES UA  Negative   BLOOD UA  Negative   LEUKOCYTES UA  Moderate (2+)*   PH, URINE  5.5   BILIRUBIN UA  Negative   UROBILINOGEN UA  0.2 E.U./dL   RBC UA /HPF None Seen   WBC UA /HPF 21-50*     EKG:      EKG done in the emergency room was reviewed by me.  It shows sinus tachycardia at 103 bpm.  Left axis deviation noted.  ST flattening noted in the anterior chest leads.  Occasional PAC noted.    Radiology:    Portable chest x-ray done in the emergency room was reviewed by me.  It shows chronic appearing parenchymal changes bilaterally with no homogenous opacity in the left lower zone suspicious for infiltrate.  Prominent right transfer fissure noted.  Sternal wires noted.  An official report is currently pending.    CT Abdomen Pelvis Without Contrast  Result Date: 6/29/2025  FINAL REPORT TECHNIQUE: null CLINICAL HISTORY: sepsis COMPARISON: null FINDINGS: CT abdomen and pelvis without contrast Comparison: 08/29/2024 Findings: Mild degenerative change spine and hips. No acute bony abnormalities. Liver and spleen within normal limits. Pancreas and adrenal glands unremarkable. Cholecystectomy. No right renal stone or hydronephrosis. No focal renal abnormality or ureteral dilation. Status post left nephrectomy. No evidence for aortic aneurysm. No free fluid or adenopathy in the pelvis. No  diverticulitis. Appendix unremarkable.     Impression: No acute processes Authenticated and Electronically Signed by Anthony De La Paz MD on 06/29/2025 11:11:15 PM    CT Chest Without Contrast Diagnostic  Result Date: 6/29/2025  FINAL REPORT TECHNIQUE: null CLINICAL HISTORY: sepsis w/ hypoxemia COMPARISON: null FINDINGS: CT chest without contrast Comparison: None provided Findings: Bilateral posterior lower lobe atelectasis. Trace bilateral pleural effusions noted. Emphysema without other parenchymal abnormality. Cardiomegaly with coronary artery calcifications. Prior sternotomy for CABG. Benign partially calcified mediastinal nodes. No significant mediastinal adenopathy. No significant focal bony abnormalities.     Impression: Trace pleural effusions with lower lobe atelectasis Authenticated and Electronically Signed by Anthony De La Paz MD on 06/29/2025 11:07:32 PM    XR Ankle 3+ View Left  Result Date: 6/26/2025  CLINICAL INDICATION: pain TECHNIQUE: XR ANKLE RIGHT 3+ VIEWS COMPARISON: 24 April 2025, CT 6/13/2025 FINDINGS: Postoperative changes from resection of the distal fibula. Healed distal tibial fracture deformity in similar alignment. Severe narrowing of the tibiotalar joint with irregularity of the lateral talar dome and adjacent distal tibia. Tilt of the talus with widening of the medial clear space. Heterotopic ossification is again seen laterally. Severe narrowing of the tibiotalar joint redemonstrated.. Small tibiotalar effusion. Calcaneal enthesopathy.    Redemonstration of severe tibiotalar osteoarthrosis with postoperative changes as described. No acute bony findings. CRITICAL RESULT:   No. COMMUNICATION: Per this written report. Drafted by Pino Martinez MD on 6/26/2025 12:57 PM Final report signed by Pino Martinez MD on 6/26/2025 12:59 PM    Assessment:    Septic shock secondary to #2 and #3, POA.  Acute urinary tract infection, POA.  Left lower lobe bacterial pneumonia, unable to classify  further, POA.  Demand ischemia with elevated troponins, POA.  Hypokalemia, POA.  Chronic kidney disease stage IV.  Diabetes mellitus type 2 with nephropathy and hyperglycemia, POA.  Coronary artery disease status post CABG and stents.  COPD, no exacerbation.  Benign prostatic hyperplasia.  Will falls with skin abrasions, POA.  Assessment & Plan  The plan for septic shock is as follows:  - Administered 3 liters of fluid and started on Levophed to maintain blood pressure.  - A central line has been placed in the right groin.  - Strong antibiotics, Zosyn and vancomycin, have been initiated.  - Blood and urine cultures have been sent.  - Obtain nasal swab for MRSA.    The plan for stage IV chronic kidney disease is as follows:  - We will hold torsemide 100 mg due to low blood pressure.  - Consult Dr. Hooper from nephrology in AM.    The plan for status post bypass surgery is as follows:  - Continue baby aspirin, clopidogrel.  - Hold metoprolol due to low blood pressures.  - Continue midodrine.    Diabetes mellitus type 2 with hyperglycemia.  - We will start him on Lantus 20 units nightly.  - Continue subcutaneous insulin protocol for coverage and postprandial insulin.  - Obtain hemoglobin A1c levels.    Hypokalemia.  - Electrolyte replacement protocol.    Multiple skin abrasions.  - Consult wound care services.     Discussed with nursing staff at the bedside.    Risk Assessment: High  DVT Prophylaxis: Heparin  Code Status: Full  Diet: Renal diabetic.      Silviano Cisneros MD  06/29/25  23:45 EDT    Patient or patient representative verbalized consent for the use of Ambient Listening during the visit for chart documentation.    Dictated utilizing Dragon dictation.     Contains text generated by Wavii  Electronically signed by Silviano Cisneros MD at 06/30/25 0004          Physician Progress Notes (most recent note)        Pat Davis DO at 07/03/25 1002              [facility]   "  PROGRESS NOTE    Name:  Jonny Ferrari Jr.   Age:  74 y.o.  Sex:  male  :  1950  MRN:  [id number]   Visit Number:  [id number]  Admission Date:  2025  Date Of Service:  25  Primary Care Physician:  Amaury Brice MD     LOS: 4 days :    Chief Complaint:      Follow-up septic shock    Subjective:    Patient overall feeling okay.  A little short of breath this morning, discussed likely fluid related and we are discontinuing this as his blood pressure and kidney functions are improving.  Will give diuretic.  Agreeable for rehab    Hospital Course:    Per prior provider: \"Jonny Ferrari Jr. is a 74-year-old male with history of diabetes mellitus type 2, chronic kidney disease stage III, COPD, coronary artery disease status post CABG and stent, ERASMO, BPH was brought to the emergency room by EMS with symptoms of generalized weakness and fatigue   The patient was brought in by ambulance due to an inability to walk, a condition that has persisted for the past 2 to 3 days. He reports no fever, dysuria, or cough but feels weak. He has experienced several falls recently and has been unable to walk independently for the past 3 to 4 days. Despite using a walker or cane, he continues to fall.   In the emergency room, patient was afebrile but mildly tachycardic in the 100 with initial blood pressure of 80/54 and pulse oxygen saturation of 95% on room air.  Patient was given 30 mL/kg fluid bolus initially in the emergency room with initial improvement of the blood pressure to systolic 100 but subsequently dropped it again to the 70s systolic.  Patient was given another 500 bolus of normal saline, he right femoral central line was placed and he was initiated on Levophed. \"    Review of Systems:     All systems were reviewed and negative except as mentioned in subjective, assessment and plan.    Vital Signs:    Temp:  [97.6 °F (36.4 °C)-98.5 °F (36.9 °C)] 98.2 °F (36.8 °C)  Heart Rate:  [] 65  Resp:  " [16-20] 20  BP: ()/() 125/63    Intake and output:    I/O last 3 completed shifts:  In: 3659 [P.O.:600; I.V.:2259.8; Blood:399.2; IV Piggyback:400]  Out: 5250 [Urine:5250]  I/O this shift:  In: 120 [P.O.:120]  Out: -     Physical Examination:    General Appearance:  Alert and cooperative.  Hard of hearing.  No acute distress   Head:  Atraumatic and normocephalic.   Eyes: Conjunctivae and sclerae normal, no icterus. No pallor.   Throat: No oral lesions, no thrush, oral mucosa moist.   Neck: Supple, trachea midline, no thyromegaly.   Lungs:   Breath sounds heard bilaterally equally.  Some scattered crackles   Heart:  Normal S1 and S2, no murmur,No JVD.   Abdomen:   Normal bowel sounds, Soft, nontender, nondistended, no rebound tenderness.  Nolan catheter in place   Extremities: Supple, trace edema   Skin: No bleeding or rash.  Multiple skin abrasions.   Neurologic: Alert and oriented x 3. No facial asymmetry. Moves all four limbs.      Laboratory results:    Results from last 7 days   Lab Units 07/03/25  0503 07/02/25  0407 07/01/25  0830 07/01/25  0445 06/30/25  0429 06/29/25  1706   SODIUM mmol/L 138 139  --  138   < > 131*   POTASSIUM mmol/L 4.2 4.2 3.4* 2.7*   < > 2.9*   CHLORIDE mmol/L 100 99  --  93*   < > 81*   CO2 mmol/L 26.4 31.0*  --  32.4*   < > 24.4   BUN mg/dL 19.0 26.0*  --  37.0*   < > 61.0*   CREATININE mg/dL 2.10* 2.16*  --  2.44*   < > 3.79*   CALCIUM mg/dL 9.0 8.8  --  8.9   < > 9.0   BILIRUBIN mg/dL  --   --   --   --   --  0.2   ALK PHOS U/L  --   --   --   --   --  69   ALT (SGPT) U/L  --   --   --   --   --  14   AST (SGOT) U/L  --   --   --   --   --  19   GLUCOSE mg/dL 87 86  --  108*   < > 335*    < > = values in this interval not displayed.     Results from last 7 days   Lab Units 07/03/25  0503 07/02/25  1517 07/02/25  0407 07/02/25  0315 07/01/25  1945 07/01/25  0830   WBC 10*3/mm3  --   --  5.65  --  5.67 8.15   HEMOGLOBIN g/dL 8.6* 7.7* 6.0*   < > 8.4* 7.0*   HEMATOCRIT %  27.9* 24.2* 18.8*   < > 26.7* 22.4*   PLATELETS 10*3/mm3  --   --  129*  --  148 154    < > = values in this interval not displayed.     Results from last 7 days   Lab Units 07/01/25  1945   INR  1.03     Results from last 7 days   Lab Units 06/29/25  1826 06/29/25  1706   HSTROP T ng/L 95* 98*     Results from last 7 days   Lab Units 06/29/25  2205 06/29/25  1738 06/29/25  1728   BLOODCX   --  No growth at 3 days No growth at 3 days   URINECX  25,000 CFU/mL Enterococcus faecalis*  --   --      Recent Labs     08/29/24  2314   PHART 7.382   ACQ2TRD 49.8*   PO2ART 68.0*   OXO2SFM 29.5*   BASEEXCESS 3.8*      I have reviewed the patient's laboratory results.    Radiology results:    No radiology results from the last 24 hrs    I have reviewed the patient's radiology reports.    Medication Review:     I have reviewed the patient's active and prn medications.     Problem List:      Septic shock    CAD (coronary artery disease)    Chronic kidney disease, stage IV (severe)    Acute UTI (urinary tract infection)    Pneumonia of left lower lobe due to infectious organism      Assessment:    Septic shock secondary to #2 and #3, POA.  Acute urinary tract infection, POA.  Left lower lobe bacterial pneumonia, unable to classify further, POA.  Demand ischemia with elevated troponins, POA.  Hypokalemia, POA.  Chronic kidney disease stage IV.  Diabetes mellitus type 2 with nephropathy and hyperglycemia, POA.  Coronary artery disease status post CABG and stents.  COPD, no exacerbation.  Benign prostatic hyperplasia.  Will falls with skin abrasions, POA.    Plan:    Septic shock  Acute UTI  Left lower lobe bacterial pneumonia  Negative blood culture, on Zosyn.  Oxygenation stable.  Off pressors.  Urine culture had E faecalis.  Will talk with pharmacy about duration of antibiotic and need for switching necessary.  Demand ischemia with elevated troponins  Troponins plateaued 95-98  Suspect secondary to demand ischemia in the setting  of his septic shock as well as CKD  Low suspicion for ACS  Hypokalemia  Continue replacement  CKD stage IV  Nephrology, Dr. Hooper consulted and appreciate recommendations.  Numbers now stabilized.  Off fluids.  Type 2 diabetes  Subcutaneous insulin protocol  Anemia  Does have chronic anemia likely due to renal failure, however acute on chronic currently.  Received 1 unit of PRBCs.  There was reportedly some blood loss with catheter insertion but urine is clear now.  Restart aspirin and Plavix today.  No further bleeding noted  CAD status post CABG  Continue DAPT with aspirin and Plavix  COPD not exacerbation  Multiple skin abrasions  Wound care  Impaired mobility and ADLs  PT/OT once stable to participate    Further orders as clinical course dictates.    DVT Prophylaxis: SCD  Code Status: Full  Diet: Renal diabetic.  Discharge Plan: STR    Pat Davis DO  25  10:02 EDT    Dictated utilizing Dragon dictation.        Electronically signed by Pat Davis DO at 25 1004          Physical Therapy Notes (last 24 hours)        Kuldeep Ren PT at 25 1339  Version 1 of 1         Goal Outcome Evaluation:  Plan of Care Reviewed With: patient        Progress: improving  Outcome Evaluation: Pt participated in PT tx this date. Pt reclined in chair upon arrival, agreeable to PT. Pt came from sitting to standing CGA using RW and ambulated 3 ft to [facility] CGA using RW. pt required max A for perineal hygiene. Pt ambulated back to chair 3 ft using RW CGA. Pt /73. Pt completed B LE ther ex including SLR, LAQ, ankle pumps, heel slides x20 each. Pt is progressing toward PT goals, cont per POC.    Anticipated Discharge Disposition (PT): inpatient rehabilitation facility, skilled nursing facility                          Electronically signed by Kuldeep Ren PT at 25 0012       Kuldeep Ren PT at 25 1339  Version 1 of 1         Patient Name: Jonny Ferrari Jr.  : 1950    MRN:  [id number]                              Today's Date: 7/2/2025       Admit Date: 6/29/2025    Visit Dx:     ICD-10-CM ICD-9-CM   1. Sepsis with acute renal failure and septic shock, due to unspecified organism, unspecified acute renal failure type  A41.9 038.9    R65.21 995.92    N17.9 785.52     584.9   2. Acute kidney injury superimposed on chronic kidney disease  N17.9 584.9    N18.9 585.9   3. Hyponatremia  E87.1 276.1   4. Hypokalemia  E87.6 276.8   5. Chronic anemia  D64.9 285.9   6. Left lower lobe consolidation  J18.1 481   7. Type 2 diabetes mellitus with hyperglycemia, unspecified whether long term insulin use  E11.65 250.00   8. Multiple skin tears  T14.8XXA 879.8     Patient Active Problem List   Diagnosis    CAD (coronary artery disease)    Essential hypertension    Dyslipidemia    Obesity    GERD (gastroesophageal reflux disease)    OA (osteoarthritis)    TIA (transient ischemic attack)    Anxiety neurosis    Psoriasis    Tobacco use    Diabetes mellitus    Bilateral carotid artery stenosis    NSTEMI (non-ST elevated myocardial infarction)    Chronic kidney disease, stage IV (severe)    Esophageal dysphagia    Iron deficiency anemia    Acute urinary retention    Debility    Perirectal abscess    Acute renal failure (ARF)    Acute metabolic encephalopathy    Sepsis    Septic shock    Acute UTI (urinary tract infection)    Pneumonia of left lower lobe due to infectious organism     Past Medical History:   Diagnosis Date    Anemia     Anxiety neurosis     CAD (coronary artery disease)     Cancer     Kidney    Chronic kidney disease     Patient reported left kidney removed secondary to kidney cancer and that he only has 30% function of the right kidney    Constipation     COPD (chronic obstructive pulmonary disease)     Depression     Diabetes mellitus     DM TYPE 2 , ONSET IN 2009    Dyslipidemia     Dysphagia     Reported noted with food, fluids and pills    Elevated cholesterol     Full dentures      GERD (gastroesophageal reflux disease)     Gout     H/O left nephrectomy 2020    secondary to cancer    [place] (hard of hearing)     Patient has bilateral hearing aids, still is very [place]    Hypertension     Impaired functional mobility, balance, gait, and endurance     MI (myocardial infarction)     Patient reported apx March 2021 (reported he refused cardiac cath) and June 2021 (did have cardiac cath with placement of 2 stents).     OA (osteoarthritis)     Obesity     MILD TO MODERATE EXOGENOUS OBESITY    Problems with swallowing     with food    Psoriasis     Sleep apnea     CPAP HS - to bring mask and machine DOS    Tattoo     x1    TIA (transient ischemic attack)     Wears glasses 10/27/2021     Past Surgical History:   Procedure Laterality Date    APPENDECTOMY      CARDIAC CATHETERIZATION      CARDIAC CATHETERIZATION N/A 2/20/2019    Procedure: Left Heart Cath;  Surgeon: Ernst Smith MD;  Location:  [facility];  Service: Cardiology    CARDIAC CATHETERIZATION Left 6/9/2021    Procedure: Left Heart Cath;  Surgeon: Ernst Smith MD;  Location:  [facility];  Service: Cardiology;  Laterality: Left;    CHOLECYSTECTOMY      COLONOSCOPY      COLONOSCOPY N/A 11/10/2021    Procedure: COLONOSCOPY with polypectomy x6 and clip x2;  Surgeon: Chidi Trinidad MD;  Location: [facility] ENDOSCOPY;  Service: Gastroenterology;  Laterality: N/A;    COLONOSCOPY N/A 10/25/2023    Procedure: COLONOSCOPY incomplete;  Surgeon: Chidi Trinidad MD;  Location: [facility] ENDOSCOPY;  Service: Gastroenterology;  Laterality: N/A;    COLONOSCOPY N/A 11/8/2023    Procedure: COLONOSCOPY WITH POLYPECTOMY X1;  Surgeon: Chidi Trinidad MD;  Location: [facility] ENDOSCOPY;  Service: Gastroenterology;  Laterality: N/A;    CORONARY ARTERY BYPASS GRAFT  2001    Patient reported no MI prior to CABG and that the bypass was 3 vessel     ENDOSCOPY      ENDOSCOPY N/A 11/10/2021    Procedure: ESOPHAGOGASTRODUODENOSCOPY with  biopsy and dilatation;  Surgeon: Chidi Trinidad MD;  Location: [facility] ENDOSCOPY;  Service: Gastroenterology;  Laterality: N/A;    ENDOSCOPY N/A 10/25/2023    Procedure: ESOPHAGOGASTRODUODENOSCOPY with biopsy and dilatation;  Surgeon: Chidi Trinidad MD;  Location: [facility] ENDOSCOPY;  Service: Gastroenterology;  Laterality: N/A;    ENDOSCOPY N/A 11/8/2023    Procedure: ESOPHAGOGASTRODUODENOSCOPY WITH BIOPSY;  Surgeon: Chidi Trinidad MD;  Location: [facility] ENDOSCOPY;  Service: Gastroenterology;  Laterality: N/A;    INCISION AND DRAINAGE PERIRECTAL ABSCESS N/A 8/29/2024    Procedure: INCISION AND DRAINAGE OF PERIRECTAL ABSCESS;  Surgeon: Sherman Billingsley MD;  Location: [facility] OR;  Service: General;  Laterality: N/A;    LAPAROSCOPIC NEPHRECTOMY Left     MOUTH SURGERY      Full mouth extraction    URETER SURGERY      VASECTOMY        General Information       Row Name 07/02/25 1333          Physical Therapy Time and Intention    Document Type therapy note (daily note)  -     Mode of Treatment physical therapy  -       Row Name 07/02/25 1333          General Information    Patient Profile Reviewed yes  -MK     Existing Precautions/Restrictions fall;oxygen therapy device and L/min;orthostatic hypotension  -               User Key  (r) = Recorded By, (t) = Taken By, (c) = Cosigned By      Initials Name Provider Type     Kuldeep Ren PT Physical Therapist                   Mobility       Row Name 07/02/25 1334          Sit-Stand Transfer    Sit-Stand [place] (Transfers) contact guard  -     Assistive Device (Sit-Stand Transfers) walker, front-wheeled  -       Row Name 07/02/25 1334          Gait/Stairs (Locomotion)    [place] Level (Gait) contact guard  -     Assistive Device (Gait) walker, front-wheeled  -     Patient was able to Ambulate yes  -MK     Distance in Feet (Gait) 3  x2  -               User Key  (r) = Recorded By, (t) = Taken By, (c) = Cosigned By      Initials Name  Provider Type    Kuldeep Hernandez, PT Physical Therapist                   Obj/Interventions    No documentation.                  Goals/Plan    No documentation.                  Clinical Impression       Row Name 07/02/25 1334          Pain    Pretreatment Pain Rating 0/10 - no pain  -     Posttreatment Pain Rating 0/10 - no pain  -       Row Name 07/02/25 1331          Plan of Care Review    Plan of Care Reviewed With patient  -     Progress improving  -     Outcome Evaluation Pt participated in PT tx this date. Pt reclined in chair upon arrival, agreeable to PT. Pt came from sitting to standing CGA using RW and ambulated 3 ft to [facility] [place] using RW. pt required max A for perineal hygiene. Pt ambulated back to chair 3 ft using RW CGA. Pt /73. Pt completed B LE ther ex including SLR, LAQ, ankle pumps, heel slides x20 each. Pt is progressing toward PT goals, cont per POC.  -       Row Name 07/02/25 1334          Vital Signs    O2 Delivery Pre Treatment room air  -     O2 Delivery Intra Treatment room air  -     O2 Delivery Post Treatment room air  -     Pre Patient Position Sitting  -     Intra Patient Position Standing  -     Post Patient Position Sitting  -       Row Name 07/02/25 133          Positioning and Restraints    Pre-Treatment Position sitting in chair/recliner  -     Post Treatment Position chair  -MK     In Chair reclined;call light within reach;encouraged to call for assist;with family/caregiver;notified [facility]               User Key  (r) = Recorded By, (t) = Taken By, (c) = Cosigned By      Initials Name Provider Type    Kuldeep Hernandez, PT Physical Therapist                   Outcome Measures       Row Name 07/02/25 2488          How much help from another person do you currently need...    Turning from your back to your side while in flat bed without using bedrails? 4  -     Moving from lying on back to sitting on the side of a flat bed without bedrails? 4  -      Moving to and from a bed to a chair (including a wheelchair)? 3  -MK     Standing up from a chair using your arms (e.g., wheelchair, bedside chair)? 3  -MK     Climbing 3-5 steps with a railing? 2  -MK     To walk in hospital room? 2  -MK     AM-PAC 6 Clicks Score (PT) 18  -MK     Highest Level of Mobility Goal Walk 10 Steps or More-6  -       Row Name 07/02/25 1337 07/02/25 1242       Functional Assessment    Outcome Measure Options AM-PAC 6 Clicks Basic Mobility (PT)  -MK AM-PAC 6 Clicks Daily Activity (OT)  -SD              User Key  (r) = Recorded By, (t) = Taken By, (c) = Cosigned By      Initials Name Provider Type    SD Janel Howe, OT Occupational Therapist    Kuldeep Hernandez, PT Physical Therapist                                 Physical Therapy Education       Title: PT OT SLP Therapies (In Progress)       Topic: Physical Therapy (In Progress)       Point: Mobility training (Done)       Learning Progress Summary            Patient Acceptance, E,D, DU,VU by  at 7/2/2025 1338    Acceptance, E,D, DU,VU by  at 7/1/2025 1707    Comment: role of PT and POC                      Point: Home exercise program (Done)       Learning Progress Summary            Patient Acceptance, E,D, DU,VU by  at 7/2/2025 1338                      Point: Body mechanics (Done)       Learning Progress Summary            Patient Acceptance, E,D, DU,VU by  at 7/2/2025 1338    Acceptance, E,D, DU,VU by  at 7/1/2025 1707    Comment: role of PT and POC                      Point: Precautions (Not Started)       Learner Progress:  Not documented in this visit.                              User Key       Initials Effective Dates Name Provider Type Discipline     06/13/23 -  Kuldeep Ren, PT Physical Therapist PT                  PT Recommendation and Plan  Planned Therapy Interventions (PT): balance training, bed mobility training, gait training, home exercise program, motor coordination training, neuromuscular  re-education, patient/family education, strengthening, transfer training  Progress: improving  Outcome Evaluation: Pt participated in PT tx this date. Pt reclined in chair upon arrival, agreeable to PT. Pt came from sitting to standing CGA using RW and ambulated 3 ft to [facility] CGA using RW. pt required max A for perineal hygiene. Pt ambulated back to chair 3 ft using RW CGA. Pt /73. Pt completed B LE ther ex including SLR, LAQ, ankle pumps, heel slides x20 each. Pt is progressing toward PT goals, cont per POC.     Time Calculation:   PT Evaluation Complexity  History, PT Evaluation Complexity: 1-2 personal factors and/or comorbidities  Examination of Body Systems (PT Eval Complexity): total of 3 or more elements  Clinical Presentation (PT Evaluation Complexity): evolving  Clinical Decision Making (PT Evaluation Complexity): moderate complexity  Overall Complexity (PT Evaluation Complexity): moderate complexity     PT Charges       Row Name 07/02/25 1105             Time Calculation    Start Time 1105  -MK      Stop Time 1130  -MK      Time Calculation (min) 25 min  -MK      PT Received On 07/02/25  -MK      PT Goal Re-Cert Due Date 07/11/25  -MK         Timed Charges    24135 - Gait Training Minutes  10  -MK      05666 - PT Therapeutic Activity Minutes 15  -MK         Total Minutes    Timed Charges Total Minutes 25  -MK       Total Minutes 25  -MK                User Key  (r) = Recorded By, (t) = Taken By, (c) = Cosigned By      Initials Name Provider Type    Kuldeep Hernandez PT Physical Therapist                  Therapy Charges for Today       Code Description Service Date Service Provider Modifiers Qty    12872402625 HC PT EVAL MOD COMPLEXITY 3 7/1/2025 Kuldeep Ren, PT GP 1    79535179019 HC GAIT TRAINING EA 15 MIN 7/2/2025 Kuldeep Ren, PT GP 1    25906251052 HC PT THERAPEUTIC ACT EA 15 MIN 7/2/2025 Kuldeep Ren, PT GP 1            PT G-Codes  Outcome Measure Options: AM-PAC 6 Clicks Basic Mobility  (PT)  AM-PAC 6 Clicks Score (PT): 18  AM-PAC 6 Clicks Score (OT): 15  PT Discharge Summary  Anticipated Discharge Disposition (PT): inpatient rehabilitation facility, skilled nursing facility    Kuldeep Ren, PT  2025      Electronically signed by Kuldeep Ren, PT at 25 3353          Occupational Therapy Notes (last 24 hours)        Janel Howe, OT at 25 1244          Patient Name: Jonny Ferrair Jr.  : 1950    MRN: [id number]                              Today's Date: 2025       Admit Date: 2025    Visit Dx:     ICD-10-CM ICD-9-CM   1. Sepsis with acute renal failure and septic shock, due to unspecified organism, unspecified acute renal failure type  A41.9 038.9    R65.21 995.92    N17.9 785.52     584.9   2. Acute kidney injury superimposed on chronic kidney disease  N17.9 584.9    N18.9 585.9   3. Hyponatremia  E87.1 276.1   4. Hypokalemia  E87.6 276.8   5. Chronic anemia  D64.9 285.9   6. Left lower lobe consolidation  J18.1 481   7. Type 2 diabetes mellitus with hyperglycemia, unspecified whether long term insulin use  E11.65 250.00   8. Multiple skin tears  T14.8XXA 879.8     Patient Active Problem List   Diagnosis    CAD (coronary artery disease)    Essential hypertension    Dyslipidemia    Obesity    GERD (gastroesophageal reflux disease)    OA (osteoarthritis)    TIA (transient ischemic attack)    Anxiety neurosis    Psoriasis    Tobacco use    Diabetes mellitus    Bilateral carotid artery stenosis    NSTEMI (non-ST elevated myocardial infarction)    Chronic kidney disease, stage IV (severe)    Esophageal dysphagia    Iron deficiency anemia    Acute urinary retention    Debility    Perirectal abscess    Acute renal failure (ARF)    Acute metabolic encephalopathy    Sepsis    Septic shock    Acute UTI (urinary tract infection)    Pneumonia of left lower lobe due to infectious organism     Past Medical History:   Diagnosis Date    Anemia     Anxiety neurosis     CAD  (coronary artery disease)     Cancer     Kidney    Chronic kidney disease     Patient reported left kidney removed secondary to kidney cancer and that he only has 30% function of the right kidney    Constipation     COPD (chronic obstructive pulmonary disease)     Depression     Diabetes mellitus     DM TYPE 2 , ONSET IN 2009    Dyslipidemia     Dysphagia     Reported noted with food, fluids and pills    Elevated cholesterol     Full dentures     GERD (gastroesophageal reflux disease)     Gout     H/O left nephrectomy 2020    secondary to cancer    [place] (hard of hearing)     Patient has bilateral hearing aids, still is very [place]    Hypertension     Impaired functional mobility, balance, gait, and endurance     MI (myocardial infarction)     Patient reported apx March 2021 (reported he refused cardiac cath) and June 2021 (did have cardiac cath with placement of 2 stents).     OA (osteoarthritis)     Obesity     MILD TO MODERATE EXOGENOUS OBESITY    Problems with swallowing     with food    Psoriasis     Sleep apnea     CPAP HS - to bring mask and machine DOS    Tattoo     x1    TIA (transient ischemic attack)     Wears glasses 10/27/2021     Past Surgical History:   Procedure Laterality Date    APPENDECTOMY      CARDIAC CATHETERIZATION      CARDIAC CATHETERIZATION N/A 2/20/2019    Procedure: Left Heart Cath;  Surgeon: Ernst Smith MD;  Location:  [facility];  Service: Cardiology    CARDIAC CATHETERIZATION Left 6/9/2021    Procedure: Left Heart Cath;  Surgeon: Ernst Smith MD;  Location:  [facility];  Service: Cardiology;  Laterality: Left;    CHOLECYSTECTOMY      COLONOSCOPY      COLONOSCOPY N/A 11/10/2021    Procedure: COLONOSCOPY with polypectomy x6 and clip x2;  Surgeon: Chidi Trinidad MD;  Location: [facility] ENDOSCOPY;  Service: Gastroenterology;  Laterality: N/A;    COLONOSCOPY N/A 10/25/2023    Procedure: COLONOSCOPY incomplete;  Surgeon: Chidi Trinidad MD;  Location:  [facility] ENDOSCOPY;  Service: Gastroenterology;  Laterality: N/A;    COLONOSCOPY N/A 11/8/2023    Procedure: COLONOSCOPY WITH POLYPECTOMY X1;  Surgeon: Chidi Trinidad MD;  Location: [facility] ENDOSCOPY;  Service: Gastroenterology;  Laterality: N/A;    CORONARY ARTERY BYPASS GRAFT  2001    Patient reported no MI prior to CABG and that the bypass was 3 vessel     ENDOSCOPY      ENDOSCOPY N/A 11/10/2021    Procedure: ESOPHAGOGASTRODUODENOSCOPY with biopsy and dilatation;  Surgeon: Chidi Trinidad MD;  Location: [facility] ENDOSCOPY;  Service: Gastroenterology;  Laterality: N/A;    ENDOSCOPY N/A 10/25/2023    Procedure: ESOPHAGOGASTRODUODENOSCOPY with biopsy and dilatation;  Surgeon: Chidi Trinidad MD;  Location: [facility] ENDOSCOPY;  Service: Gastroenterology;  Laterality: N/A;    ENDOSCOPY N/A 11/8/2023    Procedure: ESOPHAGOGASTRODUODENOSCOPY WITH BIOPSY;  Surgeon: Chidi Trinidad MD;  Location: [facility] ENDOSCOPY;  Service: Gastroenterology;  Laterality: N/A;    INCISION AND DRAINAGE PERIRECTAL ABSCESS N/A 8/29/2024    Procedure: INCISION AND DRAINAGE OF PERIRECTAL ABSCESS;  Surgeon: Sherman Billingsley MD;  Location: [facility] OR;  Service: General;  Laterality: N/A;    LAPAROSCOPIC NEPHRECTOMY Left     MOUTH SURGERY      Full mouth extraction    URETER SURGERY      VASECTOMY        General Information       Row Name 07/02/25 1238          OT Time and Intention    Subjective Information no complaints  -SD     Document Type therapy note (daily note)  -SD     Mode of Treatment occupational therapy  -SD     Patient Effort good  -SD     Symptoms Noted During/After Treatment none  -SD       Row Name 07/02/25 1238          General Information    Patient Profile Reviewed yes  -SD               User Key  (r) = Recorded By, (t) = Taken By, (c) = Cosigned By      Initials Name Provider Type    Janel Haque OT Occupational Therapist                     Mobility/ADL's       Row Name 07/02/25 1234          Bed Mobility     Comment, (Bed Mobility) in chair  -SD       Row Name 07/02/25 1238          Sit-Stand Transfer    Sit-Stand [place] (Transfers) contact guard  -SD     Assistive Device (Sit-Stand Transfers) walker, front-wheeled  -SD       [facility] Name 07/02/25 1238          Functional Mobility    Functional Mobility- Ind. Level contact guard assist  -SD     Functional Mobility- Device walker, front-wheeled  -SD     Functional Mobility-Distance (Feet) 3  x2  -SD       [facility] Name 07/02/25 1238          Lower Body Dressing Assessment/Training    [place] Level (Lower Body Dressing) don;socks;maximum assist (25% patient effort)  -SD     Position (Lower Body Dressing) supported sitting  -SD       [facility] Name 07/02/25 1238          Toileting Assessment/Training    [place] Level (Toileting) perform perineal hygiene;maximum assist (25% patient effort)  -SD     Assistive Devices (Toileting) commode, bedside without drop arms  -SD               User Key  (r) = Recorded By, (t) = Taken By, (c) = Cosigned By      Initials Name Provider Type    Janel Haque OT Occupational Therapist                   Obj/Interventions       [facility] Name 07/02/25 1238          Motor Skills    Therapeutic Exercise shoulder;elbow/forearm;wrist;hand  UB AROM exercises 1 set 15 reps  -SD               User Key  (r) = Recorded By, (t) = Taken By, (c) = Cosigned By      Initials Name Provider Type    Janel Haque OT Occupational Therapist                   Goals/Plan    No documentation.                  Clinical Impression       [facility] Name 07/02/25 1239          Pain Assessment    Pretreatment Pain Rating 0/10 - no pain  -SD     Posttreatment Pain Rating 0/10 - no pain  -SD       [facility] Name 07/02/25 1239          Plan of Care Review    Plan of Care Reviewed With patient  -SD     Progress improving  -SD     Outcome Evaluation OT tx completed. Patient is reclined in chair, denies pain. Patient required max A to emery socks. Performed sit to stand CGA using  RW, walked 3' to [facility] CGA. Required max A for perineal hygiene. Walked 3' back to chair. BP checked and 128/73. Patient tolerated BUE AROM exercises sitting in chair. Continue OT POC  -SD       Row Name 07/02/25 1239          Vital Signs    Post Systolic BP Rehab 128  -SD     Post Treatment Diastolic BP 73  -SD     Posttreatment Heart Rate (beats/min) 81  -SD     O2 Delivery Pre Treatment room air  -SD     O2 Delivery Intra Treatment room air  -SD     Post SpO2 (%) 100  -SD     O2 Delivery Post Treatment room air  -SD       Row Name 07/02/25 1239          Positioning and Restraints    Pre-Treatment Position sitting in chair/recliner  -SD     Post Treatment Position chair  -SD     In Chair reclined;call light within reach;encouraged to call for assist;with family/caregiver;notified nsg  -SD               User Key  (r) = Recorded By, (t) = Taken By, (c) = Cosigned By      Initials Name Provider Type    Janel Haque OT Occupational Therapist                   Outcome Measures       Row Name 07/02/25 1242          How much help from another is currently needed...    Putting on and taking off regular lower body clothing? 2  -SD     Bathing (including washing, rinsing, and drying) 2  -SD     Toileting (which includes using toilet bed pan or urinal) 2  -SD     Putting on and taking off regular upper body clothing 3  -SD     Taking care of personal grooming (such as brushing teeth) 3  -SD     Eating meals 3  -SD     AM-PAC 6 Clicks Score (OT) 15  -SD       Row Name 07/02/25 1242          Functional Assessment    Outcome Measure Options AM-PAC 6 Clicks Daily Activity (OT)  -SD               User Key  (r) = Recorded By, (t) = Taken By, (c) = Cosigned By      Initials Name Provider Type    Janel Haque OT Occupational Therapist                    Occupational Therapy Education       Title: PT OT SLP Therapies (In Progress)       Topic: Occupational Therapy (In Progress)       Point: ADL training (Done)        Learning Progress Summary            Patient Acceptance, E,TB, VU by SD at 7/2/2025 1243    Comment: Safety during tf    Acceptance, E,TB, VU by SD at 7/1/2025 1701    Comment: OT POC                                      User Key       Initials Effective Dates Name Provider Type Discipline    SD 06/16/21 -  Janel Howe OT Occupational Therapist OT                  OT Recommendation and Plan  Planned Therapy Interventions (OT): activity tolerance training, adaptive equipment training, BADL retraining, patient/caregiver education/training, ROM/therapeutic exercise, strengthening exercise, transfer/mobility retraining  Therapy Frequency (OT): 3 times/wk (5 times if indicated)  Plan of Care Review  Plan of Care Reviewed With: patient  Progress: improving  Outcome Evaluation: OT tx completed. Patient is reclined in chair, denies pain. Patient required max A to emery socks. Performed sit to stand CGA using RW, walked 3' to BSC CGA. Required max A for perineal hygiene. Walked 3' back to chair. BP checked and 128/73. Patient tolerated BUE AROM exercises sitting in chair. Continue OT POC     Time Calculation:   Evaluation Complexity (OT)  Review Occupational Profile/Medical/Therapy History Complexity: expanded/moderate complexity  Assessment, Occupational Performance/Identification of Deficit Complexity: 3-5 performance deficits  Clinical Decision Making Complexity (OT): detailed assessment/moderate complexity  Overall Complexity of Evaluation (OT): moderate complexity     Time Calculation- OT       Row Name 07/02/25 1243             Time Calculation- OT    OT Start Time 1108  -SD      OT Stop Time 1131  -SD      OT Time Calculation (min) 23 min  -SD      OT Received On 07/02/25  -SD      OT Goal Re-Cert Due Date 07/11/25  -SD         Timed Charges    88988 - OT Therapeutic Exercise Minutes 8  -SD      00422 - OT Self Care/Mgmt Minutes 15  -SD         Total Minutes    Timed Charges Total Minutes 23  -SD       Total  Minutes 23  -SD                User Key  (r) = Recorded By, (t) = Taken By, (c) = Cosigned By      Initials Name Provider Type    SD Janel Howe OT Occupational Therapist                  Therapy Charges for Today       Code Description Service Date Service Provider Modifiers Qty    14084178251  OT EVAL MOD COMPLEXITY 3 7/1/2025 Janel Howe OT GO 1    [id number]  OT THER PROC EA 15 MIN 7/2/2025 Janel Howe OT GO 1    [id number]  OT SELF CARE/MGMT/TRAIN EA 15 MIN 7/2/2025 Janel Howe OT GO 1                 Janel Howe OT  7/2/2025    Electronically signed by Janel Howe OT at 07/02/25 1244       Janel Howe OT at 07/02/25 1243          Goal Outcome Evaluation:  Plan of Care Reviewed With: patient        Progress: improving  Outcome Evaluation: OT tx completed. Patient is reclined in chair, denies pain. Patient required max A to emery socks. Performed sit to stand CGA using RW, walked 3' to C CGA. Required max A for perineal hygiene. Walked 3' back to chair. BP checked and 128/73. Patient tolerated BUE AROM exercises sitting in chair. Continue OT POC    Anticipated Discharge Disposition (OT): inpatient rehabilitation facility                          Electronically signed by Janel Howe OT at 07/02/25 0480

## 2025-07-03 NOTE — CASE MANAGEMENT/SOCIAL WORK
Case Management/Social Work    Patient Name:  Jonny Ferrari Jr.  YOB: 1950  MRN: [id number]  Admit Date:  6/29/2025        13:56 EDT   Discharge Plan       Row Name 07/03/25 1252       Plan    Plan STR, pending referrals    Roadmap to Recovery Yes    Patient/Family in Agreement with Plan yes    Plan Comments Pending referrals to [facility], [facility], per patient preference. Spoke to patient at bedside this morning. He is still agreeable to STR and wants to go home when he finishes rehab. Spoke to patient and wife together, later in the morning and gave roadmap, sitter/caregiver list, Pathways, and additional resources. Left message to update and review resources with sonFrankie at [phone] & daughter-in-law, Linette at [phone]. CM will continue to follow.                        Electronically signed by:  Ying Thorne RN  07/03/25 13:56 EDT

## 2025-07-04 LAB
ANION GAP SERPL CALCULATED.3IONS-SCNC: 10.1 MMOL/L (ref 5–15)
BACTERIA SPEC AEROBE CULT: NORMAL
BACTERIA SPEC AEROBE CULT: NORMAL
BUN SERPL-MCNC: 16 MG/DL (ref 8–23)
BUN/CREAT SERPL: 8.2 (ref 7–25)
CALCIUM SPEC-SCNC: 8.9 MG/DL (ref 8.6–10.5)
CHLORIDE SERPL-SCNC: 101 MMOL/L (ref 98–107)
CO2 SERPL-SCNC: 27.9 MMOL/L (ref 22–29)
CREAT SERPL-MCNC: 1.95 MG/DL (ref 0.76–1.27)
EGFRCR SERPLBLD CKD-EPI 2021: 35.9 ML/MIN/1.73
GLUCOSE BLDC GLUCOMTR-MCNC: 105 MG/DL (ref 70–130)
GLUCOSE BLDC GLUCOMTR-MCNC: 181 MG/DL (ref 70–130)
GLUCOSE BLDC GLUCOMTR-MCNC: 269 MG/DL (ref 70–130)
GLUCOSE BLDC GLUCOMTR-MCNC: 98 MG/DL (ref 70–130)
GLUCOSE SERPL-MCNC: 84 MG/DL (ref 65–99)
HCT VFR BLD AUTO: 26.4 % (ref 37.5–51)
HGB BLD-MCNC: 8.2 G/DL (ref 13–17.7)
POTASSIUM SERPL-SCNC: 3.7 MMOL/L (ref 3.5–5.2)
SODIUM SERPL-SCNC: 139 MMOL/L (ref 136–145)

## 2025-07-04 PROCEDURE — 94799 UNLISTED PULMONARY SVC/PX: CPT

## 2025-07-04 PROCEDURE — 82948 REAGENT STRIP/BLOOD GLUCOSE: CPT | Performed by: INTERNAL MEDICINE

## 2025-07-04 PROCEDURE — 99232 SBSQ HOSP IP/OBS MODERATE 35: CPT | Performed by: FAMILY MEDICINE

## 2025-07-04 PROCEDURE — 63710000001 INSULIN LISPRO (HUMAN) PER 5 UNITS: Performed by: INTERNAL MEDICINE

## 2025-07-04 PROCEDURE — 82948 REAGENT STRIP/BLOOD GLUCOSE: CPT

## 2025-07-04 PROCEDURE — 85018 HEMOGLOBIN: CPT | Performed by: FAMILY MEDICINE

## 2025-07-04 PROCEDURE — 85014 HEMATOCRIT: CPT | Performed by: FAMILY MEDICINE

## 2025-07-04 PROCEDURE — 63710000001 INSULIN GLARGINE PER 5 UNITS: Performed by: INTERNAL MEDICINE

## 2025-07-04 PROCEDURE — 94664 DEMO&/EVAL PT USE INHALER: CPT

## 2025-07-04 PROCEDURE — 97535 SELF CARE MNGMENT TRAINING: CPT

## 2025-07-04 PROCEDURE — 97530 THERAPEUTIC ACTIVITIES: CPT

## 2025-07-04 PROCEDURE — 25010000002 FUROSEMIDE PER 20 MG: Performed by: FAMILY MEDICINE

## 2025-07-04 PROCEDURE — 94761 N-INVAS EAR/PLS OXIMETRY MLT: CPT

## 2025-07-04 PROCEDURE — 25010000002 PIPERACILLIN SOD-TAZOBACTAM PER 1 G: Performed by: INTERNAL MEDICINE

## 2025-07-04 PROCEDURE — 80048 BASIC METABOLIC PNL TOTAL CA: CPT | Performed by: FAMILY MEDICINE

## 2025-07-04 RX ORDER — FUROSEMIDE 10 MG/ML
20 INJECTION INTRAMUSCULAR; INTRAVENOUS ONCE
Status: COMPLETED | OUTPATIENT
Start: 2025-07-04 | End: 2025-07-04

## 2025-07-04 RX ADMIN — BUSPIRONE HYDROCHLORIDE 15 MG: 15 TABLET ORAL at 21:31

## 2025-07-04 RX ADMIN — CLOPIDOGREL BISULFATE 75 MG: 75 TABLET, FILM COATED ORAL at 08:57

## 2025-07-04 RX ADMIN — SODIUM CHLORIDE 3.38 G: 9 INJECTION, SOLUTION INTRAVENOUS at 00:33

## 2025-07-04 RX ADMIN — IPRATROPIUM BROMIDE AND ALBUTEROL SULFATE 3 ML: 2.5; .5 SOLUTION RESPIRATORY (INHALATION) at 19:19

## 2025-07-04 RX ADMIN — Medication 10 ML: at 21:31

## 2025-07-04 RX ADMIN — SODIUM CHLORIDE 3.38 G: 9 INJECTION, SOLUTION INTRAVENOUS at 17:24

## 2025-07-04 RX ADMIN — BUSPIRONE HYDROCHLORIDE 15 MG: 15 TABLET ORAL at 08:57

## 2025-07-04 RX ADMIN — SODIUM CHLORIDE 3.38 G: 9 INJECTION, SOLUTION INTRAVENOUS at 08:58

## 2025-07-04 RX ADMIN — ASPIRIN 81 MG: 81 TABLET, COATED ORAL at 08:57

## 2025-07-04 RX ADMIN — PANTOPRAZOLE SODIUM 40 MG: 40 TABLET, DELAYED RELEASE ORAL at 06:11

## 2025-07-04 RX ADMIN — BUPRENORPHINE AND NALOXONE 1 FILM: 8; 2 FILM BUCCAL; SUBLINGUAL at 21:31

## 2025-07-04 RX ADMIN — Medication 1 CAPSULE: at 21:31

## 2025-07-04 RX ADMIN — BUDESONIDE 0.5 MG: 0.5 SUSPENSION RESPIRATORY (INHALATION) at 07:37

## 2025-07-04 RX ADMIN — MUPIROCIN 1 APPLICATION: 20 OINTMENT TOPICAL at 08:58

## 2025-07-04 RX ADMIN — Medication 10 ML: at 08:58

## 2025-07-04 RX ADMIN — BUPRENORPHINE AND NALOXONE 1 FILM: 8; 2 FILM BUCCAL; SUBLINGUAL at 08:57

## 2025-07-04 RX ADMIN — FUROSEMIDE 20 MG: 10 INJECTION, SOLUTION INTRAMUSCULAR; INTRAVENOUS at 09:04

## 2025-07-04 RX ADMIN — INSULIN LISPRO 6 UNITS: 100 INJECTION, SOLUTION INTRAVENOUS; SUBCUTANEOUS at 21:29

## 2025-07-04 RX ADMIN — INSULIN GLARGINE 20 UNITS: 100 INJECTION, SOLUTION SUBCUTANEOUS at 21:29

## 2025-07-04 RX ADMIN — Medication 1 CAPSULE: at 08:57

## 2025-07-04 RX ADMIN — BUDESONIDE 0.5 MG: 0.5 SUSPENSION RESPIRATORY (INHALATION) at 19:19

## 2025-07-04 NOTE — PLAN OF CARE
Goal Outcome Evaluation:  Plan of Care Reviewed With: patient        Progress: improving  Outcome Evaluation: Patient alert and oriented, reports of chronic pain, scheduled suboxone given, sinus rhythm, O2 > 92% on 2 L nasal cannula, reports of shortness of air intermittently throughout the shift, and patient sleeping in-between care this AM.

## 2025-07-04 NOTE — PLAN OF CARE
Goal Outcome Evaluation:                 Pt work with PT/OT today, states feeling less SOA after dose of lasix on RA most of the day.

## 2025-07-04 NOTE — PROGRESS NOTES
"    [facility]    Name:  Jonny Ferrari Jr.   Age:  74 y.o.  Sex:  male  :  1950  MRN:  [id number]   Visit Number:  [id number]  Admission Date:  2025  Date Of Service:  25  Primary Care Physician:  Amaury Brice MD     LOS: 5 days :    Chief Complaint:      Follow-up septic shock    Subjective:    Patient seen again this morning.  Stable condition.  No acute changes overnight.    Hospital Course:    Per prior provider: \"Jonny Ferrari Jr. is a 74-year-old male with history of diabetes mellitus type 2, chronic kidney disease stage III, COPD, coronary artery disease status post CABG and stent, ERASMO, BPH was brought to the emergency room by EMS with symptoms of generalized weakness and fatigue   The patient was brought in by ambulance due to an inability to walk, a condition that has persisted for the past 2 to 3 days. He reports no fever, dysuria, or cough but feels weak. He has experienced several falls recently and has been unable to walk independently for the past 3 to 4 days. Despite using a walker or cane, he continues to fall.   In the emergency room, patient was afebrile but mildly tachycardic in the 100 with initial blood pressure of 80/54 and pulse oxygen saturation of 95% on room air.  Patient was given 30 mL/kg fluid bolus initially in the emergency room with initial improvement of the blood pressure to systolic 100 but subsequently dropped it again to the 70s systolic.  Patient was given another 500 bolus of normal saline, he right femoral central line was placed and he was initiated on Levophed. \"    Review of Systems:     All systems were reviewed and negative except as mentioned in subjective, assessment and plan.    Vital Signs:    Temp:  [97.6 °F (36.4 °C)-98.3 °F (36.8 °C)] 98.2 °F (36.8 °C)  Heart Rate:  [62-88] 79  Resp:  [18-20] 18  BP: (105-143)/(53-91) 105/53    Intake and output:    I/O last 3 completed shifts:  In: 2700 " [P.O.:410; I.V.:1788; IV Piggyback:502]  Out: 4800 [Urine:4800]  I/O this shift:  In: 240 [P.O.:240]  Out: 175 [Urine:175]    Physical Examination:    General Appearance:  Alert and cooperative.  Hard of hearing.  No acute distress   Head:  Atraumatic and normocephalic.   Eyes: Conjunctivae and sclerae normal, no icterus. No pallor.   Throat: No oral lesions, no thrush, oral mucosa moist.   Neck: Supple, trachea midline, no thyromegaly.   Lungs:   Breath sounds heard bilaterally equally.  Some scattered crackles   Heart:  Normal S1 and S2, no murmur,No JVD.   Abdomen:   Normal bowel sounds, Soft, nontender, nondistended, no rebound tenderness.  Nolan catheter in place   Extremities: Supple, trace edema   Skin: No bleeding or rash.  Multiple skin abrasions.   Neurologic: Alert and oriented x 3. No facial asymmetry.  Generalized weakness     Laboratory results:    Results from last 7 days   Lab Units 07/04/25 0454 07/03/25  0503 07/02/25  0407 06/30/25  0429 06/29/25  1706   SODIUM mmol/L 139 138 139   < > 131*   POTASSIUM mmol/L 3.7 4.2 4.2   < > 2.9*   CHLORIDE mmol/L 101 100 99   < > 81*   CO2 mmol/L 27.9 26.4 31.0*   < > 24.4   BUN mg/dL 16.0 19.0 26.0*   < > 61.0*   CREATININE mg/dL 1.95* 2.10* 2.16*   < > 3.79*   CALCIUM mg/dL 8.9 9.0 8.8   < > 9.0   BILIRUBIN mg/dL  --   --   --   --  0.2   ALK PHOS U/L  --   --   --   --  69   ALT (SGPT) U/L  --   --   --   --  14   AST (SGOT) U/L  --   --   --   --  19   GLUCOSE mg/dL 84 87 86   < > 335*    < > = values in this interval not displayed.     Results from last 7 days   Lab Units 07/04/25  0454 07/03/25  0503 07/02/25  1517 07/02/25  0407 07/02/25  0315 07/01/25  1945 07/01/25  0830   WBC 10*3/mm3  --   --   --  5.65  --  5.67 8.15   HEMOGLOBIN g/dL 8.2* 8.6* 7.7* 6.0*   < > 8.4* 7.0*   HEMATOCRIT % 26.4* 27.9* 24.2* 18.8*   < > 26.7* 22.4*   PLATELETS 10*3/mm3  --   --   --  129*  --  148 154    < > = values in this interval not displayed.     Results from last  7 days   Lab Units 07/01/25  1945   INR  1.03     Results from last 7 days   Lab Units 06/29/25  1826 06/29/25  1706   HSTROP T ng/L 95* 98*     Results from last 7 days   Lab Units 06/29/25  2205 06/29/25  1738 06/29/25  1728   BLOODCX   --  No growth at 4 days No growth at 4 days   URINECX  25,000 CFU/mL Enterococcus faecalis*  --   --      Recent Labs     08/29/24  2314   PHART 7.382   [id number] 49.8*   PO2ART 68.0*   VLJ0CIR 29.5*   BASEEXCESS 3.8*      I have reviewed the patient's laboratory results.    Radiology results:    No radiology results from the last 24 hrs    I have reviewed the patient's radiology reports.    Medication Review:     I have reviewed the patient's active and prn medications.     Problem List:      Septic shock    CAD (coronary artery disease)    Chronic kidney disease, stage IV (severe)    Acute UTI (urinary tract infection)    Pneumonia of left lower lobe due to infectious organism      Assessment:    Septic shock secondary to #2 and #3, POA.  Acute urinary tract infection, POA.  Left lower lobe bacterial pneumonia, unable to classify further, POA.  Demand ischemia with elevated troponins, POA.  Hypokalemia, POA.  Chronic kidney disease stage IV.  Diabetes mellitus type 2 with nephropathy and hyperglycemia, POA.  Coronary artery disease status post CABG and stents.  COPD, no exacerbation.  Benign prostatic hyperplasia.  Will falls with skin abrasions, POA.    Plan:    Septic shock  Acute UTI  Left lower lobe bacterial pneumonia  Negative blood culture, on Zosyn.  Oxygenation stable.  Off pressors.  Urine culture had E faecalis.  Discussed with pharmacy, no further treatment indicated  Demand ischemia with elevated troponins  Troponins plateaued 95-98  Suspect secondary to demand ischemia in the setting of his septic shock as well as CKD  Low suspicion for ACS  Hypokalemia  Continue replacement  CKD stage IV  Nephrology, Dr. Hooper consulted and appreciate recommendations.  Numbers now  stabilized.  Off fluids.  Diuretics started  Type 2 diabetes  Subcutaneous insulin protocol  Anemia  Does have chronic anemia likely due to renal failure, however acute on chronic currently.  Received 1 unit of PRBCs.  There was reportedly some blood loss with catheter insertion but urine is clear now.  Restart aspirin and Plavix and will need monitored for any further blood loss  CAD status post CABG  Continue DAPT with aspirin and Plavix  COPD not exacerbation  Multiple skin abrasions  Wound care  Impaired mobility and ADLs  PT/OT once stable to participate    Further orders as clinical course dictates.    DVT Prophylaxis: SCD  Code Status: Full  Diet: Renal diabetic.  Discharge Plan: KRISTINE Davis DO  07/04/25  08:43 EDT    Dictated utilizing Dragon dictation.

## 2025-07-04 NOTE — PROGRESS NOTES
[facility]. [place]      [facility].   Progress Note          Patient Name: Jonny Ferrari Jr.  : 1950  MRN: [id number]   LOS: 5 days    Patient Care Team:  Amaury Brice MD as PCP - General (Family Medicine)  Ernst Smith MD as Consulting Physician (Cardiology)  Panfilo Bolden MD as Consulting Physician (Family Medicine)  Chidi Trinidad MD as Consulting Physician (General Surgery)  Jaren Sterling MD as Consulting Physician (Gastroenterology)    Chief Complaint:    Chief Complaint   Patient presents with    Weakness - Generalized     Pt called ems for sick, unable to urinate and weakness. Pt was on the toilet with a pressure in the 80's. Pt has multiple skin tears. Pt has 18 ga iv in left ac, 22 ga right bicep by ems.      Primary Care Physician:  Amaury Brice MD  Date of admission: 2025    Subjective     Interval History:   Follow-up on chronic kidney disease stage IV.   Patient is a lot more awake alert and interactive, he feels like he is ready to go home.    Blood pressure is significantly better, although it is on the low side.  Renal function is close to baseline.  He has been eating and drinking fairly well.  Denies having any chest pain or shortness of breath.  Still feels weak.  Events noted from last 24 hours.  I reviewed the chart and other providers notes, labs and procedures done since my last note.    Review of Systems:   As noted above.    Objective     Vitals:   Temp:  [97.6 °F (36.4 °C)-98.3 °F (36.8 °C)] 98.3 °F (36.8 °C)  Heart Rate:  [62-88] 67  Resp:  [18-20] 18  BP: (105-143)/(53-91) 105/53  Flow (L/min) (Oxygen Therapy):  [2] 2    Intake/Output Summary (Last 24 hours) at 2025 0658  Last data filed at 2025 0500  Gross per 24 hour   Intake 1000 ml   Output 2700 ml   Net -1700 ml       Physical Exam:    General Appearance: alert, oriented x 3, no acute distress   Skin: warm and dry  HEENT: oral mucosa  normal, nonicteric sclera  Neck: supple, no JVD  Lungs: CTA  Heart: RRR, normal S1 and S2  Abdomen: obese, soft, nontender, non distended and positive bowel sounds.  : no palpable bladder  Extremities: Trace edema, no cyanosis or clubbing  Neuro: normal speech and mental status     Scheduled Meds:     Current Facility-Administered Medications   Medication Dose Route Frequency Provider Last Rate Last Admin    acetaminophen (TYLENOL) tablet 650 mg  650 mg Oral Q4H PRN Silviano Cisneros MD        Or    acetaminophen (TYLENOL) 160 MG/5ML oral solution 650 mg  650 mg Oral Q4H PRN Silviano Cisneros MD        Or    acetaminophen (TYLENOL) suppository 650 mg  650 mg Rectal Q4H PRN Silviano Cisneros MD        aspirin EC tablet 81 mg  81 mg Oral Daily Silviano Cisneros MD   81 mg at 07/03/25 0812    sennosides-docusate (PERICOLACE) 8.6-50 MG per tablet 2 tablet  2 tablet Oral BID Silviano Cisneros MD   2 tablet at 07/01/25 0823    And    polyethylene glycol (MIRALAX) packet 17 g  17 g Oral Daily PRN Silviano Cisneros MD        And    bisacodyl (DULCOLAX) EC tablet 5 mg  5 mg Oral Daily PRN Silviano Cisneros MD        And    bisacodyl (DULCOLAX) suppository 10 mg  10 mg Rectal Daily PRN Silviano Cisneros MD        budesonide (PULMICORT) nebulizer solution 0.5 mg  0.5 mg Nebulization BID - RT Silviano Cisneros MD   0.5 mg at 07/03/25 1851    buprenorphine-naloxone (SUBOXONE) 8-2 MG film 1 film  1 film Sublingual BID Pat Davis DO   1 film at 07/03/25 2044    busPIRone (BUSPAR) tablet 15 mg  15 mg Oral Q12H Silviano Cisneros MD   15 mg at 07/03/25 2045    Calcium Replacement - Follow Nurse / BPA Driven Protocol   Not Applicable PRN Agustin Will DO        clopidogrel (PLAVIX) tablet 75 mg  75 mg Oral Daily Silviano Cisneros MD   75 mg at 07/03/25 0813    dextrose (D50W) (25 g/50 mL) IV injection 25 g  25 g Intravenous Q15 Min PRN Silviano Cisneros MD        dextrose (GLUTOSE) oral gel 15 g  15 g Oral Q15 Min PRN Silviano Cisneros MD        Diclofenac Sodium  (VOLTAREN) 1 % gel 2 g  2 g Topical 4x Daily PRN Kuldeep Patel DO   2 g at 07/02/25 2201    glucagon (GLUCAGEN) injection 1 mg  1 mg Intramuscular Q15 Min PRN Silviano Cisneros MD        insulin glargine (LANTUS, SEMGLEE) injection 20 Units  20 Units Subcutaneous Nightly Silviano Cisneros MD   20 Units at 07/03/25 2045    Insulin Lispro (humaLOG) injection 2-9 Units  2-9 Units Subcutaneous 4x Daily AC & at Bedtime Silviano Cisneros MD   2 Units at 06/30/25 1129    ipratropium-albuterol (DUO-NEB) nebulizer solution 3 mL  3 mL Nebulization Q4H PRN Silviano Cisneros MD        lactobacillus acidophilus (RISAQUAD) capsule 1 capsule  1 capsule Oral BID Silviano Cisneros MD   1 capsule at 07/03/25 2045    Magnesium Standard Dose Replacement - Follow Nurse / BPA Driven Protocol   Not Applicable PRN Silviano Cisneros MD        Magnesium Standard Dose Replacement - Follow Nurse / BPA Driven Protocol   Not Applicable PRN Agustin Will DO        mupirocin (BACTROBAN) 2 % nasal ointment 1 Application  1 Application Each Nare BID Silviano Cisneros MD   1 Application at 07/03/25 2045    ondansetron (ZOFRAN) injection 4 mg  4 mg Intravenous Q6H PRN Silviano Cisneros MD        pantoprazole (PROTONIX) EC tablet 40 mg  40 mg Oral Q AM Silviano Cisneros MD   40 mg at 07/04/25 0611    Pharmacy To Dose: Piperacillin-tazobactam (Zosyn)   Not Applicable Continuous PRN Silviano Cisneros MD        Phosphorus Replacement - Follow Nurse / BPA Driven Protocol   Not Applicable PRN Agustin Will DO        piperacillin-tazobactam (ZOSYN) IVPB 3.375 g IVPB in 100 mL NS (VTB)  3.375 g Intravenous Q8H Silviano Cisneros MD   3.375 g at 07/04/25 0033    Potassium Replacement - Follow Nurse / BPA Driven Protocol   Not Applicable PRN Fady Guevara MD        Potassium Replacement - Follow Nurse / BPA Driven Protocol   Not Applicable PRN Silviano Cisneros MD        Potassium Replacement - Follow Nurse / BPA Driven Protocol   Not Applicable PRN Nicolette, Agustin Dante, DO         sodium chloride 0.9 % flush 10 mL  10 mL Intravenous PRN Fady Guevara MD        sodium chloride 0.9 % flush 10 mL  10 mL Intravenous Q12H Silviano Cisneros MD   10 mL at 07/03/25 2045    sodium chloride 0.9 % flush 10 mL  10 mL Intravenous PRN Silviano Cisneros MD        sodium chloride 0.9 % infusion 40 mL  40 mL Intravenous PRN Silviano Cisneros MD           aspirin, 81 mg, Oral, Daily  budesonide, 0.5 mg, Nebulization, BID - RT  buprenorphine-naloxone, 1 film, Sublingual, BID  busPIRone, 15 mg, Oral, Q12H  clopidogrel, 75 mg, Oral, Daily  insulin glargine, 20 Units, Subcutaneous, Nightly  insulin lispro, 2-9 Units, Subcutaneous, 4x Daily AC & at Bedtime  lactobacillus acidophilus, 1 capsule, Oral, BID  mupirocin, 1 Application, Each Nare, BID  pantoprazole, 40 mg, Oral, Q AM  piperacillin-tazobactam, 3.375 g, Intravenous, Q8H  senna-docusate sodium, 2 tablet, Oral, BID  sodium chloride, 10 mL, Intravenous, Q12H        IV Meds:   Pharmacy To Dose:,         Results Reviewed:   I have personally reviewed the results from the time of this admission to 7/4/2025 06:58 EDT     Results from last 7 days   Lab Units 07/04/25  0454 07/03/25  0503 07/02/25  0407 06/30/25  0429 06/29/25  1706   SODIUM mmol/L 139 138 139   < > 131*   POTASSIUM mmol/L 3.7 4.2 4.2   < > 2.9*   CHLORIDE mmol/L 101 100 99   < > 81*   CO2 mmol/L 27.9 26.4 31.0*   < > 24.4   BUN mg/dL 16.0 19.0 26.0*   < > 61.0*   CREATININE mg/dL 1.95* 2.10* 2.16*   < > 3.79*   CALCIUM mg/dL 8.9 9.0 8.8   < > 9.0   BILIRUBIN mg/dL  --   --   --   --  0.2   ALK PHOS U/L  --   --   --   --  69   ALT (SGPT) U/L  --   --   --   --  14   AST (SGOT) U/L  --   --   --   --  19   GLUCOSE mg/dL 84 87 86   < > 335*    < > = values in this interval not displayed.       Estimated Creatinine Clearance: 41 mL/min (A) (by C-G formula based on SCr of 1.95 mg/dL (H)).    Results from last 7 days   Lab Units 07/03/25  0503 07/02/25  1517 07/02/25  0407 07/01/25  0445 06/29/25  1826  "  MAGNESIUM mg/dL  --   --   --   --  1.8   PHOSPHORUS mg/dL 2.6 2.4* 1.9*   < >  --     < > = values in this interval not displayed.             Results from last 7 days   Lab Units 07/04/25  0454 07/03/25  0503 07/02/25  1517 07/02/25  0407 07/02/25  0315 07/01/25  1945 07/01/25  0830 06/30/25  0429 06/29/25  1706   WBC 10*3/mm3  --   --   --  5.65  --  5.67 8.15 18.60* 15.69*   HEMOGLOBIN g/dL 8.2* 8.6* 7.7* 6.0* 6.4* 8.4* 7.0* 8.0* 9.0*   PLATELETS 10*3/mm3  --   --   --  129*  --  148 154 203 205       Results from last 7 days   Lab Units 07/01/25  1945   INR  1.03       Brief Urine Lab Results  (Last result in the past 365 days)        Color   Clarity   Blood   Leuk Est   Nitrite   Protein   CREAT   Urine HCG        06/29/25 2205 Yellow   Clear   Negative   Moderate (2+)   Negative   Negative                   No results found for: \"UTPCR\"    Imaging Results (Last 24 Hours)       ** No results found for the last 24 hours. **                Assessment / Plan     ASSESSMENT:    Septic shock    CAD (coronary artery disease)    Chronic kidney disease, stage IV (severe)    Acute UTI (urinary tract infection)    Pneumonia of left lower lobe due to infectious organism    Acute kidney injury: Patient does have significant worsening of his renal function.  Most likely secondary to volume depletion and septic shock.  Bicarb is stable low potassium noted.  Chronic kidney disease stage IV: Underlying chronic kidney disease secondary to diabetes and hypertension as well as volume loss, status post left nephrectomy in March of 2020.  Baseline creatinine of 2.9 with a EGFR of 21 mL/min.  Type 2 diabetes: Longstanding history of type 2 diabetes likely with some complications.  Septic shock: Treated as per hospitalist service still on pressors.  Left lower lobe pneumonia: On IV antibiotics as per hospitalist service.  Urinary tract infection: Treated  Hypokalemia: Replace per protocol  Coronary artery disease: Status post CABG " and stent placements.  No acute issues  COPD: Longstanding history of smoking.  Untreated sleep apnea: Longstanding known history of obstructive sleep apnea, has not been able to use his BiPAP      PLAN:  Hemoglobin improved to 8.2 this morning.  Renal function is at baseline.  Blood pressure is fairly stable in the 100 range.  Will not give any fluids at this point.  Details were discussed with the patient as well as family in the room.  Wife is at the bedside.  Details were also discussed with the hospitalist service and or other providers as needed.  Short-term rehab placement is in progress.  Continue with rest of the current treatment plan, and monitor with surveillance labs, adjust medication doses to the eGFR.  Further recommendations will depend on clinical course of the patient during the current hospitalization.   I have reviewed the copied text to this note, it was edited and the changes made as needed.  It is accurate to the point, when the note was signed today.     Thank you for involving us in the care of Jonny Ferrari Jr..  Please feel free to call with any questions.    Jefry Hooper MD, FÁTIMA  07/04/25  06:58 EDT    [facility]  [phone]      Part of this note may be an electronic transcription/translation of spoken language to printed text using the Dragon Dictation System.

## 2025-07-04 NOTE — PLAN OF CARE
Goal Outcome Evaluation:  Plan of Care Reviewed With: patient        Progress: improving  Outcome Evaluation: OT tx completed. Patient is supine in bed, wife is present. Patient reports 6/10 generalized pain. Patient satting 100% on 2L. Performed supine to sit CGA, sit to stand from EOB CGA, able to march in place at bedside. Complained of dizziness, tf to BSC CGA using RW. BP checked while sitting on commode and 98/76. RN made aware. Patient requested to sit on BSC, notified RN. Patient with wife, call bell within reach. Continue OT POC    Anticipated Discharge Disposition (OT): inpatient rehabilitation facility

## 2025-07-04 NOTE — THERAPY TREATMENT NOTE
Patient Name: Jonny Ferrari Jr.  : 1950    MRN: [id number]                              Today's Date: 2025       Admit Date: 2025    Visit Dx:     ICD-10-CM ICD-9-CM   1. Sepsis with acute renal failure and septic shock, due to unspecified organism, unspecified acute renal failure type  A41.9 038.9    R65.21 995.92    N17.9 785.52     584.9   2. Acute kidney injury superimposed on chronic kidney disease  N17.9 584.9    N18.9 585.9   3. Hyponatremia  E87.1 276.1   4. Hypokalemia  E87.6 276.8   5. Chronic anemia  D64.9 285.9   6. Left lower lobe consolidation  J18.1 481   7. Type 2 diabetes mellitus with hyperglycemia, unspecified whether long term insulin use  E11.65 250.00   8. Multiple skin tears  T14.8XXA 879.8     Patient Active Problem List   Diagnosis    CAD (coronary artery disease)    Essential hypertension    Dyslipidemia    Obesity    GERD (gastroesophageal reflux disease)    OA (osteoarthritis)    TIA (transient ischemic attack)    Anxiety neurosis    Psoriasis    Tobacco use    Diabetes mellitus    Bilateral carotid artery stenosis    NSTEMI (non-ST elevated myocardial infarction)    Chronic kidney disease, stage IV (severe)    Esophageal dysphagia    Iron deficiency anemia    Acute urinary retention    Debility    Perirectal abscess    Acute renal failure (ARF)    Acute metabolic encephalopathy    Sepsis    Septic shock    Acute UTI (urinary tract infection)    Pneumonia of left lower lobe due to infectious organism     Past Medical History:   Diagnosis Date    Anemia     Anxiety neurosis     CAD (coronary artery disease)     Cancer     Kidney    Chronic kidney disease     Patient reported left kidney removed secondary to kidney cancer and that he only has 30% function of the right kidney    Constipation     COPD (chronic obstructive pulmonary disease)     Depression     Diabetes mellitus     DM TYPE 2 , ONSET IN     Dyslipidemia     Dysphagia     Reported noted with food, fluids and  pills    Elevated cholesterol     Full dentures     GERD (gastroesophageal reflux disease)     Gout     H/O left nephrectomy 2020    secondary to cancer    [place] (hard of hearing)     Patient has bilateral hearing aids, still is very [place]    Hypertension     Impaired functional mobility, balance, gait, and endurance     MI (myocardial infarction)     Patient reported apx March 2021 (reported he refused cardiac cath) and June 2021 (did have cardiac cath with placement of 2 stents).     OA (osteoarthritis)     Obesity     MILD TO MODERATE EXOGENOUS OBESITY    Problems with swallowing     with food    Psoriasis     Sleep apnea     CPAP HS - to bring mask and machine DOS    Tattoo     x1    TIA (transient ischemic attack)     Wears glasses 10/27/2021     Past Surgical History:   Procedure Laterality Date    APPENDECTOMY      CARDIAC CATHETERIZATION      CARDIAC CATHETERIZATION N/A 2/20/2019    Procedure: Left Heart Cath;  Surgeon: Ernst Smith MD;  Location:  [facility];  Service: Cardiology    CARDIAC CATHETERIZATION Left 6/9/2021    Procedure: Left Heart Cath;  Surgeon: Ernst Smith MD;  Location:  [facility];  Service: Cardiology;  Laterality: Left;    CHOLECYSTECTOMY      COLONOSCOPY      COLONOSCOPY N/A 11/10/2021    Procedure: COLONOSCOPY with polypectomy x6 and clip x2;  Surgeon: Chidi Trinidad MD;  Location: [facility] ENDOSCOPY;  Service: Gastroenterology;  Laterality: N/A;    COLONOSCOPY N/A 10/25/2023    Procedure: COLONOSCOPY incomplete;  Surgeon: Chidi Trinidad MD;  Location: [facility] ENDOSCOPY;  Service: Gastroenterology;  Laterality: N/A;    COLONOSCOPY N/A 11/8/2023    Procedure: COLONOSCOPY WITH POLYPECTOMY X1;  Surgeon: Chidi Trinidad MD;  Location: [facility] ENDOSCOPY;  Service: Gastroenterology;  Laterality: N/A;    CORONARY ARTERY BYPASS GRAFT  2001    Patient reported no MI prior to CABG and that the bypass was 3 vessel     ENDOSCOPY      ENDOSCOPY N/A  11/10/2021    Procedure: ESOPHAGOGASTRODUODENOSCOPY with biopsy and dilatation;  Surgeon: Chidi Trinidad MD;  Location: [facility] ENDOSCOPY;  Service: Gastroenterology;  Laterality: N/A;    ENDOSCOPY N/A 10/25/2023    Procedure: ESOPHAGOGASTRODUODENOSCOPY with biopsy and dilatation;  Surgeon: Chidi Trinidad MD;  Location: [facility] ENDOSCOPY;  Service: Gastroenterology;  Laterality: N/A;    ENDOSCOPY N/A 11/8/2023    Procedure: ESOPHAGOGASTRODUODENOSCOPY WITH BIOPSY;  Surgeon: Chidi Trinidad MD;  Location: [facility] ENDOSCOPY;  Service: Gastroenterology;  Laterality: N/A;    INCISION AND DRAINAGE PERIRECTAL ABSCESS N/A 8/29/2024    Procedure: INCISION AND DRAINAGE OF PERIRECTAL ABSCESS;  Surgeon: Sherman Billingsley MD;  Location: [facility] OR;  Service: General;  Laterality: N/A;    LAPAROSCOPIC NEPHRECTOMY Left     MOUTH SURGERY      Full mouth extraction    URETER SURGERY      VASECTOMY        General Information       Row Name 07/04/25 1349          OT Time and Intention    Subjective Information complains of;weakness;dizziness  -SD     Document Type therapy note (daily note)  -SD     Mode of Treatment occupational therapy  -SD     Patient Effort adequate  -SD     Symptoms Noted During/After Treatment fatigue  -SD       Row Name 07/04/25 1349          General Information    Patient Profile Reviewed yes  -SD               User Key  (r) = Recorded By, (t) = Taken By, (c) = Cosigned By      Initials Name Provider Type    SD Janel Howe OT Occupational Therapist                     Mobility/ADL's       Row Name 07/04/25 1349          Bed Mobility    Bed Mobility supine-sit  -SD     Supine-Sit [place] (Bed Mobility) contact guard  -SD     Assistive Device (Bed Mobility) bed rails;head of bed elevated  -SD       Row Name 07/04/25 1349          Transfers    Transfers sit-stand transfer;toilet transfer  -SD       Row Name 07/04/25 1349          Sit-Stand Transfer    Sit-Stand [place] (Transfers) contact  guard  -SD     Assistive Device (Sit-Stand Transfers) walker, front-wheeled  -SD       Row Name 07/04/25 1349          Toilet Transfer    Type (Toilet Transfer) sit-stand;stand-sit  -SD     [place] (Toilet Transfer) contact guard  -SD     Assistive Device (Toilet Transfer) commode, bedside without drop arms;walker, front-wheeled  -SD       Row Name 07/04/25 1349          Functional Mobility    Functional Mobility- Ind. Level contact guard assist  -SD     Functional Mobility- Device walker, front-wheeled  -SD     Functional Mobility- Comment marching in place at bedside  -SD       Row Name 07/04/25 1349          Lower Body Dressing Assessment/Training    [place] (Lower Body Dressing) don;socks;maximum assist (25% patient effort)  -SD     Position (Lower Body Dressing) edge of bed sitting  -SD       Row Name 07/04/25 1349          Toileting Assessment/Training    [place] (Toileting) maximum assist (25% patient effort);perform perineal hygiene  -SD     Assistive Devices (Toileting) commode, bedside without drop arms  -SD               User Key  (r) = Recorded By, (t) = Taken By, (c) = Cosigned By      Initials Name Provider Type    Janel Haque OT Occupational Therapist                   Obj/Interventions    No documentation.                  Goals/Plan    No documentation.                  Clinical Impression       Row Name 07/04/25 1350          Pain Assessment    Pretreatment Pain Rating 6/10  -SD     Posttreatment Pain Rating 6/10  -SD     Pain Side/Orientation generalized  -SD     Pain Management Interventions exercise or physical activity utilized  -SD     Response to Pain Interventions no change per patient report  -SD       Row Name 07/04/25 1350          Plan of Care Review    Plan of Care Reviewed With patient  -SD     Progress improving  -SD     Outcome Evaluation OT tx completed. Patient is supine in bed, wife is present. Patient reports 6/10 generalized pain.  Patient satting 100% on 2L. Performed supine to sit CGA, sit to stand from EOB CGA, able to march in place at bedside. Complained of dizziness, tf to BSC CGA using RW. BP checked while sitting on commode and 98/76. RN made aware. Patient requested to sit on BSC, notified RN. Patient with wife, call bell within reach. Continue OT POC  -SD       Row Name 07/04/25 1350          Vital Signs    Intra Systolic BP Rehab 98  -SD     Intra Treatment Diastolic BP 76  -SD     O2 Delivery Pre Treatment supplemental O2  -SD     O2 Delivery Intra Treatment supplemental O2  -SD     O2 Delivery Post Treatment supplemental O2  -SD       Row Name 07/04/25 1350          Positioning and Restraints    Pre-Treatment Position in bed  -SD     Post Treatment Position bsc  -SD     On BS commode sitting;encouraged to call for assist;call light within reach;notified nsg;with family/caregiver  -SD               User Key  (r) = Recorded By, (t) = Taken By, (c) = Cosigned By      Initials Name Provider Type    Janel Haque OT Occupational Therapist                   Outcome Measures       Row Name 07/04/25 1352          How much help from another is currently needed...    Putting on and taking off regular lower body clothing? 2  -SD     Bathing (including washing, rinsing, and drying) 2  -SD     Toileting (which includes using toilet bed pan or urinal) 2  -SD     Putting on and taking off regular upper body clothing 3  -SD     Taking care of personal grooming (such as brushing teeth) 3  -SD     Eating meals 3  -SD     AM-PAC 6 Clicks Score (OT) 15  -SD       Row Name 07/04/25 0900          How much help from another person do you currently need...    Turning from your back to your side while in flat bed without using bedrails? 4  -TY     Moving from lying on back to sitting on the side of a flat bed without bedrails? 4  -TY     Moving to and from a bed to a chair (including a wheelchair)? 3  -TY     Standing up from a chair using your  arms (e.g., wheelchair, bedside chair)? 3  -TY     Climbing 3-5 steps with a railing? 2  -TY     To walk in hospital room? 2  -TY     AM-PAC 6 Clicks Score (PT) 18  -TY     Highest Level of Mobility Goal Walk 10 Steps or More-6  -TY       Row Name 07/04/25 1352          Functional Assessment    Outcome Measure Options AM-PAC 6 Clicks Daily Activity (OT)  -SD               User Key  (r) = Recorded By, (t) = Taken By, (c) = Cosigned By      Initials Name Provider Type    SD Janel Howe, DEE DEE Occupational Therapist    TY Dejah Fabian, RN Registered Nurse                    Occupational Therapy Education       Title: PT OT SLP Therapies (In Progress)       Topic: Occupational Therapy (In Progress)       Point: ADL training (Done)       Learning Progress Summary            Patient Acceptance, E,TB, VU by SD at 7/4/2025 1352    Comment: Safety during toilet tf    Acceptance, E,TB, VU by SD at 7/2/2025 1243    Comment: Safety during tf    Acceptance, E,TB, VU by SD at 7/1/2025 1701    Comment: OT POC                                      User Key       Initials Effective Dates Name Provider Type Discipline    SD 06/16/21 -  Janel Howe OT Occupational Therapist OT                  OT Recommendation and Plan  Planned Therapy Interventions (OT): activity tolerance training, adaptive equipment training, BADL retraining, patient/caregiver education/training, ROM/therapeutic exercise, strengthening exercise, transfer/mobility retraining  Therapy Frequency (OT): 3 times/wk (5 times if indicated)  Plan of Care Review  Plan of Care Reviewed With: patient  Progress: improving  Outcome Evaluation: OT tx completed. Patient is supine in bed, wife is present. Patient reports 6/10 generalized pain. Patient satting 100% on 2L. Performed supine to sit CGA, sit to stand from EOB CGA, able to march in place at bedside. Complained of dizziness, tf to BSC CGA using RW. BP checked while sitting on commode and 98/76. RN made aware.  Patient requested to sit on BSC, notified RN. Patient with wife, call bell within reach. Continue OT POC     Time Calculation:   Evaluation Complexity (OT)  Review Occupational Profile/Medical/Therapy History Complexity: expanded/moderate complexity  Assessment, Occupational Performance/Identification of Deficit Complexity: 3-5 performance deficits  Clinical Decision Making Complexity (OT): detailed assessment/moderate complexity  Overall Complexity of Evaluation (OT): moderate complexity     Time Calculation- OT       Row Name 07/04/25 1353             Time Calculation- OT    OT Start Time 0950  -SD      OT Stop Time 1013  -SD      OT Time Calculation (min) 23 min  -SD      OT Received On 07/04/25  -SD      OT Goal Re-Cert Due Date 07/11/25  -SD         Timed Charges    93866 - OT Therapeutic Activity Minutes 8  -SD      26335 - OT Self Care/Mgmt Minutes 15  -SD         Total Minutes    Timed Charges Total Minutes 23  -SD       Total Minutes 23  -SD                User Key  (r) = Recorded By, (t) = Taken By, (c) = Cosigned By      Initials Name Provider Type    SD Janel Howe, OT Occupational Therapist                  Therapy Charges for Today       Code Description Service Date Service Provider Modifiers Qty    88359778370 HC OT THERAPEUTIC ACT EA 15 MIN 7/4/2025 Janel Howe OT GO 1    94954295600 HC OT SELF CARE/MGMT/TRAIN EA 15 MIN 7/4/2025 Janel Howe OT GO 1                 Janel Howe OT  7/4/2025

## 2025-07-05 LAB
ANION GAP SERPL CALCULATED.3IONS-SCNC: 10.7 MMOL/L (ref 5–15)
BUN SERPL-MCNC: 13 MG/DL (ref 8–23)
BUN/CREAT SERPL: 6.9 (ref 7–25)
CALCIUM SPEC-SCNC: 8.8 MG/DL (ref 8.6–10.5)
CHLORIDE SERPL-SCNC: 102 MMOL/L (ref 98–107)
CO2 SERPL-SCNC: 24.3 MMOL/L (ref 22–29)
CREAT SERPL-MCNC: 1.88 MG/DL (ref 0.76–1.27)
EGFRCR SERPLBLD CKD-EPI 2021: 37.5 ML/MIN/1.73
GLUCOSE BLDC GLUCOMTR-MCNC: 149 MG/DL (ref 70–130)
GLUCOSE BLDC GLUCOMTR-MCNC: 154 MG/DL (ref 70–130)
GLUCOSE BLDC GLUCOMTR-MCNC: 192 MG/DL (ref 70–130)
GLUCOSE BLDC GLUCOMTR-MCNC: 198 MG/DL (ref 70–130)
GLUCOSE SERPL-MCNC: 143 MG/DL (ref 65–99)
HCT VFR BLD AUTO: 24.8 % (ref 37.5–51)
HGB BLD-MCNC: 7.7 G/DL (ref 13–17.7)
POTASSIUM SERPL-SCNC: 4.4 MMOL/L (ref 3.5–5.2)
SODIUM SERPL-SCNC: 137 MMOL/L (ref 136–145)

## 2025-07-05 PROCEDURE — 82948 REAGENT STRIP/BLOOD GLUCOSE: CPT

## 2025-07-05 PROCEDURE — 85018 HEMOGLOBIN: CPT | Performed by: FAMILY MEDICINE

## 2025-07-05 PROCEDURE — 80048 BASIC METABOLIC PNL TOTAL CA: CPT | Performed by: FAMILY MEDICINE

## 2025-07-05 PROCEDURE — 97110 THERAPEUTIC EXERCISES: CPT

## 2025-07-05 PROCEDURE — 99232 SBSQ HOSP IP/OBS MODERATE 35: CPT | Performed by: FAMILY MEDICINE

## 2025-07-05 PROCEDURE — 94799 UNLISTED PULMONARY SVC/PX: CPT

## 2025-07-05 PROCEDURE — 63710000001 INSULIN LISPRO (HUMAN) PER 5 UNITS: Performed by: INTERNAL MEDICINE

## 2025-07-05 PROCEDURE — 82948 REAGENT STRIP/BLOOD GLUCOSE: CPT | Performed by: INTERNAL MEDICINE

## 2025-07-05 PROCEDURE — 63710000001 INSULIN GLARGINE PER 5 UNITS: Performed by: INTERNAL MEDICINE

## 2025-07-05 PROCEDURE — 97530 THERAPEUTIC ACTIVITIES: CPT

## 2025-07-05 PROCEDURE — 94761 N-INVAS EAR/PLS OXIMETRY MLT: CPT

## 2025-07-05 PROCEDURE — 85014 HEMATOCRIT: CPT | Performed by: FAMILY MEDICINE

## 2025-07-05 RX ADMIN — Medication 10 ML: at 08:10

## 2025-07-05 RX ADMIN — Medication 1 CAPSULE: at 20:41

## 2025-07-05 RX ADMIN — BUPRENORPHINE AND NALOXONE 1 FILM: 8; 2 FILM BUCCAL; SUBLINGUAL at 08:07

## 2025-07-05 RX ADMIN — Medication 1 CAPSULE: at 08:08

## 2025-07-05 RX ADMIN — CLOPIDOGREL BISULFATE 75 MG: 75 TABLET, FILM COATED ORAL at 08:08

## 2025-07-05 RX ADMIN — BUDESONIDE 0.5 MG: 0.5 SUSPENSION RESPIRATORY (INHALATION) at 07:13

## 2025-07-05 RX ADMIN — IPRATROPIUM BROMIDE AND ALBUTEROL SULFATE 3 ML: 2.5; .5 SOLUTION RESPIRATORY (INHALATION) at 18:18

## 2025-07-05 RX ADMIN — INSULIN LISPRO 2 UNITS: 100 INJECTION, SOLUTION INTRAVENOUS; SUBCUTANEOUS at 08:07

## 2025-07-05 RX ADMIN — BUDESONIDE 0.5 MG: 0.5 SUSPENSION RESPIRATORY (INHALATION) at 18:18

## 2025-07-05 RX ADMIN — BUSPIRONE HYDROCHLORIDE 15 MG: 15 TABLET ORAL at 20:41

## 2025-07-05 RX ADMIN — Medication 10 ML: at 20:39

## 2025-07-05 RX ADMIN — INSULIN LISPRO 2 UNITS: 100 INJECTION, SOLUTION INTRAVENOUS; SUBCUTANEOUS at 17:19

## 2025-07-05 RX ADMIN — BUPRENORPHINE AND NALOXONE 1 FILM: 8; 2 FILM BUCCAL; SUBLINGUAL at 20:41

## 2025-07-05 RX ADMIN — INSULIN GLARGINE 20 UNITS: 100 INJECTION, SOLUTION SUBCUTANEOUS at 20:40

## 2025-07-05 RX ADMIN — PANTOPRAZOLE SODIUM 40 MG: 40 TABLET, DELAYED RELEASE ORAL at 05:51

## 2025-07-05 RX ADMIN — BUSPIRONE HYDROCHLORIDE 15 MG: 15 TABLET ORAL at 08:08

## 2025-07-05 RX ADMIN — INSULIN LISPRO 2 UNITS: 100 INJECTION, SOLUTION INTRAVENOUS; SUBCUTANEOUS at 20:40

## 2025-07-05 RX ADMIN — SENNOSIDES AND DOCUSATE SODIUM 2 TABLET: 50; 8.6 TABLET ORAL at 08:08

## 2025-07-05 RX ADMIN — ASPIRIN 81 MG: 81 TABLET, COATED ORAL at 08:08

## 2025-07-05 NOTE — PROGRESS NOTES
"    [facility]    PROGRESS NOTE    Name:  Jonny Ferrari Jr.   Age:  74 y.o.  Sex:  male  :  1950  MRN:  [id number]   Visit Number:  [id number]  Admission Date:  2025  Date Of Service:  25  Primary Care Physician:  Amaury Brice MD     LOS: 6 days :    Chief Complaint:      Follow-up septic shock    Subjective:    Patient seen this morning.  Wife at bedside.  No acute changes overnight.  Labs pending    Hospital Course:    Per prior provider: \"Jonny Ferrari Jr. is a 74-year-old male with history of diabetes mellitus type 2, chronic kidney disease stage III, COPD, coronary artery disease status post CABG and stent, ERASMO, BPH was brought to the emergency room by EMS with symptoms of generalized weakness and fatigue   The patient was brought in by ambulance due to an inability to walk, a condition that has persisted for the past 2 to 3 days. He reports no fever, dysuria, or cough but feels weak. He has experienced several falls recently and has been unable to walk independently for the past 3 to 4 days. Despite using a walker or cane, he continues to fall.   In the emergency room, patient was afebrile but mildly tachycardic in the 100 with initial blood pressure of 80/54 and pulse oxygen saturation of 95% on room air.  Patient was given 30 mL/kg fluid bolus initially in the emergency room with initial improvement of the blood pressure to systolic 100 but subsequently dropped it again to the 70s systolic.  Patient was given another 500 bolus of normal saline, he right femoral central line was placed and he was initiated on Levophed. \"    Review of Systems:     All systems were reviewed and negative except as mentioned in subjective, assessment and plan.    Vital Signs:    Temp:  [97.5 °F (36.4 °C)-99 °F (37.2 °C)] 97.5 °F (36.4 °C)  Heart Rate:  [66-95] 76  Resp:  [14-18] 18  BP: (106-125)/(59-69) 125/63    Intake and output:    I/O last 3 completed shifts:  In: 1311.7 " [P.O.:850; I.V.:154.7; IV Piggyback:307]  Out: 3010 [Urine:3010]  I/O this shift:  In: 240 [P.O.:240]  Out: 175 [Urine:175]    Physical Examination:    General Appearance:  Alert and cooperative.  Hard of hearing.  No acute distress   Head:  Atraumatic and normocephalic.   Eyes: Conjunctivae and sclerae normal, no icterus. No pallor.   Throat: No oral lesions, no thrush, oral mucosa moist.   Neck: Supple, trachea midline, no thyromegaly.   Lungs:   Breath sounds heard bilaterally equally.  Some scattered crackles   Heart:  Normal S1 and S2, no murmur,No JVD.   Abdomen:   Normal bowel sounds, Soft, nontender, nondistended, no rebound tenderness.  Nolan catheter in place   Extremities: Supple, trace edema   Skin: No bleeding or rash.  Multiple skin abrasions.   Neurologic: Alert and oriented x 3. No facial asymmetry.  Generalized weakness unchanged     Laboratory results:    Results from last 7 days   Lab Units 07/04/25 0454 07/03/25  0503 07/02/25  0407 06/30/25  0429 06/29/25  1706   SODIUM mmol/L 139 138 139   < > 131*   POTASSIUM mmol/L 3.7 4.2 4.2   < > 2.9*   CHLORIDE mmol/L 101 100 99   < > 81*   CO2 mmol/L 27.9 26.4 31.0*   < > 24.4   BUN mg/dL 16.0 19.0 26.0*   < > 61.0*   CREATININE mg/dL 1.95* 2.10* 2.16*   < > 3.79*   CALCIUM mg/dL 8.9 9.0 8.8   < > 9.0   BILIRUBIN mg/dL  --   --   --   --  0.2   ALK PHOS U/L  --   --   --   --  69   ALT (SGPT) U/L  --   --   --   --  14   AST (SGOT) U/L  --   --   --   --  19   GLUCOSE mg/dL 84 87 86   < > 335*    < > = values in this interval not displayed.     Results from last 7 days   Lab Units 07/04/25  0454 07/03/25  0503 07/02/25  1517 07/02/25  0407 07/02/25  0315 07/01/25  1945 07/01/25  0830   WBC 10*3/mm3  --   --   --  5.65  --  5.67 8.15   HEMOGLOBIN g/dL 8.2* 8.6* 7.7* 6.0*   < > 8.4* 7.0*   HEMATOCRIT % 26.4* 27.9* 24.2* 18.8*   < > 26.7* 22.4*   PLATELETS 10*3/mm3  --   --   --  129*  --  148 154    < > = values in this interval not displayed.     Results  from last 7 days   Lab Units 07/01/25  1945   INR  1.03     Results from last 7 days   Lab Units 06/29/25  1826 06/29/25  1706   HSTROP T ng/L 95* 98*     Results from last 7 days   Lab Units 06/29/25  2205 06/29/25  1738 06/29/25  1728   BLOODCX   --  No growth at 5 days No growth at 5 days   URINECX  25,000 CFU/mL Enterococcus faecalis*  --   --      Recent Labs     08/29/24  2314   PHART 7.382   [id number] 49.8*   PO2ART 68.0*   IVV6EBS 29.5*   BASEEXCESS 3.8*      I have reviewed the patient's laboratory results.    Radiology results:    No radiology results from the last 24 hrs    I have reviewed the patient's radiology reports.    Medication Review:     I have reviewed the patient's active and prn medications.     Problem List:      Septic shock    CAD (coronary artery disease)    Chronic kidney disease, stage IV (severe)    Acute UTI (urinary tract infection)    Pneumonia of left lower lobe due to infectious organism      Assessment:    Septic shock secondary to #2 and #3, POA.  Acute urinary tract infection, POA.  Left lower lobe bacterial pneumonia, unable to classify further, POA.  Demand ischemia with elevated troponins, POA.  Hypokalemia, POA.  Chronic kidney disease stage IV.  Diabetes mellitus type 2 with nephropathy and hyperglycemia, POA.  Coronary artery disease status post CABG and stents.  COPD, no exacerbation.  Benign prostatic hyperplasia.  Will falls with skin abrasions, POA.    Plan:    Septic shock  Acute UTI  Left lower lobe bacterial pneumonia  Negative blood culture, on Zosyn.  Oxygenation stable.  Off pressors.  Urine culture had E faecalis.  Discussed with pharmacy, no further treatment indicated  Demand ischemia with elevated troponins  Troponins plateaued 95-98  Suspect secondary to demand ischemia in the setting of his septic shock as well as CKD  Low suspicion for ACS  Hypokalemia  Continue replacement  CKD stage IV  Nephrology, Dr. Hooper consulted and appreciate recommendations.   Numbers now stabilized.  Off fluids.  Diuretics started  Type 2 diabetes  Subcutaneous insulin protocol  Anemia  Does have chronic anemia likely due to renal failure, however acute on chronic currently.  Received 1 unit of PRBCs.  There was reportedly some blood loss with catheter insertion but urine is clear now.  Have restarted aspirin.  Will need further monitoring.  May need some iron transfusions.  CAD status post CABG  Continue DAPT with aspirin and Plavix  COPD not exacerbation  Multiple skin abrasions  Wound care  Impaired mobility and ADLs  PT/OT once stable to participate    Further orders as clinical course dictates.    DVT Prophylaxis: SCD  Code Status: Full  Diet: Renal diabetic.  Discharge Plan: KRISTINE Davis DO  07/05/25  09:44 EDT    Dictated utilizing Dragon dictation.

## 2025-07-05 NOTE — THERAPY TREATMENT NOTE
Patient Name: Jonny Ferrari Jr.  : 1950    MRN: [id number]                              Today's Date: 2025       Admit Date: 2025    Visit Dx:     ICD-10-CM ICD-9-CM   1. Sepsis with acute renal failure and septic shock, due to unspecified organism, unspecified acute renal failure type  A41.9 038.9    R65.21 995.92    N17.9 785.52     584.9   2. Acute kidney injury superimposed on chronic kidney disease  N17.9 584.9    N18.9 585.9   3. Hyponatremia  E87.1 276.1   4. Hypokalemia  E87.6 276.8   5. Chronic anemia  D64.9 285.9   6. Left lower lobe consolidation  J18.1 481   7. Type 2 diabetes mellitus with hyperglycemia, unspecified whether long term insulin use  E11.65 250.00   8. Multiple skin tears  T14.8XXA 879.8     Patient Active Problem List   Diagnosis    CAD (coronary artery disease)    Essential hypertension    Dyslipidemia    Obesity    GERD (gastroesophageal reflux disease)    OA (osteoarthritis)    TIA (transient ischemic attack)    Anxiety neurosis    Psoriasis    Tobacco use    Diabetes mellitus    Bilateral carotid artery stenosis    NSTEMI (non-ST elevated myocardial infarction)    Chronic kidney disease, stage IV (severe)    Esophageal dysphagia    Iron deficiency anemia    Acute urinary retention    Debility    Perirectal abscess    Acute renal failure (ARF)    Acute metabolic encephalopathy    Sepsis    Septic shock    Acute UTI (urinary tract infection)    Pneumonia of left lower lobe due to infectious organism     Past Medical History:   Diagnosis Date    Anemia     Anxiety neurosis     CAD (coronary artery disease)     Cancer     Kidney    Chronic kidney disease     Patient reported left kidney removed secondary to kidney cancer and that he only has 30% function of the right kidney    Constipation     COPD (chronic obstructive pulmonary disease)     Depression     Diabetes mellitus     DM TYPE 2 , ONSET IN     Dyslipidemia     Dysphagia     Reported noted with food, fluids and  pills    Elevated cholesterol     Full dentures     GERD (gastroesophageal reflux disease)     Gout     H/O left nephrectomy 2020    secondary to cancer    [place] (hard of hearing)     Patient has bilateral hearing aids, still is very [place]    Hypertension     Impaired functional mobility, balance, gait, and endurance     MI (myocardial infarction)     Patient reported apx March 2021 (reported he refused cardiac cath) and June 2021 (did have cardiac cath with placement of 2 stents).     OA (osteoarthritis)     Obesity     MILD TO MODERATE EXOGENOUS OBESITY    Problems with swallowing     with food    Psoriasis     Sleep apnea     CPAP HS - to bring mask and machine DOS    Tattoo     x1    TIA (transient ischemic attack)     Wears glasses 10/27/2021     Past Surgical History:   Procedure Laterality Date    APPENDECTOMY      CARDIAC CATHETERIZATION      CARDIAC CATHETERIZATION N/A 2/20/2019    Procedure: Left Heart Cath;  Surgeon: Ernst Smith MD;  Location:  [facility];  Service: Cardiology    CARDIAC CATHETERIZATION Left 6/9/2021    Procedure: Left Heart Cath;  Surgeon: Ernst Smith MD;  Location:  [facility];  Service: Cardiology;  Laterality: Left;    CHOLECYSTECTOMY      COLONOSCOPY      COLONOSCOPY N/A 11/10/2021    Procedure: COLONOSCOPY with polypectomy x6 and clip x2;  Surgeon: Chidi Trinidad MD;  Location: [facility] ENDOSCOPY;  Service: Gastroenterology;  Laterality: N/A;    COLONOSCOPY N/A 10/25/2023    Procedure: COLONOSCOPY incomplete;  Surgeon: Chidi Trinidad MD;  Location: [facility] ENDOSCOPY;  Service: Gastroenterology;  Laterality: N/A;    COLONOSCOPY N/A 11/8/2023    Procedure: COLONOSCOPY WITH POLYPECTOMY X1;  Surgeon: Chidi Trinidad MD;  Location: [facility] ENDOSCOPY;  Service: Gastroenterology;  Laterality: N/A;    CORONARY ARTERY BYPASS GRAFT  2001    Patient reported no MI prior to CABG and that the bypass was 3 vessel     ENDOSCOPY      ENDOSCOPY N/A  11/10/2021    Procedure: ESOPHAGOGASTRODUODENOSCOPY with biopsy and dilatation;  Surgeon: Chidi Trinidad MD;  Location: [facility] ENDOSCOPY;  Service: Gastroenterology;  Laterality: N/A;    ENDOSCOPY N/A 10/25/2023    Procedure: ESOPHAGOGASTRODUODENOSCOPY with biopsy and dilatation;  Surgeon: Chidi Trinidad MD;  Location: [facility] ENDOSCOPY;  Service: Gastroenterology;  Laterality: N/A;    ENDOSCOPY N/A 11/8/2023    Procedure: ESOPHAGOGASTRODUODENOSCOPY WITH BIOPSY;  Surgeon: Chidi Trinidad MD;  Location: [facility] ENDOSCOPY;  Service: Gastroenterology;  Laterality: N/A;    INCISION AND DRAINAGE PERIRECTAL ABSCESS N/A 8/29/2024    Procedure: INCISION AND DRAINAGE OF PERIRECTAL ABSCESS;  Surgeon: Sherman Billingsley MD;  Location: [facility] OR;  Service: General;  Laterality: N/A;    LAPAROSCOPIC NEPHRECTOMY Left     MOUTH SURGERY      Full mouth extraction    URETER SURGERY      VASECTOMY        General Information       Row Name 07/05/25 1741          Physical Therapy Time and Intention    Document Type therapy note (daily note)  -     Mode of Treatment physical therapy  -       Row Name 07/05/25 1741          General Information    Patient Profile Reviewed yes  -     Existing Precautions/Restrictions fall;oxygen therapy device and L/min;orthostatic hypotension  -CC       Row Name 07/05/25 1741          Safety Issues/Impairments Affecting Functional Mobility    Safety Issues Affecting Function (Mobility) awareness of need for assistance;safety precaution awareness;safety precautions follow-through/compliance  -CC     Impairments Affecting Function (Mobility) balance;endurance/activity tolerance;shortness of breath;strength;pain  -CC               User Key  (r) = Recorded By, (t) = Taken By, (c) = Cosigned By      Initials Name Provider Type    CC Danica Amezcua PTA Physical Therapist Assistant                   Mobility       Row Name 07/05/25 1742          Gait/Stairs (Locomotion)    Patient was able to  Ambulate no, other medical factors prevent ambulation  -CC     Reason Patient was unable to Ambulate Non-Ambulatory at Baseline  per pt hasn't amb in approx 2 years except to t/f to/from power w/c  -CC               User Key  (r) = Recorded By, (t) = Taken By, (c) = Cosigned By      Initials Name Provider Type    CC Danica Amezcua, STEPHANIE Physical Therapist Assistant                   Obj/Interventions    No documentation.                  Goals/Plan    No documentation.                  Clinical Impression       Row Name 07/05/25 1743          Pain    Pretreatment Pain Rating 5/10  -CC     Posttreatment Pain Rating 5/10  -CC     Pain Side/Orientation generalized  -CC     Pain Management Interventions exercise or physical activity utilized  -     Response to Pain Interventions activity participation with tolerable pain  -CC       Row Name 07/05/25 1743          Plan of Care Review    Plan of Care Reviewed With patient  -CC     Progress improving  -CC     Outcome Evaluation Pt agreeable to physical therapy. Performed supine <->sit with SBA/S, sitting EOB without LOB noted and no support. Performed B LE ex in sitting LAQ, hip abd, marching and B UE shld flex. horz abd/add, shld retraction and protraction, elevation and depression, bicep curls and tricep press with all ex 20to 30 reps each. Pt declined to chair d/t being in chair all morning and back to bed after lunch. Con't with PT POC and progress as tolerated  -CC       Row Name 07/05/25 1743          Positioning and Restraints    Pre-Treatment Position in bed  -CC     Post Treatment Position bed  -CC     In Bed notified nsg;supine;fowlers;call light within reach;encouraged to call for assist;patient within staff view  -CC               User Key  (r) = Recorded By, (t) = Taken By, (c) = Cosigned By      Initials Name Provider Type    CC Danica Amezcua PTA Physical Therapist Assistant                   Outcome Measures       Row Name 07/05/25 1747 07/05/25  0807       How much help from another person do you currently need...    Turning from your back to your side while in flat bed without using bedrails? 4  -CC 4  -JR    Moving from lying on back to sitting on the side of a flat bed without bedrails? 4  -CC 4  -JR    Moving to and from a bed to a chair (including a wheelchair)? 3  -CC 3  -JR    Standing up from a chair using your arms (e.g., wheelchair, bedside chair)? 3  -CC 3  -JR    Climbing 3-5 steps with a railing? 1  -CC 2  -JR    To walk in hospital room? 1  -CC 2  -JR    AM-PAC 6 Clicks Score (PT) 16  -CC 18  -JR    Highest Level of Mobility Goal Stand (1 or More Minutes)-5  -CC Walk 10 Steps or More-6  -JR      Row Name 07/05/25 1747          Functional Assessment    Outcome Measure Options AM-PAC 6 Clicks Basic Mobility (PT)  -               User Key  (r) = Recorded By, (t) = Taken By, (c) = Cosigned By      Initials Name Provider Type     Danica Amezcua PTA Physical Therapist Assistant    JR Carina Moses RN Registered Nurse                                 Physical Therapy Education       Title: PT OT SLP Therapies (In Progress)       Topic: Physical Therapy (In Progress)       Point: Mobility training (Done)       Learning Progress Summary            Patient Acceptance, E,D, DU,VU by  at 7/2/2025 1338    Acceptance, E,D, DU,VU by  at 7/1/2025 1707    Comment: role of PT and POC                      Point: Home exercise program (Done)       Learning Progress Summary            Patient Acceptance, E,TB, VU by  at 7/5/2025 1748    Acceptance, E,D, DU,VU by  at 7/2/2025 1338                      Point: Body mechanics (Done)       Learning Progress Summary            Patient Acceptance, E,D, DU,VU by  at 7/2/2025 1338    Acceptance, E,D, DU,VU by  at 7/1/2025 1707    Comment: role of PT and POC                      Point: Precautions (Not Started)       Learner Progress:  Not documented in this visit.                              User Key        Initials Effective Dates Name Provider Type Discipline    CC 06/16/21 -  Danica Amezcua PTA Physical Therapist Assistant PT     06/13/23 -  Kuldeep Ren PT Physical Therapist PT                  PT Recommendation and Plan     Progress: improving  Outcome Evaluation: Pt agreeable to physical therapy. Performed supine <->sit with SBA/S, sitting EOB without LOB noted and no support. Performed B LE ex in sitting LAQ, hip abd, marching and B UE shld flex. horz abd/add, shld retraction and protraction, elevation and depression, bicep curls and tricep press with all ex 20to 30 reps each. Pt declined to chair d/t being in chair all morning and back to bed after lunch. Con't with PT POC and progress as tolerated     Time Calculation:         PT Charges       Row Name 07/05/25 1748             Time Calculation    PT Received On 07/05/25  -CC      PT Goal Re-Cert Due Date 07/11/25  -CC         Time Calculation- PT    Total Timed Code Minutes- PT 30 minute(s)  -CC         Timed Charges    06420 - PT Therapeutic Exercise Minutes 22  -CC      77521 - PT Therapeutic Activity Minutes 8  -CC         Total Minutes    Timed Charges Total Minutes 30  -CC       Total Minutes 30  -CC                User Key  (r) = Recorded By, (t) = Taken By, (c) = Cosigned By      Initials Name Provider Type    CC Danica Amezcua PTA Physical Therapist Assistant                  Therapy Charges for Today       Code Description Service Date Service Provider Modifiers Qty    35863252045 HC PT THER PROC EA 15 MIN 7/5/2025 Danica Amezcua PTA GP 1    93088675778 HC PT THERAPEUTIC ACT EA 15 MIN 7/5/2025 Danica Amezcua PTA GP 1            PT G-Codes  Outcome Measure Options: AM-PAC 6 Clicks Basic Mobility (PT)  AM-PAC 6 Clicks Score (PT): 16  AM-PAC 6 Clicks Score (OT): 15       Danica Amezcua PTA  7/5/2025

## 2025-07-05 NOTE — PLAN OF CARE
Goal Outcome Evaluation:              Outcome Evaluation: Sinus rhythm on monitor throughout shift.  Has remained on room air.  Ambulation encouraged.  No c/o SOA/CP.

## 2025-07-05 NOTE — PLAN OF CARE
Goal Outcome Evaluation:   Pt up to chair and assisted with bath this shift. Minimal assistance needed with walker for transfer/ambulation around room.   Problem: Skin Injury Risk Increased  Goal: Skin Health and Integrity  Outcome: Progressing  Intervention: Optimize Skin Protection  Recent Flowsheet Documentation  Taken 7/5/2025 1200 by Carina Moses RN  Activity Management: activity encouraged  Taken 7/5/2025 1000 by Carina Moses RN  Activity Management: activity encouraged  Head of Bed (HOB) Positioning: HOB elevated  Taken 7/5/2025 0807 by Carina Moses RN  Activity Management: activity encouraged  Head of Bed (HOB) Positioning: HOB elevated     Problem: Fall Injury Risk  Goal: Absence of Fall and Fall-Related Injury  Outcome: Progressing  Intervention: Identify and Manage Contributors  Recent Flowsheet Documentation  Taken 7/5/2025 1200 by Carina Moses RN  Medication Review/Management: medications reviewed  Taken 7/5/2025 1000 by Carina Moses RN  Medication Review/Management: medications reviewed  Taken 7/5/2025 0807 by Carina Moses RN  Medication Review/Management: medications reviewed  Intervention: Promote Injury-Free Environment  Recent Flowsheet Documentation  Taken 7/5/2025 1200 by Carina Moses RN  Safety Promotion/Fall Prevention:   activity supervised   assistive device/personal items within reach   clutter free environment maintained   fall prevention program maintained   lighting adjusted   muscle strengthening facilitated   nonskid shoes/slippers when out of bed   room organization consistent   safety round/check completed  Taken 7/5/2025 1000 by Carina Moses RN  Safety Promotion/Fall Prevention:   activity supervised   assistive device/personal items within reach   clutter free environment maintained   fall prevention program maintained   lighting adjusted   muscle strengthening facilitated   nonskid shoes/slippers when out of bed   room organization consistent   safety  round/check completed  Taken 7/5/2025 0807 by Carina Moses, RN  Safety Promotion/Fall Prevention:   activity supervised   assistive device/personal items within reach   clutter free environment maintained   fall prevention program maintained   lighting adjusted   muscle strengthening facilitated   nonskid shoes/slippers when out of bed   safety round/check completed   room organization consistent

## 2025-07-05 NOTE — PLAN OF CARE
Goal Outcome Evaluation:  Plan of Care Reviewed With: patient        Progress: improving  Outcome Evaluation: Pt agreeable to physical therapy. Performed supine <->sit with SBA/S, sitting EOB without LOB noted and no support. Performed B LE ex in sitting LAQ, hip abd, marching and B UE shld flex. horz abd/add, shld retraction and protraction, elevation and depression, bicep curls and tricep press with all ex 20to 30 reps each. Pt declined to chair d/t being in chair all morning and back to bed after lunch. Con't with PT POC and progress as tolerated

## 2025-07-05 NOTE — PROGRESS NOTES
[facility] Progress Note      Patient Name: Jonny Ferrari Jr.  : 1950  MRN: [id number]  Primary Care Physician:  Amaury Brice MD  Date of admission: 2025    Subjective     Interval History:   Clinically stable no acute events overnight creatinine is down to 1.88 peak this visit 3.79  Good urine output no clear uremic symptoms blood pressure is stable resting well    Review of Systems:   As noted above    Objective     Vitals:   Temp:  [97.5 °F (36.4 °C)-99 °F (37.2 °C)] 97.5 °F (36.4 °C)  Heart Rate:  [66-95] 76  Resp:  [14-18] 18  BP: (112-125)/(59-66) 125/63    Intake/Output Summary (Last 24 hours) at 2025 1301  Last data filed at 2025 0812  Gross per 24 hour   Intake 989.72 ml   Output 1685 ml   Net -695.28 ml       Physical Exam:    General Appearance: alert, oriented x 3, no acute distress   Skin: warm and dry  HEENT: oral mucosa normal, nonicteric sclera  Neck: supple, no JVD  Lungs: CTA  Heart: RRR, normal S1 and S2  Abdomen: soft, nontender, nondistended  : no palpable bladder  Extremities: no edema, cyanosis or clubbing  Neuro: normal speech and mental status     Scheduled Meds:     aspirin, 81 mg, Oral, Daily  budesonide, 0.5 mg, Nebulization, BID - RT  buprenorphine-naloxone, 1 film, Sublingual, BID  busPIRone, 15 mg, Oral, Q12H  clopidogrel, 75 mg, Oral, Daily  insulin glargine, 20 Units, Subcutaneous, Nightly  insulin lispro, 2-9 Units, Subcutaneous, 4x Daily AC & at Bedtime  lactobacillus acidophilus, 1 capsule, Oral, BID  pantoprazole, 40 mg, Oral, Q AM  senna-docusate sodium, 2 tablet, Oral, BID  sodium chloride, 10 mL, Intravenous, Q12H      IV Meds:        Results Reviewed:   I have personally reviewed the results from the time of this admission to 2025 13:01 EDT     Results from last 7 days   Lab Units 25  1009 25  0454 25  0503 25  0429 25  1706   SODIUM mmol/L 137 139 138   < > 131*   POTASSIUM  mmol/L 4.4 3.7 4.2   < > 2.9*   CHLORIDE mmol/L 102 101 100   < > 81*   CO2 mmol/L 24.3 27.9 26.4   < > 24.4   BUN mg/dL 13.0 16.0 19.0   < > 61.0*   CREATININE mg/dL 1.88* 1.95* 2.10*   < > 3.79*   CALCIUM mg/dL 8.8 8.9 9.0   < > 9.0   BILIRUBIN mg/dL  --   --   --   --  0.2   ALK PHOS U/L  --   --   --   --  69   ALT (SGPT) U/L  --   --   --   --  14   AST (SGOT) U/L  --   --   --   --  19   GLUCOSE mg/dL 143* 84 87   < > 335*    < > = values in this interval not displayed.     Estimated Creatinine Clearance: 43.2 mL/min (A) (by C-G formula based on SCr of 1.88 mg/dL (H)).  Results from last 7 days   Lab Units 07/03/25  0503 07/02/25  1517 07/02/25  0407 07/01/25  0445 06/29/25  1826   MAGNESIUM mg/dL  --   --   --   --  1.8   PHOSPHORUS mg/dL 2.6 2.4* 1.9*   < >  --     < > = values in this interval not displayed.         Results from last 7 days   Lab Units 07/05/25  1009 07/04/25  0454 07/03/25  0503 07/02/25  1517 07/02/25  0407 07/02/25  0315 07/01/25  1945 07/01/25  0830 06/30/25  0429 06/29/25  1706   WBC 10*3/mm3  --   --   --   --  5.65  --  5.67 8.15 18.60* 15.69*   HEMOGLOBIN g/dL 7.7* 8.2* 8.6* 7.7* 6.0*   < > 8.4* 7.0* 8.0* 9.0*   PLATELETS 10*3/mm3  --   --   --   --  129*  --  148 154 203 205    < > = values in this interval not displayed.     Results from last 7 days   Lab Units 07/01/25 1945   INR  1.03       Assessment / Plan     ASSESSMENT:  Acute kidney injury: Patient does have significant worsening of his renal function.  Most likely secondary to volume depletion and septic shock.  Bicarb is stable low potassium noted.  Chronic kidney disease stage IV: Underlying chronic kidney disease secondary to diabetes and hypertension as well as volume loss, status post left nephrectomy in March of 2020.  Baseline creatinine of 2.9 with a EGFR of 21 mL/min.  Type 2 diabetes: Longstanding history of type 2 diabetes likely with some complications.  Septic shock: Treated as per hospitalist service still  on pressors.  Left lower lobe pneumonia: On IV antibiotics as per hospitalist service.  Urinary tract infection: Treated  Hypokalemia: Replace per protocol  Coronary artery disease: Status post CABG and stent placements.  No acute issues  COPD: Longstanding history of smoking.  Untreated sleep apnea: Longstanding known history of obstructive sleep apnea, has not been able to use his BiPAP        PLAN:   -Encourage oral hydration   -monitor intake and output  -Dose medicate per GFR   -avoid nephrotoxin  -Monitor electrolytes  -Aim for MAP greater than 65  -Thank for the consult will closely follow      Alem Maharaj MD  07/05/25  13:01 EDT    [facility]  [phone]

## 2025-07-06 LAB
GLUCOSE BLDC GLUCOMTR-MCNC: 117 MG/DL (ref 70–130)
GLUCOSE BLDC GLUCOMTR-MCNC: 135 MG/DL (ref 70–130)
GLUCOSE BLDC GLUCOMTR-MCNC: 142 MG/DL (ref 70–130)
GLUCOSE BLDC GLUCOMTR-MCNC: 183 MG/DL (ref 70–130)

## 2025-07-06 PROCEDURE — 63710000001 INSULIN LISPRO (HUMAN) PER 5 UNITS: Performed by: INTERNAL MEDICINE

## 2025-07-06 PROCEDURE — 82948 REAGENT STRIP/BLOOD GLUCOSE: CPT | Performed by: INTERNAL MEDICINE

## 2025-07-06 PROCEDURE — 94799 UNLISTED PULMONARY SVC/PX: CPT

## 2025-07-06 PROCEDURE — 82948 REAGENT STRIP/BLOOD GLUCOSE: CPT

## 2025-07-06 PROCEDURE — 94761 N-INVAS EAR/PLS OXIMETRY MLT: CPT

## 2025-07-06 PROCEDURE — 63710000001 INSULIN GLARGINE PER 5 UNITS: Performed by: INTERNAL MEDICINE

## 2025-07-06 PROCEDURE — 99232 SBSQ HOSP IP/OBS MODERATE 35: CPT | Performed by: FAMILY MEDICINE

## 2025-07-06 RX ADMIN — Medication 1 CAPSULE: at 09:22

## 2025-07-06 RX ADMIN — Medication 10 ML: at 21:02

## 2025-07-06 RX ADMIN — CLOPIDOGREL BISULFATE 75 MG: 75 TABLET, FILM COATED ORAL at 09:22

## 2025-07-06 RX ADMIN — INSULIN GLARGINE 20 UNITS: 100 INJECTION, SOLUTION SUBCUTANEOUS at 21:04

## 2025-07-06 RX ADMIN — BUSPIRONE HYDROCHLORIDE 15 MG: 15 TABLET ORAL at 09:22

## 2025-07-06 RX ADMIN — Medication 1 CAPSULE: at 21:05

## 2025-07-06 RX ADMIN — INSULIN LISPRO 2 UNITS: 100 INJECTION, SOLUTION INTRAVENOUS; SUBCUTANEOUS at 17:08

## 2025-07-06 RX ADMIN — BUSPIRONE HYDROCHLORIDE 15 MG: 15 TABLET ORAL at 21:05

## 2025-07-06 RX ADMIN — BUDESONIDE 0.5 MG: 0.5 SUSPENSION RESPIRATORY (INHALATION) at 07:18

## 2025-07-06 RX ADMIN — BUPRENORPHINE AND NALOXONE 1 FILM: 8; 2 FILM BUCCAL; SUBLINGUAL at 09:22

## 2025-07-06 RX ADMIN — PANTOPRAZOLE SODIUM 40 MG: 40 TABLET, DELAYED RELEASE ORAL at 05:33

## 2025-07-06 RX ADMIN — BUDESONIDE 0.5 MG: 0.5 SUSPENSION RESPIRATORY (INHALATION) at 19:56

## 2025-07-06 RX ADMIN — Medication 10 ML: at 09:23

## 2025-07-06 RX ADMIN — ASPIRIN 81 MG: 81 TABLET, COATED ORAL at 09:22

## 2025-07-06 NOTE — PROGRESS NOTES
[facility] Progress Note      Patient Name: Jonny Ferrari Jr.  : 1950  MRN: [id number]  Primary Care Physician:  Amaury Brice MD  Date of admission: 2025    Subjective     Interval History:   Clinically stable no acute events overnight creatinine is down to 1.88 peak this visit 3.79 no labs available from today  Good urine output no clear uremic symptoms blood pressure is stable resting well   Review of Systems:   As noted above    Objective     Vitals:   Temp:  [98 °F (36.7 °C)-98.7 °F (37.1 °C)] 98 °F (36.7 °C)  Heart Rate:  [71-91] 73  Resp:  [13-20] 13  BP: (106-125)/(52-68) 119/68    Intake/Output Summary (Last 24 hours) at 2025 1732  Last data filed at 2025 1700  Gross per 24 hour   Intake 1290 ml   Output 725 ml   Net 565 ml       Physical Exam:    General Appearance: alert, oriented x 3, no acute distress   Skin: warm and dry  HEENT: oral mucosa normal, nonicteric sclera  Neck: supple, no JVD  Lungs: CTA  Heart: RRR, normal S1 and S2  Abdomen: soft, nontender, nondistended  : no palpable bladder  Extremities: no edema, cyanosis or clubbing  Neuro: normal speech and mental status     Scheduled Meds:     aspirin, 81 mg, Oral, Daily  budesonide, 0.5 mg, Nebulization, BID - RT  buprenorphine-naloxone, 1 film, Sublingual, BID  busPIRone, 15 mg, Oral, Q12H  clopidogrel, 75 mg, Oral, Daily  insulin glargine, 20 Units, Subcutaneous, Nightly  insulin lispro, 2-9 Units, Subcutaneous, 4x Daily AC & at Bedtime  lactobacillus acidophilus, 1 capsule, Oral, BID  pantoprazole, 40 mg, Oral, Q AM  senna-docusate sodium, 2 tablet, Oral, BID  sodium chloride, 10 mL, Intravenous, Q12H      IV Meds:        Results Reviewed:   I have personally reviewed the results from the time of this admission to 2025 17:32 EDT     Results from last 7 days   Lab Units 25  1009 25  0454 07/03/25  0503   SODIUM mmol/L 137 139 138   POTASSIUM mmol/L 4.4 3.7 4.2    CHLORIDE mmol/L 102 101 100   CO2 mmol/L 24.3 27.9 26.4   BUN mg/dL 13.0 16.0 19.0   CREATININE mg/dL 1.88* 1.95* 2.10*   CALCIUM mg/dL 8.8 8.9 9.0   GLUCOSE mg/dL 143* 84 87     Estimated Creatinine Clearance: 43.2 mL/min (A) (by C-G formula based on SCr of 1.88 mg/dL (H)).  Results from last 7 days   Lab Units 07/03/25  0503 07/02/25  1517 07/02/25  0407 07/01/25  0445 06/29/25  1826   MAGNESIUM mg/dL  --   --   --   --  1.8   PHOSPHORUS mg/dL 2.6 2.4* 1.9*   < >  --     < > = values in this interval not displayed.         Results from last 7 days   Lab Units 07/05/25  1009 07/04/25  0454 07/03/25  0503 07/02/25  1517 07/02/25  0407 07/02/25  0315 07/01/25  1945 07/01/25  0830 06/30/25  0429   WBC 10*3/mm3  --   --   --   --  5.65  --  5.67 8.15 18.60*   HEMOGLOBIN g/dL 7.7* 8.2* 8.6* 7.7* 6.0*   < > 8.4* 7.0* 8.0*   PLATELETS 10*3/mm3  --   --   --   --  129*  --  148 154 203    < > = values in this interval not displayed.     Results from last 7 days   Lab Units 07/01/25  1945   INR  1.03       Assessment / Plan     ASSESSMENT:  Acute kidney injury: Patient does have significant worsening of his renal function.  Most likely secondary to volume depletion and septic shock.  Bicarb is stable low potassium noted.  Chronic kidney disease stage IV: Underlying chronic kidney disease secondary to diabetes and hypertension as well as volume loss, status post left nephrectomy in March of 2020.  Baseline creatinine of 2.9 with a EGFR of 21 mL/min.  Type 2 diabetes: Longstanding history of type 2 diabetes likely with some complications.  Septic shock: Treated as per hospitalist service still on pressors.  Left lower lobe pneumonia: On IV antibiotics as per hospitalist service.  Urinary tract infection: Treated  Hypokalemia: Replace per protocol  Coronary artery disease: Status post CABG and stent placements.  No acute issues  COPD: Longstanding history of smoking.  Untreated sleep apnea: Longstanding known history of  obstructive sleep apnea, has not been able to use his BiPAP        PLAN:   -Encourage oral hydration   -will need labs in the morning  -monitor intake and output  -Dose medicate per GFR   -avoid nephrotoxin  -Monitor electrolytes  -Aim for MAP greater than 65  -Thank for the consult will closely follow      Alem Maharaj MD  07/06/25  17:32 EDT    [facility]  [phone]

## 2025-07-06 NOTE — PAYOR COMM NOTE
"TO:BC  FROM:FELIPE RODRÍGUEZ, RN PHONE [phone]  CLINICAL UPDATE    Nuzhat Marinelli Jr. (74 y.o. Male)       Date of Birth   1950    Social Security Number       Address   [address] [place] [address]    Home Phone   [phone]    MRN   [id number]       [facility]   [facility]    Marital Status                               Admission Date   6/29/2025    Admission Type   Emergency    Admitting Provider   Silviano Cisneros MD    Attending Provider   Pat Davis DO    Department, Room/Bed   [facility] INTENSIVE [facility], I05/1       Discharge Date       Discharge Disposition       Discharge Destination                                 Attending Provider: Pat Davis DO    Allergies: Metformin    Isolation: None   Infection: None   Code Status: CPR    Ht: 182.9 cm (72\")   Wt: 98.7 kg (217 lb 9.5 oz)    Admission Cmt: None   Principal Problem: Septic shock [A41.9,R65.21]                   Active Insurance as of 6/29/2025       Primary Coverage       Payor Plan Insurance Group Employer/Plan Group    ANTH MEDICARE REPLACEMENT ANTH MEDICARE ADVANTAGE HMO [id number]       Payor Plan Address Payor Plan Phone Number Payor Plan Fax Number Effective Dates    [address] [phone]  1/1/2025 - None Entered    [facility] [id number]         Subscriber Name Subscriber Birth Date Member ID       NUZHAT MARINELLI JR. 1950 [id number]                     Emergency Contacts        (Rel.) Home Phone Work Phone Mobile Phone    IsisLashon (Spouse) [phone] -- [phone]    ISISCATE (Relative) -- -- [phone]    ISISNEGRA (Son) [phone] -- --              Vital Signs (last day)       Date/Time Temp Temp src Pulse Resp BP Patient Position SpO2    07/06/25 1152 -- -- -- -- 119/68 -- --    07/06/25 1100 98.4 (36.9) Oral -- -- -- -- --    07/06/25 0931 98.1 (36.7) Oral 74 15 118/57 Sitting 100    07/06/25 0718 -- -- -- 16 -- -- --    " 07/06/25 0341 98.4 (36.9) Oral -- -- -- -- --    07/06/25 0338 -- -- 78 20 106/58 Lying 95    07/06/25 0300 -- -- 72 -- -- -- 95    07/06/25 0200 -- -- 76 -- -- -- 94    07/06/25 0000 -- -- 73 -- -- -- 96    07/05/25 2316 98.5 (36.9) Oral 81 20 106/52 Lying 93    07/05/25 2200 -- -- 77 -- -- -- 95    07/05/25 1934 -- -- 82 18 125/64 Lying 98    07/05/25 1853 98.7 (37.1) Oral 74 20 118/66 Lying 98    07/05/25 1829 -- -- 73 18 -- -- --    07/05/25 1818 -- -- 71 18 -- -- 97    07/05/25 1700 -- -- 77 -- -- -- 96    07/05/25 1633 97.9 (36.6) Oral 77 18 107/54 Lying 96    07/05/25 1500 -- -- 66 -- -- -- 96    07/05/25 1300 97.6 (36.4) Oral 68 17 109/64 Lying 98    07/05/25 0812 97.5 (36.4) Oral 76 18 125/63 Lying 97    07/05/25 0713 -- -- -- 16 -- -- --    07/05/25 0700 -- -- 69 -- -- -- 98    07/05/25 0436 98.1 (36.7) Oral 74 16 115/66 Lying 99    07/05/25 0200 -- -- 66 -- -- -- 96          Current Facility-Administered Medications   Medication Dose Route Frequency Provider Last Rate Last Admin    acetaminophen (TYLENOL) tablet 650 mg  650 mg Oral Q4H PRN Silviano Cisneros MD        Or    acetaminophen (TYLENOL) 160 MG/5ML oral solution 650 mg  650 mg Oral Q4H PRN Silviano Cisneros MD        Or    acetaminophen (TYLENOL) suppository 650 mg  650 mg Rectal Q4H PRN Silviano Cisneros MD        aspirin EC tablet 81 mg  81 mg Oral Daily Silviano Cisneros MD   81 mg at 07/06/25 0922    sennosides-docusate (PERICOLACE) 8.6-50 MG per tablet 2 tablet  2 tablet Oral BID Silviano Cisneros MD   2 tablet at 07/05/25 0808    And    polyethylene glycol (MIRALAX) packet 17 g  17 g Oral Daily PRN Silviano Cisneros MD        And    bisacodyl (DULCOLAX) EC tablet 5 mg  5 mg Oral Daily PRN Silviano Cisneros MD        And    bisacodyl (DULCOLAX) suppository 10 mg  10 mg Rectal Daily PRN Silviano Cisneros MD        budesonide (PULMICORT) nebulizer solution 0.5 mg  0.5 mg Nebulization BID - RT Silviano Cisneros MD   0.5 mg at 07/06/25 0718    buprenorphine-naloxone (SUBOXONE) 8-2  MG film 1 film  1 film Sublingual BID Pat Davis Gregorio DO   1 film at 07/06/25 0922    busPIRone (BUSPAR) tablet 15 mg  15 mg Oral Q12H Silviano Cisneros MD   15 mg at 07/06/25 0922    Calcium Replacement - Follow Nurse / BPA Driven Protocol   Not Applicable PRN Agustin Will DO        clopidogrel (PLAVIX) tablet 75 mg  75 mg Oral Daily Silviano Cisneros MD   75 mg at 07/06/25 0922    dextrose (D50W) (25 g/50 mL) IV injection 25 g  25 g Intravenous Q15 Min PRN Silviano Cisneros MD        dextrose (GLUTOSE) oral gel 15 g  15 g Oral Q15 Min PRN Silviano Cisneros MD        Diclofenac Sodium (VOLTAREN) 1 % gel 2 g  2 g Topical 4x Daily PRN Kuldeep Patel DO   2 g at 07/02/25 2201    glucagon (GLUCAGEN) injection 1 mg  1 mg Intramuscular Q15 Min PRN Silviano Cisneros MD        insulin glargine (LANTUS, SEMGLEE) injection 20 Units  20 Units Subcutaneous Nightly Silviano Cisneros MD   20 Units at 07/05/25 2040    Insulin Lispro (humaLOG) injection 2-9 Units  2-9 Units Subcutaneous 4x Daily AC & at Bedtime Silviano Cisneros MD   2 Units at 07/05/25 2040    ipratropium-albuterol (DUO-NEB) nebulizer solution 3 mL  3 mL Nebulization Q4H PRN Silviano Cisneros MD   3 mL at 07/05/25 1818    lactobacillus acidophilus (RISAQUAD) capsule 1 capsule  1 capsule Oral BID Silviano Cisneros MD   1 capsule at 07/06/25 0922    Magnesium Standard Dose Replacement - Follow Nurse / BPA Driven Protocol   Not Applicable PRN Silviano Cisneros MD        Magnesium Standard Dose Replacement - Follow Nurse / BPA Driven Protocol   Not Applicable PRN Agustin Will DO        ondansetron (ZOFRAN) injection 4 mg  4 mg Intravenous Q6H PRN Silviano Cisneros MD        pantoprazole (PROTONIX) EC tablet 40 mg  40 mg Oral Q AM Silviano Cisneros MD   40 mg at 07/06/25 0533    Phosphorus Replacement - Follow Nurse / BPA Driven Protocol   Not Applicable PRN Agustin Will, DO        Potassium Replacement - Follow Nurse / BPA Driven Protocol   Not Applicable PRN Fady Guevara,  MD        Potassium Replacement - Follow Nurse / BPA Driven Protocol   Not Applicable Silviano Reyes MD        Potassium Replacement - Follow Nurse / BPA Driven Protocol   Not Applicable PRN Agustin Will DO        sodium chloride 0.9 % flush 10 mL  10 mL Intravenous PRN Fady Guevraa MD        sodium chloride 0.9 % flush 10 mL  10 mL Intravenous Q12H Silviano Cisneros MD   10 mL at 25 0923    sodium chloride 0.9 % flush 10 mL  10 mL Intravenous PRN Silviano Cisneros MD        sodium chloride 0.9 % infusion 40 mL  40 mL Intravenous PRN Silviano Cisneros MD         Lab Results (last 24 hours)       Procedure Component Value Units Date/Time    POC Glucose 4x Daily Before Meals & at Bedtime [[id number]]  (Abnormal) Collected: 25 1106    Specimen: Blood Updated: 25 1108     Glucose 135 mg/dL      Comment: Serial Number: [id number]:  [id number]       POC Glucose Once [[id number]]  (Abnormal) Collected: 25 0916    Specimen: Blood Updated: 25 0917     Glucose 142 mg/dL      Comment: Serial Number: [id number]:  [id number]       POC Glucose Once [[id number]]  (Abnormal) Collected: 25    Specimen: Blood Updated: 25     Glucose 198 mg/dL      Comment: Serial Number: [id number]:  [id number]       POC Glucose Once [[id number]]  (Abnormal) Collected: 25 1650    Specimen: Blood Updated: 25 1652     Glucose 154 mg/dL      Comment: Serial Number: [id number]:  [id number]             Imaging Results (Last 24 Hours)       ** No results found for the last 24 hours. **             Physician Progress Notes (last 48 hours)        Alem Maharaj MD at 25 1301              [facility] Progress Note      Patient Name: Jonny Ferrari Jr.  : 1950  MRN: [id number]  Primary Care Physician:  Amaury Brice MD  Date of admission: 2025    Subjective     Interval History:   Clinically stable no acute events  overnight creatinine is down to 1.88 peak this visit 3.79  Good urine output no clear uremic symptoms blood pressure is stable resting well    Review of Systems:   As noted above    Objective     Vitals:   Temp:  [97.5 °F (36.4 °C)-99 °F (37.2 °C)] 97.5 °F (36.4 °C)  Heart Rate:  [66-95] 76  Resp:  [14-18] 18  BP: (112-125)/(59-66) 125/63    Intake/Output Summary (Last 24 hours) at 7/5/2025 1301  Last data filed at 7/5/2025 0812  Gross per 24 hour   Intake 989.72 ml   Output 1685 ml   Net -695.28 ml       Physical Exam:    General Appearance: alert, oriented x 3, no acute distress   Skin: warm and dry  HEENT: oral mucosa normal, nonicteric sclera  Neck: supple, no JVD  Lungs: CTA  Heart: RRR, normal S1 and S2  Abdomen: soft, nontender, nondistended  : no palpable bladder  Extremities: no edema, cyanosis or clubbing  Neuro: normal speech and mental status     Scheduled Meds:     aspirin, 81 mg, Oral, Daily  budesonide, 0.5 mg, Nebulization, BID - RT  buprenorphine-naloxone, 1 film, Sublingual, BID  busPIRone, 15 mg, Oral, Q12H  clopidogrel, 75 mg, Oral, Daily  insulin glargine, 20 Units, Subcutaneous, Nightly  insulin lispro, 2-9 Units, Subcutaneous, 4x Daily AC & at Bedtime  lactobacillus acidophilus, 1 capsule, Oral, BID  pantoprazole, 40 mg, Oral, Q AM  senna-docusate sodium, 2 tablet, Oral, BID  sodium chloride, 10 mL, Intravenous, Q12H      IV Meds:        Results Reviewed:   I have personally reviewed the results from the time of this admission to 7/5/2025 13:01 EDT     Results from last 7 days   Lab Units 07/05/25  1009 07/04/25  0454 07/03/25  0503 06/30/25  0429 06/29/25  1706   SODIUM mmol/L 137 139 138   < > 131*   POTASSIUM mmol/L 4.4 3.7 4.2   < > 2.9*   CHLORIDE mmol/L 102 101 100   < > 81*   CO2 mmol/L 24.3 27.9 26.4   < > 24.4   BUN mg/dL 13.0 16.0 19.0   < > 61.0*   CREATININE mg/dL 1.88* 1.95* 2.10*   < > 3.79*   CALCIUM mg/dL 8.8 8.9 9.0   < > 9.0   BILIRUBIN mg/dL  --   --   --   --  0.2   ALK  PHOS U/L  --   --   --   --  69   ALT (SGPT) U/L  --   --   --   --  14   AST (SGOT) U/L  --   --   --   --  19   GLUCOSE mg/dL 143* 84 87   < > 335*    < > = values in this interval not displayed.     Estimated Creatinine Clearance: 43.2 mL/min (A) (by C-G formula based on SCr of 1.88 mg/dL (H)).  Results from last 7 days   Lab Units 07/03/25  0503 07/02/25  1517 07/02/25  0407 07/01/25  0445 06/29/25  1826   MAGNESIUM mg/dL  --   --   --   --  1.8   PHOSPHORUS mg/dL 2.6 2.4* 1.9*   < >  --     < > = values in this interval not displayed.         Results from last 7 days   Lab Units 07/05/25  1009 07/04/25  0454 07/03/25  0503 07/02/25  1517 07/02/25  0407 07/02/25  0315 07/01/25  1945 07/01/25  0830 06/30/25  0429 06/29/25  1706   WBC 10*3/mm3  --   --   --   --  5.65  --  5.67 8.15 18.60* 15.69*   HEMOGLOBIN g/dL 7.7* 8.2* 8.6* 7.7* 6.0*   < > 8.4* 7.0* 8.0* 9.0*   PLATELETS 10*3/mm3  --   --   --   --  129*  --  148 154 203 205    < > = values in this interval not displayed.     Results from last 7 days   Lab Units 07/01/25  1945   INR  1.03       Assessment / Plan     ASSESSMENT:  Acute kidney injury: Patient does have significant worsening of his renal function.  Most likely secondary to volume depletion and septic shock.  Bicarb is stable low potassium noted.  Chronic kidney disease stage IV: Underlying chronic kidney disease secondary to diabetes and hypertension as well as volume loss, status post left nephrectomy in March of 2020.  Baseline creatinine of 2.9 with a EGFR of 21 mL/min.  Type 2 diabetes: Longstanding history of type 2 diabetes likely with some complications.  Septic shock: Treated as per hospitalist service still on pressors.  Left lower lobe pneumonia: On IV antibiotics as per hospitalist service.  Urinary tract infection: Treated  Hypokalemia: Replace per protocol  Coronary artery disease: Status post CABG and stent placements.  No acute issues  COPD: Longstanding history of  "smoking.  Untreated sleep apnea: Longstanding known history of obstructive sleep apnea, has not been able to use his BiPAP        PLAN:   -Encourage oral hydration   -monitor intake and output  -Dose medicate per GFR   -avoid nephrotoxin  -Monitor electrolytes  -Aim for MAP greater than 65  -Thank for the consult will closely follow      Alem Maharaj MD  25  13:01 EDT    [facility]  [phone]     Electronically signed by Alem Maharaj MD at 25 1303       Pat Davis DO at 25 0920              [facility]    Name:  Jonny Ferrari Jr.   Age:  74 y.o.  Sex:  male  :  1950  MRN:  [id number]   Visit Number:  [id number]  Admission Date:  2025  Date Of Service:  25  Primary Care Physician:  Amaury Brice MD     LOS: 6 days :    Chief Complaint:      Follow-up septic shock    Subjective:    Patient seen this morning.  Wife at bedside.  No acute changes overnight.  Labs pending    Hospital Course:    Per prior provider: \"Jonny Ferrari Jr. is a 74-year-old male with history of diabetes mellitus type 2, chronic kidney disease stage III, COPD, coronary artery disease status post CABG and stent, ERASMO, BPH was brought to the emergency room by EMS with symptoms of generalized weakness and fatigue   The patient was brought in by ambulance due to an inability to walk, a condition that has persisted for the past 2 to 3 days. He reports no fever, dysuria, or cough but feels weak. He has experienced several falls recently and has been unable to walk independently for the past 3 to 4 days. Despite using a walker or cane, he continues to fall.   In the emergency room, patient was afebrile but mildly tachycardic in the 100 with initial blood pressure of 80/54 and pulse oxygen saturation of 95% on room air.  Patient was given 30 mL/kg fluid bolus initially in the emergency room with initial improvement of the " "blood pressure to systolic 100 but subsequently dropped it again to the 70s systolic.  Patient was given another 500 bolus of normal saline, he right femoral central line was placed and he was initiated on Levophed. \"    Review of Systems:     All systems were reviewed and negative except as mentioned in subjective, assessment and plan.    Vital Signs:    Temp:  [97.5 °F (36.4 °C)-99 °F (37.2 °C)] 97.5 °F (36.4 °C)  Heart Rate:  [66-95] 76  Resp:  [14-18] 18  BP: (106-125)/(59-69) 125/63    Intake and output:    I/O last 3 completed shifts:  In: 1311.7 [P.O.:850; I.V.:154.7; IV Piggyback:307]  Out: 3010 [Urine:3010]  I/O this shift:  In: 240 [P.O.:240]  Out: 175 [Urine:175]    Physical Examination:    General Appearance:  Alert and cooperative.  Hard of hearing.  No acute distress   Head:  Atraumatic and normocephalic.   Eyes: Conjunctivae and sclerae normal, no icterus. No pallor.   Throat: No oral lesions, no thrush, oral mucosa moist.   Neck: Supple, trachea midline, no thyromegaly.   Lungs:   Breath sounds heard bilaterally equally.  Some scattered crackles   Heart:  Normal S1 and S2, no murmur,No JVD.   Abdomen:   Normal bowel sounds, Soft, nontender, nondistended, no rebound tenderness.  Nolan catheter in place   Extremities: Supple, trace edema   Skin: No bleeding or rash.  Multiple skin abrasions.   Neurologic: Alert and oriented x 3. No facial asymmetry.  Generalized weakness unchanged     Laboratory results:    Results from last 7 days   Lab Units 07/04/25  0454 07/03/25  0503 07/02/25  0407 06/30/25  0429 06/29/25  1706   SODIUM mmol/L 139 138 139   < > 131*   POTASSIUM mmol/L 3.7 4.2 4.2   < > 2.9*   CHLORIDE mmol/L 101 100 99   < > 81*   CO2 mmol/L 27.9 26.4 31.0*   < > 24.4   BUN mg/dL 16.0 19.0 26.0*   < > 61.0*   CREATININE mg/dL 1.95* 2.10* 2.16*   < > 3.79*   CALCIUM mg/dL 8.9 9.0 8.8   < > 9.0   BILIRUBIN mg/dL  --   --   --   --  0.2   ALK PHOS U/L  --   --   --   --  69   ALT (SGPT) U/L  --   -- "   --   --  14   AST (SGOT) U/L  --   --   --   --  19   GLUCOSE mg/dL 84 87 86   < > 335*    < > = values in this interval not displayed.     Results from last 7 days   Lab Units 07/04/25  0454 07/03/25  0503 07/02/25  1517 07/02/25  0407 07/02/25  0315 07/01/25  1945 07/01/25  0830   WBC 10*3/mm3  --   --   --  5.65  --  5.67 8.15   HEMOGLOBIN g/dL 8.2* 8.6* 7.7* 6.0*   < > 8.4* 7.0*   HEMATOCRIT % 26.4* 27.9* 24.2* 18.8*   < > 26.7* 22.4*   PLATELETS 10*3/mm3  --   --   --  129*  --  148 154    < > = values in this interval not displayed.     Results from last 7 days   Lab Units 07/01/25  1945   INR  1.03     Results from last 7 days   Lab Units 06/29/25  1826 06/29/25  1706   HSTROP T ng/L 95* 98*     Results from last 7 days   Lab Units 06/29/25  2205 06/29/25  1738 06/29/25  1728   BLOODCX   --  No growth at 5 days No growth at 5 days   URINECX  25,000 CFU/mL Enterococcus faecalis*  --   --      Recent Labs     08/29/24  2314   PHART 7.382   ZMR1TIH 49.8*   PO2ART 68.0*   CRP3OFF 29.5*   BASEEXCESS 3.8*      I have reviewed the patient's laboratory results.    Radiology results:    No radiology results from the last 24 hrs    I have reviewed the patient's radiology reports.    Medication Review:     I have reviewed the patient's active and prn medications.     Problem List:      Septic shock    CAD (coronary artery disease)    Chronic kidney disease, stage IV (severe)    Acute UTI (urinary tract infection)    Pneumonia of left lower lobe due to infectious organism      Assessment:    Septic shock secondary to #2 and #3, POA.  Acute urinary tract infection, POA.  Left lower lobe bacterial pneumonia, unable to classify further, POA.  Demand ischemia with elevated troponins, POA.  Hypokalemia, POA.  Chronic kidney disease stage IV.  Diabetes mellitus type 2 with nephropathy and hyperglycemia, POA.  Coronary artery disease status post CABG and stents.  COPD, no exacerbation.  Benign prostatic hyperplasia.  Will  falls with skin abrasions, POA.    Plan:    Septic shock  Acute UTI  Left lower lobe bacterial pneumonia  Negative blood culture, on Zosyn.  Oxygenation stable.  Off pressors.  Urine culture had E faecalis.  Discussed with pharmacy, no further treatment indicated  Demand ischemia with elevated troponins  Troponins plateaued 95-98  Suspect secondary to demand ischemia in the setting of his septic shock as well as CKD  Low suspicion for ACS  Hypokalemia  Continue replacement  CKD stage IV  Nephrology, Dr. Hooper consulted and appreciate recommendations.  Numbers now stabilized.  Off fluids.  Diuretics started  Type 2 diabetes  Subcutaneous insulin protocol  Anemia  Does have chronic anemia likely due to renal failure, however acute on chronic currently.  Received 1 unit of PRBCs.  There was reportedly some blood loss with catheter insertion but urine is clear now.  Have restarted aspirin.  Will need further monitoring.  May need some iron transfusions.  CAD status post CABG  Continue DAPT with aspirin and Plavix  COPD not exacerbation  Multiple skin abrasions  Wound care  Impaired mobility and ADLs  PT/OT once stable to participate    Further orders as clinical course dictates.    DVT Prophylaxis: SCD  Code Status: Full  Diet: Renal diabetic.  Discharge Plan: KRISTINE Davis DO  07/05/25  09:44 EDT    Dictated utilizing Dragon dictation.        Electronically signed by Pat Davis DO at 07/05/25 1877

## 2025-07-06 NOTE — PLAN OF CARE
Goal Outcome Evaluation:              Outcome Evaluation: Pt has remained on room air this shift.  Currently up in recliner per request.  Sinus rhyhthm on monitor.

## 2025-07-06 NOTE — PROGRESS NOTES
"    [facility]    Name:  Jonny Ferrari Jr.   Age:  74 y.o.  Sex:  male  :  1950  MRN:  [id number]   Visit Number:  [id number]  Admission Date:  2025  Date Of Service:  25  Primary Care Physician:  Amaury Brice MD     LOS: 7 days :    Chief Complaint:      Follow-up septic shock    Subjective:    Patient seen again today.  No acute changes overnight.  No evidence of any bleeding.  Feels better.  Discussed will repeat labs again tomorrow    Hospital Course:    Per prior provider: \"Jonny Ferrari Jr. is a 74-year-old male with history of diabetes mellitus type 2, chronic kidney disease stage III, COPD, coronary artery disease status post CABG and stent, ERASMO, BPH was brought to the emergency room by EMS with symptoms of generalized weakness and fatigue   The patient was brought in by ambulance due to an inability to walk, a condition that has persisted for the past 2 to 3 days. He reports no fever, dysuria, or cough but feels weak. He has experienced several falls recently and has been unable to walk independently for the past 3 to 4 days. Despite using a walker or cane, he continues to fall.   In the emergency room, patient was afebrile but mildly tachycardic in the 100 with initial blood pressure of 80/54 and pulse oxygen saturation of 95% on room air.  Patient was given 30 mL/kg fluid bolus initially in the emergency room with initial improvement of the blood pressure to systolic 100 but subsequently dropped it again to the 70s systolic.  Patient was given another 500 bolus of normal saline, he right femoral central line was placed and he was initiated on Levophed. \"    Patient was able to be weaned off steroids.  He completed antibiotics for UTI after discussion with pharmacy.  Has been seen by nephrology and kidney function is back to baseline.  Did have some drop in hemoglobin, there was concern for possible hematuria, this has resolved however.  " Did receive 1 unit of PRBCs.    Review of Systems:     All systems were reviewed and negative except as mentioned in subjective, assessment and plan.    Vital Signs:    Temp:  [97.9 °F (36.6 °C)-98.7 °F (37.1 °C)] 98.4 °F (36.9 °C)  Heart Rate:  [66-91] 91  Resp:  [13-20] 13  BP: (106-125)/(52-68) 119/68    Intake and output:    I/O last 3 completed shifts:  In: 1505 [P.O.:1390; I.V.:10; IV Piggyback:105]  Out: 2510 [Urine:2510]  I/O this shift:  In: 240 [P.O.:240]  Out: -     Physical Examination:    General Appearance:  Alert and cooperative.  Hard of hearing.  No acute distress   Head:  Atraumatic and normocephalic.   Eyes: Conjunctivae and sclerae normal, no icterus. No pallor.   Throat: No oral lesions, no thrush, oral mucosa moist.   Neck: Supple, trachea midline, no thyromegaly.   Lungs:   Breath sounds heard bilaterally equally.  Some scattered crackles   Heart:  Normal S1 and S2, no murmur,No JVD.   Abdomen:   Normal bowel sounds, Soft, nontender, nondistended, no rebound tenderness.  Nolan catheter in place   Extremities: Supple, trace edema   Skin: No bleeding or rash.  Multiple skin abrasions.   Neurologic: Alert and oriented x 3. No facial asymmetry.  Generalized weakness unchanged     Laboratory results:    Results from last 7 days   Lab Units 07/05/25  1009 07/04/25  0454 07/03/25  0503 06/30/25  0429 06/29/25  1706   SODIUM mmol/L 137 139 138   < > 131*   POTASSIUM mmol/L 4.4 3.7 4.2   < > 2.9*   CHLORIDE mmol/L 102 101 100   < > 81*   CO2 mmol/L 24.3 27.9 26.4   < > 24.4   BUN mg/dL 13.0 16.0 19.0   < > 61.0*   CREATININE mg/dL 1.88* 1.95* 2.10*   < > 3.79*   CALCIUM mg/dL 8.8 8.9 9.0   < > 9.0   BILIRUBIN mg/dL  --   --   --   --  0.2   ALK PHOS U/L  --   --   --   --  69   ALT (SGPT) U/L  --   --   --   --  14   AST (SGOT) U/L  --   --   --   --  19   GLUCOSE mg/dL 143* 84 87   < > 335*    < > = values in this interval not displayed.     Results from last 7 days   Lab Units 07/05/25  1861  07/04/25  0454 07/03/25  0503 07/02/25  1517 07/02/25  0407 07/02/25  0315 07/01/25  1945 07/01/25  0830   WBC 10*3/mm3  --   --   --   --  5.65  --  5.67 8.15   HEMOGLOBIN g/dL 7.7* 8.2* 8.6*   < > 6.0*   < > 8.4* 7.0*   HEMATOCRIT % 24.8* 26.4* 27.9*   < > 18.8*   < > 26.7* 22.4*   PLATELETS 10*3/mm3  --   --   --   --  129*  --  148 154    < > = values in this interval not displayed.     Results from last 7 days   Lab Units 07/01/25  1945   INR  1.03     Results from last 7 days   Lab Units 06/29/25  1826 06/29/25  1706   HSTROP T ng/L 95* 98*     Results from last 7 days   Lab Units 06/29/25  2205 06/29/25  1738 06/29/25  1728   BLOODCX   --  No growth at 5 days No growth at 5 days   URINECX  25,000 CFU/mL Enterococcus faecalis*  --   --      Recent Labs     08/29/24  2314   PHART 7.382   KUS2SDH 49.8*   PO2ART 68.0*   WOX9FGL 29.5*   BASEEXCESS 3.8*      I have reviewed the patient's laboratory results.    Radiology results:    No radiology results from the last 24 hrs    I have reviewed the patient's radiology reports.    Medication Review:     I have reviewed the patient's active and prn medications.     Problem List:      Septic shock    CAD (coronary artery disease)    Chronic kidney disease, stage IV (severe)    Acute UTI (urinary tract infection)    Pneumonia of left lower lobe due to infectious organism      Assessment:    Septic shock secondary to #2 and #3, POA.  Acute urinary tract infection, POA.  Left lower lobe bacterial pneumonia, unable to classify further, POA.  Demand ischemia with elevated troponins, POA.  Hypokalemia, POA.  Chronic kidney disease stage IV.  Diabetes mellitus type 2 with nephropathy and hyperglycemia, POA.  Coronary artery disease status post CABG and stents.  COPD, no exacerbation.  Benign prostatic hyperplasia.  Will falls with skin abrasions, POA.    Plan:    Septic shock  Acute UTI  Left lower lobe bacterial pneumonia  Negative blood culture, on Zosyn.  Oxygenation stable.   Off pressors.  Urine culture had E faecalis.  Discussed with pharmacy, no further treatment indicated  Demand ischemia with elevated troponins  Troponins plateaued 95-98  Suspect secondary to demand ischemia in the setting of his septic shock as well as CKD  Low suspicion for ACS  Hypokalemia  Continue replacement  CKD stage IV  Nephrology, Dr. Hooper consulted and appreciate recommendations.  Numbers now stabilized.  Off fluids.  Diuretics started  Type 2 diabetes  Subcutaneous insulin protocol  Anemia  Does have chronic anemia likely due to renal failure, however acute on chronic currently.  Received 1 unit of PRBCs.  There was reportedly some blood loss with catheter insertion but urine is clear now.  Have restarted aspirin.  Continue to monitor, especially for any GI bleeding.  CAD status post CABG  Continue DAPT with aspirin and Plavix  COPD not exacerbation  Multiple skin abrasions  Wound care  Impaired mobility and ADLs  PT/OT once stable to participate    At this time we are pending approval or denial for short-term rehab.  Patient is okay with going back home if he gets denied, but wants to see if he is able to go.    DVT Prophylaxis: SCD  Code Status: Full  Diet: Renal diabetic.  Discharge Plan: KRISTINE Davis DO  07/06/25  13:36 EDT    Dictated utilizing Dragon dictation.

## 2025-07-07 LAB
ANION GAP SERPL CALCULATED.3IONS-SCNC: 10.2 MMOL/L (ref 5–15)
BASOPHILS # BLD AUTO: 0.02 10*3/MM3 (ref 0–0.2)
BASOPHILS NFR BLD AUTO: 0.3 % (ref 0–1.5)
BUN SERPL-MCNC: 16 MG/DL (ref 8–23)
BUN/CREAT SERPL: 8.8 (ref 7–25)
CALCIUM SPEC-SCNC: 9.2 MG/DL (ref 8.6–10.5)
CHLORIDE SERPL-SCNC: 101 MMOL/L (ref 98–107)
CO2 SERPL-SCNC: 23.8 MMOL/L (ref 22–29)
CREAT SERPL-MCNC: 1.82 MG/DL (ref 0.76–1.27)
DEPRECATED RDW RBC AUTO: 49.9 FL (ref 37–54)
EGFRCR SERPLBLD CKD-EPI 2021: 39 ML/MIN/1.73
EOSINOPHIL # BLD AUTO: 0.16 10*3/MM3 (ref 0–0.4)
EOSINOPHIL NFR BLD AUTO: 2.3 % (ref 0.3–6.2)
ERYTHROCYTE [DISTWIDTH] IN BLOOD BY AUTOMATED COUNT: 16.9 % (ref 12.3–15.4)
GLUCOSE BLDC GLUCOMTR-MCNC: 107 MG/DL (ref 70–130)
GLUCOSE BLDC GLUCOMTR-MCNC: 120 MG/DL (ref 70–130)
GLUCOSE BLDC GLUCOMTR-MCNC: 144 MG/DL (ref 70–130)
GLUCOSE BLDC GLUCOMTR-MCNC: 185 MG/DL (ref 70–130)
GLUCOSE SERPL-MCNC: 123 MG/DL (ref 65–99)
HCT VFR BLD AUTO: 26.2 % (ref 37.5–51)
HGB BLD-MCNC: 8.2 G/DL (ref 13–17.7)
IMM GRANULOCYTES # BLD AUTO: 0.05 10*3/MM3 (ref 0–0.05)
IMM GRANULOCYTES NFR BLD AUTO: 0.7 % (ref 0–0.5)
LYMPHOCYTES # BLD AUTO: 0.82 10*3/MM3 (ref 0.7–3.1)
LYMPHOCYTES NFR BLD AUTO: 11.8 % (ref 19.6–45.3)
MCH RBC QN AUTO: 25.7 PG (ref 26.6–33)
MCHC RBC AUTO-ENTMCNC: 31.3 G/DL (ref 31.5–35.7)
MCV RBC AUTO: 82.1 FL (ref 79–97)
MONOCYTES # BLD AUTO: 0.45 10*3/MM3 (ref 0.1–0.9)
MONOCYTES NFR BLD AUTO: 6.5 % (ref 5–12)
NEUTROPHILS NFR BLD AUTO: 5.46 10*3/MM3 (ref 1.7–7)
NEUTROPHILS NFR BLD AUTO: 78.4 % (ref 42.7–76)
NRBC BLD AUTO-RTO: 0 /100 WBC (ref 0–0.2)
PLATELET # BLD AUTO: 139 10*3/MM3 (ref 140–450)
PMV BLD AUTO: 9.3 FL (ref 6–12)
POTASSIUM SERPL-SCNC: 4.6 MMOL/L (ref 3.5–5.2)
RBC # BLD AUTO: 3.19 10*6/MM3 (ref 4.14–5.8)
SODIUM SERPL-SCNC: 135 MMOL/L (ref 136–145)
WBC NRBC COR # BLD AUTO: 6.96 10*3/MM3 (ref 3.4–10.8)

## 2025-07-07 PROCEDURE — 82948 REAGENT STRIP/BLOOD GLUCOSE: CPT

## 2025-07-07 PROCEDURE — 94761 N-INVAS EAR/PLS OXIMETRY MLT: CPT

## 2025-07-07 PROCEDURE — 97530 THERAPEUTIC ACTIVITIES: CPT

## 2025-07-07 PROCEDURE — 85025 COMPLETE CBC W/AUTO DIFF WBC: CPT | Performed by: FAMILY MEDICINE

## 2025-07-07 PROCEDURE — 63710000001 INSULIN LISPRO (HUMAN) PER 5 UNITS: Performed by: INTERNAL MEDICINE

## 2025-07-07 PROCEDURE — 97110 THERAPEUTIC EXERCISES: CPT

## 2025-07-07 PROCEDURE — 80048 BASIC METABOLIC PNL TOTAL CA: CPT | Performed by: FAMILY MEDICINE

## 2025-07-07 PROCEDURE — 99232 SBSQ HOSP IP/OBS MODERATE 35: CPT | Performed by: INTERNAL MEDICINE

## 2025-07-07 PROCEDURE — 82948 REAGENT STRIP/BLOOD GLUCOSE: CPT | Performed by: INTERNAL MEDICINE

## 2025-07-07 PROCEDURE — 63710000001 INSULIN GLARGINE PER 5 UNITS: Performed by: INTERNAL MEDICINE

## 2025-07-07 PROCEDURE — 94799 UNLISTED PULMONARY SVC/PX: CPT

## 2025-07-07 PROCEDURE — 97116 GAIT TRAINING THERAPY: CPT

## 2025-07-07 RX ORDER — BUPRENORPHINE AND NALOXONE 8; 2 MG/1; MG/1
1 FILM, SOLUBLE BUCCAL; SUBLINGUAL 2 TIMES DAILY
Status: COMPLETED | OUTPATIENT
Start: 2025-07-07 | End: 2025-07-08

## 2025-07-07 RX ADMIN — INSULIN LISPRO 2 UNITS: 100 INJECTION, SOLUTION INTRAVENOUS; SUBCUTANEOUS at 21:09

## 2025-07-07 RX ADMIN — INSULIN GLARGINE 20 UNITS: 100 INJECTION, SOLUTION SUBCUTANEOUS at 21:09

## 2025-07-07 RX ADMIN — BUPRENORPHINE AND NALOXONE 1 FILM: 8; 2 FILM BUCCAL; SUBLINGUAL at 01:27

## 2025-07-07 RX ADMIN — Medication 1 CAPSULE: at 09:12

## 2025-07-07 RX ADMIN — Medication 1 CAPSULE: at 21:10

## 2025-07-07 RX ADMIN — Medication 10 ML: at 09:17

## 2025-07-07 RX ADMIN — BUPRENORPHINE AND NALOXONE 1 FILM: 8; 2 FILM BUCCAL; SUBLINGUAL at 09:24

## 2025-07-07 RX ADMIN — BUDESONIDE 0.5 MG: 0.5 SUSPENSION RESPIRATORY (INHALATION) at 07:11

## 2025-07-07 RX ADMIN — PANTOPRAZOLE SODIUM 40 MG: 40 TABLET, DELAYED RELEASE ORAL at 05:03

## 2025-07-07 RX ADMIN — ASPIRIN 81 MG: 81 TABLET, COATED ORAL at 09:12

## 2025-07-07 RX ADMIN — CLOPIDOGREL BISULFATE 75 MG: 75 TABLET, FILM COATED ORAL at 09:12

## 2025-07-07 RX ADMIN — Medication 10 ML: at 21:10

## 2025-07-07 RX ADMIN — BUSPIRONE HYDROCHLORIDE 15 MG: 15 TABLET ORAL at 21:10

## 2025-07-07 RX ADMIN — BUDESONIDE 0.5 MG: 0.5 SUSPENSION RESPIRATORY (INHALATION) at 19:14

## 2025-07-07 RX ADMIN — BUPRENORPHINE AND NALOXONE 1 FILM: 8; 2 FILM BUCCAL; SUBLINGUAL at 21:39

## 2025-07-07 RX ADMIN — BUSPIRONE HYDROCHLORIDE 15 MG: 15 TABLET ORAL at 09:15

## 2025-07-07 NOTE — THERAPY TREATMENT NOTE
Patient Name: Jonny Ferrari Jr.  : 1950    MRN: [id number]                              Today's Date: 2025       Admit Date: 2025    Visit Dx:     ICD-10-CM ICD-9-CM   1. Sepsis with acute renal failure and septic shock, due to unspecified organism, unspecified acute renal failure type  A41.9 038.9    R65.21 995.92    N17.9 785.52     584.9   2. Acute kidney injury superimposed on chronic kidney disease  N17.9 584.9    N18.9 585.9   3. Hyponatremia  E87.1 276.1   4. Hypokalemia  E87.6 276.8   5. Chronic anemia  D64.9 285.9   6. Left lower lobe consolidation  J18.1 481   7. Type 2 diabetes mellitus with hyperglycemia, unspecified whether long term insulin use  E11.65 250.00   8. Multiple skin tears  T14.8XXA 879.8     Patient Active Problem List   Diagnosis    CAD (coronary artery disease)    Essential hypertension    Dyslipidemia    Obesity    GERD (gastroesophageal reflux disease)    OA (osteoarthritis)    TIA (transient ischemic attack)    Anxiety neurosis    Psoriasis    Tobacco use    Diabetes mellitus    Bilateral carotid artery stenosis    NSTEMI (non-ST elevated myocardial infarction)    Chronic kidney disease, stage IV (severe)    Esophageal dysphagia    Iron deficiency anemia    Acute urinary retention    Debility    Perirectal abscess    Acute renal failure (ARF)    Acute metabolic encephalopathy    Sepsis    Septic shock    Acute UTI (urinary tract infection)    Pneumonia of left lower lobe due to infectious organism     Past Medical History:   Diagnosis Date    Anemia     Anxiety neurosis     CAD (coronary artery disease)     Cancer     Kidney    Chronic kidney disease     Patient reported left kidney removed secondary to kidney cancer and that he only has 30% function of the right kidney    Constipation     COPD (chronic obstructive pulmonary disease)     Depression     Diabetes mellitus     DM TYPE 2 , ONSET IN     Dyslipidemia     Dysphagia     Reported noted with food, fluids and  pills    Elevated cholesterol     Full dentures     GERD (gastroesophageal reflux disease)     Gout     H/O left nephrectomy 2020    secondary to cancer    [place] (hard of hearing)     Patient has bilateral hearing aids, still is very [place]    Hypertension     Impaired functional mobility, balance, gait, and endurance     MI (myocardial infarction)     Patient reported apx March 2021 (reported he refused cardiac cath) and June 2021 (did have cardiac cath with placement of 2 stents).     OA (osteoarthritis)     Obesity     MILD TO MODERATE EXOGENOUS OBESITY    Problems with swallowing     with food    Psoriasis     Sleep apnea     CPAP HS - to bring mask and machine DOS    Tattoo     x1    TIA (transient ischemic attack)     Wears glasses 10/27/2021     Past Surgical History:   Procedure Laterality Date    APPENDECTOMY      CARDIAC CATHETERIZATION      CARDIAC CATHETERIZATION N/A 2/20/2019    Procedure: Left Heart Cath;  Surgeon: Ernst Smith MD;  Location:  [facility];  Service: Cardiology    CARDIAC CATHETERIZATION Left 6/9/2021    Procedure: Left Heart Cath;  Surgeon: Ernst Smith MD;  Location:  [facility];  Service: Cardiology;  Laterality: Left;    CHOLECYSTECTOMY      COLONOSCOPY      COLONOSCOPY N/A 11/10/2021    Procedure: COLONOSCOPY with polypectomy x6 and clip x2;  Surgeon: Chidi Trinidad MD;  Location: [facility] ENDOSCOPY;  Service: Gastroenterology;  Laterality: N/A;    COLONOSCOPY N/A 10/25/2023    Procedure: COLONOSCOPY incomplete;  Surgeon: Chidi Trinidad MD;  Location: [facility] ENDOSCOPY;  Service: Gastroenterology;  Laterality: N/A;    COLONOSCOPY N/A 11/8/2023    Procedure: COLONOSCOPY WITH POLYPECTOMY X1;  Surgeon: Chidi Trinidad MD;  Location: [facility] ENDOSCOPY;  Service: Gastroenterology;  Laterality: N/A;    CORONARY ARTERY BYPASS GRAFT  2001    Patient reported no MI prior to CABG and that the bypass was 3 vessel     ENDOSCOPY      ENDOSCOPY N/A  11/10/2021    Procedure: ESOPHAGOGASTRODUODENOSCOPY with biopsy and dilatation;  Surgeon: Chidi Trinidad MD;  Location: [facility] ENDOSCOPY;  Service: Gastroenterology;  Laterality: N/A;    ENDOSCOPY N/A 10/25/2023    Procedure: ESOPHAGOGASTRODUODENOSCOPY with biopsy and dilatation;  Surgeon: Chidi Trinidad MD;  Location: [facility] ENDOSCOPY;  Service: Gastroenterology;  Laterality: N/A;    ENDOSCOPY N/A 11/8/2023    Procedure: ESOPHAGOGASTRODUODENOSCOPY WITH BIOPSY;  Surgeon: Chidi Trinidad MD;  Location: [facility] ENDOSCOPY;  Service: Gastroenterology;  Laterality: N/A;    INCISION AND DRAINAGE PERIRECTAL ABSCESS N/A 8/29/2024    Procedure: INCISION AND DRAINAGE OF PERIRECTAL ABSCESS;  Surgeon: Sherman Billingsley MD;  Location: [facility] OR;  Service: General;  Laterality: N/A;    LAPAROSCOPIC NEPHRECTOMY Left     MOUTH SURGERY      Full mouth extraction    URETER SURGERY      VASECTOMY        General Information       Row Name 07/07/25 1305          OT Time and Intention    Document Type therapy note (daily note)  -     Mode of Treatment occupational therapy;co-treatment;physical therapy  -     Patient Effort good  -       Row Name 07/07/25 1305          General Information    Patient Profile Reviewed yes  -     Existing Precautions/Restrictions fall;oxygen therapy device and L/min  -       Row Name 07/07/25 1305          Cognition    Orientation Status (Cognition) oriented x 4  -       Row Name 07/07/25 1305          Safety Issues/Impairments Affecting Functional Mobility    Impairments Affecting Function (Mobility) balance;endurance/activity tolerance;shortness of breath;strength;pain  -               User Key  (r) = Recorded By, (t) = Taken By, (c) = Cosigned By      Initials Name Provider Type     Vandana Rosas OT Occupational Therapist                     Mobility/ADL's       Row Name 07/07/25 1305          Sit-Stand Transfer    Sit-Stand [place] (Transfers) standby assist;1 person  assist  -     Assistive Device (Sit-Stand Transfers) walker, front-wheeled  -       Row Name 07/07/25 1307          Functional Mobility    Functional Mobility- Ind. Level contact guard assist;1 person  -     Functional Mobility- Device walker, front-wheeled  -     Functional Mobility-Distance (Feet) 60  -HH     Patient was able to Ambulate yes  -[facility] Name 07/07/25 1307          Activities of Daily Living    BADL Assessment/Intervention lower body dressing  -[facility] Name 07/07/25 1307          Lower Body Dressing Assessment/Training    [place] Level (Lower Body Dressing) minimum assist (75% patient effort);socks;doff;don  -     Position (Lower Body Dressing) unsupported sitting  -               User Key  (r) = Recorded By, (t) = Taken By, (c) = Cosigned By      Initials Name Provider Type     Vandana Rosas, OT Occupational Therapist                   Obj/Interventions    No documentation.                  Goals/Plan    No documentation.                  Clinical Impression       [facility] Name 07/07/25 1311          Pain Assessment    Pretreatment Pain Rating 0/10 - no pain  -     Posttreatment Pain Rating 0/10 - no pain  -HH       Row Name 07/07/25 1311          Plan of Care Review    Outcome Evaluation Pt agreeable to OT session this date. Pt received sitting in recliner, call-light within reach. Pt completed STS with FWW. Pt ambulated ~60ft with FWW requiring CGA with FWW and verbal cueing needed for technique and safety. Pt was able to doff/don bilateral socks with Reji while seated in recliner. Pt verbalized fear of falling resulting in him not ambulating prior to admission. Pt would benefit from STR at discharge to improve Ind/safety with functional tasks/mobility and decrease risk of falling. Cont. POC  -[facility] Name 07/07/25 1311          Vital Signs    Pre Systolic BP Rehab 117  -HH     Pre Treatment Diastolic BP 63  -HH     Pretreatment Heart Rate (beats/min) 71  -HH      Posttreatment Heart Rate (beats/min) 73  -HH     Pre SpO2 (%) 96  -HH     O2 Delivery Pre Treatment room air  -HH     O2 Delivery Intra Treatment room air  -HH     Post SpO2 (%) 97  -HH     O2 Delivery Post Treatment room air  -HH     Pre Patient Position Sitting  -HH     Intra Patient Position Standing  -HH     Post Patient Position Sitting  -HH       Row Name 07/07/25 1311          Positioning and Restraints    Pre-Treatment Position sitting in chair/recliner  -HH     Post Treatment Position chair  -HH     In Chair sitting;notified nsg;reclined;encouraged to call for assist;with family/caregiver  -               User Key  (r) = Recorded By, (t) = Taken By, (c) = Cosigned By      Initials Name Provider Type    HH Vandana Rosas, OT Occupational Therapist                   Outcome Measures       Row Name 07/07/25 1323          How much help from another is currently needed...    Putting on and taking off regular lower body clothing? 2  -HH     Bathing (including washing, rinsing, and drying) 2  -HH     Toileting (which includes using toilet bed pan or urinal) 2  -HH     Putting on and taking off regular upper body clothing 3  -HH     Taking care of personal grooming (such as brushing teeth) 3  -HH     Eating meals 3  -HH     AM-PAC 6 Clicks Score (OT) 15  -HH       Row Name 07/07/25 1135 07/07/25 0987       How much help from another person do you currently need...    Turning from your back to your side while in flat bed without using bedrails? 4  -JH 4  -AS (r) CM (t) AS (c)    Moving from lying on back to sitting on the side of a flat bed without bedrails? 4  -JH 3  -AS (r) CM (t) AS (c)    Moving to and from a bed to a chair (including a wheelchair)? 3  -JH 3  -AS (r) CM (t) AS (c)    Standing up from a chair using your arms (e.g., wheelchair, bedside chair)? 3  -JH 3  -AS (r) CM (t) AS (c)    Climbing 3-5 steps with a railing? 2  -JH 2  -AS (r) CM (t) AS (c)    To walk in hospital room? 3  - 3  -AS (r) CM  (t) AS (c)    AM-PAC 6 Clicks Score (PT) 19  - 18  -AS (r) CM (t)    Highest Level of Mobility Goal Walk 10 Steps or More-6  -JH Walk 10 Steps or More-6  -AS (r) CM (t)      Row Name 07/07/25 1323 07/07/25 1135       Functional Assessment    Outcome Measure Options AM-PAC 6 Clicks Daily Activity (OT)  - AM-PAC 6 Clicks Basic Mobility (PT)  -              User Key  (r) = Recorded By, (t) = Taken By, (c) = Cosigned By      Initials Name Provider Type    AS Anahi Hensley, RN Registered Nurse     Vandana Rosas, OT Occupational Therapist    Chidi Nascimento, RN Extern Registered Nurse     Alexander, Frankie, PT Physical Therapist                    Occupational Therapy Education       Title: PT OT SLP Therapies (In Progress)       Topic: Occupational Therapy (In Progress)       Point: ADL training (Done)       Learning Progress Summary            Patient Acceptance, E, VU by  at 7/7/2025 1324    Comment: Continued POC and discharge recommendations    Acceptance, E,TB, VU by SD at 7/4/2025 1352    Comment: Safety during toilet tf                                      User Key       Initials Effective Dates Name Provider Type Discipline    SD 06/16/21 -  Janel Howe OT Occupational Therapist OT     02/25/25 -  Vandana Rosas OT Occupational Therapist OT                  OT Recommendation and Plan     Plan of Care Review  Outcome Evaluation: Pt agreeable to OT session this date. Pt received sitting in recliner, call-light within reach. Pt completed STS with FWW. Pt ambulated ~60ft with FWW requiring CGA with FWW and verbal cueing needed for technique and safety. Pt was able to doff/don bilateral socks with Reji while seated in recliner. Pt verbalized fear of falling resulting in him not ambulating prior to admission. Pt would benefit from STR at discharge to improve Ind/safety with functional tasks/mobility and decrease risk of falling. Cont. POC     Time Calculation:         Time Calculation- OT        Row Name 07/07/25 1326 07/07/25 1137          Time Calculation- OT    OT Start Time 1003  -HH --     OT Received On 07/07/25  -HH --        Timed Charges    03559 - Gait Training Minutes  -- 15  -JH     96149 - OT Therapeutic Activity Minutes 24  -HH --     58230 - OT Self Care/Mgmt Minutes 4  -HH --        Total Minutes    Timed Charges Total Minutes 28  -HH 15  -JH      Total Minutes 28  -HH 15  -JH               User Key  (r) = Recorded By, (t) = Taken By, (c) = Cosigned By      Initials Name Provider Type     Vandana Rosas OT Occupational Therapist     Head, Frankie PT Physical Therapist                  Therapy Charges for Today       Code Description Service Date Service Provider Modifiers Qty    [id number]  OT THERAPEUTIC ACT EA 15 MIN 7/7/2025 Vandana Rosas OT GO 2                 Vandana Rosas OT  7/7/2025

## 2025-07-07 NOTE — PLAN OF CARE
Goal Outcome Evaluation:              Outcome Evaluation: Pt agreeable to OT session this date. Pt received sitting in recliner, call-light within reach. Pt completed STS with FWW. Pt ambulated ~60ft with FWW requiring CGA with FWW and verbal cueing needed for technique and safety. Pt was able to doff/don bilateral socks with Reji while seated in recliner. Pt verbalized fear of falling resulting in him not ambulating prior to admission. Pt would benefit from STR at discharge to improve Ind/safety with functional tasks/mobility and decrease risk of falling. Cont. POC

## 2025-07-07 NOTE — PROGRESS NOTES
[facility] Progress Note      Patient Name: Jonny Ferrari Jr.  : 1950  MRN: [id number]  Primary Care Physician:  Amaury Brice MD  Date of admission: 2025    Subjective     Interval History:     Blood pressure is stable no acute uremic symptoms  No acute events overnight  Clinically stable still some weakness and lethargy good urine output  Creatinine is near plateaued down to 1.82  Review of Systems:   As noted above    Objective     Vitals:   Temp:  [97.5 °F (36.4 °C)-98.4 °F (36.9 °C)] 97.5 °F (36.4 °C)  Heart Rate:  [69-89] 78  Resp:  [14-20] 17  BP: ()/(60-76) 120/70    Intake/Output Summary (Last 24 hours) at 2025 1545  Last data filed at 2025 1207  Gross per 24 hour   Intake 1862 ml   Output 2050 ml   Net -188 ml       Physical Exam:    General Appearance: alert, oriented x 3, no acute distress   Skin: warm and dry  HEENT: oral mucosa normal, nonicteric sclera  Neck: supple, no JVD  Lungs: CTA  Heart: RRR, normal S1 and S2  Abdomen: soft, nontender, nondistended  : no palpable bladder  Extremities: no edema, cyanosis or clubbing  Neuro: normal speech and mental status     Scheduled Meds:     aspirin, 81 mg, Oral, Daily  budesonide, 0.5 mg, Nebulization, BID - RT  buprenorphine-naloxone, 1 film, Sublingual, BID  busPIRone, 15 mg, Oral, Q12H  clopidogrel, 75 mg, Oral, Daily  insulin glargine, 20 Units, Subcutaneous, Nightly  insulin lispro, 2-9 Units, Subcutaneous, 4x Daily AC & at Bedtime  lactobacillus acidophilus, 1 capsule, Oral, BID  pantoprazole, 40 mg, Oral, Q AM  senna-docusate sodium, 2 tablet, Oral, BID  sodium chloride, 10 mL, Intravenous, Q12H      IV Meds:        Results Reviewed:   I have personally reviewed the results from the time of this admission to 2025 15:45 EDT     Results from last 7 days   Lab Units 25  0431 25  1009 25  0454   SODIUM mmol/L 135* 137 139   POTASSIUM mmol/L 4.6 4.4 3.7   CHLORIDE  mmol/L 101 102 101   CO2 mmol/L 23.8 24.3 27.9   BUN mg/dL 16.0 13.0 16.0   CREATININE mg/dL 1.82* 1.88* 1.95*   CALCIUM mg/dL 9.2 8.8 8.9   GLUCOSE mg/dL 123* 143* 84     Estimated Creatinine Clearance: 44.6 mL/min (A) (by C-G formula based on SCr of 1.82 mg/dL (H)).  Results from last 7 days   Lab Units 07/03/25  0503 07/02/25  1517 07/02/25  0407   PHOSPHORUS mg/dL 2.6 2.4* 1.9*         Results from last 7 days   Lab Units 07/07/25  0431 07/05/25  1009 07/04/25  0454 07/03/25  0503 07/02/25  1517 07/02/25  0407 07/02/25  0315 07/01/25  1945 07/01/25  0830   WBC 10*3/mm3 6.96  --   --   --   --  5.65  --  5.67 8.15   HEMOGLOBIN g/dL 8.2* 7.7* 8.2* 8.6* 7.7* 6.0*   < > 8.4* 7.0*   PLATELETS 10*3/mm3 139*  --   --   --   --  129*  --  148 154    < > = values in this interval not displayed.     Results from last 7 days   Lab Units 07/01/25  1945   INR  1.03       Assessment / Plan     ASSESSMENT:  Acute kidney injury: Recovered   chronic kidney disease stage IV: Underlying chronic kidney disease secondary to diabetes and hypertension as well as volume loss, status post left nephrectomy in March of 2020.  Baseline creatinine of 2.9 with a EGFR of 21 mL/min.  Now 1.82  Type 2 diabetes: Longstanding history of type 2 diabetes likely with some complications.  Septic shock: Treated as per hospitalist service still on pressors.  Left lower lobe pneumonia: On IV antibiotics as per hospitalist service.  Urinary tract infection: Treated  Hypokalemia: Replace per protocol  Coronary artery disease: Status post CABG and stent placements.  No acute issues  COPD: Longstanding history of smoking.  Untreated sleep apnea: Longstanding known history of obstructive sleep apnea, has not been able to use his BiPAP        PLAN:   -Encourage oral hydration   - monitor intake and output  -Dose medicate per GFR   -avoid nephrotoxin  -Monitor electrolytes  -Aim for MAP greater than 65  - Okay to discharge from nephrology perspective-  -Thank  for the consult will closely follow      Alem Maharaj MD  07/07/25  15:45 EDT    [facility]  [phone]

## 2025-07-07 NOTE — PLAN OF CARE
Goal Outcome Evaluation:  Plan of Care Reviewed With: patient, spouse        Progress: improving  Outcome Evaluation: Pt received sitting in recliner. Pt performed sit to stand with SBA and FWW. Pt ambulated 60 feet CGA with FWW and demonstrated severe R calcaneal eversion throughout ambulation. Pt revealed he has a history of a R ankle fracture 2 years ago that led to this deformity. Pt performed seated ankle exercises as indicated in flowsheets to improve strength of B ankles as well as improve neuromuscular control of R ankle. Pt demonstrates high fear of falling which led to him not ambulating in the past 2 years. Pt reports desire to be able to ambulate on his own prior to returning home. I believe this pt would benefit from short term rehabilitation at discharge to improve functional activity tolerance, decrease fall risk, and maximize functional independence. Continue PT POC. Of note, pt reports having an orthotic at home for the R ankle. Family was asked to bring the orthotic for the treatment tomorrow.

## 2025-07-07 NOTE — PLAN OF CARE
Goal Outcome Evaluation:           Progress: improving  Outcome Evaluation: pt is still waiting for insurnace approval and rehab placement. folley catheter for urinary retention has been removed. pt has not voided yet, pt up to bedside commode. pt has been up in recliner all day and can move to bedisde commode with minimal assistance.

## 2025-07-07 NOTE — DISCHARGE PLACEMENT REQUEST
"Carelon documentation     Isis Nuzhat Hidalgo (74 y.o. Male)       Date of Birth   1950    Social Security Number       Address   [address] [place] [address]    Home Phone   [phone]    MRN   [id number]       [facility]    Marital Status                               Admission Date   2025    Admission Type   Emergency    Admitting Provider   Silviano Cisneros MD    Attending Provider   Kuldeep Patel DO    Department, Room/Bed   [facility] INTENSIVE CARE, I       Discharge Date       Discharge Disposition       Discharge Destination                                 Attending Provider: Kuldeep Patel DO    Allergies: Metformin    Isolation: None   Infection: None   Code Status: CPR    Ht: 182.9 cm (72\")   Wt: 98.7 kg (217 lb 9.5 oz)    Admission Cmt: None   Principal Problem: Septic shock [A41.9,R65.21]                   Active Insurance as of 2025       Primary Coverage       Payor Plan Insurance Group Employer/Plan Group    ANTH MEDICARE REPLACEMENT [facility] MEDICARE ADVANTAGE O [id number]       Payor Plan Address Payor Plan Phone Number Payor Plan Fax Number Effective Dates    [address] [phone]  2025 - None Entered    [facility] [id number]         Subscriber Name Subscriber Birth Date Member ID       NUZHAT MARINELLI  1950 [id number]                     Emergency Contacts        (Rel.) Home Phone Work Phone Mobile Phone    IsisLashon (Spouse) [phone] -- [phone]    ISISCATE (Relative) -- -- [phone]    NEGRA MARINELLI (Son) [phone] -- --                 History & Physical        Silivano Cisneros MD at [address] [place] [address]   HISTORY AND PHYSICAL      Name:  Nuzhat Barrazajanice Hidalgo   Age:  74 y.o.  Sex:  male  :  1950  MRN:  [id number]   Visit Number:  [id number]  Admission Date:  2025  Date Of Service:  25  Primary Care Physician:  Amaury Brice " MD Yahaira    Chief Complaint:     Generalized weakness and falls.    History Of Presenting Illness:      Jonny Ferrari Jr. is a 74-year-old male with history of diabetes mellitus type 2, chronic kidney disease stage III, COPD, coronary artery disease status post CABG and stent, ERASMO, BPH was brought to the emergency room by EMS with symptoms of generalized weakness and fatigue that has been going on for the last 2 days.  History of Present Illness  Reason for Admit: Fatigue, sepsis, and chronic kidney disease.    The patient was brought in by ambulance due to an inability to walk, a condition that has persisted for the past 2 to 3 days. He reports no fever, dysuria, or cough but feels weak. He has experienced several falls recently and has been unable to walk independently for the past 3 to 4 days. Despite using a walker or cane, he continues to fall. He also reports swelling in his legs and is hard of hearing in his left ear. He reports feeling slightly better this evening.    He has stage 4 chronic kidney disease and is under the care of Dr. Hooper. He has been taking torsemide 100 mg.    He underwent bypass surgery approximately 20 years ago and is currently on baby aspirin.    MEDICATIONS  Current: Aspirin, torsemide     In the emergency room, patient was afebrile but mildly tachycardic in the 100 with initial blood pressure of 80/54 and pulse oxygen saturation of 95% on room air.  Patient was given 30 mL/kg fluid bolus initially in the emergency room with initial improvement of the blood pressure to systolic 100 but subsequently dropped it again to the 70s systolic.  Patient was given another 500 bolus of normal saline, he right femoral central line was placed and he was initiated on Levophed.    Blood work done in the emergency room showed initial troponin of 98 with repeat at 95.  proBNP 1835.  Sodium 131, potassium 2.9, BUN 61, creatinine 3.79 (baseline), glucose 335.  LFT was unremarkable.  Magnesium 1.8.   Initial lactic acid was 9.7 with repeat at 4.2.  Procalcitonin 0.79.  WBC 15.7, hemoglobin 9.  Urine analysis shows 21-50 WBCs with trace bacteria.  Blood cultures were drawn.  Fecal occult blood was negative.  Urine culture was sent.  Portable chest x-ray done in the emergency room showed chronic appearing bilateral parenchymal changes with prominent right transverse pressure.  Possible on homogenous opacity noted on the left lower zone.  Patient was given Zosyn and vancomycin in the emergency room, oral potassium chloride and was subsequently initiated on lactated Ringer's IV fluids.  CT abdomen and pelvis showed no acute process.  CT chest without contrast showed pleural effusion with lower lobe atelectasis.  Patient was given Zosyn and vancomycin and was subsequently admitted to the medical ICU for management of septic shock, urinary tract infection, left lower lobe pneumonia.    Review Of Systems:    All systems were reviewed and negative except as mentioned in history of presenting illness, assessment and plan.    Past Medical History: Patient's  has a past medical history of Anemia, Anxiety neurosis, CAD (coronary artery disease), Cancer, Chronic kidney disease, Constipation, COPD (chronic obstructive pulmonary disease), Depression, Diabetes mellitus, Dyslipidemia, Dysphagia, Elevated cholesterol, Full dentures, GERD (gastroesophageal reflux disease), Gout, H/O left nephrectomy (2020), [place] (hard of hearing), Hypertension, Impaired functional mobility, balance, gait, and endurance, MI (myocardial infarction), OA (osteoarthritis), Obesity, Problems with swallowing, Psoriasis, Sleep apnea, Tattoo, TIA (transient ischemic attack), and Wears glasses (10/27/2021).    Past Surgical History: Patient's  has a past surgical history that includes Appendectomy; Cardiac catheterization; Coronary artery bypass graft (2001); Cholecystectomy; Cardiac catheterization (N/A, 2/20/2019); Ureter surgery; Laparoscopic nephrectomy  (Left); Cardiac catheterization (Left, 6/9/2021); Mouth surgery; Colonoscopy; Esophagogastroduodenoscopy; Vasectomy; Colonoscopy (N/A, 11/10/2021); Esophagogastroduodenoscopy (N/A, 11/10/2021); Colonoscopy (N/A, 10/25/2023); Esophagogastroduodenoscopy (N/A, 10/25/2023); Colonoscopy (N/A, 11/8/2023); Esophagogastroduodenoscopy (N/A, 11/8/2023); and Incision and drainage perirectal abscess (N/A, 8/29/2024).    Social History: Patient's  reports that he has quit smoking. His smoking use included cigarettes. He started smoking about 45 years ago. He has a 45.5 pack-year smoking history. His smokeless tobacco use includes snuff. He reports that he does not drink alcohol and does not use drugs.    Family History:  Patient's family history includes Lung disease in his mother; No Known Problems in his brother, brother, father, maternal grandfather, maternal grandmother, paternal grandfather, paternal grandmother, sister, sister, and sister.     Allergies:      Metformin    Home Medications:    Prior to Admission Medications       Prescriptions Last Dose Informant Patient Reported? Taking?    acetaminophen (TYLENOL) 325 MG tablet  Spouse/Significant Other Yes No    Take 2 tablets by mouth Every 6 (Six) Hours As Needed.    alfuzosin (UROXATRAL) 10 MG 24 hr tablet   Yes No    Take 1 tablet by mouth Daily.    allopurinol (ZYLOPRIM) 100 MG tablet  Spouse/Significant Other Yes No    Take 2 tablets by mouth Daily.    aspirin 81 MG EC tablet  Spouse/Significant Other Yes No    Take 1 tablet by mouth Daily.    buprenorphine-naloxone (SUBOXONE) 8-2 MG per SL tablet  Spouse/Significant Other Yes No    Place 1 tablet under the tongue 2 (Two) Times a Day.    busPIRone (BUSPAR) 15 MG tablet  Spouse/Significant Other Yes No    Take 1 tablet by mouth 2 (two) times a day.    calcitriol (ROCALTROL) 0.25 MCG capsule  Spouse/Significant Other Yes No    Take 1 capsule by mouth Daily.    Cholecalciferol (Vitamin D) 50 MCG (2000 UT) tablet   Spouse/Significant Other Yes No    Take 1 tablet by mouth Daily.    Patient not taking:  Reported on 6/24/2025    clopidogrel (PLAVIX) 75 MG tablet  Spouse/Significant Other No No    Take 1 tablet by mouth Daily.    dapagliflozin Propanediol (Farxiga) 10 MG tablet  Spouse/Significant Other Yes No    Take  by mouth Daily.    dutasteride (AVODART) 0.5 MG capsule  Spouse/Significant Other Yes No    Take 1 capsule by mouth Daily.    ezetimibe (ZETIA) 10 MG tablet  Spouse/Significant Other Yes No    Take 1 tablet by mouth Daily.    Lantus SoloStar 100 UNIT/ML injection pen  Spouse/Significant Other Yes No    Inject 20 Units under the skin into the appropriate area as directed 4 (Four) Times a Day After Meals & at Bedtime.    linagliptin (TRADJENTA) 5 MG tablet tablet   No No    Take 1 tablet by mouth Daily.    metoprolol tartrate (LOPRESSOR) 25 MG tablet   No No    Take 1 tablet by mouth Daily. Take for systolic blood pressure greater than 110    midodrine (PROAMATINE) 5 MG tablet   No No    Take 1 tablet by mouth 3 (Three) Times a Day Before Meals.    nitroglycerin (NITROSTAT) 0.4 MG SL tablet  Spouse/Significant Other No No    Place 1 tablet under the tongue Every 5 (Five) Minutes As Needed for Chest Pain. Take no more than 3 doses in 15 minutes.    omeprazole (priLOSEC) 20 MG capsule  Spouse/Significant Other Yes No    Take 1 capsule by mouth 2 (Two) Times a Day.    potassium chloride ER (K-TAB) 20 MEQ tablet controlled-release ER tablet   Yes No    Take 1 tablet by mouth 2 (Two) Times a Day.    sertraline (ZOLOFT) 100 MG tablet  Spouse/Significant Other Yes No    Take 1 tablet by mouth Daily. 2 tablet daily    Spiriva HandiHaler 18 MCG per inhalation capsule   No No    Place 1 capsule into inhaler and inhale Daily for 30 days.    Symbicort 160-4.5 MCG/ACT inhaler   No No    Inhale 2 puffs 2 (Two) Times a Day for 30 days.    tamsulosin (FLOMAX) 0.4 MG capsule 24 hr capsule  Spouse/Significant Other Yes No    Take 1  "capsule by mouth Every Night.    torsemide (DEMADEX) 100 MG tablet   No No    Take 1 tablet by mouth Daily As Needed (Take as needed for fluid retention/swelling).    True Metrix Blood Glucose Test test strip  Spouse/Significant Other Yes No    See Admin Instructions.     ED Medications:    Medications   sodium chloride 0.9 % flush 10 mL (has no administration in time range)   Potassium Replacement - Follow Nurse / BPA Driven Protocol (has no administration in time range)   lactated ringers infusion (125 mL/hr Intravenous New Bag 6/29/25 2212)   norepinephrine (LEVOPHED) 8 mg in 250mL D5W infusion (0.1 mcg/kg/min × 95.3 kg Intravenous Rate/Dose Change 6/29/25 2306)   sodium chloride 0.9 % bolus 500 mL (0 mL Intravenous Stopped 6/29/25 1750)   piperacillin-tazobactam (ZOSYN) IVPB 3.375 g IVPB in 100 mL NS (VTB) (0 g Intravenous Stopped 6/29/25 1815)   vancomycin IVPB 2000 mg in 0.9% Sodium Chloride 500 mL (0 mg Intravenous Stopped 6/29/25 2033)   lactated ringers bolus 2,859 mL (0 mL Intravenous Stopped 6/29/25 2134)   potassium chloride (KLOR-CON M20) CR tablet 40 mEq (40 mEq Oral Given 6/29/25 1953)   lidocaine 1% - EPINEPHrine 1:143463 (XYLOCAINE W/EPI) 1 %-1:736457 injection 10 mL (10 mL Injection Given 6/29/25 2227)     Vital Signs:  Temp:  [97.9 °F (36.6 °C)] 97.9 °F (36.6 °C)  Heart Rate:  [] 107  Resp:  [18] 18  BP: ()/(42-68) 91/56        06/29/25  1658   Weight: 95.3 kg (210 lb)     Body mass index is 28.48 kg/m².    Physical Exam:     Most recent vital Signs: BP 91/56   Pulse 107   Temp 97.9 °F (36.6 °C) (Rectal)   Resp 18   Ht 182.9 cm (72\")   Wt 95.3 kg (210 lb)   SpO2 93%   BMI 28.48 kg/m²     Physical Exam  Constitutional:       General: He is not in acute distress.     Appearance: He is ill-appearing.      Comments: Does not seem to be in any distress at this time.   HENT:      Head: Normocephalic.      Comments: Healing abrasion noted on the scalp/vertex.     Right Ear: External " ear normal.      Left Ear: External ear normal.      Nose: Nose normal.      Mouth/Throat:      Mouth: Mucous membranes are moist.   Eyes:      Extraocular Movements: Extraocular movements intact.      Conjunctiva/sclera: Conjunctivae normal.   Cardiovascular:      Rate and Rhythm: Normal rate and regular rhythm.      Pulses: Normal pulses.      Heart sounds: Normal heart sounds. No murmur heard.     Comments: Midline CABG scar noted.  Pulmonary:      Effort: Pulmonary effort is normal.      Breath sounds: Rales present. No wheezing.   Abdominal:      General: Bowel sounds are normal.      Palpations: Abdomen is soft.      Tenderness: There is no abdominal tenderness. There is no guarding or rebound.      Comments: Surgical scars noted on the epigastric region.   Musculoskeletal:         General: Normal range of motion.      Cervical back: Neck supple.      Right lower leg: Edema present.      Left lower leg: Edema present.      Comments: 2+ pitting edema noted bilateral lower extremities up to the knees.  Surgical scar noted on the right knee joint.  Right femoral central venous line noted.   Skin:     General: Skin is warm.      Findings: Bruising and lesion present.      Comments: Multiple abrasions noted in bilateral lower and upper extremities, scalp.   Neurological:      General: No focal deficit present.      Mental Status: He is alert and oriented to person, place, and time. Mental status is at baseline.      Comments: Hard of hearing.   Psychiatric:         Mood and Affect: Mood normal.         Behavior: Behavior normal.       Physical Exam  Skin: Bruising is present all over the skin. There are numerous skin tears.  Extremities: Swelling is noted in the legs.    Laboratory data:    I have reviewed the labs done in the emergency room.    Results from last 7 days   Lab Units 06/29/25  1706   SODIUM mmol/L 131*   POTASSIUM mmol/L 2.9*   CHLORIDE mmol/L 81*   CO2 mmol/L 24.4   BUN mg/dL 61.0*   CREATININE  mg/dL 3.79*   CALCIUM mg/dL 9.0   BILIRUBIN mg/dL 0.2   ALK PHOS U/L 69   ALT (SGPT) U/L 14   AST (SGOT) U/L 19   GLUCOSE mg/dL 335*     Results from last 7 days   Lab Units 06/29/25  1706   WBC 10*3/mm3 15.69*   HEMOGLOBIN g/dL 9.0*   HEMATOCRIT % 27.6*   PLATELETS 10*3/mm3 205       Results from last 7 days   Lab Units 06/29/25  1826 06/29/25  1706   HSTROP T ng/L 95* 98*     Results from last 7 days   Lab Units 06/29/25  1706   PROBNP pg/mL 1,835.0*       Results from last 7 days   Lab Units 06/29/25  1706   LIPASE U/L 19       Results from last 7 days   Lab Units 06/29/25  2205   COLOR UA  Yellow   GLUCOSE UA  500 mg/dL (2+)*   KETONES UA  Negative   BLOOD UA  Negative   LEUKOCYTES UA  Moderate (2+)*   PH, URINE  5.5   BILIRUBIN UA  Negative   UROBILINOGEN UA  0.2 E.U./dL   RBC UA /HPF None Seen   WBC UA /HPF 21-50*     EKG:      EKG done in the emergency room was reviewed by me.  It shows sinus tachycardia at 103 bpm.  Left axis deviation noted.  ST flattening noted in the anterior chest leads.  Occasional PAC noted.    Radiology:    Portable chest x-ray done in the emergency room was reviewed by me.  It shows chronic appearing parenchymal changes bilaterally with no homogenous opacity in the left lower zone suspicious for infiltrate.  Prominent right transfer fissure noted.  Sternal wires noted.  An official report is currently pending.    CT Abdomen Pelvis Without Contrast  Result Date: 6/29/2025  FINAL REPORT TECHNIQUE: null CLINICAL HISTORY: sepsis COMPARISON: null FINDINGS: CT abdomen and pelvis without contrast Comparison: 08/29/2024 Findings: Mild degenerative change spine and hips. No acute bony abnormalities. Liver and spleen within normal limits. Pancreas and adrenal glands unremarkable. Cholecystectomy. No right renal stone or hydronephrosis. No focal renal abnormality or ureteral dilation. Status post left nephrectomy. No evidence for aortic aneurysm. No free fluid or adenopathy in the pelvis. No  diverticulitis. Appendix unremarkable.     Impression: No acute processes Authenticated and Electronically Signed by Anthony De La Paz MD on 06/29/2025 11:11:15 PM    CT Chest Without Contrast Diagnostic  Result Date: 6/29/2025  FINAL REPORT TECHNIQUE: null CLINICAL HISTORY: sepsis w/ hypoxemia COMPARISON: null FINDINGS: CT chest without contrast Comparison: None provided Findings: Bilateral posterior lower lobe atelectasis. Trace bilateral pleural effusions noted. Emphysema without other parenchymal abnormality. Cardiomegaly with coronary artery calcifications. Prior sternotomy for CABG. Benign partially calcified mediastinal nodes. No significant mediastinal adenopathy. No significant focal bony abnormalities.     Impression: Trace pleural effusions with lower lobe atelectasis Authenticated and Electronically Signed by Anthony De La Paz MD on 06/29/2025 11:07:32 PM    XR Ankle 3+ View Left  Result Date: 6/26/2025  CLINICAL INDICATION: pain TECHNIQUE: XR ANKLE RIGHT 3+ VIEWS COMPARISON: 24 April 2025, CT 6/13/2025 FINDINGS: Postoperative changes from resection of the distal fibula. Healed distal tibial fracture deformity in similar alignment. Severe narrowing of the tibiotalar joint with irregularity of the lateral talar dome and adjacent distal tibia. Tilt of the talus with widening of the medial clear space. Heterotopic ossification is again seen laterally. Severe narrowing of the tibiotalar joint redemonstrated.. Small tibiotalar effusion. Calcaneal enthesopathy.    Redemonstration of severe tibiotalar osteoarthrosis with postoperative changes as described. No acute bony findings. CRITICAL RESULT:   No. COMMUNICATION: Per this written report. Drafted by Pino Martinez MD on 6/26/2025 12:57 PM Final report signed by Pino Martinez MD on 6/26/2025 12:59 PM    Assessment:    Septic shock secondary to #2 and #3, POA.  Acute urinary tract infection, POA.  Left lower lobe bacterial pneumonia, unable to classify  further, POA.  Demand ischemia with elevated troponins, POA.  Hypokalemia, POA.  Chronic kidney disease stage IV.  Diabetes mellitus type 2 with nephropathy and hyperglycemia, POA.  Coronary artery disease status post CABG and stents.  COPD, no exacerbation.  Benign prostatic hyperplasia.  Will falls with skin abrasions, POA.  Assessment & Plan  The plan for septic shock is as follows:  - Administered 3 liters of fluid and started on Levophed to maintain blood pressure.  - A central line has been placed in the right groin.  - Strong antibiotics, Zosyn and vancomycin, have been initiated.  - Blood and urine cultures have been sent.  - Obtain nasal swab for MRSA.    The plan for stage IV chronic kidney disease is as follows:  - We will hold torsemide 100 mg due to low blood pressure.  - Consult Dr. Hooper from nephrology in AM.    The plan for status post bypass surgery is as follows:  - Continue baby aspirin, clopidogrel.  - Hold metoprolol due to low blood pressures.  - Continue midodrine.    Diabetes mellitus type 2 with hyperglycemia.  - We will start him on Lantus 20 units nightly.  - Continue subcutaneous insulin protocol for coverage and postprandial insulin.  - Obtain hemoglobin A1c levels.    Hypokalemia.  - Electrolyte replacement protocol.    Multiple skin abrasions.  - Consult wound care services.     Discussed with nursing staff at the bedside.    Risk Assessment: High  DVT Prophylaxis: Heparin  Code Status: Full  Diet: Renal diabetic.      Silviano Cisneros MD  06/29/25  23:45 EDT    Patient or patient representative verbalized consent for the use of Ambient Listening during the visit for chart documentation.    Dictated utilizing Dragon dictation.     Contains text generated by [employer]  Electronically signed by Silviano Cisneros MD at 06/30/25 0004          Physician Progress Notes (most recent note)        Kuldeep Patel DO at 07/07/25 1249              [facility]    PROGRESS  "NOTE    Name:  Jonny Ferrari Jr.   Age:  74 y.o.  Sex:  male  :  1950  MRN:  [id number]   Visit Number:  [id number]  Admission Date:  2025  Date Of Service:  25  Primary Care Physician:  Amaury Brice MD     LOS: 8 days :    Chief Complaint:      Follow-up septic shock    Subjective:    Patient examined.  Wife at bedside.  Patient sitting in bedside chair.  No acute events overnight.  Encourage work with PT/OT.  Awaiting short-term rehab placement.    Hospital Course:    Per prior provider: \"Jonny Ferrari Jr. is a 74-year-old male with history of diabetes mellitus type 2, chronic kidney disease stage III, COPD, coronary artery disease status post CABG and stent, ERASMO, BPH was brought to the emergency room by EMS with symptoms of generalized weakness and fatigue   The patient was brought in by ambulance due to an inability to walk, a condition that has persisted for the past 2 to 3 days. He reports no fever, dysuria, or cough but feels weak. He has experienced several falls recently and has been unable to walk independently for the past 3 to 4 days. Despite using a walker or cane, he continues to fall.   In the emergency room, patient was afebrile but mildly tachycardic in the 100 with initial blood pressure of 80/54 and pulse oxygen saturation of 95% on room air.  Patient was given 30 mL/kg fluid bolus initially in the emergency room with initial improvement of the blood pressure to systolic 100 but subsequently dropped it again to the 70s systolic.  Patient was given another 500 bolus of normal saline, he right femoral central line was placed and he was initiated on Levophed. \"     Patient was able to be weaned off steroids.  He completed antibiotics for UTI after discussion with pharmacy.  Has been seen by nephrology and kidney function is back to baseline.  Did have some drop in hemoglobin, there was concern for possible hematuria, this has resolved however.  Did receive 1 unit of " PRBCs.    Review of Systems:     All systems were reviewed and negative except as mentioned in subjective, assessment and plan.    Vital Signs:    Temp:  [98 °F (36.7 °C)-98.4 °F (36.9 °C)] 98.1 °F (36.7 °C)  Heart Rate:  [69-88] 69  Resp:  [14-20] 18  BP: ()/(60-71) 96/63    Intake and output:    I/O last 3 completed shifts:  In: 1685 [P.O.:925; I.V.:10; Other:750]  Out: 2475 [Urine:2475]  I/O this shift:  In: 717 [P.O.:717]  Out: 150 [Urine:150]    Physical Examination:    General Appearance:  Alert and cooperative. NAD   Head:  Atraumatic and normocephalic.   Eyes: Conjunctivae and sclerae normal, no icterus. No pallor.   Throat: No oral lesions, no thrush, oral mucosa moist.   Neck: Supple, trachea midline, no thyromegaly.   Lungs:   Breath sounds heard bilaterally equally.  No wheezing or crackles.    Heart:  Normal S1 and S2, no murmur, No JVD.   Abdomen:   Normal bowel sounds, Soft, nontender, nondistended, no rebound tenderness.   Extremities: Supple, no edema, no cyanosis, no clubbing.   Skin: No bleeding or rash.   Neurologic: Alert and oriented x 3. No facial asymmetry. Generalized weakness.      Laboratory results:    Results from last 7 days   Lab Units 07/07/25  0431 07/05/25  1009 07/04/25  0454   SODIUM mmol/L 135* 137 139   POTASSIUM mmol/L 4.6 4.4 3.7   CHLORIDE mmol/L 101 102 101   CO2 mmol/L 23.8 24.3 27.9   BUN mg/dL 16.0 13.0 16.0   CREATININE mg/dL 1.82* 1.88* 1.95*   CALCIUM mg/dL 9.2 8.8 8.9   GLUCOSE mg/dL 123* 143* 84     Results from last 7 days   Lab Units 07/07/25  0431 07/05/25  1009 07/04/25  0454 07/02/25  1517 07/02/25  0407 07/02/25  0315 07/01/25  1945   WBC 10*3/mm3 6.96  --   --   --  5.65  --  5.67   HEMOGLOBIN g/dL 8.2* 7.7* 8.2*   < > 6.0*   < > 8.4*   HEMATOCRIT % 26.2* 24.8* 26.4*   < > 18.8*   < > 26.7*   PLATELETS 10*3/mm3 139*  --   --   --  129*  --  148    < > = values in this interval not displayed.     Results from last 7 days   Lab Units 07/01/25  1945   INR   1.03             Recent Labs     08/29/24  2314   PHART 7.382   TUE6OFM 49.8*   PO2ART 68.0*   [id number] 29.5*   BASEEXCESS 3.8*      I have reviewed the patient's laboratory results.    Radiology results:    No radiology results from the last 24 hrs  I have reviewed the patient's radiology reports.    Medication Review:     I have reviewed the patient's active and prn medications.     Problem List:      Septic shock    CAD (coronary artery disease)    Chronic kidney disease, stage IV (severe)    Acute UTI (urinary tract infection)    Pneumonia of left lower lobe due to infectious organism      Assessment:    Septic shock secondary to #2 and #3, POA.  Acute urinary tract infection, POA.  Left lower lobe bacterial pneumonia, unable to classify further, POA.  Demand ischemia with elevated troponins, POA.  Hypokalemia, POA.  Chronic kidney disease stage IV.  Diabetes mellitus type 2 with nephropathy and hyperglycemia, POA.  Coronary artery disease status post CABG and stents.  COPD, no exacerbation.  Benign prostatic hyperplasia.  Will falls with skin abrasions, POA.    Plan:    Septic shock  Acute UTI  Left lower lobe bacterial pneumonia  Negative blood culture, on Zosyn.  Oxygenation stable.  Off pressors.  Urine culture had E faecalis.  Discussed with pharmacy, no further treatment indicated  Demand ischemia with elevated troponins  Troponins plateaued 95-98  Suspect secondary to demand ischemia in the setting of his septic shock as well as CKD  Low suspicion for ACS  Hypokalemia  Continue replacement  CKD stage IV  Nephrology, Dr. Hooper consulted and appreciate recommendations.  Numbers now stabilized.  Off fluids.  Diuretics started  Type 2 diabetes  Subcutaneous insulin protocol  Anemia  Does have chronic anemia likely due to renal failure, however acute on chronic currently.  Received 1 unit of PRBCs.  There was reportedly some blood loss with catheter insertion but urine is clear now.  Have restarted aspirin.   Continue to monitor, especially for any GI bleeding.  CAD status post CABG  Continue DAPT with aspirin and Plavix  COPD not exacerbation  Multiple skin abrasions  Wound care  Impaired mobility and ADLs  PT/OT once stable to participate     At this time we are pending approval or denial for short-term rehab.  Patient is okay with going back home if he gets denied, but wants to see if he is able to go.    Plan of care reviewed, no changes. Copied forward from 7/6     DVT Prophylaxis: SCD  Code Status: Full  Diet: Renal diabetic.  Discharge Plan: STR       Kuldeep Patel DO  07/07/25  12:50 EDT    Dictated utilizing Dragon dictation.        Electronically signed by Kuldeep Patel DO at 07/07/25 1252          Physical Therapy Notes (all)        Kuldeep Ren PT at 07/01/25 1707  Version 1 of 1         Goal Outcome Evaluation:  Plan of Care Reviewed With: patient        Progress: no change  Outcome Evaluation: Pt participated in PT evaluation this date. Pt supine upon arrival, agreeable to PT. Pt on 2 L NC with O2 sats 100%. Pt does not wear O2 at baseline, so it was doffed. Pt lives with his wife and reports that he has trouble ambulating more than 4-5 steps due to dizziness and frequent falls. Pt has a recent R ankle fx with multiple surgeries performed. Pt primarily uses wc due to this. Pt needs assist for ADLs. Pt BP in supine 98/62. Pt came from supine to sitting SBA and sat EOB with BP 82/53. Pt completed STS CGA using RW and took steps toward HOB totaling 3 ft, CGA using RW. pt was seated and returned to supine SBA. pt BP upon returning to supine 71/47. Pt was left with all needs met. Pt is expected to benefit from skilled PT during this inpatient stay to maximize functional mobility and IND. Pt would further benefit from STR upon dc    Anticipated Discharge Disposition (PT): inpatient rehabilitation facility, skilled nursing facility                          Electronically signed by Kuldeep Ren PT at  25 170       Kuldeep Ren, PT at 25 1708  Version 1 of 1         Patient Name: Jonny Ferrari Jr.  : 1950    MRN: [id number]                              Today's Date: 2025       Admit Date: 2025    Visit Dx:     ICD-10-CM ICD-9-CM   1. Sepsis with acute renal failure and septic shock, due to unspecified organism, unspecified acute renal failure type  A41.9 038.9    R65.21 995.92    N17.9 785.52     584.9   2. Acute kidney injury superimposed on chronic kidney disease  N17.9 584.9    N18.9 585.9   3. Hyponatremia  E87.1 276.1   4. Hypokalemia  E87.6 276.8   5. Chronic anemia  D64.9 285.9   6. Left lower lobe consolidation  J18.1 481   7. Type 2 diabetes mellitus with hyperglycemia, unspecified whether long term insulin use  E11.65 250.00   8. Multiple skin tears  T14.8XXA 879.8     Patient Active Problem List   Diagnosis    CAD (coronary artery disease)    Essential hypertension    Dyslipidemia    Obesity    GERD (gastroesophageal reflux disease)    OA (osteoarthritis)    TIA (transient ischemic attack)    Anxiety neurosis    Psoriasis    Tobacco use    Diabetes mellitus    Bilateral carotid artery stenosis    NSTEMI (non-ST elevated myocardial infarction)    Chronic kidney disease, stage IV (severe)    Esophageal dysphagia    Iron deficiency anemia    Acute urinary retention    Debility    Perirectal abscess    Acute renal failure (ARF)    Acute metabolic encephalopathy    Sepsis    Septic shock    Acute UTI (urinary tract infection)    Pneumonia of left lower lobe due to infectious organism     Past Medical History:   Diagnosis Date    Anemia     Anxiety neurosis     CAD (coronary artery disease)     Cancer     Kidney    Chronic kidney disease     Patient reported left kidney removed secondary to kidney cancer and that he only has 30% function of the right kidney    Constipation     COPD (chronic obstructive pulmonary disease)     Depression     Diabetes mellitus     DM TYPE 2 , ONSET  IN 2009    Dyslipidemia     Dysphagia     Reported noted with food, fluids and pills    Elevated cholesterol     Full dentures     GERD (gastroesophageal reflux disease)     Gout     H/O left nephrectomy 2020    secondary to cancer    [place] (hard of hearing)     Patient has bilateral hearing aids, still is very [place]    Hypertension     Impaired functional mobility, balance, gait, and endurance     MI (myocardial infarction)     Patient reported apx March 2021 (reported he refused cardiac cath) and June 2021 (did have cardiac cath with placement of 2 stents).     OA (osteoarthritis)     Obesity     MILD TO MODERATE EXOGENOUS OBESITY    Problems with swallowing     with food    Psoriasis     Sleep apnea     CPAP HS - to bring mask and machine DOS    Tattoo     x1    TIA (transient ischemic attack)     Wears glasses 10/27/2021     Past Surgical History:   Procedure Laterality Date    APPENDECTOMY      CARDIAC CATHETERIZATION      CARDIAC CATHETERIZATION N/A 2/20/2019    Procedure: Left Heart Cath;  Surgeon: Ernst Smith MD;  Location:  [facility];  Service: Cardiology    CARDIAC CATHETERIZATION Left 6/9/2021    Procedure: Left Heart Cath;  Surgeon: Ernst Smith MD;  Location:  [facility];  Service: Cardiology;  Laterality: Left;    CHOLECYSTECTOMY      COLONOSCOPY      COLONOSCOPY N/A 11/10/2021    Procedure: COLONOSCOPY with polypectomy x6 and clip x2;  Surgeon: Chidi Trinidad MD;  Location: [facility];  Service: Gastroenterology;  Laterality: N/A;    COLONOSCOPY N/A 10/25/2023    Procedure: COLONOSCOPY incomplete;  Surgeon: Chidi Trinidad MD;  Location: [facility];  Service: Gastroenterology;  Laterality: N/A;    COLONOSCOPY N/A 11/8/2023    Procedure: COLONOSCOPY WITH POLYPECTOMY X1;  Surgeon: Chidi Trinidad MD;  Location: [facility];  Service: Gastroenterology;  Laterality: N/A;    CORONARY ARTERY BYPASS GRAFT  2001    Patient reported no MI prior to  CABG and that the bypass was 3 vessel     ENDOSCOPY      ENDOSCOPY N/A 11/10/2021    Procedure: ESOPHAGOGASTRODUODENOSCOPY with biopsy and dilatation;  Surgeon: Chidi Trinidad MD;  Location: [facility];  Service: Gastroenterology;  Laterality: N/A;    ENDOSCOPY N/A 10/25/2023    Procedure: ESOPHAGOGASTRODUODENOSCOPY with biopsy and dilatation;  Surgeon: Chidi Trinidad MD;  Location: [facility];  Service: Gastroenterology;  Laterality: N/A;    ENDOSCOPY N/A 11/8/2023    Procedure: ESOPHAGOGASTRODUODENOSCOPY WITH BIOPSY;  Surgeon: Chidi Trinidad MD;  Location: [facility];  Service: Gastroenterology;  Laterality: N/A;    INCISION AND DRAINAGE PERIRECTAL ABSCESS N/A 8/29/2024    Procedure: INCISION AND DRAINAGE OF PERIRECTAL ABSCESS;  Surgeon: Sherman Billingsley MD;  Location: [facility];  Service: General;  Laterality: N/A;    LAPAROSCOPIC NEPHRECTOMY Left     MOUTH SURGERY      Full mouth extraction    URETER SURGERY      VASECTOMY        General Information       Row Name 07/01/25 1659          Physical Therapy Time and Intention    Document Type evaluation  -     Mode of Treatment physical therapy  -       Row Name 07/01/25 1659          General Information    Patient Profile Reviewed yes  -     Prior Level of Function independent:;w/c or scooter;transfer;mod assist:;ADL's  -     Existing Precautions/Restrictions fall;oxygen therapy device and L/min;orthostatic hypotension  -     Barriers to Rehab medically complex;previous functional deficit;physical barrier  -       Row Name 07/01/25 1659          Living Environment    Current Living Arrangements home  -     People in Home spouse  -       Row Name 07/01/25 1659          Home Main Entrance    Number of Stairs, Main Entrance one  -     Stair Railings, Main Entrance none  -       Row Name 07/01/25 1659          Stairs Within Home, Primary    Number of Stairs, Within Home, Primary none  -       Row Name 07/01/25 1659           Cognition    Orientation Status (Cognition) oriented x 4  -       Row Name 07/01/25 1659          Safety Issues/Impairments Affecting Functional Mobility    Safety Issues Affecting Function (Mobility) safety precautions follow-through/compliance;safety precaution awareness;awareness of need for assistance  -     Impairments Affecting Function (Mobility) balance;endurance/activity tolerance;shortness of breath;strength;pain;postural/trunk control  -               User Key  (r) = Recorded By, (t) = Taken By, (c) = Cosigned By      Initials Name Provider Type    Kuldeep Hernandez PT Physical Therapist                   Mobility       Row Name 07/01/25 1700          Bed Mobility    Bed Mobility supine-sit;sit-supine  -     Supine-Sit [place] (Bed Mobility) standby assist  -     Sit-Supine [place] (Bed Mobility) standby assist  -     Assistive Device (Bed Mobility) bed rails;head of bed elevated  -       Row Name 07/01/25 1700          Sit-Stand Transfer    Sit-Stand [place] (Transfers) contact guard  -     Assistive Device (Sit-Stand Transfers) walker, front-wheeled  -       Row Name 07/01/25 1700          Gait/Stairs (Locomotion)    [place] Level (Gait) contact guard  -     Assistive Device (Gait) walker, front-wheeled  -     Patient was able to Ambulate yes  -MK     Distance in Feet (Gait) 3  -MK     Deviations/Abnormal Patterns (Gait) festinating/shuffling;gait speed decreased;base of support, narrow  -               User Key  (r) = Recorded By, (t) = Taken By, (c) = Cosigned By      Initials Name Provider Type    Kuldeep Hernandez PT Physical Therapist                   Obj/Interventions       Row Name 07/01/25 1700          Range of Motion Comprehensive    General Range of Motion no range of motion deficits identified  -       Row Name 07/01/25 1700          Strength Comprehensive (MMT)    General Manual Muscle Testing (MMT) Assessment lower extremity strength  deficits identified  -MK     Comment, General Manual Muscle Testing (MMT) Assessment B LE grossly 3+/5  -MK       Row Name 07/01/25 1700          Balance    Balance Assessment sitting static balance;sitting dynamic balance;standing static balance;standing dynamic balance  -MK     Static Sitting Balance standby assist  -MK     Dynamic Sitting Balance standby assist  -MK     Position, Sitting Balance unsupported;sitting edge of bed  -MK     Static Standing Balance contact guard  -MK     Dynamic Standing Balance contact guard  -MK     Position/Device Used, Standing Balance walker, front-wheeled  -MK     Balance Interventions sitting;standing;sit to stand  -MK               User Key  (r) = Recorded By, (t) = Taken By, (c) = Cosigned By      Initials Name Provider Type     Kuldeep Ren, PT Physical Therapist                   Goals/Plan       Row Name 07/01/25 1706          Bed Mobility Goal 1 (PT)    Activity/Assistive Device (Bed Mobility Goal 1, PT) bed mobility activities, all  -MK     [place] Level/Cues Needed (Bed Mobility Goal 1, PT) modified independence  -MK     Time Frame (Bed Mobility Goal 1, PT) short term goal (STG);5 days  -     Progress/Outcomes (Bed Mobility Goal 1, PT) new goal  -       Row Name 07/01/25 1706          Transfer Goal 1 (PT)    Activity/Assistive Device (Transfer Goal 1, PT) sit-to-stand/stand-to-sit  -MK     [place] Level/Cues Needed (Transfer Goal 1, PT) modified independence  -MK     Time Frame (Transfer Goal 1, PT) long term goal (LTG);10 days  -     Progress/Outcome (Transfer Goal 1, PT) new goal  -       Row Name 07/01/25 1706          Gait Training Goal 1 (PT)    Activity/Assistive Device (Gait Training Goal 1, PT) gait (walking locomotion)  -MK     [place] Level (Gait Training Goal 1, PT) contact guard required  -MK     Distance (Gait Training Goal 1, PT) 12 ft using LRAD  -MK     Time Frame (Gait Training Goal 1, PT) long term goal (LTG);10 days  -      Progress/Outcome (Gait Training Goal 1, PT) new goal  -       Row Name 07/01/25 1706          Patient Education Goal (PT)    Activity (Patient Education Goal, PT) Pt to participate in B LE ther ex at least 3x per week  -     [place]/Cues/Accuracy (Memory Goal 2, PT) demonstrates adequately  -     Time Frame (Patient Education Goal, PT) long term goal (LTG);10 days  -     Progress/Outcome (Patient Education Goal, PT) new goal  -       Row Name 07/01/25 1706          Therapy Assessment/Plan (PT)    Planned Therapy Interventions (PT) balance training;bed mobility training;gait training;home exercise program;motor coordination training;neuromuscular re-education;patient/family education;strengthening;transfer training  -               User Key  (r) = Recorded By, (t) = Taken By, (c) = Cosigned By      Initials Name Provider Type    Kuldeep Hernandez, PT Physical Therapist                   Clinical Impression       Row Name 07/01/25 1701          Pain    Pretreatment Pain Rating 0/10 - no pain  -     Posttreatment Pain Rating 0/10 - no pain  -       Row Name 07/01/25 1701          Plan of Care Review    Plan of Care Reviewed With patient  -MK     Progress no change  -     Outcome Evaluation Pt participated in PT evaluation this date. Pt supine upon arrival, agreeable to PT. Pt on 2 L NC with O2 sats 100%. Pt does not wear O2 at baseline, so it was doffed. Pt lives with his wife and reports that he has trouble ambulating more than 4-5 steps due to dizziness and frequent falls. Pt has a recent R ankle fx with multiple surgeries performed. Pt primarily uses wc due to this. Pt needs assist for ADLs. Pt BP in supine 98/62. Pt came from supine to sitting SBA and sat EOB with BP 82/53. Pt completed STS CGA using RW and took steps toward HOB totaling 3 ft, CGA using RW. pt was seated and returned to supine SBA. pt BP upon returning to supine 71/47. Pt was left with all needs met. Pt is expected to benefit  from skilled PT during this inpatient stay to maximize functional mobility and IND. Pt would further benefit from STR upon dc  -       Row Name 07/01/25 1701          Therapy Assessment/Plan (PT)    Patient/Family Therapy Goals Statement (PT) pt is eager to go home  -     Rehab Potential (PT) good  -     Criteria for Skilled Interventions Met (PT) yes;skilled treatment is necessary  -     Therapy Frequency (PT) 5 times/wk  -     Predicted Duration of Therapy Intervention (PT) 2 weeks  -       Row Name 07/01/25 1701          Vital Signs    Pre Systolic BP Rehab 98  -MK     Pre Treatment Diastolic BP 62  -MK     Intra Systolic BP Rehab 82  -MK     Intra Treatment Diastolic BP 53  -MK     Post Systolic BP Rehab 71  -MK     Post Treatment Diastolic BP 47  -MK     O2 Delivery Pre Treatment supplemental O2  -     O2 Delivery Intra Treatment supplemental O2  -MK     O2 Delivery Post Treatment supplemental O2  -MK     Pre Patient Position Supine  -MK     Intra Patient Position Standing  -MK     Post Patient Position Supine  -       Row Name 07/01/25 1701          Positioning and Restraints    Pre-Treatment Position in bed  -     Post Treatment Position bed  -MK     In Bed supine;call light within reach;encouraged to call for assist;notified nsg;exit alarm on  -               User Key  (r) = Recorded By, (t) = Taken By, (c) = Cosigned By      Initials Name Provider Type    Kuldeep Hernandez, PT Physical Therapist                   Outcome Measures       Row Name 07/01/25 1706 07/01/25 0829       How much help from another person do you currently need...    Turning from your back to your side while in flat bed without using bedrails? 4  -MK 4  -LA    Moving from lying on back to sitting on the side of a flat bed without bedrails? 4  -MK 3  -LA    Moving to and from a bed to a chair (including a wheelchair)? 2  -MK 2  -LA    Standing up from a chair using your arms (e.g., wheelchair, bedside chair)? 2  -MK 2   -LA    Climbing 3-5 steps with a railing? 2  - 2  -LA    To walk in hospital room? 2  - 2  -LA    AM-PAC 6 Clicks Score (PT) 16  - 15  -LA    Highest Level of Mobility Goal Stand (1 or More Minutes)-5  - Move to Chair/Commode-4  -LA      Row Name 07/01/25 1706 07/01/25 1700       Functional Assessment    Outcome Measure Options AM-PAC 6 Clicks Basic Mobility (PT)  - AM-PAC 6 Clicks Daily Activity (OT)  -SD              User Key  (r) = Recorded By, (t) = Taken By, (c) = Cosigned By      Initials Name Provider Type    SD Janel Howe, OT Occupational Therapist    Isma Fernández, RN Registered Nurse    Kuldeep Hernandez, PT Physical Therapist                                 Physical Therapy Education       Title: PT OT SLP Therapies (In Progress)       Topic: Physical Therapy (In Progress)       Point: Mobility training (Done)       Learning Progress Summary            Patient Acceptance, E,D, DU,VU by  at 7/1/2025 1707    Comment: role of PT and POC                      Point: Home exercise program (Not Started)       Learner Progress:  Not documented in this visit.              Point: Body mechanics (Done)       Learning Progress Summary            Patient Acceptance, E,D, DU,VU by  at 7/1/2025 1707    Comment: role of PT and POC                      Point: Precautions (Not Started)       Learner Progress:  Not documented in this visit.                              User Key       Initials Effective Dates Name Provider Type Discipline     06/13/23 -  Kuldeep Ren, PT Physical Therapist PT                  PT Recommendation and Plan  Planned Therapy Interventions (PT): balance training, bed mobility training, gait training, home exercise program, motor coordination training, neuromuscular re-education, patient/family education, strengthening, transfer training  Progress: no change  Outcome Evaluation: Pt participated in PT evaluation this date. Pt supine upon arrival, agreeable to PT. Pt on 2 L NC  with O2 sats 100%. Pt does not wear O2 at baseline, so it was doffed. Pt lives with his wife and reports that he has trouble ambulating more than 4-5 steps due to dizziness and frequent falls. Pt has a recent R ankle fx with multiple surgeries performed. Pt primarily uses wc due to this. Pt needs assist for ADLs. Pt BP in supine 98/62. Pt came from supine to sitting SBA and sat EOB with BP 82/53. Pt completed STS CGA using RW and took steps toward HOB totaling 3 ft, CGA using RW. pt was seated and returned to supine SBA. pt BP upon returning to supine 71/47. Pt was left with all needs met. Pt is expected to benefit from skilled PT during this inpatient stay to maximize functional mobility and IND. Pt would further benefit from STR upon dc     Time Calculation:   PT Evaluation Complexity  History, PT Evaluation Complexity: 1-2 personal factors and/or comorbidities  Examination of Body Systems (PT Eval Complexity): total of 3 or more elements  Clinical Presentation (PT Evaluation Complexity): evolving  Clinical Decision Making (PT Evaluation Complexity): moderate complexity  Overall Complexity (PT Evaluation Complexity): moderate complexity     PT Charges       Row Name 07/01/25 1541             Time Calculation    Start Time 1541  -MK      PT Received On 07/01/25  -MK      PT Goal Re-Cert Due Date 07/11/25  -MK         Untimed Charges    PT Eval/Re-eval Minutes 48  -MK         Total Minutes    Untimed Charges Total Minutes 48  -MK       Total Minutes 48  -MK                User Key  (r) = Recorded By, (t) = Taken By, (c) = Cosigned By      Initials Name Provider Type    Kuldeep Hernandez, PT Physical Therapist                  Therapy Charges for Today       Code Description Service Date Service Provider Modifiers Qty    80093741403 HC PT EVAL MOD COMPLEXITY 3 7/1/2025 Kuldeep Ren, PT GP 1            PT G-Codes  Outcome Measure Options: AM-PAC 6 Clicks Basic Mobility (PT)  AM-PAC 6 Clicks Score (PT): 16  AM-PAC 6  Clicks Score (OT): 14  PT Discharge Summary  Anticipated Discharge Disposition (PT): inpatient rehabilitation facility, skilled nursing facility    Kuldeep Ren PT  2025      Electronically signed by Kuldeep Ren PT at 25 1709         Frankie Munoz, PT at 25 1002  Version 1 of 1      Attestation signed by Kuldeep Ren PT at 25 1251      I attest to the accuracy and completion of this note                    Goal Outcome Evaluation:  Plan of Care Reviewed With: patient, spouse        Progress: improving  Outcome Evaluation: Pt received sitting in recliner. Pt performed sit to stand with SBA and FWW. Pt ambulated 60 feet CGA with FWW and demonstrated severe R calcaneal eversion throughout ambulation. Pt revealed he has a history of a R ankle fracture 2 years ago that led to this deformity. Pt performed seated ankle exercises as indicated in flowsheets to improve strength of B ankles as well as improve neuromuscular control of R ankle. Pt demonstrates high fear of falling which led to him not ambulating in the past 2 years. Pt reports desire to be able to ambulate on his own prior to returning home. I believe this pt would benefit from short term rehabilitation at discharge to improve functional activity tolerance, decrease fall risk, and maximize functional independence. Continue PT POC. Of note, pt reports having an orthotic at home for the R ankle. Family was asked to bring the orthotic for the treatment tomorrow.                               Electronically signed by Kuldeep Ren PT at 25 1251       Frankie Munoz PT at 25 1002  Version 1 of 1      Attestation signed by Kuldeep Ren PT at 25 1252      I attest to the accuracy and completion of this note                    Patient Name: Jonny Ferrari Jr.  : 1950    MRN: [id number]                              Today's Date: 2025       Admit Date: 2025    Visit Dx:     ICD-10-CM ICD-9-CM   1. Sepsis with  acute renal failure and septic shock, due to unspecified organism, unspecified acute renal failure type  A41.9 038.9    R65.21 995.92    N17.9 785.52     584.9   2. Acute kidney injury superimposed on chronic kidney disease  N17.9 584.9    N18.9 585.9   3. Hyponatremia  E87.1 276.1   4. Hypokalemia  E87.6 276.8   5. Chronic anemia  D64.9 285.9   6. Left lower lobe consolidation  J18.1 481   7. Type 2 diabetes mellitus with hyperglycemia, unspecified whether long term insulin use  E11.65 250.00   8. Multiple skin tears  T14.8XXA 879.8     Patient Active Problem List   Diagnosis    CAD (coronary artery disease)    Essential hypertension    Dyslipidemia    Obesity    GERD (gastroesophageal reflux disease)    OA (osteoarthritis)    TIA (transient ischemic attack)    Anxiety neurosis    Psoriasis    Tobacco use    Diabetes mellitus    Bilateral carotid artery stenosis    NSTEMI (non-ST elevated myocardial infarction)    Chronic kidney disease, stage IV (severe)    Esophageal dysphagia    Iron deficiency anemia    Acute urinary retention    Debility    Perirectal abscess    Acute renal failure (ARF)    Acute metabolic encephalopathy    Sepsis    Septic shock    Acute UTI (urinary tract infection)    Pneumonia of left lower lobe due to infectious organism     Past Medical History:   Diagnosis Date    Anemia     Anxiety neurosis     CAD (coronary artery disease)     Cancer     Kidney    Chronic kidney disease     Patient reported left kidney removed secondary to kidney cancer and that he only has 30% function of the right kidney    Constipation     COPD (chronic obstructive pulmonary disease)     Depression     Diabetes mellitus     DM TYPE 2 , ONSET IN 2009    Dyslipidemia     Dysphagia     Reported noted with food, fluids and pills    Elevated cholesterol     Full dentures     GERD (gastroesophageal reflux disease)     Gout     H/O left nephrectomy 2020    secondary to cancer    [place] (hard of hearing)     Patient has  bilateral hearing aids, still is very [place]    Hypertension     Impaired functional mobility, balance, gait, and endurance     MI (myocardial infarction)     Patient reported apx March 2021 (reported he refused cardiac cath) and June 2021 (did have cardiac cath with placement of 2 stents).     OA (osteoarthritis)     Obesity     MILD TO MODERATE EXOGENOUS OBESITY    Problems with swallowing     with food    Psoriasis     Sleep apnea     CPAP HS - to bring mask and machine DOS    Tattoo     x1    TIA (transient ischemic attack)     Wears glasses 10/27/2021     Past Surgical History:   Procedure Laterality Date    APPENDECTOMY      CARDIAC CATHETERIZATION      CARDIAC CATHETERIZATION N/A 2/20/2019    Procedure: Left Heart Cath;  Surgeon: Ernst Smith MD;  Location:  [facility];  Service: Cardiology    CARDIAC CATHETERIZATION Left 6/9/2021    Procedure: Left Heart Cath;  Surgeon: Ernst Smith MD;  Location:  [facility];  Service: Cardiology;  Laterality: Left;    CHOLECYSTECTOMY      COLONOSCOPY      COLONOSCOPY N/A 11/10/2021    Procedure: COLONOSCOPY with polypectomy x6 and clip x2;  Surgeon: Chidi Trinidad MD;  Location: [facility] ENDOSCOPY;  Service: Gastroenterology;  Laterality: N/A;    COLONOSCOPY N/A 10/25/2023    Procedure: COLONOSCOPY incomplete;  Surgeon: Chidi Trinidad MD;  Location: [facility] ENDOSCOPY;  Service: Gastroenterology;  Laterality: N/A;    COLONOSCOPY N/A 11/8/2023    Procedure: COLONOSCOPY WITH POLYPECTOMY X1;  Surgeon: Chidi Trinidad MD;  Location: [facility] ENDOSCOPY;  Service: Gastroenterology;  Laterality: N/A;    CORONARY ARTERY BYPASS GRAFT  2001    Patient reported no MI prior to CABG and that the bypass was 3 vessel     ENDOSCOPY      ENDOSCOPY N/A 11/10/2021    Procedure: ESOPHAGOGASTRODUODENOSCOPY with biopsy and dilatation;  Surgeon: Chidi Trinidad MD;  Location: [facility] ENDOSCOPY;  Service: Gastroenterology;  Laterality: N/A;    ENDOSCOPY  N/A 10/25/2023    Procedure: ESOPHAGOGASTRODUODENOSCOPY with biopsy and dilatation;  Surgeon: Chidi Trinidad MD;  Location: [facility] ENDOSCOPY;  Service: Gastroenterology;  Laterality: N/A;    ENDOSCOPY N/A 11/8/2023    Procedure: ESOPHAGOGASTRODUODENOSCOPY WITH BIOPSY;  Surgeon: Chidi Trinidad MD;  Location: [facility] ENDOSCOPY;  Service: Gastroenterology;  Laterality: N/A;    INCISION AND DRAINAGE PERIRECTAL ABSCESS N/A 8/29/2024    Procedure: INCISION AND DRAINAGE OF PERIRECTAL ABSCESS;  Surgeon: Sherman Billingsley MD;  Location: [facility] OR;  Service: General;  Laterality: N/A;    LAPAROSCOPIC NEPHRECTOMY Left     MOUTH SURGERY      Full mouth extraction    URETER SURGERY      VASECTOMY        General Information       [facility] Name 07/07/25 1125          Physical Therapy Time and Intention    Document Type therapy note (daily note)  [facility]     Mode of Treatment physical therapy;co-treatment;occupational therapy  -[facility] Name 07/07/25 1125          General Information    Patient Profile Reviewed yes  -     Existing Precautions/Restrictions fall;oxygen therapy device and L/min  -       Row Name 07/07/25 1125          Cognition    Orientation Status (Cognition) oriented x 4  -[facility] Name 07/07/25 1125          Safety Issues/Impairments Affecting Functional Mobility    Impairments Affecting Function (Mobility) balance;endurance/activity tolerance;shortness of breath;strength;pain  -               User Key  (r) = Recorded By, (t) = Taken By, (c) = Cosigned By      Initials Name Provider Type     Frankie Munoz, PT Physical Therapist                   Mobility       Row Name 07/07/25 1126          Bed Mobility    Comment, (Bed Mobility) Pt received in chair  -       Row Name 07/07/25 1126          Sit-Stand Transfer    Sit-Stand [place] (Transfers) standby assist;1 person assist  -     Assistive Device (Sit-Stand Transfers) walker, front-wheeled  -       Row Name 07/07/25 1126          Gait/Stairs  (Locomotion)    [place] Level (Gait) contact guard;1 person assist  -     Assistive Device (Gait) walker, front-wheeled  -     Patient was able to Ambulate yes  -     Distance in Feet (Gait) 60  -JH     Deviations/Abnormal Patterns (Gait) festinating/shuffling;gait speed decreased;base of support, narrow;right sided deviations;micah decreased;stride length decreased;weight shifting decreased  -     Right Sided Gait Deviations heel strike decreased  R calcaneal eversion  -     [place] Level (Stairs) not tested  -               User Key  (r) = Recorded By, (t) = Taken By, (c) = Cosigned By      Initials Name Provider Type     Frankie Munoz PT Physical Therapist                   Obj/Interventions       Row Name 07/07/25 [facility]7          Motor Skills    Therapeutic Exercise ankle  -       Row Name 07/07/25 1127          Ankle (Therapeutic Exercise)    Ankle (Therapeutic Exercise) AROM (active range of motion);strengthening exercise  -     Ankle AROM (Therapeutic Exercise) bilateral;eversion;10 repetitions;3 second hold;sitting  -     Ankle Strengthening (Therapeutic Exercise) bilateral;dorsiflexion;plantarflexion;10 repetitions;sitting;eversion  ABC's  -       Row Name 07/07/25 1127          Balance    Balance Assessment sitting static balance;sitting dynamic balance;standing static balance;standing dynamic balance  -     Static Sitting Balance standby assist  -     Dynamic Sitting Balance standby assist  -     Position, Sitting Balance unsupported;sitting in chair  -     Static Standing Balance contact guard;1-person assist  -     Dynamic Standing Balance contact guard;1-person assist  -     Position/Device Used, Standing Balance supported;walker, front-wheeled  -               User Key  (r) = Recorded By, (t) = Taken By, (c) = Cosigned By      Initials Name Provider Type     Frankie Munoz PT Physical Therapist                   Goals/Plan    No documentation.                   Clinical Impression       Row Name 07/07/25 1129          Pain    Pretreatment Pain Rating 0/10 - no pain  -     Posttreatment Pain Rating 0/10 - no pain  -       Row Name 07/07/25 1129          Plan of Care Review    Plan of Care Reviewed With patient;spouse  -     Progress improving  -     Outcome Evaluation Pt received sitting in recliner. Pt performed sit to stand with SBA and FWW. Pt ambulated 60 feet CGA with FWW and demonstrated severe R calcaneal eversion throughout ambulation. Pt revealed he has a history of a R ankle fracture 2 years ago that led to this deformity. Pt performed seated ankle exercises as indicated in flowsheets to improve strength of B ankles as well as improve neuromuscular control of R ankle. Pt demonstrates high fear of falling which led to him not ambulating in the past 2 years. Pt reports desire to be able to ambulate on his own prior to returning home. I believe this pt would benefit from short term rehabilitation at discharge to improve functional activity tolerance, decrease fall risk, and maximize functional independence. Continue PT POC. Of note, pt reports having an orthotic at home for the R ankle. Family was asked to bring the orthotic for the treatment tomorrow.  -       Row Name 07/07/25 1129          Therapy Assessment/Plan (PT)    Rehab Potential (PT) good  -     Criteria for Skilled Interventions Met (PT) yes;skilled treatment is necessary  -     Therapy Frequency (PT) 5 times/wk  -       Row Name 07/07/25 1129          Vital Signs    Pre Systolic BP Rehab 117  -     Pre Treatment Diastolic BP 63  -     Post Systolic BP Rehab 140  -JH     Post Treatment Diastolic BP 76  -     Pretreatment Heart Rate (beats/min) 71  -     Posttreatment Heart Rate (beats/min) 73  -     Pre SpO2 (%) 96  -     O2 Delivery Pre Treatment room air  -     Post SpO2 (%) 97  -     O2 Delivery Post Treatment room air  -     Pre Patient Position Sitting  -      Post Patient Position Sitting  -       Row Name 07/07/25 1129          Positioning and Restraints    Pre-Treatment Position sitting in chair/recliner  -     Post Treatment Position chair  -     In Chair notified nsg;reclined;call light within reach;encouraged to call for assist;with family/caregiver  -               User Key  (r) = Recorded By, (t) = Taken By, (c) = Cosigned By      Initials Name Provider Type     Frankie Munoz, MARY Physical Therapist                   Outcome Measures       Row Name 07/07/25 1135 07/07/25 0924       How much help from another person do you currently need...    Turning from your back to your side while in flat bed without using bedrails? 4  - 4 (P)   -CM    Moving from lying on back to sitting on the side of a flat bed without bedrails? 4  - 3 (P)   -CM    Moving to and from a bed to a chair (including a wheelchair)? 3  - 3 (P)   -CM    Standing up from a chair using your arms (e.g., wheelchair, bedside chair)? 3  - 3 (P)   -CM    Climbing 3-5 steps with a railing? 2  - 2 (P)   -CM    To walk in hospital room? 3  - 3 (P)   -CM    AM-PAC 6 Clicks Score (PT) 19  - 18 (P)   -CM    Highest Level of Mobility Goal Walk 10 Steps or More-6  - Walk 10 Steps or More-6 (P)   -CM      Row Name 07/07/25 1135          Functional Assessment    Outcome Measure Options AM-PAC 6 Clicks Basic Mobility (PT)  -               User Key  (r) = Recorded By, (t) = Taken By, (c) = Cosigned By      Initials Name Provider Type    Chidi Nascimento, RN Extern Registered Nurse     Frankie Munoz, PT Physical Therapist                                 Physical Therapy Education       Title: PT OT SLP Therapies (In Progress)       Topic: Physical Therapy (In Progress)       Point: Mobility training (Done)       Learning Progress Summary            Patient Acceptance, E,TB, VU by  at 7/7/2025 1136    Comment: Pt educated in HEP, proper gait mechanics with FWW, and discharge  recommendations.    Acceptance, E,D, DU,VU by  at 7/2/2025 1338    Acceptance, E,D, DU,VU by  at 7/1/2025 1707    Comment: role of PT and POC                      Point: Home exercise program (Done)       Learning Progress Summary            Patient Acceptance, E,TB, VU by  at 7/7/2025 1136    Comment: Pt educated in HEP, proper gait mechanics with FWW, and discharge recommendations.    Acceptance, E,TB, VU by  at 7/5/2025 1748    Acceptance, E,D, DU,VU by  at 7/2/2025 1338                      Point: Body mechanics (Done)       Learning Progress Summary            Patient Acceptance, E,D, DU,VU by  at 7/2/2025 1338    Acceptance, E,D, DU,VU by  at 7/1/2025 1707    Comment: role of PT and POC                      Point: Precautions (Not Started)       Learner Progress:  Not documented in this visit.                              User Key       Initials Effective Dates Name Provider Type Discipline     06/16/21 -  Danica Amezcua PTA Physical Therapist Assistant PT     06/13/23 -  Kuldeep Ren PT Physical Therapist PT     06/19/25 -  Frankie Munoz, MARY Physical Therapist PT                  PT Recommendation and Plan     Progress: improving  Outcome Evaluation: Pt received sitting in recliner. Pt performed sit to stand with SBA and FWW. Pt ambulated 60 feet CGA with FWW and demonstrated severe R calcaneal eversion throughout ambulation. Pt revealed he has a history of a R ankle fracture 2 years ago that led to this deformity. Pt performed seated ankle exercises as indicated in flowsheets to improve strength of B ankles as well as improve neuromuscular control of R ankle. Pt demonstrates high fear of falling which led to him not ambulating in the past 2 years. Pt reports desire to be able to ambulate on his own prior to returning home. I believe this pt would benefit from short term rehabilitation at discharge to improve functional activity tolerance, decrease fall risk, and maximize functional  independence. Continue PT POC. Of note, pt reports having an orthotic at home for the R ankle. Family was asked to bring the orthotic for the treatment tomorrow.     Time Calculation:         PT Charges       Row Name 25 1137             Time Calculation    Start Time 1002  -      Stop Time 1028  -      Time Calculation (min) 26 min  -      PT Received On 25  -      PT Goal Re-Cert Due Date 25  -         Time Calculation- PT    Total Timed Code Minutes- PT 26 minute(s)  -         Timed Charges    29557 - PT Therapeutic Exercise Minutes 11  -      67946 - Gait Training Minutes  15  -         Total Minutes    Timed Charges Total Minutes 26  -       Total Minutes 26  -                User Key  (r) = Recorded By, (t) = Taken By, (c) = Cosigned By      Initials Name Provider Type    Frankie López PT Physical Therapist                  Therapy Charges for Today       Code Description Service Date Service Provider Modifiers Qty    [id number]  PT THER PROC EA 15 MIN 2025 Frankie Munoz PT GP 1    [id number] HC GAIT TRAINING EA 15 MIN 2025 Frankie Munoz PT GP 1            PT G-Codes  Outcome Measure Options: AM-PAC 6 Clicks Basic Mobility (PT)  AM-PAC 6 Clicks Score (PT): 19  AM-PAC 6 Clicks Score (OT): 15  PT Discharge Summary  Anticipated Discharge Disposition (PT): skilled nursing facility    Frankie Munoz PT TP#[id number] 2025      Electronically signed by Kuldeep Ren PT at 25 1252          Occupational Therapy Notes (all)        Vandana Rosas, OT at 25 1328          Patient Name: Jonny Ferrari Jr.  : 1950    MRN: [id number]                              Today's Date: 2025       Admit Date: 2025    Visit Dx:     ICD-10-CM ICD-9-CM   1. Sepsis with acute renal failure and septic shock, due to unspecified organism, unspecified acute renal failure type  A41.9 038.9    R65.21 995.92    N17.9 785.52     584.9   2. Acute kidney injury superimposed  on chronic kidney disease  N17.9 584.9    N18.9 585.9   3. Hyponatremia  E87.1 276.1   4. Hypokalemia  E87.6 276.8   5. Chronic anemia  D64.9 285.9   6. Left lower lobe consolidation  J18.1 481   7. Type 2 diabetes mellitus with hyperglycemia, unspecified whether long term insulin use  E11.65 250.00   8. Multiple skin tears  T14.8XXA 879.8     Patient Active Problem List   Diagnosis    CAD (coronary artery disease)    Essential hypertension    Dyslipidemia    Obesity    GERD (gastroesophageal reflux disease)    OA (osteoarthritis)    TIA (transient ischemic attack)    Anxiety neurosis    Psoriasis    Tobacco use    Diabetes mellitus    Bilateral carotid artery stenosis    NSTEMI (non-ST elevated myocardial infarction)    Chronic kidney disease, stage IV (severe)    Esophageal dysphagia    Iron deficiency anemia    Acute urinary retention    Debility    Perirectal abscess    Acute renal failure (ARF)    Acute metabolic encephalopathy    Sepsis    Septic shock    Acute UTI (urinary tract infection)    Pneumonia of left lower lobe due to infectious organism     Past Medical History:   Diagnosis Date    Anemia     Anxiety neurosis     CAD (coronary artery disease)     Cancer     Kidney    Chronic kidney disease     Patient reported left kidney removed secondary to kidney cancer and that he only has 30% function of the right kidney    Constipation     COPD (chronic obstructive pulmonary disease)     Depression     Diabetes mellitus     DM TYPE 2 , ONSET IN 2009    Dyslipidemia     Dysphagia     Reported noted with food, fluids and pills    Elevated cholesterol     Full dentures     GERD (gastroesophageal reflux disease)     Gout     H/O left nephrectomy 2020    secondary to cancer    [place] (hard of hearing)     Patient has bilateral hearing aids, still is very [place]    Hypertension     Impaired functional mobility, balance, gait, and endurance     MI (myocardial infarction)     Patient reported apx March 2021 (reported he  refused cardiac cath) and June 2021 (did have cardiac cath with placement of 2 stents).     OA (osteoarthritis)     Obesity     MILD TO MODERATE EXOGENOUS OBESITY    Problems with swallowing     with food    Psoriasis     Sleep apnea     CPAP HS - to bring mask and machine DOS    Tattoo     x1    TIA (transient ischemic attack)     Wears glasses 10/27/2021     Past Surgical History:   Procedure Laterality Date    APPENDECTOMY      CARDIAC CATHETERIZATION      CARDIAC CATHETERIZATION N/A 2/20/2019    Procedure: Left Heart Cath;  Surgeon: Ernst Smith MD;  Location:  [facility];  Service: Cardiology    CARDIAC CATHETERIZATION Left 6/9/2021    Procedure: Left Heart Cath;  Surgeon: Ernst Smith MD;  Location:  [facility];  Service: Cardiology;  Laterality: Left;    CHOLECYSTECTOMY      COLONOSCOPY      COLONOSCOPY N/A 11/10/2021    Procedure: COLONOSCOPY with polypectomy x6 and clip x2;  Surgeon: Chidi Trinidad MD;  Location: [facility] ENDOSCOPY;  Service: Gastroenterology;  Laterality: N/A;    COLONOSCOPY N/A 10/25/2023    Procedure: COLONOSCOPY incomplete;  Surgeon: Chidi Trinidad MD;  Location: [facility] ENDOSCOPY;  Service: Gastroenterology;  Laterality: N/A;    COLONOSCOPY N/A 11/8/2023    Procedure: COLONOSCOPY WITH POLYPECTOMY X1;  Surgeon: Chidi Trinidad MD;  Location: [facility] ENDOSCOPY;  Service: Gastroenterology;  Laterality: N/A;    CORONARY ARTERY BYPASS GRAFT  2001    Patient reported no MI prior to CABG and that the bypass was 3 vessel     ENDOSCOPY      ENDOSCOPY N/A 11/10/2021    Procedure: ESOPHAGOGASTRODUODENOSCOPY with biopsy and dilatation;  Surgeon: Chidi Trinidad MD;  Location: [facility] ENDOSCOPY;  Service: Gastroenterology;  Laterality: N/A;    ENDOSCOPY N/A 10/25/2023    Procedure: ESOPHAGOGASTRODUODENOSCOPY with biopsy and dilatation;  Surgeon: Chidi Trinidad MD;  Location: [facility] ENDOSCOPY;  Service: Gastroenterology;  Laterality: N/A;     ENDOSCOPY N/A 11/8/2023    Procedure: ESOPHAGOGASTRODUODENOSCOPY WITH BIOPSY;  Surgeon: Chidi Trinidad MD;  Location: [facility] ENDOSCOPY;  Service: Gastroenterology;  Laterality: N/A;    INCISION AND DRAINAGE PERIRECTAL ABSCESS N/A 8/29/2024    Procedure: INCISION AND DRAINAGE OF PERIRECTAL ABSCESS;  Surgeon: Sherman Billingsley MD;  Location: [facility] OR;  Service: General;  Laterality: N/A;    LAPAROSCOPIC NEPHRECTOMY Left     MOUTH SURGERY      Full mouth extraction    URETER SURGERY      VASECTOMY        General Information       Row Name 07/07/25 1305          OT Time and Intention    Document Type therapy note (daily note)  -     Mode of Treatment occupational therapy;co-treatment;physical therapy  -     Patient Effort good  -       Row Name 07/07/25 1305          General Information    Patient Profile Reviewed yes  -     Existing Precautions/Restrictions fall;oxygen therapy device and L/min  -       Row Name 07/07/25 1305          Cognition    Orientation Status (Cognition) oriented x 4  -       Row Name 07/07/25 1305          Safety Issues/Impairments Affecting Functional Mobility    Impairments Affecting Function (Mobility) balance;endurance/activity tolerance;shortness of breath;strength;pain  -               User Key  (r) = Recorded By, (t) = Taken By, (c) = Cosigned By      Initials Name Provider Type     Vandana Rosas OT Occupational Therapist                     Mobility/ADL's       Row Name 07/07/25 1307          Sit-Stand Transfer    Sit-Stand [place] (Transfers) standby assist;1 person assist  -     Assistive Device (Sit-Stand Transfers) walker, front-wheeled  -       Row Name 07/07/25 1307          Functional Mobility    Functional Mobility- Ind. Level contact guard assist;1 person  -     Functional Mobility- Device walker, front-wheeled  -     Functional Mobility-Distance (Feet) 60  -     Patient was able to Ambulate yes  -       Row Name 07/07/25 1203           Activities of Daily Living    BADL Assessment/Intervention lower body dressing  -HH       Row Name 07/07/25 1307          Lower Body Dressing Assessment/Training    [place] Level (Lower Body Dressing) minimum assist (75% patient effort);socks;doff;don  -HH     Position (Lower Body Dressing) unsupported sitting  -HH               User Key  (r) = Recorded By, (t) = Taken By, (c) = Cosigned By      Initials Name Provider Type     Vandana Rosas, OT Occupational Therapist                   Obj/Interventions    No documentation.                  Goals/Plan    No documentation.                  Clinical Impression       [facility] Name 07/07/25 1311          Pain Assessment    Pretreatment Pain Rating 0/10 - no pain  -HH     Posttreatment Pain Rating 0/10 - no pain  -HH       [facility] Name 07/07/25 1311          Plan of Care Review    Outcome Evaluation Pt agreeable to OT session this date. Pt received sitting in recliner, call-light within reach. Pt completed STS with FWW. Pt ambulated ~60ft with FWW requiring CGA with FWW and verbal cueing needed for technique and safety. Pt was able to doff/don bilateral socks with [place] while seated in recliner. Pt verbalized fear of falling resulting in him not ambulating prior to admission. Pt would benefit from STR at discharge to improve Ind/safety with functional tasks/mobility and decrease risk of falling. Cont. POC  -HH       [facility] Name 07/07/25 1311          Vital Signs    Pre Systolic BP Rehab 117  -HH     Pre Treatment Diastolic BP 63  -HH     Pretreatment Heart Rate (beats/min) 71  -HH     Posttreatment Heart Rate (beats/min) 73  -HH     Pre SpO2 (%) 96  -HH     O2 Delivery Pre Treatment room air  -HH     O2 Delivery Intra Treatment room air  -HH     Post SpO2 (%) 97  -HH     O2 Delivery Post Treatment room air  -HH     Pre Patient Position Sitting  -HH     Intra Patient Position Standing  -HH     Post Patient Position Sitting  -HH       Row Name 07/07/25 1311          Positioning  and Restraints    Pre-Treatment Position sitting in chair/recliner  -     Post Treatment Position chair  -HH     In Chair sitting;notified nsg;reclined;encouraged to call for assist;with family/caregiver  -               User Key  (r) = Recorded By, (t) = Taken By, (c) = Cosigned By      Initials Name Provider Type     Vandaan Rosas, OT Occupational Therapist                   Outcome Measures       Row Name 07/07/25 1323          How much help from another is currently needed...    Putting on and taking off regular lower body clothing? 2  -HH     Bathing (including washing, rinsing, and drying) 2  -HH     Toileting (which includes using toilet bed pan or urinal) 2  -HH     Putting on and taking off regular upper body clothing 3  -HH     Taking care of personal grooming (such as brushing teeth) 3  -HH     Eating meals 3  -     AM-PAC 6 Clicks Score (OT) 15  -HH       Row Name 07/07/25 1135 07/07/25 0924       How much help from another person do you currently need...    Turning from your back to your side while in flat bed without using bedrails? 4  - 4  -AS (r) CM (t) AS (c)    Moving from lying on back to sitting on the side of a flat bed without bedrails? 4  - 3  -AS (r) CM (t) AS (c)    Moving to and from a bed to a chair (including a wheelchair)? 3  - 3  -AS (r) CM (t) AS (c)    Standing up from a chair using your arms (e.g., wheelchair, bedside chair)? 3  - 3  -AS (r) CM (t) AS (c)    Climbing 3-5 steps with a railing? 2  - 2  -AS (r) CM (t) AS (c)    To walk in hospital room? 3  - 3  -AS (r) CM (t) AS (c)    AM-PAC 6 Clicks Score (PT) 19  - 18  -AS (r) CM (t)    Highest Level of Mobility Goal Walk 10 Steps or More-6  -JH Walk 10 Steps or More-6  -AS (r) CM (t)      Row Name 07/07/25 1323 07/07/25 1135       Functional Assessment    Outcome Measure Options AM-PAC 6 Clicks Daily Activity (OT)  - AM-PAC 6 Clicks Basic Mobility (PT)  -              User Key  (r) = Recorded By, (t) =  Taken By, (c) = Cosigned By      Initials Name Provider Type    AS Anahi Hensley, RN Registered Nurse     Vandana Rosas OT Occupational Therapist    Chidi Nascimento, RN Extern Registered Nurse     Head, Frankie, PT Physical Therapist                    Occupational Therapy Education       Title: PT OT SLP Therapies (In Progress)       Topic: Occupational Therapy (In Progress)       Point: ADL training (Done)       Learning Progress Summary            Patient Acceptance, E, VU by  at 7/7/2025 1324    Comment: Continued POC and discharge recommendations    Acceptance, E,TB, VU by SD at 7/4/2025 1352    Comment: Safety during toilet tf                                      User Key       Initials Effective Dates Name Provider Type Discipline    SD 06/16/21 -  Janel Howe OT Occupational Therapist OT     02/25/25 -  Vandana Rosas OT Occupational Therapist OT                  OT Recommendation and Plan     Plan of Care Review  Outcome Evaluation: Pt agreeable to OT session this date. Pt received sitting in recliner, call-light within reach. Pt completed STS with FWW. Pt ambulated ~60ft with FWW requiring CGA with FWW and verbal cueing needed for technique and safety. Pt was able to doff/don bilateral socks with Reji while seated in recliner. Pt verbalized fear of falling resulting in him not ambulating prior to admission. Pt would benefit from STR at discharge to improve Ind/safety with functional tasks/mobility and decrease risk of falling. Cont. POC     Time Calculation:         Time Calculation- OT       Row Name 07/07/25 1326 07/07/25 1137          Time Calculation- OT    OT Start Time 1003  - --     OT Received On 07/07/25 - --        Timed Charges    26349 - Gait Training Minutes  -- 15  -     08902 - OT Therapeutic Activity Minutes 24  - --     22089 - OT Self Care/Mgmt Minutes 4  - --        Total Minutes    Timed Charges Total Minutes 28  - 15  -      Total Minutes 28   - 15  -JH               User Key  (r) = Recorded By, (t) = Taken By, (c) = Cosigned By      Initials Name Provider Type     Vandana Rosas OT Occupational Therapist     Head, Frankie, MARY Physical Therapist                  Therapy Charges for Today       Code Description Service Date Service Provider Modifiers Qty    07999598573  OT THERAPEUTIC ACT EA 15 MIN 2025 Vandana Rosas OT GO 2                 Vandana Rosas OT  2025    Electronically signed by Vandana Rosas OT at 25 1329       Vandana Rosas OT at 25 1325          Goal Outcome Evaluation:              Outcome Evaluation: Pt agreeable to OT session this date. Pt received sitting in recliner, call-light within reach. Pt completed STS with FWW. Pt ambulated ~60ft with FWW requiring CGA with FWW and verbal cueing needed for technique and safety. Pt was able to doff/don bilateral socks with Reji while seated in recliner. Pt verbalized fear of falling resulting in him not ambulating prior to admission. Pt would benefit from STR at discharge to improve Ind/safety with functional tasks/mobility and decrease risk of falling. Cont. POC                               Electronically signed by Vandana Rosas OT at 25 1326           Janel Howe OT at 25 1702          Patient Name: Jonny Ferrari Jr.  : 1950    MRN: [id number]                              Today's Date: 2025       Admit Date: 2025    Visit Dx:     ICD-10-CM ICD-9-CM   1. Sepsis with acute renal failure and septic shock, due to unspecified organism, unspecified acute renal failure type  A41.9 038.9    R65.21 995.92    N17.9 785.52     584.9   2. Acute kidney injury superimposed on chronic kidney disease  N17.9 584.9    N18.9 585.9   3. Hyponatremia  E87.1 276.1   4. Hypokalemia  E87.6 276.8   5. Chronic anemia  D64.9 285.9   6. Left lower lobe consolidation  J18.1 481   7. Type 2 diabetes mellitus with hyperglycemia, unspecified whether  long term insulin use  E11.65 250.00   8. Multiple skin tears  T14.8XXA 879.8     Patient Active Problem List   Diagnosis    CAD (coronary artery disease)    Essential hypertension    Dyslipidemia    Obesity    GERD (gastroesophageal reflux disease)    OA (osteoarthritis)    TIA (transient ischemic attack)    Anxiety neurosis    Psoriasis    Tobacco use    Diabetes mellitus    Bilateral carotid artery stenosis    NSTEMI (non-ST elevated myocardial infarction)    Chronic kidney disease, stage IV (severe)    Esophageal dysphagia    Iron deficiency anemia    Acute urinary retention    Debility    Perirectal abscess    Acute renal failure (ARF)    Acute metabolic encephalopathy    Sepsis    Septic shock    Acute UTI (urinary tract infection)    Pneumonia of left lower lobe due to infectious organism     Past Medical History:   Diagnosis Date    Anemia     Anxiety neurosis     CAD (coronary artery disease)     Cancer     Kidney    Chronic kidney disease     Patient reported left kidney removed secondary to kidney cancer and that he only has 30% function of the right kidney    Constipation     COPD (chronic obstructive pulmonary disease)     Depression     Diabetes mellitus     DM TYPE 2 , ONSET IN 2009    Dyslipidemia     Dysphagia     Reported noted with food, fluids and pills    Elevated cholesterol     Full dentures     GERD (gastroesophageal reflux disease)     Gout     H/O left nephrectomy 2020    secondary to cancer    [place] (hard of hearing)     Patient has bilateral hearing aids, still is very [place]    Hypertension     Impaired functional mobility, balance, gait, and endurance     MI (myocardial infarction)     Patient reported apx March 2021 (reported he refused cardiac cath) and June 2021 (did have cardiac cath with placement of 2 stents).     OA (osteoarthritis)     Obesity     MILD TO MODERATE EXOGENOUS OBESITY    Problems with swallowing     with food    Psoriasis     Sleep apnea     CPAP HS - to bring mask and  machine DOS    Tattoo     x1    TIA (transient ischemic attack)     Wears glasses 10/27/2021     Past Surgical History:   Procedure Laterality Date    APPENDECTOMY      CARDIAC CATHETERIZATION      CARDIAC CATHETERIZATION N/A 2/20/2019    Procedure: Left Heart Cath;  Surgeon: Ernst Smith MD;  Location:  [facility];  Service: Cardiology    CARDIAC CATHETERIZATION Left 6/9/2021    Procedure: Left Heart Cath;  Surgeon: Ernst Smith MD;  Location:  [facility];  Service: Cardiology;  Laterality: Left;    CHOLECYSTECTOMY      COLONOSCOPY      COLONOSCOPY N/A 11/10/2021    Procedure: COLONOSCOPY with polypectomy x6 and clip x2;  Surgeon: Chidi Trinidad MD;  Location: [facility] ENDOSCOPY;  Service: Gastroenterology;  Laterality: N/A;    COLONOSCOPY N/A 10/25/2023    Procedure: COLONOSCOPY incomplete;  Surgeon: Chidi Trinidad MD;  Location: [facility] ENDOSCOPY;  Service: Gastroenterology;  Laterality: N/A;    COLONOSCOPY N/A 11/8/2023    Procedure: COLONOSCOPY WITH POLYPECTOMY X1;  Surgeon: Chidi Trinidad MD;  Location: [facility] ENDOSCOPY;  Service: Gastroenterology;  Laterality: N/A;    CORONARY ARTERY BYPASS GRAFT  2001    Patient reported no MI prior to CABG and that the bypass was 3 vessel     ENDOSCOPY      ENDOSCOPY N/A 11/10/2021    Procedure: ESOPHAGOGASTRODUODENOSCOPY with biopsy and dilatation;  Surgeon: Chidi Trinidad MD;  Location: [facility] ENDOSCOPY;  Service: Gastroenterology;  Laterality: N/A;    ENDOSCOPY N/A 10/25/2023    Procedure: ESOPHAGOGASTRODUODENOSCOPY with biopsy and dilatation;  Surgeon: Chidi Trinidad MD;  Location: [facility] ENDOSCOPY;  Service: Gastroenterology;  Laterality: N/A;    ENDOSCOPY N/A 11/8/2023    Procedure: ESOPHAGOGASTRODUODENOSCOPY WITH BIOPSY;  Surgeon: Chidi Trinidad MD;  Location: [facility] ENDOSCOPY;  Service: Gastroenterology;  Laterality: N/A;    INCISION AND DRAINAGE PERIRECTAL ABSCESS N/A 8/29/2024    Procedure: INCISION AND  DRAINAGE OF PERIRECTAL ABSCESS;  Surgeon: Sherman Billingsley MD;  Location: [facility];  Service: General;  Laterality: N/A;    LAPAROSCOPIC NEPHRECTOMY Left     MOUTH SURGERY      Full mouth extraction    URETER SURGERY      VASECTOMY        General Information       Row Name 07/01/25 1649          OT Time and Intention    Subjective Information no complaints  -SD     Document Type evaluation  -SD     Mode of Treatment occupational therapy  -SD     Patient Effort adequate  -SD     Symptoms Noted During/After Treatment fatigue  -SD       Row Name 07/01/25 1649          General Information    Patient Profile Reviewed yes  -SD     Prior Level of Function independent:;w/c or scooter;transfer;mod assist:;ADL's  -SD     Existing Precautions/Restrictions fall;oxygen therapy device and L/min;orthostatic hypotension  -SD     Barriers to Rehab medically complex;previous functional deficit;physical barrier  -SD       Row Name 07/01/25 1649          Living Environment    Current Living Arrangements home  -SD     People in Home spouse  -SD       Row Name 07/01/25 1649          Home Main Entrance    Number of Stairs, Main Entrance one  -SD     Stair Railings, Main Entrance none  -SD       Row Name 07/01/25 1649          Stairs Within Home, Primary    Number of Stairs, Within Home, Primary none  -SD       Row Name 07/01/25 1649          Cognition    Orientation Status (Cognition) oriented x 4  -SD       Row Name 07/01/25 1649          Safety Issues/Impairments Affecting Functional Mobility    Safety Issues Affecting Function (Mobility) safety precautions follow-through/compliance;safety precaution awareness  -SD     Impairments Affecting Function (Mobility) balance;endurance/activity tolerance;shortness of breath;strength;pain;postural/trunk control  -SD               User Key  (r) = Recorded By, (t) = Taken By, (c) = Cosigned By      Initials Name Provider Type    Janel Haque OT Occupational Therapist                      Mobility/ADL's       Row Name 07/01/25 1651          Bed Mobility    Bed Mobility supine-sit;sit-supine  -SD     Supine-Sit [place] (Bed Mobility) standby assist  -SD     Sit-Supine [place] (Bed Mobility) standby assist  -SD     Assistive Device (Bed Mobility) bed rails;head of bed elevated  -SD       Row Name 07/01/25 1651          Transfers    Transfers sit-stand transfer  -SD       Row Name 07/01/25 1651          Sit-Stand Transfer    Sit-Stand [place] (Transfers) contact guard  -SD     Assistive Device (Sit-Stand Transfers) walker, front-wheeled  -SD       [facility] Name 07/01/25 1651          Functional Mobility    Functional Mobility- Ind. Level contact guard assist  -SD     Functional Mobility- Device walker, front-wheeled  -SD     Functional Mobility-Distance (Feet) 3  -SD     Functional Mobility- Safety Issues balance decreased during turns;sequencing ability decreased;step length decreased;weight-shifting ability decreased;supplemental O2  -SD     Functional Mobility- Comment limited d/t no recliner available and low BP  -SD     Patient was able to Ambulate yes  -SD       Row Name 07/01/25 1651          Activities of Daily Living    BADL Assessment/Intervention bathing;upper body dressing;lower body dressing;grooming;feeding;toileting  -SD       [facility] Name 07/01/25 1651          Bathing Assessment/Intervention    [place] Level (Bathing) maximum assist (25% patient effort)  -SD       [facility] Name 07/01/25 1651          Upper Body Dressing Assessment/Training    [place] Level (Upper Body Dressing) minimum assist (75% patient effort)  -SD       Row Name 07/01/25 1651          Lower Body Dressing Assessment/Training    [place] Level (Lower Body Dressing) maximum assist (25% patient effort);socks  -SD     Position (Lower Body Dressing) edge of bed sitting  -SD       Row Name 07/01/25 1651          Grooming Assessment/Training    [place] Level (Grooming) minimum assist (75% patient effort)   -SD       [facility] Name 07/01/25 1651          Self-Feeding Assessment/Training    [place] Level (Feeding) supervision  -SD       Row Name 07/01/25 1651          Toileting Assessment/Training    [place] Level (Toileting) maximum assist (25% patient effort);dependent (less than 25% patient effort)  -SD     Comment, (Toileting) urinary catheter  -SD               User Key  (r) = Recorded By, (t) = Taken By, (c) = Cosigned By      Initials Name Provider Type    Janel Haque OT Occupational Therapist                   Obj/Interventions       Row Name 07/01/25 1652          Range of Motion Comprehensive    General Range of Motion upper extremity range of motion deficits identified  -SD     Comment, General Range of Motion L shoulder flexion deficits  -SD       [facility] Name 07/01/25 1652          Strength Comprehensive (MMT)    General Manual Muscle Testing (MMT) Assessment upper extremity strength deficits identified  -SD               User Key  (r) = Recorded By, (t) = Taken By, (c) = Cosigned By      Initials Name Provider Type    Jaenl Haque OT Occupational Therapist                   Goals/Plan       Row Name 07/01/25 1700          Transfer Goal 1 (OT)    Activity/Assistive Device (Transfer Goal 1, OT) sit-to-stand/stand-to-sit;walker, rolling  -SD     [place] Level/Cues Needed (Transfer Goal 1, OT) standby assist  -SD     Time Frame (Transfer Goal 1, OT) long term goal (LTG)  -SD     Progress/Outcome (Transfer Goal 1, OT) goal ongoing  -SD       Row Name 07/01/25 1700          Dressing Goal 1 (OT)    Activity/Device (Dressing Goal 1, OT) lower body dressing  -SD     [place]/Cues Needed (Dressing Goal 1, OT) moderate assist (50-74% patient effort)  -SD     Time Frame (Dressing Goal 1, OT) 2 weeks  -SD     Progress/Outcome (Dressing Goal 1, OT) goal ongoing  -SD       Row Name 07/01/25 1700          Toileting Goal 1 (OT)    Activity/Device (Toileting Goal 1, OT) toileting skills,  all;commode, bedside without drop arms  -SD     [place] Level/Cues Needed (Toileting Goal 1, OT) moderate assist (50-74% patient effort)  -SD     Time Frame (Toileting Goal 1, OT) 2 weeks  -SD     Progress/Outcome (Toileting Goal 1, OT) goal ongoing  -SD       Row Name 07/01/25 1700          Strength Goal 1 (OT)    Strength Goal 1 (OT) Patient to perform UB ther ex as tolerated  -SD     Time Frame (Strength Goal 1, OT) long term goal (LTG)  -SD     Progress/Outcome (Strength Goal 1, OT) goal ongoing  -SD       Row Name 07/01/25 1700          Therapy Assessment/Plan (OT)    Planned Therapy Interventions (OT) activity tolerance training;adaptive equipment training;BADL retraining;patient/caregiver education/training;ROM/therapeutic exercise;strengthening exercise;transfer/mobility retraining  -SD               User Key  (r) = Recorded By, (t) = Taken By, (c) = Cosigned By      Initials Name Provider Type    Janel Haque OT Occupational Therapist                   Clinical Impression       Row Name 07/01/25 1653          Pain Assessment    Pretreatment Pain Rating 0/10 - no pain  -SD     Posttreatment Pain Rating 0/10 - no pain  -SD       Row Name 07/01/25 1653          Plan of Care Review    Plan of Care Reviewed With patient  -SD     Progress no change  -SD     Outcome Evaluation OT eval completed. Patient is supine in bed, Ox4, denies pain. Patient on 2L O2 satting 100%. Does not wear O2 at baseline. Patient has urinary catheter intact, noted to be bleeding around, notified RN. Patient lives with his wife in a one level home with one step to enter. Patient has a history of R ankle fracture with 3 total surgeries on that ankle over last two years and unhealed wound to medial malleolous. Patient states this in addition to persistent dizziness and falls has led to him primarily using a WC within the home. Patient sleeps in a recliner. Has assistance from his wife for ADLs; has a tub with shower chair and  BSC. Patient's BP in supine is 98/62. Moved to EOB SBA, BP sitting EOB is 82/53. Patient performed sit to stand CGA, took side steps toward HOB CGA. BP checked upon returning to supine and 71/47. Patient requires max-TD for toileting, max A for bathing, max A for LBD. Patient is expected to benefit from continued OT services prior to DC to address decline in ADLs and mobility. Would benefit from STR.  -SD       Row Name 07/01/25 1653          Therapy Assessment/Plan (OT)    Patient/Family Therapy Goal Statement (OT) increase strength  -SD     Rehab Potential (OT) good  -SD     Criteria for Skilled Therapeutic Interventions Met (OT) skilled treatment is necessary  -SD     Therapy Frequency (OT) 3 times/wk  5 times if indicated  -SD       Row Name 07/01/25 4283          Therapy Plan Review/Discharge Plan (OT)    Anticipated Discharge Disposition (OT) inpatient rehabilitation facility  -SD       Row Name 07/01/25 1653          Vital Signs    Pre Systolic BP Rehab 98  -SD     Pre Treatment Diastolic BP 62  -SD     Intra Systolic BP Rehab 82  -SD     Intra Treatment Diastolic BP 53  -SD     Post Systolic BP Rehab 71  -SD     Post Treatment Diastolic BP 47  -SD     Pre SpO2 (%) 100  -SD     O2 Delivery Pre Treatment supplemental O2  -SD     O2 Delivery Intra Treatment supplemental O2  -SD     O2 Delivery Post Treatment supplemental O2  -SD       Row Name 07/01/25 7813          Positioning and Restraints    Pre-Treatment Position in bed  -SD     Post Treatment Position bed  -SD     In Bed supine;call light within reach;encouraged to call for assist;notified nsg  -SD               User Key  (r) = Recorded By, (t) = Taken By, (c) = Cosigned By      Initials Name Provider Type    Janel Haque OT Occupational Therapist                   Outcome Measures       Row Name 07/01/25 1700          How much help from another is currently needed...    Putting on and taking off regular lower body clothing? 2  -SD     Bathing  (including washing, rinsing, and drying) 2  -SD     Toileting (which includes using toilet bed pan or urinal) 1  -SD     Putting on and taking off regular upper body clothing 3  -SD     Taking care of personal grooming (such as brushing teeth) 3  -SD     Eating meals 3  -SD     AM-PAC 6 Clicks Score (OT) 14  -SD       Row Name 07/01/25 0829          How much help from another person do you currently need...    Turning from your back to your side while in flat bed without using bedrails? 4  -LA     Moving from lying on back to sitting on the side of a flat bed without bedrails? 3  -LA     Moving to and from a bed to a chair (including a wheelchair)? 2  -LA     Standing up from a chair using your arms (e.g., wheelchair, bedside chair)? 2  -LA     Climbing 3-5 steps with a railing? 2  -LA     To walk in hospital room? 2  -LA     AM-PAC 6 Clicks Score (PT) 15  -LA       Row Name 07/01/25 1700          Functional Assessment    Outcome Measure Options AM-PAC 6 Clicks Daily Activity (OT)  -SD               User Key  (r) = Recorded By, (t) = Taken By, (c) = Cosigned By      Initials Name Provider Type    SD Janel Howe OT Occupational Therapist    Isma Fernández, RN Registered Nurse                    Occupational Therapy Education       Title: PT OT SLP Therapies (In Progress)       Topic: Occupational Therapy (In Progress)       Point: ADL training (Done)       Learning Progress Summary            Patient Acceptance, E,TB, VU by SD at 7/1/2025 1701    Comment: OT POC                                      User Key       Initials Effective Dates Name Provider Type Discipline    SD 06/16/21 -  Janel Howe OT Occupational Therapist OT                  OT Recommendation and Plan  Planned Therapy Interventions (OT): activity tolerance training, adaptive equipment training, BADL retraining, patient/caregiver education/training, ROM/therapeutic exercise, strengthening exercise, transfer/mobility retraining  Therapy  Frequency (OT): 3 times/wk (5 times if indicated)  Plan of Care Review  Plan of Care Reviewed With: patient  Progress: no change  Outcome Evaluation: OT eval completed. Patient is supine in bed, Ox4, denies pain. Patient on 2L O2 satting 100%. Does not wear O2 at baseline. Patient has urinary catheter intact, noted to be bleeding around, notified RN. Patient lives with his wife in a one level home with one step to enter. Patient has a history of R ankle fracture with 3 total surgeries on that ankle over last two years and unhealed wound to medial malleolous. Patient states this in addition to persistent dizziness and falls has led to him primarily using a WC within the home. Patient sleeps in a recliner. Has assistance from his wife for ADLs; has a tub with shower chair and BSC. Patient's BP in supine is 98/62. Moved to EOB SBA, BP sitting EOB is 82/53. Patient performed sit to stand CGA, took side steps toward HOB CGA. BP checked upon returning to supine and 71/47. Patient requires max-TD for toileting, max A for bathing, max A for LBD. Patient is expected to benefit from continued OT services prior to DC to address decline in ADLs and mobility. Would benefit from STR.     Time Calculation:   Evaluation Complexity (OT)  Review Occupational Profile/Medical/Therapy History Complexity: expanded/moderate complexity  Assessment, Occupational Performance/Identification of Deficit Complexity: 3-5 performance deficits  Clinical Decision Making Complexity (OT): detailed assessment/moderate complexity  Overall Complexity of Evaluation (OT): moderate complexity     Time Calculation- OT       Row Name 07/01/25 1701             Time Calculation- OT    OT Start Time 1540  -SD      OT Received On 07/01/25  -SD      OT Goal Re-Cert Due Date 07/11/25  -SD         Untimed Charges    OT Eval/Re-eval Minutes 45  -SD         Total Minutes    Untimed Charges Total Minutes 45  -SD       Total Minutes 45  -SD                User Key  (r)  = Recorded By, (t) = Taken By, (c) = Cosigned By      Initials Name Provider Type    SD Janel Howe OT Occupational Therapist                  Therapy Charges for Today       Code Description Service Date Service Provider Modifiers Qty    45368535691 HC OT EVAL MOD COMPLEXITY 3 7/1/2025 Janel Howe OT GO 1                 Janel Howe OT  7/1/2025    Electronically signed by Janel Howe OT at 07/01/25 1702       Janel Howe OT at 07/01/25 1701          Goal Outcome Evaluation:  Plan of Care Reviewed With: patient        Progress: no change  Outcome Evaluation: OT eval completed. Patient is supine in bed, Ox4, denies pain. Patient on 2L O2 satting 100%. Does not wear O2 at baseline. Patient has urinary catheter intact, noted to be bleeding around, notified RN. Patient lives with his wife in a one level home with one step to enter. Patient has a history of R ankle fracture with 3 total surgeries on that ankle over last two years and unhealed wound to medial malleolous. Patient states this in addition to persistent dizziness and falls has led to him primarily using a WC within the home. Patient sleeps in a recliner. Has assistance from his wife for ADLs; has a tub with shower chair and BSC. Patient's BP in supine is 98/62. Moved to EOB SBA, BP sitting EOB is 82/53. Patient performed sit to stand CGA, took side steps toward HOB CGA. BP checked upon returning to supine and 71/47. Patient requires max-TD for toileting, max A for bathing, max A for LBD. Patient is expected to benefit from continued OT services prior to DC to address decline in ADLs and mobility. Would benefit from STR.    Anticipated Discharge Disposition (OT): inpatient rehabilitation facility                          Electronically signed by Janel Howe OT at 07/01/25 1701

## 2025-07-07 NOTE — PROGRESS NOTES
Patient Name: Jonny Ferrari Jr.  YOB: 1950  MRN: [id number]  Admission date: 6/29/2025  Reason for Encounter: Follow-up/Progress Note      [facility] Clinical Nutrition Progress Note       Nutrition Intervention Updates: RD available PRN.      Subjective: 7/7: PO intake averaging 100% x 3 meals. Supplementation ordered.     7/3: Pt is no longer requiring pressor support. Average PO intake 55% x 5 meals. Pt is consuming Boost Glucose Control that is ordered.      7/1: Pt is in the ICU and is on pressor support. Levophed running at .02 mcg/kg/min. Pt has had wt loss of 36# (14.6%) within 11 months. Unsure of what caused the weight loss and suspect pt will have more d/t edema. Average PO intake 56% x 4 meals. Hypokalemia and K+ is being replaced. Will d/c K+ diet restriction and order supplement to promote PO intake.        PO Diet: Diet: Diabetic, Renal; Consistent Carbohydrate; Low Sodium (2-3g); Texture: Regular (IDDSI 7); Fluid Consistency: Thin (IDDSI 0)   PO Supplements: Boost Glucose Control Daily   PO Intake:  7/7: 100% x 2 meals        Labs: Labs reviewed         GI Function:  WNL        Brief Weight Review:    Wt Readings from Last 1 Encounters:   07/05/25 0455 98.7 kg (217 lb 9.5 oz)   07/04/25 0330 95 kg (209 lb 7 oz)   07/03/25 0400 94.1 kg (207 lb 7.3 oz)   07/02/25 0400 95.2 kg (209 lb 14.1 oz)   07/01/25 0400 95 kg (209 lb 7 oz)   06/29/25 1658 95.3 kg (210 lb)        Results from last 7 days   Lab Units 07/07/25  0431 07/05/25  1009 07/04/25  0454   SODIUM mmol/L 135* 137 139   POTASSIUM mmol/L 4.6 4.4 3.7   CHLORIDE mmol/L 101 102 101   CO2 mmol/L 23.8 24.3 27.9   BUN mg/dL 16.0 13.0 16.0   CREATININE mg/dL 1.82* 1.88* 1.95*   CALCIUM mg/dL 9.2 8.8 8.9   GLUCOSE mg/dL 123* 143* 84     Results from last 7 days   Lab Units 07/07/25  0431 07/04/25  0454 07/03/25  0503   PHOSPHORUS mg/dL  --   --  2.6   PLATELETS 10*3/mm3 139*  --   --    HEMOGLOBIN g/dL 8.2*   < > 8.6*   HEMATOCRIT %  26.2*   < > 27.9*    < > = values in this interval not displayed.     Lab Results   Component Value Date    HGBA1C 7.60 (H) 06/29/2025       Electronically signed by:  Sia Martel RD  07/07/25 10:12 EDT

## 2025-07-07 NOTE — PLAN OF CARE
Goal Outcome Evaluation:              Outcome Evaluation: Pt currently on his phone up in the recliner.  Sinus rhythm on monitor/remains on room air.  No complaints re burton cath.

## 2025-07-07 NOTE — PROGRESS NOTES
"    [facility]    Name:  Jonny Ferrari Jr.   Age:  74 y.o.  Sex:  male  :  1950  MRN:  [id number]   Visit Number:  [id number]  Admission Date:  2025  Date Of Service:  25  Primary Care Physician:  Amaury Brice MD     LOS: 8 days :    Chief Complaint:      Follow-up septic shock    Subjective:    Patient examined.  Wife at bedside.  Patient sitting in bedside chair.  No acute events overnight.  Encourage work with PT/OT.  Awaiting short-term rehab placement.    Hospital Course:    Per prior provider: \"Jonny Ferrari Jr. is a 74-year-old male with history of diabetes mellitus type 2, chronic kidney disease stage III, COPD, coronary artery disease status post CABG and stent, ERASMO, BPH was brought to the emergency room by EMS with symptoms of generalized weakness and fatigue   The patient was brought in by ambulance due to an inability to walk, a condition that has persisted for the past 2 to 3 days. He reports no fever, dysuria, or cough but feels weak. He has experienced several falls recently and has been unable to walk independently for the past 3 to 4 days. Despite using a walker or cane, he continues to fall.   In the emergency room, patient was afebrile but mildly tachycardic in the 100 with initial blood pressure of 80/54 and pulse oxygen saturation of 95% on room air.  Patient was given 30 mL/kg fluid bolus initially in the emergency room with initial improvement of the blood pressure to systolic 100 but subsequently dropped it again to the 70s systolic.  Patient was given another 500 bolus of normal saline, he right femoral central line was placed and he was initiated on Levophed. \"     Patient was able to be weaned off steroids.  He completed antibiotics for UTI after discussion with pharmacy.  Has been seen by nephrology and kidney function is back to baseline.  Did have some drop in hemoglobin, there was concern for possible hematuria, this " has resolved however.  Did receive 1 unit of PRBCs.    Review of Systems:     All systems were reviewed and negative except as mentioned in subjective, assessment and plan.    Vital Signs:    Temp:  [98 °F (36.7 °C)-98.4 °F (36.9 °C)] 98.1 °F (36.7 °C)  Heart Rate:  [69-88] 69  Resp:  [14-20] 18  BP: ()/(60-71) 96/63    Intake and output:    I/O last 3 completed shifts:  In: 1685 [P.O.:925; I.V.:10; Other:750]  Out: 2475 [Urine:2475]  I/O this shift:  In: 717 [P.O.:717]  Out: 150 [Urine:150]    Physical Examination:    General Appearance:  Alert and cooperative. NAD   Head:  Atraumatic and normocephalic.   Eyes: Conjunctivae and sclerae normal, no icterus. No pallor.   Throat: No oral lesions, no thrush, oral mucosa moist.   Neck: Supple, trachea midline, no thyromegaly.   Lungs:   Breath sounds heard bilaterally equally.  No wheezing or crackles.    Heart:  Normal S1 and S2, no murmur, No JVD.   Abdomen:   Normal bowel sounds, Soft, nontender, nondistended, no rebound tenderness.   Extremities: Supple, no edema, no cyanosis, no clubbing.   Skin: No bleeding or rash.   Neurologic: Alert and oriented x 3. No facial asymmetry. Generalized weakness.      Laboratory results:    Results from last 7 days   Lab Units 07/07/25  0431 07/05/25  1009 07/04/25  0454   SODIUM mmol/L 135* 137 139   POTASSIUM mmol/L 4.6 4.4 3.7   CHLORIDE mmol/L 101 102 101   CO2 mmol/L 23.8 24.3 27.9   BUN mg/dL 16.0 13.0 16.0   CREATININE mg/dL 1.82* 1.88* 1.95*   CALCIUM mg/dL 9.2 8.8 8.9   GLUCOSE mg/dL 123* 143* 84     Results from last 7 days   Lab Units 07/07/25  0431 07/05/25  1009 07/04/25  0454 07/02/25  1517 07/02/25  0407 07/02/25  0315 07/01/25 1945   WBC 10*3/mm3 6.96  --   --   --  5.65  --  5.67   HEMOGLOBIN g/dL 8.2* 7.7* 8.2*   < > 6.0*   < > 8.4*   HEMATOCRIT % 26.2* 24.8* 26.4*   < > 18.8*   < > 26.7*   PLATELETS 10*3/mm3 139*  --   --   --  129*  --  148    < > = values in this interval not displayed.     Results from  last 7 days   Lab Units 07/01/25  1945   INR  1.03             Recent Labs     08/29/24  2314   PHART 7.382   TUS6JOH 49.8*   PO2ART 68.0*   PNB9JSM 29.5*   BASEEXCESS 3.8*      I have reviewed the patient's laboratory results.    Radiology results:    No radiology results from the last 24 hrs  I have reviewed the patient's radiology reports.    Medication Review:     I have reviewed the patient's active and prn medications.     Problem List:      Septic shock    CAD (coronary artery disease)    Chronic kidney disease, stage IV (severe)    Acute UTI (urinary tract infection)    Pneumonia of left lower lobe due to infectious organism      Assessment:    Septic shock secondary to #2 and #3, POA.  Acute urinary tract infection, POA.  Left lower lobe bacterial pneumonia, unable to classify further, POA.  Demand ischemia with elevated troponins, POA.  Hypokalemia, POA.  Chronic kidney disease stage IV.  Diabetes mellitus type 2 with nephropathy and hyperglycemia, POA.  Coronary artery disease status post CABG and stents.  COPD, no exacerbation.  Benign prostatic hyperplasia.  Will falls with skin abrasions, POA.    Plan:    Septic shock  Acute UTI  Left lower lobe bacterial pneumonia  Negative blood culture, on Zosyn.  Oxygenation stable.  Off pressors.  Urine culture had E faecalis.  Discussed with pharmacy, no further treatment indicated  Demand ischemia with elevated troponins  Troponins plateaued 95-98  Suspect secondary to demand ischemia in the setting of his septic shock as well as CKD  Low suspicion for ACS  Hypokalemia  Continue replacement  CKD stage IV  Nephrology, Dr. Hooper consulted and appreciate recommendations.  Numbers now stabilized.  Off fluids.  Diuretics started  Type 2 diabetes  Subcutaneous insulin protocol  Anemia  Does have chronic anemia likely due to renal failure, however acute on chronic currently.  Received 1 unit of PRBCs.  There was reportedly some blood loss with catheter insertion but  urine is clear now.  Have restarted aspirin.  Continue to monitor, especially for any GI bleeding.  CAD status post CABG  Continue DAPT with aspirin and Plavix  COPD not exacerbation  Multiple skin abrasions  Wound care  Impaired mobility and ADLs  PT/OT once stable to participate     At this time we are pending approval or denial for short-term rehab.  Patient is okay with going back home if he gets denied, but wants to see if he is able to go.    Plan of care reviewed, no changes. Copied forward from 7/6     DVT Prophylaxis: SCD  Code Status: Full  Diet: Renal diabetic.  Discharge Plan: KRISTINE Patel DO  07/07/25  12:50 EDT    Dictated utilizing Dragon dictation.

## 2025-07-07 NOTE — CASE MANAGEMENT/SOCIAL WORK
Case Management/Social Work    Patient Name:  Jonny Ferrari Jr.  YOB: 1950  MRN: [id number]  Admit Date:  6/29/2025        14:37 EDT   Discharge Plan       Row Name 07/07/25 1429       Plan    Plan [facility] STR, pending auth    Patient/Family in Agreement with Plan yes    Plan Comments Update to previous two notes today:  No return call from son or daughter-in-law from attempted calls on Thursday, 7/3. Attempted to call sonFrankie today and left a message to update. Called daughter-in-lawLinette and updated today on bed offer with pending insurance auth for [facility] STR. Also explained to Linette that there is a chance he may get denied due to walking 60 feet with CG today, but if denied we can arrange home health. Linette stated they have been out of town & verbalized understanding. [facility] requested patient bring home supply of inhalers when he comes, due to cost. CM will continue to follow.                        Electronically signed by:  Ying Thorne RN  07/07/25 14:37 EDT

## 2025-07-07 NOTE — CASE MANAGEMENT/SOCIAL WORK
Case Management/Social Work    Patient Name:  Jonny Ferrari Jr.  YOB: 1950  MRN: [id number]  Admit Date:  6/29/2025        09:58 EDT   Discharge Plan       Row Name 07/07/25 0954       Plan    Plan STR, PENDING referrals. Will need auth once a bed is offered.    Patient/Family in Agreement with Plan yes    Plan Comments Spoke to patient and wife at bedside to update. He would like to go home with his wife and home health, but is still agreeable to STR. Referrals are still pending at this time. Once a bed is offered, he will need insurance auth. Patient will need updated therapy notes for auth. CM will continue to follow.                        Electronically signed by:  Ying Thorne RN  07/07/25 09:58 EDT

## 2025-07-07 NOTE — CASE MANAGEMENT/SOCIAL WORK
Case Management/Social Work    Patient Name:  Jonny Ferrari Jr.  YOB: 1950  MRN: [id number]  Admit Date:  6/29/2025        [facility] no beds. SW asked [facility] to review. SW left message for [facility] to review. SW following.     11:07 EDT [facility] can offer. SW waiting on therapy notes to start auth. SW following.     12:39 EDT [employer] not allowing this SW to start auth, stating member ID can not be found. SW following.     13:48 EDT auth was started after getting name/information correct in system. Auth is currently pending in portal. JESSICA Following.       Electronically signed by:  HEAVENLY Rodríguez  07/07/25 09:33 EDT

## 2025-07-07 NOTE — THERAPY TREATMENT NOTE
Patient Name: Jonny Ferrari Jr.  : 1950    MRN: [id number]                              Today's Date: 2025       Admit Date: 2025    Visit Dx:     ICD-10-CM ICD-9-CM   1. Sepsis with acute renal failure and septic shock, due to unspecified organism, unspecified acute renal failure type  A41.9 038.9    R65.21 995.92    N17.9 785.52     584.9   2. Acute kidney injury superimposed on chronic kidney disease  N17.9 584.9    N18.9 585.9   3. Hyponatremia  E87.1 276.1   4. Hypokalemia  E87.6 276.8   5. Chronic anemia  D64.9 285.9   6. Left lower lobe consolidation  J18.1 481   7. Type 2 diabetes mellitus with hyperglycemia, unspecified whether long term insulin use  E11.65 250.00   8. Multiple skin tears  T14.8XXA 879.8     Patient Active Problem List   Diagnosis    CAD (coronary artery disease)    Essential hypertension    Dyslipidemia    Obesity    GERD (gastroesophageal reflux disease)    OA (osteoarthritis)    TIA (transient ischemic attack)    Anxiety neurosis    Psoriasis    Tobacco use    Diabetes mellitus    Bilateral carotid artery stenosis    NSTEMI (non-ST elevated myocardial infarction)    Chronic kidney disease, stage IV (severe)    Esophageal dysphagia    Iron deficiency anemia    Acute urinary retention    Debility    Perirectal abscess    Acute renal failure (ARF)    Acute metabolic encephalopathy    Sepsis    Septic shock    Acute UTI (urinary tract infection)    Pneumonia of left lower lobe due to infectious organism     Past Medical History:   Diagnosis Date    Anemia     Anxiety neurosis     CAD (coronary artery disease)     Cancer     Kidney    Chronic kidney disease     Patient reported left kidney removed secondary to kidney cancer and that he only has 30% function of the right kidney    Constipation     COPD (chronic obstructive pulmonary disease)     Depression     Diabetes mellitus     DM TYPE 2 , ONSET IN     Dyslipidemia     Dysphagia     Reported noted with food, fluids and  pills    Elevated cholesterol     Full dentures     GERD (gastroesophageal reflux disease)     Gout     H/O left nephrectomy 2020    secondary to cancer    [place] (hard of hearing)     Patient has bilateral hearing aids, still is very [place]    Hypertension     Impaired functional mobility, balance, gait, and endurance     MI (myocardial infarction)     Patient reported apx March 2021 (reported he refused cardiac cath) and June 2021 (did have cardiac cath with placement of 2 stents).     OA (osteoarthritis)     Obesity     MILD TO MODERATE EXOGENOUS OBESITY    Problems with swallowing     with food    Psoriasis     Sleep apnea     CPAP HS - to bring mask and machine DOS    Tattoo     x1    TIA (transient ischemic attack)     Wears glasses 10/27/2021     Past Surgical History:   Procedure Laterality Date    APPENDECTOMY      CARDIAC CATHETERIZATION      CARDIAC CATHETERIZATION N/A 2/20/2019    Procedure: Left Heart Cath;  Surgeon: Ernst Smith MD;  Location:  [facility];  Service: Cardiology    CARDIAC CATHETERIZATION Left 6/9/2021    Procedure: Left Heart Cath;  Surgeon: Ernst Smith MD;  Location:  [facility];  Service: Cardiology;  Laterality: Left;    CHOLECYSTECTOMY      COLONOSCOPY      COLONOSCOPY N/A 11/10/2021    Procedure: COLONOSCOPY with polypectomy x6 and clip x2;  Surgeon: Chidi Trinidad MD;  Location: [facility] ENDOSCOPY;  Service: Gastroenterology;  Laterality: N/A;    COLONOSCOPY N/A 10/25/2023    Procedure: COLONOSCOPY incomplete;  Surgeon: Chidi Trinidad MD;  Location: [facility] ENDOSCOPY;  Service: Gastroenterology;  Laterality: N/A;    COLONOSCOPY N/A 11/8/2023    Procedure: COLONOSCOPY WITH POLYPECTOMY X1;  Surgeon: Chidi Trinidad MD;  Location: [facility] ENDOSCOPY;  Service: Gastroenterology;  Laterality: N/A;    CORONARY ARTERY BYPASS GRAFT  2001    Patient reported no MI prior to CABG and that the bypass was 3 vessel     ENDOSCOPY      ENDOSCOPY N/A  11/10/2021    Procedure: ESOPHAGOGASTRODUODENOSCOPY with biopsy and dilatation;  Surgeon: Chidi Trinidad MD;  Location: [facility] ENDOSCOPY;  Service: Gastroenterology;  Laterality: N/A;    ENDOSCOPY N/A 10/25/2023    Procedure: ESOPHAGOGASTRODUODENOSCOPY with biopsy and dilatation;  Surgeon: Chidi Trinidad MD;  Location: [facility] ENDOSCOPY;  Service: Gastroenterology;  Laterality: N/A;    ENDOSCOPY N/A 11/8/2023    Procedure: ESOPHAGOGASTRODUODENOSCOPY WITH BIOPSY;  Surgeon: Chidi Trinidad MD;  Location: [facility] ENDOSCOPY;  Service: Gastroenterology;  Laterality: N/A;    INCISION AND DRAINAGE PERIRECTAL ABSCESS N/A 8/29/2024    Procedure: INCISION AND DRAINAGE OF PERIRECTAL ABSCESS;  Surgeon: Sherman Billingsley MD;  Location: [facility] OR;  Service: General;  Laterality: N/A;    LAPAROSCOPIC NEPHRECTOMY Left     MOUTH SURGERY      Full mouth extraction    URETER SURGERY      VASECTOMY        General Information       [facility] Name 07/07/25 1125          Physical Therapy Time and Intention    Document Type therapy note (daily note)  -     Mode of Treatment physical therapy;co-treatment;occupational therapy  -       Row Name 07/07/25 1125          General Information    Patient Profile Reviewed yes  -     Existing Precautions/Restrictions fall;oxygen therapy device and L/min  -       Row Name 07/07/25 1125          Cognition    Orientation Status (Cognition) oriented x 4  -       Row Name 07/07/25 1125          Safety Issues/Impairments Affecting Functional Mobility    Impairments Affecting Function (Mobility) balance;endurance/activity tolerance;shortness of breath;strength;pain  -               User Key  (r) = Recorded By, (t) = Taken By, (c) = Cosigned By      Initials Name Provider Type     Frankie Munoz, PT Physical Therapist                   Mobility       Row Name 07/07/25 1126          Bed Mobility    Comment, (Bed Mobility) Pt received in chair  -       Row Name 07/07/25 1126          Sit-Stand  Transfer    Sit-Stand [place] (Transfers) standby assist;1 person assist  -     Assistive Device (Sit-Stand Transfers) walker, front-wheeled  [facility]       Row Name 07/07/25 1126          Gait/Stairs (Locomotion)    [place] Level (Gait) contact guard;1 person assist  -     Assistive Device (Gait) walker, front-wheeled  [facility]     Patient was able to Ambulate yes  -     Distance in Feet (Gait) 60  -JH     Deviations/Abnormal Patterns (Gait) festinating/shuffling;gait speed decreased;base of support, narrow;right sided deviations;micah decreased;stride length decreased;weight shifting decreased  -     Right Sided Gait Deviations heel strike decreased  R calcaneal eversion  -     [place] Level (Stairs) not tested  -               User Key  (r) = Recorded By, (t) = Taken By, (c) = Cosigned By      Initials Name Provider Type     Frankie Munoz PT Physical Therapist                   Obj/Interventions       [facility] Name 07/07/25 1127          Motor Skills    Therapeutic Exercise ankle  -[facility] Name 07/07/25 1127          Ankle (Therapeutic Exercise)    Ankle (Therapeutic Exercise) AROM (active range of motion);strengthening exercise  -     Ankle AROM (Therapeutic Exercise) bilateral;eversion;10 repetitions;3 second hold;sitting  -     Ankle Strengthening (Therapeutic Exercise) bilateral;dorsiflexion;plantarflexion;10 repetitions;sitting;eversion  ABC's  -       Row Name 07/07/25 1127          Balance    Balance Assessment sitting static balance;sitting dynamic balance;standing static balance;standing dynamic balance  -     Static Sitting Balance standby assist  -     Dynamic Sitting Balance standby assist  -     Position, Sitting Balance unsupported;sitting in chair  -     Static Standing Balance contact guard;1-person assist  -     Dynamic Standing Balance contact guard;1-person assist  -     Position/Device Used, Standing Balance supported;[facility]                User Key  (r) = Recorded By, (t) = Taken By, (c) = Cosigned By      Initials Name Provider Type     Frankie Munoz, PT Physical Therapist                   Goals/Plan    No documentation.                  Clinical Impression       Row Name 07/07/25 1129          Pain    Pretreatment Pain Rating 0/10 - no pain  -     Posttreatment Pain Rating 0/10 - no pain  -       Row Name 07/07/25 1129          Plan of Care Review    Plan of Care Reviewed With patient;spouse  -     Progress improving  -     Outcome Evaluation Pt received sitting in recliner. Pt performed sit to stand with SBA and FWW. Pt ambulated 60 feet CGA with FWW and demonstrated severe R calcaneal eversion throughout ambulation. Pt revealed he has a history of a R ankle fracture 2 years ago that led to this deformity. Pt performed seated ankle exercises as indicated in flowsheets to improve strength of B ankles as well as improve neuromuscular control of R ankle. Pt demonstrates high fear of falling which led to him not ambulating in the past 2 years. Pt reports desire to be able to ambulate on his own prior to returning home. I believe this pt would benefit from short term rehabilitation at discharge to improve functional activity tolerance, decrease fall risk, and maximize functional independence. Continue PT POC. Of note, pt reports having an orthotic at home for the R ankle. Family was asked to bring the orthotic for the treatment tomorrow.  -       Row Name 07/07/25 1129          Therapy Assessment/Plan (PT)    Rehab Potential (PT) good  -     Criteria for Skilled Interventions Met (PT) yes;skilled treatment is necessary  -     Therapy Frequency (PT) 5 times/wk  -       Row Name 07/07/25 1129          Vital Signs    Pre Systolic BP Rehab 117  -     Pre Treatment Diastolic BP 63  -JH     Post Systolic BP Rehab 140  -JH     Post Treatment Diastolic BP 76  -     Pretreatment Heart Rate (beats/min) 71  -     Posttreatment Heart  Rate (beats/min) 73  -     Pre SpO2 (%) 96  -     O2 Delivery Pre Treatment room air  -     Post SpO2 (%) 97  -     O2 Delivery Post Treatment room air  -     Pre Patient Position Sitting  -     Post Patient Position Sitting  -       Row Name 07/07/25 1129          Positioning and Restraints    Pre-Treatment Position sitting in chair/recliner  -     Post Treatment Position chair  -     In Chair notified nsg;reclined;call light within reach;encouraged to call for assist;with family/caregiver  -               User Key  (r) = Recorded By, (t) = Taken By, (c) = Cosigned By      Initials Name Provider Type    Frankie López PT Physical Therapist                   Outcome Measures       Row Name 07/07/25 1135 07/07/25 0924       How much help from another person do you currently need...    Turning from your back to your side while in flat bed without using bedrails? 4  - 4 (P)   -CM    Moving from lying on back to sitting on the side of a flat bed without bedrails? 4  - 3 (P)   -CM    Moving to and from a bed to a chair (including a wheelchair)? 3  - 3 (P)   -CM    Standing up from a chair using your arms (e.g., wheelchair, bedside chair)? 3  - 3 (P)   -CM    Climbing 3-5 steps with a railing? 2  - 2 (P)   -CM    To walk in hospital room? 3  - 3 (P)   -CM    AM-PAC 6 Clicks Score (PT) 19  - 18 (P)   -CM    Highest Level of Mobility Goal Walk 10 Steps or More-6  - Walk 10 Steps or More-6 (P)   -CM      Row Name 07/07/25 1135          Functional Assessment    Outcome Measure Options AM-PAC 6 Clicks Basic Mobility (PT)  -               User Key  (r) = Recorded By, (t) = Taken By, (c) = Cosigned By      Initials Name Provider Type    Chidi Nascimento RN Extern Registered Nurse    Frankie López PT Physical Therapist                                 Physical Therapy Education       Title: PT OT SLP Therapies (In Progress)       Topic: Physical Therapy (In Progress)       Point:  Mobility training (Done)       Learning Progress Summary            Patient Acceptance, E,TB, VU by  at 7/7/2025 1136    Comment: Pt educated in HEP, proper gait mechanics with FWW, and discharge recommendations.    Acceptance, E,D, DU,VU by  at 7/2/2025 1338    Acceptance, E,D, DU,VU by  at 7/1/2025 1707    Comment: role of PT and POC                      Point: Home exercise program (Done)       Learning Progress Summary            Patient Acceptance, E,TB, VU by  at 7/7/2025 1136    Comment: Pt educated in HEP, proper gait mechanics with FWW, and discharge recommendations.    Acceptance, E,TB, VU by  at 7/5/2025 1748    Acceptance, E,D, DU,VU by  at 7/2/2025 1338                      Point: Body mechanics (Done)       Learning Progress Summary            Patient Acceptance, E,D, DU,VU by  at 7/2/2025 1338    Acceptance, E,D, DU,VU by  at 7/1/2025 1707    Comment: role of PT and POC                      Point: Precautions (Not Started)       Learner Progress:  Not documented in this visit.                              User Key       Initials Effective Dates Name Provider Type Discipline     06/16/21 -  Danica Amezcua PTA Physical Therapist Assistant PT     06/13/23 -  Kludeep Ren PT Physical Therapist PT     06/19/25 -  Frankie Munoz PT Physical Therapist PT                  PT Recommendation and Plan     Progress: improving  Outcome Evaluation: Pt received sitting in recliner. Pt performed sit to stand with SBA and FWW. Pt ambulated 60 feet CGA with FWW and demonstrated severe R calcaneal eversion throughout ambulation. Pt revealed he has a history of a R ankle fracture 2 years ago that led to this deformity. Pt performed seated ankle exercises as indicated in flowsheets to improve strength of B ankles as well as improve neuromuscular control of R ankle. Pt demonstrates high fear of falling which led to him not ambulating in the past 2 years. Pt reports desire to be able to ambulate on  his own prior to returning home. I believe this pt would benefit from short term rehabilitation at discharge to improve functional activity tolerance, decrease fall risk, and maximize functional independence. Continue PT POC. Of note, pt reports having an orthotic at home for the R ankle. Family was asked to bring the orthotic for the treatment tomorrow.     Time Calculation:         PT Charges       Row Name 07/07/25 1137             Time Calculation    Start Time 1002  -      Stop Time 1028  -      Time Calculation (min) 26 min  -      PT Received On 07/07/25  -      PT Goal Re-Cert Due Date 07/11/25  -         Time Calculation- PT    Total Timed Code Minutes- PT 26 minute(s)  -         Timed Charges    22602 - PT Therapeutic Exercise Minutes 11  -      50579 - Gait Training Minutes  15  -         Total Minutes    Timed Charges Total Minutes 26  -       Total Minutes 26  -JH                User Key  (r) = Recorded By, (t) = Taken By, (c) = Cosigned By      Initials Name Provider Type     Frankie Munoz, MARY Physical Therapist                  Therapy Charges for Today       Code Description Service Date Service Provider Modifiers Qty    85810772297 HC PT THER PROC EA 15 MIN 7/7/2025 Frankie Munoz, PT GP 1    29784707124 HC GAIT TRAINING EA 15 MIN 7/7/2025 Frankie Munoz, PT GP 1            PT G-Codes  Outcome Measure Options: AM-PAC 6 Clicks Basic Mobility (PT)  AM-PAC 6 Clicks Score (PT): 19  AM-PAC 6 Clicks Score (OT): 15  PT Discharge Summary  Anticipated Discharge Disposition (PT): skilled nursing facility    Frankie Munoz PT TP#[id number] 7/7/2025

## 2025-07-07 NOTE — CASE MANAGEMENT/SOCIAL WORK
Case Management/Social Work    Patient Name:  Jonny Ferrari Jr.  YOB: 1950  MRN: [id number]  Admit Date:  6/29/2025        11:30 EDT   Discharge Plan       Row Name 07/07/25 1128       Plan    Plan [facility] STR. Auth will be started once therapy notes available.    Patient/Family in Agreement with Plan yes    Plan Comments Update to previous note today:  [facility] offered STR bed. Updated patient and wife at bedside and delivered IMM. Patient and wife are agreeable. Auth will be started once therapy notes are available.      Row Name 07/07/25 0954       Plan    Plan STR, PENDING referrals. Will need auth once a bed is offered.    Patient/Family in Agreement with Plan yes    Plan Comments Spoke to patient and wife at bedside to update. He would like to go home with his wife and home health, but is still agreeable to STR. Referrals are still pending at this time. Once a bed is offered, he will need insurance auth. Patient will need updated therapy notes for auth. CM will continue to follow.                        Electronically signed by:  Ying Thorne RN  07/07/25 11:30 EDT

## 2025-07-08 LAB
GLUCOSE BLDC GLUCOMTR-MCNC: 123 MG/DL (ref 70–130)
GLUCOSE BLDC GLUCOMTR-MCNC: 132 MG/DL (ref 70–130)
GLUCOSE BLDC GLUCOMTR-MCNC: 148 MG/DL (ref 70–130)
GLUCOSE BLDC GLUCOMTR-MCNC: 181 MG/DL (ref 70–130)
GLUCOSE BLDC GLUCOMTR-MCNC: 187 MG/DL (ref 70–130)

## 2025-07-08 PROCEDURE — 97530 THERAPEUTIC ACTIVITIES: CPT

## 2025-07-08 PROCEDURE — 94799 UNLISTED PULMONARY SVC/PX: CPT

## 2025-07-08 PROCEDURE — 63710000001 INSULIN GLARGINE PER 5 UNITS: Performed by: INTERNAL MEDICINE

## 2025-07-08 PROCEDURE — 99231 SBSQ HOSP IP/OBS SF/LOW 25: CPT | Performed by: INTERNAL MEDICINE

## 2025-07-08 PROCEDURE — 94761 N-INVAS EAR/PLS OXIMETRY MLT: CPT

## 2025-07-08 PROCEDURE — 82948 REAGENT STRIP/BLOOD GLUCOSE: CPT

## 2025-07-08 PROCEDURE — 63710000001 INSULIN LISPRO (HUMAN) PER 5 UNITS: Performed by: INTERNAL MEDICINE

## 2025-07-08 PROCEDURE — 82948 REAGENT STRIP/BLOOD GLUCOSE: CPT | Performed by: INTERNAL MEDICINE

## 2025-07-08 PROCEDURE — 97110 THERAPEUTIC EXERCISES: CPT

## 2025-07-08 RX ADMIN — INSULIN GLARGINE 20 UNITS: 100 INJECTION, SOLUTION SUBCUTANEOUS at 20:59

## 2025-07-08 RX ADMIN — BUPRENORPHINE AND NALOXONE 1 FILM: 8; 2 FILM BUCCAL; SUBLINGUAL at 09:02

## 2025-07-08 RX ADMIN — PANTOPRAZOLE SODIUM 40 MG: 40 TABLET, DELAYED RELEASE ORAL at 06:05

## 2025-07-08 RX ADMIN — BUDESONIDE 0.5 MG: 0.5 SUSPENSION RESPIRATORY (INHALATION) at 19:41

## 2025-07-08 RX ADMIN — Medication 1 CAPSULE: at 20:59

## 2025-07-08 RX ADMIN — BUSPIRONE HYDROCHLORIDE 15 MG: 15 TABLET ORAL at 09:02

## 2025-07-08 RX ADMIN — INSULIN LISPRO 2 UNITS: 100 INJECTION, SOLUTION INTRAVENOUS; SUBCUTANEOUS at 16:51

## 2025-07-08 RX ADMIN — Medication 10 ML: at 09:02

## 2025-07-08 RX ADMIN — BUDESONIDE 0.5 MG: 0.5 SUSPENSION RESPIRATORY (INHALATION) at 07:02

## 2025-07-08 RX ADMIN — ASPIRIN 81 MG: 81 TABLET, COATED ORAL at 09:02

## 2025-07-08 RX ADMIN — Medication 10 ML: at 21:00

## 2025-07-08 RX ADMIN — IPRATROPIUM BROMIDE AND ALBUTEROL SULFATE 3 ML: 2.5; .5 SOLUTION RESPIRATORY (INHALATION) at 19:41

## 2025-07-08 RX ADMIN — BUSPIRONE HYDROCHLORIDE 15 MG: 15 TABLET ORAL at 20:58

## 2025-07-08 RX ADMIN — CLOPIDOGREL BISULFATE 75 MG: 75 TABLET, FILM COATED ORAL at 09:02

## 2025-07-08 RX ADMIN — BUPRENORPHINE AND NALOXONE 1 FILM: 8; 2 FILM BUCCAL; SUBLINGUAL at 20:58

## 2025-07-08 RX ADMIN — Medication 1 CAPSULE: at 09:02

## 2025-07-08 NOTE — CASE MANAGEMENT/SOCIAL WORK
Case Management/Social Work    Patient Name:  Jonny Ferrari Jr.  YOB: 1950  MRN: [id number]  Admit Date:  6/29/2025        10:57 EDT   Discharge Plan       Row Name 07/08/25 1055       Plan    Plan [facility] STR, pending auth    Patient/Family in Agreement with Plan yes    Plan Comments Auth is still pending for STR at [facility]. Updated wife at bedside this morning. Patient sleeping.                        Electronically signed by:  Ying Thorne RN  07/08/25 10:57 EDT

## 2025-07-08 NOTE — CASE MANAGEMENT/SOCIAL WORK
Case Management/Social Work    Patient Name:  Jonny Ferrari Jr.  YOB: 1950  MRN: [id number]  Admit Date:  6/29/2025        15:24 EDT   Discharge Plan       Row Name 07/08/25 1519       Plan    Plan [facility] STR, pending auth    Patient/Family in Agreement with Plan yes    Plan Comments Auth is still pending at this time. Updated patient and wife at bedside. Patient and wife will wait until tomorrow, 7/9. If auth is still pending tomorrow, he wants to go home with home health. CM left a message for sonFrankie at [phone] and daughter-in-law, Linette at [phone] to update that auth is still pending and if no answer by tomorrow, patient wants to go home with home health. Awaiting a call back. CM will continue to follow.                        Electronically signed by:  Ying Thorne RN  07/08/25 15:24 EDT

## 2025-07-08 NOTE — PROGRESS NOTES
"    [facility]    Name:  Jonny Ferrari Jr.   Age:  74 y.o.  Sex:  male  :  1950  MRN:  [id number]   Visit Number:  [id number]  Admission Date:  2025  Date Of Service:  25  Primary Care Physician:  Amaury Brice MD     LOS: 9 days :    Chief Complaint:      Follow-up septic shock    Subjective:    Patient examined.  Resting comfortably. No acute events overnight. Waiting on STR    Hospital Course:    Per prior provider: \"Jonny Ferrari Jr. is a 74-year-old male with history of diabetes mellitus type 2, chronic kidney disease stage III, COPD, coronary artery disease status post CABG and stent, ERASMO, BPH was brought to the emergency room by EMS with symptoms of generalized weakness and fatigue   The patient was brought in by ambulance due to an inability to walk, a condition that has persisted for the past 2 to 3 days. He reports no fever, dysuria, or cough but feels weak. He has experienced several falls recently and has been unable to walk independently for the past 3 to 4 days. Despite using a walker or cane, he continues to fall.   In the emergency room, patient was afebrile but mildly tachycardic in the 100 with initial blood pressure of 80/54 and pulse oxygen saturation of 95% on room air.  Patient was given 30 mL/kg fluid bolus initially in the emergency room with initial improvement of the blood pressure to systolic 100 but subsequently dropped it again to the 70s systolic.  Patient was given another 500 bolus of normal saline, he right femoral central line was placed and he was initiated on Levophed. \"     Patient was able to be weaned off steroids.  He completed antibiotics for UTI after discussion with pharmacy.  Has been seen by nephrology and kidney function is back to baseline.  Did have some drop in hemoglobin, there was concern for possible hematuria, this has resolved however.  Did receive 1 unit of PRBCs.    Review of Systems:     All " systems were reviewed and negative except as mentioned in subjective, assessment and plan.    Vital Signs:    Temp:  [97.5 °F (36.4 °C)-98.2 °F (36.8 °C)] 97.8 °F (36.6 °C)  Heart Rate:  [66-96] 74  Resp:  [16-18] 16  BP: ()/(62-84) 108/64    Intake and output:    I/O last 3 completed shifts:  In: 1592 [P.O.:1582; I.V.:10]  Out: 1925 [Urine:1925]  I/O this shift:  In: 580 [P.O.:580]  Out: 650 [Urine:650]    Physical Examination:  Examined again 7/8/25    General Appearance:  Alert and cooperative. NAD   Head:  Atraumatic and normocephalic.   Eyes: Conjunctivae and sclerae normal, no icterus. No pallor.   Throat: No oral lesions, no thrush, oral mucosa moist.   Neck: Supple, trachea midline, no thyromegaly.   Lungs:   Breath sounds heard bilaterally equally.  No wheezing or crackles.    Heart:  Normal S1 and S2, no murmur, No JVD.   Abdomen:   Normal bowel sounds, Soft, nontender, nondistended, no rebound tenderness.   Extremities: Supple, no edema, no cyanosis, no clubbing.   Skin: No bleeding or rash.   Neurologic: Alert and oriented x 3. No facial asymmetry. Generalized weakness.      Laboratory results:    Results from last 7 days   Lab Units 07/07/25  0431 07/05/25  1009 07/04/25  0454   SODIUM mmol/L 135* 137 139   POTASSIUM mmol/L 4.6 4.4 3.7   CHLORIDE mmol/L 101 102 101   CO2 mmol/L 23.8 24.3 27.9   BUN mg/dL 16.0 13.0 16.0   CREATININE mg/dL 1.82* 1.88* 1.95*   CALCIUM mg/dL 9.2 8.8 8.9   GLUCOSE mg/dL 123* 143* 84     Results from last 7 days   Lab Units 07/07/25  0431 07/05/25  1009 07/04/25  0454 07/02/25  1517 07/02/25  0407 07/02/25  0315 07/01/25  1945   WBC 10*3/mm3 6.96  --   --   --  5.65  --  5.67   HEMOGLOBIN g/dL 8.2* 7.7* 8.2*   < > 6.0*   < > 8.4*   HEMATOCRIT % 26.2* 24.8* 26.4*   < > 18.8*   < > 26.7*   PLATELETS 10*3/mm3 139*  --   --   --  129*  --  148    < > = values in this interval not displayed.     Results from last 7 days   Lab Units 07/01/25  8215   INR  1.03              Recent Labs     08/29/24  2314   PHART 7.382   FIO0SMG 49.8*   PO2ART 68.0*   IET9GFU 29.5*   BASEEXCESS 3.8*      I have reviewed the patient's laboratory results.    Radiology results:    No radiology results from the last 24 hrs  I have reviewed the patient's radiology reports.    Medication Review:     I have reviewed the patient's active and prn medications.     Problem List:      Septic shock    CAD (coronary artery disease)    Chronic kidney disease, stage IV (severe)    Acute UTI (urinary tract infection)    Pneumonia of left lower lobe due to infectious organism      Assessment:    Septic shock secondary to #2 and #3, POA.  Acute urinary tract infection, POA.  Left lower lobe bacterial pneumonia, unable to classify further, POA.  Demand ischemia with elevated troponins, POA.  Hypokalemia, POA.  Chronic kidney disease stage IV.  Diabetes mellitus type 2 with nephropathy and hyperglycemia, POA.  Coronary artery disease status post CABG and stents.  COPD, no exacerbation.  Benign prostatic hyperplasia.  Will falls with skin abrasions, POA.    Plan:    Septic shock  Acute UTI  Left lower lobe bacterial pneumonia  Negative blood culture, on Zosyn.  Oxygenation stable.  Off pressors.  Urine culture had E faecalis.  Discussed with pharmacy, no further treatment indicated  Demand ischemia with elevated troponins  Troponins plateaued 95-98  Suspect secondary to demand ischemia in the setting of his septic shock as well as CKD  Low suspicion for ACS  Hypokalemia  Continue replacement  CKD stage IV  Nephrology, Dr. Hooper consulted and appreciate recommendations.  Numbers now stabilized.  Off fluids.  Diuretics started  Type 2 diabetes  Subcutaneous insulin protocol  Anemia  Does have chronic anemia likely due to renal failure, however acute on chronic currently.  Received 1 unit of PRBCs.  There was reportedly some blood loss with catheter insertion but urine is clear now.  Have restarted aspirin.  Continue to  monitor, especially for any GI bleeding.  CAD status post CABG  Continue DAPT with aspirin and Plavix  COPD not exacerbation  Multiple skin abrasions  Wound care  Impaired mobility and ADLs  PT/OT once stable to participate     At this time we are pending approval or denial for short-term rehab.  Patient is okay with going back home if he gets denied, but wants to see if he is able to go.    Plan of care reviewed, no changes. Copied forward from 7/7/25     DVT Prophylaxis: SCD  Code Status: Full  Diet: Renal diabetic.  Discharge Plan: KRISTINE Patel DO  07/08/25  13:46 EDT    Dictated utilizing Dragon dictation.

## 2025-07-08 NOTE — PLAN OF CARE
Goal Outcome Evaluation:  Plan of Care Reviewed With: patient, spouse        Progress: improving  Outcome Evaluation: Pt participated well with treatment. Pt received sitting in chair asleep with HR 79 and oxygen sat 99% on room air. Pt was considerably lethargic throughout first 10 minutes of treatment and required verbal cueing to open eyes. Pt performed B LE exercises as indicated in flowsheets prior to functional mobility and exercises were progressed this session. Pt performed 5 time sit to stand test this session with CGA throughout and completed test in 18.2 seconds indicating increased fall risk and risk of further hospitalizations due to falls. Pt performed additional STS with CGA and FWW and ambulated a total of 10 feet with CGA and FWW. Pt reported increased fatigue and could not ambulate further this session. Pt will continue to benefit from skilled PT to address functional activity tolerance deficit and decrease fall risk. Pt would benefit from short term rehabilitation to maximize functional independence, reduce fall risk, and reduce risk of further hospitalizations.    Anticipated Discharge Disposition (PT): skilled nursing facility

## 2025-07-08 NOTE — PLAN OF CARE
Goal Outcome Evaluation:  Plan of Care Reviewed With: patient        Progress: improving  Outcome Evaluation: Patient is waiting for insurance approval for STR, VSS, Up with assist, telemetry overflow, patient has urinated since catheter was pulled

## 2025-07-08 NOTE — CASE MANAGEMENT/SOCIAL WORK
Case Management/Social Work    Patient Name:  Jonny Ferrari Jr.  YOB: 1950  MRN: [id number]  Admit Date:  6/29/2025        Auth is still pending in portal for STR at [facility]. SW Following.     14:39 EDT auth still pending for STR.  following.       Electronically signed by:  HEAVENLY Rodríguez  07/08/25 09:10 EDT

## 2025-07-08 NOTE — PROGRESS NOTES
[facility] Progress Note      Patient Name: Jonny Ferrari Jr.  : 1950  MRN: [id number]  Primary Care Physician:  Amaury Brice MD  Date of admission: 2025    Subjective     Interval History:   Clinically stable no acute events overnight  No chest pain no shortness of breath no labs done today     renal function is near plateaued down to 1.82  Review of Systems:   As noted above    Objective     Vitals:   Temp:  [97.5 °F (36.4 °C)-98.2 °F (36.8 °C)] 97.8 °F (36.6 °C)  Heart Rate:  [66-96] 74  Resp:  [16-18] 16  BP: ()/(62-84) 108/64    Intake/Output Summary (Last 24 hours) at 2025 1425  Last data filed at 2025 1300  Gross per 24 hour   Intake 1060 ml   Output 750 ml   Net 310 ml       Physical Exam:    General Appearance: alert no acute distress   Skin: warm and dry  HEENT: oral mucosa normal  Neck: supple, no JVD  Lungs: CTA  Heart: RRR, normal S1 and S2  Abdomen: soft, nontender  : no palpable bladder  Extremities: no edema  Neuro: normal speech    Scheduled Meds:     aspirin, 81 mg, Oral, Daily  budesonide, 0.5 mg, Nebulization, BID - RT  buprenorphine-naloxone, 1 film, Sublingual, BID  busPIRone, 15 mg, Oral, Q12H  clopidogrel, 75 mg, Oral, Daily  insulin glargine, 20 Units, Subcutaneous, Nightly  insulin lispro, 2-9 Units, Subcutaneous, 4x Daily AC & at Bedtime  lactobacillus acidophilus, 1 capsule, Oral, BID  pantoprazole, 40 mg, Oral, Q AM  senna-docusate sodium, 2 tablet, Oral, BID  sodium chloride, 10 mL, Intravenous, Q12H      IV Meds:        Results Reviewed:   I have personally reviewed the results from the time of this admission to 2025 14:25 EDT     Results from last 7 days   Lab Units 25  0431 25  1009 25  0454   SODIUM mmol/L 135* 137 139   POTASSIUM mmol/L 4.6 4.4 3.7   CHLORIDE mmol/L 101 102 101   CO2 mmol/L 23.8 24.3 27.9   BUN mg/dL 16.0 13.0 16.0   CREATININE mg/dL 1.82* 1.88* 1.95*   CALCIUM mg/dL 9.2 8.8  8.9   GLUCOSE mg/dL 123* 143* 84     Estimated Creatinine Clearance: 44.7 mL/min (A) (by C-G formula based on SCr of 1.82 mg/dL (H)).  Results from last 7 days   Lab Units 07/03/25  0503 07/02/25  1517 07/02/25  0407   PHOSPHORUS mg/dL 2.6 2.4* 1.9*         Results from last 7 days   Lab Units 07/07/25  0431 07/05/25  1009 07/04/25  0454 07/03/25  0503 07/02/25  1517 07/02/25  0407 07/02/25  0315 07/01/25  1945   WBC 10*3/mm3 6.96  --   --   --   --  5.65  --  5.67   HEMOGLOBIN g/dL 8.2* 7.7* 8.2* 8.6* 7.7* 6.0*   < > 8.4*   PLATELETS 10*3/mm3 139*  --   --   --   --  129*  --  148    < > = values in this interval not displayed.     Results from last 7 days   Lab Units 07/01/25  1945   INR  1.03       Assessment / Plan     ASSESSMENT:  Acute kidney injury: Recovered   chronic kidney disease stage IV: Underlying chronic kidney disease secondary to diabetes and hypertension as well as volume loss, status post left nephrectomy in March of 2020.  Baseline creatinine of 2.9 with a EGFR of 21 mL/min.  Now 1.82  Type 2 diabetes: Longstanding history of type 2 diabetes likely with some complications.  Septic shock: Treated as per hospitalist service still on pressors.  Left lower lobe pneumonia: On IV antibiotics as per hospitalist service.  Urinary tract infection: Treated  Hypokalemia: Replace per protocol  Coronary artery disease: Status post CABG and stent placements.  No acute issues  COPD: Longstanding history of smoking.  Untreated sleep apnea: Longstanding known history of obstructive sleep apnea, has not been able to use his BiPAP        PLAN:   -Encourage oral hydration monitor sodium it was 135 yesterday   -Monitor drops in the hemoglobin  - monitor intake and output  -Dose medicate per GFR   -avoid nephrotoxin  -Aim for MAP greater than 65  - Okay to discharge from nephrology perspective-  -Thank for the consult will closely follow      Alem Maharaj MD  07/08/25  14:25 EDT    [facility]  [phone]

## 2025-07-08 NOTE — THERAPY TREATMENT NOTE
Patient Name: Jonny Ferrari Jr.  : 1950    MRN: [id number]                              Today's Date: 2025       Admit Date: 2025    Visit Dx:     ICD-10-CM ICD-9-CM   1. Sepsis with acute renal failure and septic shock, due to unspecified organism, unspecified acute renal failure type  A41.9 038.9    R65.21 995.92    N17.9 785.52     584.9   2. Acute kidney injury superimposed on chronic kidney disease  N17.9 584.9    N18.9 585.9   3. Hyponatremia  E87.1 276.1   4. Hypokalemia  E87.6 276.8   5. Chronic anemia  D64.9 285.9   6. Left lower lobe consolidation  J18.1 481   7. Type 2 diabetes mellitus with hyperglycemia, unspecified whether long term insulin use  E11.65 250.00   8. Multiple skin tears  T14.8XXA 879.8     Patient Active Problem List   Diagnosis    CAD (coronary artery disease)    Essential hypertension    Dyslipidemia    Obesity    GERD (gastroesophageal reflux disease)    OA (osteoarthritis)    TIA (transient ischemic attack)    Anxiety neurosis    Psoriasis    Tobacco use    Diabetes mellitus    Bilateral carotid artery stenosis    NSTEMI (non-ST elevated myocardial infarction)    Chronic kidney disease, stage IV (severe)    Esophageal dysphagia    Iron deficiency anemia    Acute urinary retention    Debility    Perirectal abscess    Acute renal failure (ARF)    Acute metabolic encephalopathy    Sepsis    Septic shock    Acute UTI (urinary tract infection)    Pneumonia of left lower lobe due to infectious organism     Past Medical History:   Diagnosis Date    Anemia     Anxiety neurosis     CAD (coronary artery disease)     Cancer     Kidney    Chronic kidney disease     Patient reported left kidney removed secondary to kidney cancer and that he only has 30% function of the right kidney    Constipation     COPD (chronic obstructive pulmonary disease)     Depression     Diabetes mellitus     DM TYPE 2 , ONSET IN     Dyslipidemia     Dysphagia     Reported noted with food, fluids and  pills    Elevated cholesterol     Full dentures     GERD (gastroesophageal reflux disease)     Gout     H/O left nephrectomy 2020    secondary to cancer    [place] (hard of hearing)     Patient has bilateral hearing aids, still is very [place]    Hypertension     Impaired functional mobility, balance, gait, and endurance     MI (myocardial infarction)     Patient reported apx March 2021 (reported he refused cardiac cath) and June 2021 (did have cardiac cath with placement of 2 stents).     OA (osteoarthritis)     Obesity     MILD TO MODERATE EXOGENOUS OBESITY    Problems with swallowing     with food    Psoriasis     Sleep apnea     CPAP HS - to bring mask and machine DOS    Tattoo     x1    TIA (transient ischemic attack)     Wears glasses 10/27/2021     Past Surgical History:   Procedure Laterality Date    APPENDECTOMY      CARDIAC CATHETERIZATION      CARDIAC CATHETERIZATION N/A 2/20/2019    Procedure: Left Heart Cath;  Surgeon: Ernst Smith MD;  Location:  [facility];  Service: Cardiology    CARDIAC CATHETERIZATION Left 6/9/2021    Procedure: Left Heart Cath;  Surgeon: Ernst Smiht MD;  Location:  [facility];  Service: Cardiology;  Laterality: Left;    CHOLECYSTECTOMY      COLONOSCOPY      COLONOSCOPY N/A 11/10/2021    Procedure: COLONOSCOPY with polypectomy x6 and clip x2;  Surgeon: Chidi Trinidad MD;  Location: [facility] ENDOSCOPY;  Service: Gastroenterology;  Laterality: N/A;    COLONOSCOPY N/A 10/25/2023    Procedure: COLONOSCOPY incomplete;  Surgeon: Chidi Trinidad MD;  Location: [facility] ENDOSCOPY;  Service: Gastroenterology;  Laterality: N/A;    COLONOSCOPY N/A 11/8/2023    Procedure: COLONOSCOPY WITH POLYPECTOMY X1;  Surgeon: Chidi Trinidad MD;  Location: [facility] ENDOSCOPY;  Service: Gastroenterology;  Laterality: N/A;    CORONARY ARTERY BYPASS GRAFT  2001    Patient reported no MI prior to CABG and that the bypass was 3 vessel     ENDOSCOPY      ENDOSCOPY N/A  11/10/2021    Procedure: ESOPHAGOGASTRODUODENOSCOPY with biopsy and dilatation;  Surgeon: Chidi Trinidad MD;  Location: [facility] ENDOSCOPY;  Service: Gastroenterology;  Laterality: N/A;    ENDOSCOPY N/A 10/25/2023    Procedure: ESOPHAGOGASTRODUODENOSCOPY with biopsy and dilatation;  Surgeon: Chidi Trinidad MD;  Location: [facility] ENDOSCOPY;  Service: Gastroenterology;  Laterality: N/A;    ENDOSCOPY N/A 11/8/2023    Procedure: ESOPHAGOGASTRODUODENOSCOPY WITH BIOPSY;  Surgeon: Chidi Trinidad MD;  Location: [facility] ENDOSCOPY;  Service: Gastroenterology;  Laterality: N/A;    INCISION AND DRAINAGE PERIRECTAL ABSCESS N/A 8/29/2024    Procedure: INCISION AND DRAINAGE OF PERIRECTAL ABSCESS;  Surgeon: Sherman Billingsley MD;  Location: [facility] OR;  Service: General;  Laterality: N/A;    LAPAROSCOPIC NEPHRECTOMY Left     MOUTH SURGERY      Full mouth extraction    URETER SURGERY      VASECTOMY        General Information       [facility] Name 07/08/25 1139          Physical Therapy Time and Intention    Document Type therapy note (daily note)  -     Mode of Treatment individual therapy;physical therapy  -[facility] Name 07/08/25 1139          General Information    Patient Profile Reviewed yes  -     Existing Precautions/Restrictions fall  -       Row Name 07/08/25 1139          Cognition    Orientation Status (Cognition) oriented x 4  -[facility] Name 07/08/25 1139          Safety Issues/Impairments Affecting Functional Mobility    Impairments Affecting Function (Mobility) balance;endurance/activity tolerance;shortness of breath;strength;pain  -               User Key  (r) = Recorded By, (t) = Taken By, (c) = Cosigned By      Initials Name Provider Type     Frankie Munoz, PT Physical Therapist                   Mobility       Row Name 07/08/25 1139          Bed Mobility    Comment, (Bed Mobility) pt received in chair.  -       Row Name 07/08/25 1139          Sit-Stand Transfer    Sit-Stand [place] (Transfers)  contact guard;1 person assist  -     Assistive Device (Sit-Stand Transfers) walker, front-wheeled  -     Comment, (Sit-Stand Transfer) 6x STS this session  -[facility] Name 07/08/25 1139          Gait/Stairs (Locomotion)    [place] Level (Gait) contact guard;1 person assist  -     Assistive Device (Gait) walker, front-wheeled  -     Patient was able to Ambulate yes  -     Distance in Feet (Gait) 10  -     Deviations/Abnormal Patterns (Gait) festinating/shuffling;gait speed decreased;base of support, narrow;right sided deviations;micah decreased;stride length decreased;weight shifting decreased  -     Bilateral Gait Deviations forward flexed posture  -     Right Sided Gait Deviations heel strike decreased  -               User Key  (r) = Recorded By, (t) = Taken By, (c) = Cosigned By      Initials Name Provider Type     Frankie Munoz PT Physical Therapist                   Obj/Interventions       [facility] Name 07/08/25 1140          Motor Skills    Therapeutic Exercise hip;knee;ankle  -[facility] Name 07/08/25 1140          Hip (Therapeutic Exercise)    Hip (Therapeutic Exercise) strengthening exercise  -     Hip Strengthening (Therapeutic Exercise) bilateral;flexion;marching while seated;10 repetitions;sitting  -[facility] Name 07/08/25 1140          Knee (Therapeutic Exercise)    Knee (Therapeutic Exercise) strengthening exercise  -     Knee Strengthening (Therapeutic Exercise) bilateral;flexion;extension;SAQ (short arc quad);sitting;10 repetitions  -[facility] Name 07/08/25 1140          Ankle (Therapeutic Exercise)    Ankle (Therapeutic Exercise) strengthening exercise  -     Ankle AROM (Therapeutic Exercise) bilateral  -     Ankle Strengthening (Therapeutic Exercise) bilateral;dorsiflexion;plantarflexion;eversion;sitting;10 repetitions  -[facility] Name 07/08/25 1140          Advanced Stepping/Walking Interventions    Stepping/Walking Interventions backward walking  -      Backward Walking (Stepping/Walking Interventions) 5' backward walking this session  -       Row Name 07/08/25 1140          Balance    Balance Assessment sitting static balance;sitting dynamic balance;standing static balance;standing dynamic balance  -     Static Sitting Balance standby assist  -     Dynamic Sitting Balance standby assist  -     Position, Sitting Balance unsupported;sitting in chair  -     Static Standing Balance contact guard;1-person assist  -     Dynamic Standing Balance contact guard;1-person assist  -     Position/Device Used, Standing Balance supported;walker, rolling  -               User Key  (r) = Recorded By, (t) = Taken By, (c) = Cosigned By      Initials Name Provider Type     Frankie Munoz, PT Physical Therapist                   Goals/Plan    No documentation.                  Clinical Impression       [facility] Name 07/08/25 1142          Pain    Pretreatment Pain Rating 5/10  [facility]     Posttreatment Pain Rating 5/10  [facility]     Pain Location back;hip;knee;extremity  -     Pain Side/Orientation bilateral;lower;generalized  -     Pain Management Interventions exercise or physical activity utilized;positioning techniques utilized  [facility]     Response to Pain Interventions activity participation with tolerable pain;functional ability unchanged  -       Row Name 07/08/25 1142          Plan of Care Review    Plan of Care Reviewed With patient;spouse  -     Progress improving  -     Outcome Evaluation Pt participated well with treatment. Pt received sitting in chair asleep with HR 79 and oxygen sat 99% on room air. Pt was considerably lethargic throughout first 10 minutes of treatment and required verbal cueing to open eyes. Pt performed B LE exercises as indicated in flowsheets prior to functional mobility and exercises were progressed this session. Pt performed 5 time sit to stand test this session with CGA throughout and completed test in 18.2 seconds indicating increased  fall risk and risk of further hospitalizations due to falls. Pt performed additional STS with CGA and FWW and ambulated a total of 10 feet with CGA and FWW. Pt reported increased fatigue and could not ambulate further this session. Pt will continue to benefit from skilled PT to address functional activity tolerance deficit and decrease fall risk. Pt would benefit from short term rehabilitation to maximize functional independence, reduce fall risk, and reduce risk of further hospitalizations.  -[facility] Name 07/08/25 1142          Vital Signs    Pre Systolic BP Rehab 108  -     Pre Treatment Diastolic BP 64  -     Pretreatment Heart Rate (beats/min) 79  -     Posttreatment Heart Rate (beats/min) 79  -     Pre SpO2 (%) 99  -[facility]     O2 Delivery Pre Treatment room air  -     Post SpO2 (%) 96  -     O2 Delivery Post Treatment room air  -     Pre Patient Position Sitting  -     Post Patient Position Sitting  -[facility] Name 07/08/25 1142          Positioning and Restraints    Pre-Treatment Position sitting in chair/recliner  -     Post Treatment Position chair  -     In Chair notified nsg;reclined;call light within reach;encouraged to call for assist;with family/caregiver;RLE elevated;LLE elevated  -               User Key  (r) = Recorded By, (t) = Taken By, (c) = Cosigned By      Initials Name Provider Type    Frankie López PT Physical Therapist                   Outcome Measures       [facility] Name 07/08/25 1151 07/08/25 0819       How much help from another person do you currently need...    Turning from your back to your side while in flat bed without using bedrails? 4  - 4  -HN    Moving from lying on back to sitting on the side of a flat bed without bedrails? 4  - 4  -HN    Moving to and from a bed to a chair (including a wheelchair)? 3  - 3  -HN    Standing up from a chair using your arms (e.g., wheelchair, bedside chair)? 3  - 3  -HN    Climbing 3-5 steps with a railing? 2  - 2   -HN    To walk in hospital room? 3  - 3  -HN    AM-PAC 6 Clicks Score (PT) 19  - 19  -HN    Highest Level of Mobility Goal Walk 10 Steps or More-6  - Walk 10 Steps or More-6  -HN      Row Name 07/08/25 1151          Functional Assessment    Outcome Measure Options AM-PAC 6 Clicks Basic Mobility (PT)  -               User Key  (r) = Recorded By, (t) = Taken By, (c) = Cosigned By      Initials Name Provider Type    HN Haleigh Donato, RN Registered Nurse     Head, Frankie, PT Physical Therapist                                 Physical Therapy Education       Title: PT OT SLP Therapies (In Progress)       Topic: Physical Therapy (In Progress)       Point: Mobility training (Done)       Learning Progress Summary            Patient Acceptance, E,TB, VU by  at 7/8/2025 1151    Comment: Pt educated in sit to stand transfer, gait activities, and HEP.    Acceptance, E,TB, VU by  at 7/7/2025 1136    Comment: Pt educated in HEP, proper gait mechanics with FWW, and discharge recommendations.    Acceptance, E,D, DU,VU by  at 7/2/2025 1338    Acceptance, E,D, DU,VU by  at 7/1/2025 1707    Comment: role of PT and POC                      Point: Home exercise program (Done)       Learning Progress Summary            Patient Acceptance, E,TB, VU by  at 7/8/2025 1151    Comment: Pt educated in sit to stand transfer, gait activities, and HEP.    Acceptance, E,TB, VU by  at 7/7/2025 1136    Comment: Pt educated in HEP, proper gait mechanics with FWW, and discharge recommendations.    Acceptance, E,TB, VU by  at 7/5/2025 1748    Acceptance, E,D, DU,VU by  at 7/2/2025 1338                      Point: Body mechanics (Done)       Learning Progress Summary            Patient Acceptance, E,TB, VU by  at 7/8/2025 1151    Comment: Pt educated in sit to stand transfer, gait activities, and HEP.    Acceptance, E,D, DU,VU by  at 7/2/2025 1338    Acceptance, E,D, DU,VU by  at 7/1/2025 1707    Comment: role of PT and POC                       Point: Precautions (Not Started)       Learner Progress:  Not documented in this visit.                              User Key       Initials Effective Dates Name Provider Type Discipline    CC 06/16/21 -  Danica Amezcua PTA Physical Therapist Assistant PT     06/13/23 -  Kuldeep Ren, MARY Physical Therapist PT     06/19/25 -  Frankie Munoz PT Physical Therapist PT                  PT Recommendation and Plan     Progress: improving  Outcome Evaluation: Pt participated well with treatment. Pt received sitting in chair asleep with HR 79 and oxygen sat 99% on room air. Pt was considerably lethargic throughout first 10 minutes of treatment and required verbal cueing to open eyes. Pt performed B LE exercises as indicated in flowsheets prior to functional mobility and exercises were progressed this session. Pt performed 5 time sit to stand test this session with CGA throughout and completed test in 18.2 seconds indicating increased fall risk and risk of further hospitalizations due to falls. Pt performed additional STS with CGA and FWW and ambulated a total of 10 feet with CGA and FWW. Pt reported increased fatigue and could not ambulate further this session. Pt will continue to benefit from skilled PT to address functional activity tolerance deficit and decrease fall risk. Pt would benefit from short term rehabilitation to maximize functional independence, reduce fall risk, and reduce risk of further hospitalizations.     Time Calculation:         PT Charges       Row Name 07/08/25 1153             Time Calculation    Start Time 1046  -      Stop Time 1113  -      Time Calculation (min) 27 min  -      PT Received On 07/08/25  -      PT Goal Re-Cert Due Date 07/11/25  -         Time Calculation- PT    Total Timed Code Minutes- PT 27 minute(s)  -         Timed Charges    88984 - PT Therapeutic Exercise Minutes 15  -      77150 - PT Therapeutic Activity Minutes 12  -         Total  Minutes    Timed Charges Total Minutes 27  -       Total Minutes 27  -[facility]                User Key  (r) = Recorded By, (t) = Taken By, (c) = Cosigned By      Initials Name Provider Type    Frankie López PT Physical Therapist                  Therapy Charges for Today       Code Description Service Date Service Provider Modifiers Qty    [id number]  PT THER PROC EA 15 MIN 7/7/2025 Head, Frankie, PT GP 1    [id number]  GAIT TRAINING EA 15 MIN 7/7/2025 Frankie Munoz, PT GP 1    [id number]  PT THER PROC EA 15 MIN 7/8/2025 Frankie Munoz, PT GP 1    [id number]  PT THERAPEUTIC ACT EA 15 MIN 7/8/2025 Frankie Munoz, PT GP 1            PT G-Codes  Outcome Measure Options: AM-PAC 6 Clicks Basic Mobility (PT)  AM-PAC 6 Clicks Score (PT): 19  AM-PAC 6 Clicks Score (OT): 15  PT Discharge Summary  Anticipated Discharge Disposition (PT): skilled nursing facility    Frankie Munoz PT [id number]   7/8/2025

## 2025-07-09 LAB
GLUCOSE BLDC GLUCOMTR-MCNC: 119 MG/DL (ref 70–130)
GLUCOSE BLDC GLUCOMTR-MCNC: 127 MG/DL (ref 70–130)
GLUCOSE BLDC GLUCOMTR-MCNC: 133 MG/DL (ref 70–130)
GLUCOSE BLDC GLUCOMTR-MCNC: 164 MG/DL (ref 70–130)

## 2025-07-09 PROCEDURE — 94664 DEMO&/EVAL PT USE INHALER: CPT

## 2025-07-09 PROCEDURE — 82948 REAGENT STRIP/BLOOD GLUCOSE: CPT

## 2025-07-09 PROCEDURE — 99231 SBSQ HOSP IP/OBS SF/LOW 25: CPT | Performed by: INTERNAL MEDICINE

## 2025-07-09 PROCEDURE — 63710000001 INSULIN GLARGINE PER 5 UNITS: Performed by: INTERNAL MEDICINE

## 2025-07-09 PROCEDURE — 94761 N-INVAS EAR/PLS OXIMETRY MLT: CPT

## 2025-07-09 PROCEDURE — 94799 UNLISTED PULMONARY SVC/PX: CPT

## 2025-07-09 PROCEDURE — 63710000001 INSULIN LISPRO (HUMAN) PER 5 UNITS: Performed by: INTERNAL MEDICINE

## 2025-07-09 RX ORDER — BUPRENORPHINE AND NALOXONE 8; 2 MG/1; MG/1
1 FILM, SOLUBLE BUCCAL; SUBLINGUAL 2 TIMES DAILY
Status: COMPLETED | OUTPATIENT
Start: 2025-07-09 | End: 2025-07-10

## 2025-07-09 RX ADMIN — INSULIN LISPRO 2 UNITS: 100 INJECTION, SOLUTION INTRAVENOUS; SUBCUTANEOUS at 17:41

## 2025-07-09 RX ADMIN — BUPRENORPHINE AND NALOXONE 1 FILM: 8; 2 FILM BUCCAL; SUBLINGUAL at 10:04

## 2025-07-09 RX ADMIN — SENNOSIDES AND DOCUSATE SODIUM 2 TABLET: 50; 8.6 TABLET ORAL at 09:04

## 2025-07-09 RX ADMIN — Medication 10 ML: at 09:05

## 2025-07-09 RX ADMIN — Medication 1 CAPSULE: at 09:04

## 2025-07-09 RX ADMIN — PANTOPRAZOLE SODIUM 40 MG: 40 TABLET, DELAYED RELEASE ORAL at 06:21

## 2025-07-09 RX ADMIN — BUDESONIDE 0.5 MG: 0.5 SUSPENSION RESPIRATORY (INHALATION) at 07:07

## 2025-07-09 RX ADMIN — INSULIN GLARGINE 20 UNITS: 100 INJECTION, SOLUTION SUBCUTANEOUS at 20:45

## 2025-07-09 RX ADMIN — BUPRENORPHINE AND NALOXONE 1 FILM: 8; 2 FILM BUCCAL; SUBLINGUAL at 20:48

## 2025-07-09 RX ADMIN — BUDESONIDE 0.5 MG: 0.5 SUSPENSION RESPIRATORY (INHALATION) at 19:25

## 2025-07-09 RX ADMIN — BUSPIRONE HYDROCHLORIDE 15 MG: 15 TABLET ORAL at 09:04

## 2025-07-09 RX ADMIN — IPRATROPIUM BROMIDE AND ALBUTEROL SULFATE 3 ML: 2.5; .5 SOLUTION RESPIRATORY (INHALATION) at 19:25

## 2025-07-09 RX ADMIN — BUSPIRONE HYDROCHLORIDE 15 MG: 15 TABLET ORAL at 20:47

## 2025-07-09 RX ADMIN — Medication 1 CAPSULE: at 20:47

## 2025-07-09 RX ADMIN — ASPIRIN 81 MG: 81 TABLET, COATED ORAL at 09:05

## 2025-07-09 RX ADMIN — CLOPIDOGREL BISULFATE 75 MG: 75 TABLET, FILM COATED ORAL at 09:05

## 2025-07-09 NOTE — DISCHARGE PLACEMENT REQUEST
"Updated notes     Nuzhat Marinelli Jr. (74 y.o. Male)       Date of Birth   1950    Social Security Number       Address   [address] [place] [address]    Home Phone   [phone]    MRN   [id number]       [facility]    Marital Status                               Admission Date   2025    Admission Type   Emergency    Admitting Provider   Silviano Cisneros MD    Attending Provider   Kuldeep Patel DO    Department, Room/Bed   [facility] TELEMETRY 3, 329/1       Discharge Date       Discharge Disposition       Discharge Destination                                 Attending Provider: Kuldeep Patel DO    Allergies: Metformin    Isolation: None   Infection: None   Code Status: CPR    Ht: 182.9 cm (72\")   Wt: 98.9 kg (218 lb 0.6 oz)    Admission Cmt: None   Principal Problem: Septic shock [A41.9,R65.21]                   Active Insurance as of 2025       Primary Coverage       Payor Plan Insurance Group Employer/Plan Group    ANTH MEDICARE REPLACEMENT [facility] MEDICARE ADVANTAGE HMO [id number]       Payor Plan Address Payor Plan Phone Number Payor Plan Fax Number Effective Dates    [address] [phone]  2025 - None Entered    [facility] [address]         Subscriber Name Subscriber Birth Date Member ID       NUZHAT MARINELLI JR. 1950 [id number]                     Emergency Contacts        (Rel.) Home Phone Work Phone Mobile Phone    IsisLashon (Spouse) [phone] -- [phone]    ISISCATE (Relative) -- -- [phone]    NEGRA MARINELLI (Son) [phone] -- --                 History & Physical        Silviano Cisneros MD at [address] [place] [address]   HISTORY AND PHYSICAL      Name:  Nuzhat Marinelli Jr.   Age:  74 y.o.  Sex:  male  :  1950  MRN:  [id number]   Visit Number:  [id number]  Admission Date:  2025  Date Of Service:  25  Primary Care Physician:  Amaury Brice, " MD    Chief Complaint:     Generalized weakness and falls.    History Of Presenting Illness:      Jnony Ferrari Jr. is a 74-year-old male with history of diabetes mellitus type 2, chronic kidney disease stage III, COPD, coronary artery disease status post CABG and stent, ERASMO, BPH was brought to the emergency room by EMS with symptoms of generalized weakness and fatigue that has been going on for the last 2 days.  History of Present Illness  Reason for Admit: Fatigue, sepsis, and chronic kidney disease.    The patient was brought in by ambulance due to an inability to walk, a condition that has persisted for the past 2 to 3 days. He reports no fever, dysuria, or cough but feels weak. He has experienced several falls recently and has been unable to walk independently for the past 3 to 4 days. Despite using a walker or cane, he continues to fall. He also reports swelling in his legs and is hard of hearing in his left ear. He reports feeling slightly better this evening.    He has stage 4 chronic kidney disease and is under the care of Dr. Hooper. He has been taking torsemide 100 mg.    He underwent bypass surgery approximately 20 years ago and is currently on baby aspirin.    MEDICATIONS  Current: Aspirin, torsemide     In the emergency room, patient was afebrile but mildly tachycardic in the 100 with initial blood pressure of 80/54 and pulse oxygen saturation of 95% on room air.  Patient was given 30 mL/kg fluid bolus initially in the emergency room with initial improvement of the blood pressure to systolic 100 but subsequently dropped it again to the 70s systolic.  Patient was given another 500 bolus of normal saline, he right femoral central line was placed and he was initiated on Levophed.    Blood work done in the emergency room showed initial troponin of 98 with repeat at 95.  proBNP 1835.  Sodium 131, potassium 2.9, BUN 61, creatinine 3.79 (baseline), glucose 335.  LFT was unremarkable.  Magnesium 1.8.  Initial  lactic acid was 9.7 with repeat at 4.2.  Procalcitonin 0.79.  WBC 15.7, hemoglobin 9.  Urine analysis shows 21-50 WBCs with trace bacteria.  Blood cultures were drawn.  Fecal occult blood was negative.  Urine culture was sent.  Portable chest x-ray done in the emergency room showed chronic appearing bilateral parenchymal changes with prominent right transverse pressure.  Possible on homogenous opacity noted on the left lower zone.  Patient was given Zosyn and vancomycin in the emergency room, oral potassium chloride and was subsequently initiated on lactated Ringer's IV fluids.  CT abdomen and pelvis showed no acute process.  CT chest without contrast showed pleural effusion with lower lobe atelectasis.  Patient was given Zosyn and vancomycin and was subsequently admitted to the medical ICU for management of septic shock, urinary tract infection, left lower lobe pneumonia.    Review Of Systems:    All systems were reviewed and negative except as mentioned in history of presenting illness, assessment and plan.    Past Medical History: Patient's  has a past medical history of Anemia, Anxiety neurosis, CAD (coronary artery disease), Cancer, Chronic kidney disease, Constipation, COPD (chronic obstructive pulmonary disease), Depression, Diabetes mellitus, Dyslipidemia, Dysphagia, Elevated cholesterol, Full dentures, GERD (gastroesophageal reflux disease), Gout, H/O left nephrectomy (2020), [place] (hard of hearing), Hypertension, Impaired functional mobility, balance, gait, and endurance, MI (myocardial infarction), OA (osteoarthritis), Obesity, Problems with swallowing, Psoriasis, Sleep apnea, Tattoo, TIA (transient ischemic attack), and Wears glasses (10/27/2021).    Past Surgical History: Patient's  has a past surgical history that includes Appendectomy; Cardiac catheterization; Coronary artery bypass graft (2001); Cholecystectomy; Cardiac catheterization (N/A, 2/20/2019); Ureter surgery; Laparoscopic nephrectomy (Left);  Cardiac catheterization (Left, 6/9/2021); Mouth surgery; Colonoscopy; Esophagogastroduodenoscopy; Vasectomy; Colonoscopy (N/A, 11/10/2021); Esophagogastroduodenoscopy (N/A, 11/10/2021); Colonoscopy (N/A, 10/25/2023); Esophagogastroduodenoscopy (N/A, 10/25/2023); Colonoscopy (N/A, 11/8/2023); Esophagogastroduodenoscopy (N/A, 11/8/2023); and Incision and drainage perirectal abscess (N/A, 8/29/2024).    Social History: Patient's  reports that he has quit smoking. His smoking use included cigarettes. He started smoking about 45 years ago. He has a 45.5 pack-year smoking history. His smokeless tobacco use includes snuff. He reports that he does not drink alcohol and does not use drugs.    Family History:  Patient's family history includes Lung disease in his mother; No Known Problems in his brother, brother, father, maternal grandfather, maternal grandmother, paternal grandfather, paternal grandmother, sister, sister, and sister.     Allergies:      Metformin    Home Medications:    Prior to Admission Medications       Prescriptions Last Dose Informant Patient Reported? Taking?    acetaminophen (TYLENOL) 325 MG tablet  Spouse/Significant Other Yes No    Take 2 tablets by mouth Every 6 (Six) Hours As Needed.    alfuzosin (UROXATRAL) 10 MG 24 hr tablet   Yes No    Take 1 tablet by mouth Daily.    allopurinol (ZYLOPRIM) 100 MG tablet  Spouse/Significant Other Yes No    Take 2 tablets by mouth Daily.    aspirin 81 MG EC tablet  Spouse/Significant Other Yes No    Take 1 tablet by mouth Daily.    buprenorphine-naloxone (SUBOXONE) 8-2 MG per SL tablet  Spouse/Significant Other Yes No    Place 1 tablet under the tongue 2 (Two) Times a Day.    busPIRone (BUSPAR) 15 MG tablet  Spouse/Significant Other Yes No    Take 1 tablet by mouth 2 (two) times a day.    calcitriol (ROCALTROL) 0.25 MCG capsule  Spouse/Significant Other Yes No    Take 1 capsule by mouth Daily.    Cholecalciferol (Vitamin D) 50 MCG (2000 UT) tablet   Spouse/Significant Other Yes No    Take 1 tablet by mouth Daily.    Patient not taking:  Reported on 6/24/2025    clopidogrel (PLAVIX) 75 MG tablet  Spouse/Significant Other No No    Take 1 tablet by mouth Daily.    dapagliflozin Propanediol (Farxiga) 10 MG tablet  Spouse/Significant Other Yes No    Take  by mouth Daily.    dutasteride (AVODART) 0.5 MG capsule  Spouse/Significant Other Yes No    Take 1 capsule by mouth Daily.    ezetimibe (ZETIA) 10 MG tablet  Spouse/Significant Other Yes No    Take 1 tablet by mouth Daily.    Lantus SoloStar 100 UNIT/ML injection pen  Spouse/Significant Other Yes No    Inject 20 Units under the skin into the appropriate area as directed 4 (Four) Times a Day After Meals & at Bedtime.    linagliptin (TRADJENTA) 5 MG tablet tablet   No No    Take 1 tablet by mouth Daily.    metoprolol tartrate (LOPRESSOR) 25 MG tablet   No No    Take 1 tablet by mouth Daily. Take for systolic blood pressure greater than 110    midodrine (PROAMATINE) 5 MG tablet   No No    Take 1 tablet by mouth 3 (Three) Times a Day Before Meals.    nitroglycerin (NITROSTAT) 0.4 MG SL tablet  Spouse/Significant Other No No    Place 1 tablet under the tongue Every 5 (Five) Minutes As Needed for Chest Pain. Take no more than 3 doses in 15 minutes.    omeprazole (priLOSEC) 20 MG capsule  Spouse/Significant Other Yes No    Take 1 capsule by mouth 2 (Two) Times a Day.    potassium chloride ER (K-TAB) 20 MEQ tablet controlled-release ER tablet   Yes No    Take 1 tablet by mouth 2 (Two) Times a Day.    sertraline (ZOLOFT) 100 MG tablet  Spouse/Significant Other Yes No    Take 1 tablet by mouth Daily. 2 tablet daily    Spiriva HandiHaler 18 MCG per inhalation capsule   No No    Place 1 capsule into inhaler and inhale Daily for 30 days.    Symbicort 160-4.5 MCG/ACT inhaler   No No    Inhale 2 puffs 2 (Two) Times a Day for 30 days.    tamsulosin (FLOMAX) 0.4 MG capsule 24 hr capsule  Spouse/Significant Other Yes No    Take 1  "capsule by mouth Every Night.    torsemide (DEMADEX) 100 MG tablet   No No    Take 1 tablet by mouth Daily As Needed (Take as needed for fluid retention/swelling).    True Metrix Blood Glucose Test test strip  Spouse/Significant Other Yes No    See Admin Instructions.     ED Medications:    Medications   sodium chloride 0.9 % flush 10 mL (has no administration in time range)   Potassium Replacement - Follow Nurse / BPA Driven Protocol (has no administration in time range)   lactated ringers infusion (125 mL/hr Intravenous New Bag 6/29/25 2212)   norepinephrine (LEVOPHED) 8 mg in 250mL D5W infusion (0.1 mcg/kg/min × 95.3 kg Intravenous Rate/Dose Change 6/29/25 2306)   sodium chloride 0.9 % bolus 500 mL (0 mL Intravenous Stopped 6/29/25 1750)   piperacillin-tazobactam (ZOSYN) IVPB 3.375 g IVPB in 100 mL NS (VTB) (0 g Intravenous Stopped 6/29/25 1815)   vancomycin IVPB 2000 mg in 0.9% Sodium Chloride 500 mL (0 mg Intravenous Stopped 6/29/25 2033)   lactated ringers bolus 2,859 mL (0 mL Intravenous Stopped 6/29/25 2134)   potassium chloride (KLOR-CON M20) CR tablet 40 mEq (40 mEq Oral Given 6/29/25 1953)   lidocaine 1% - EPINEPHrine 1:195728 (XYLOCAINE W/EPI) 1 %-1:365289 injection 10 mL (10 mL Injection Given 6/29/25 2227)     Vital Signs:  Temp:  [97.9 °F (36.6 °C)] 97.9 °F (36.6 °C)  Heart Rate:  [] 107  Resp:  [18] 18  BP: ()/(42-68) 91/56        06/29/25  1658   Weight: 95.3 kg (210 lb)     Body mass index is 28.48 kg/m².    Physical Exam:     Most recent vital Signs: BP 91/56   Pulse 107   Temp 97.9 °F (36.6 °C) (Rectal)   Resp 18   Ht 182.9 cm (72\")   Wt 95.3 kg (210 lb)   SpO2 93%   BMI 28.48 kg/m²     Physical Exam  Constitutional:       General: He is not in acute distress.     Appearance: He is ill-appearing.      Comments: Does not seem to be in any distress at this time.   HENT:      Head: Normocephalic.      Comments: Healing abrasion noted on the scalp/vertex.     Right Ear: External " ear normal.      Left Ear: External ear normal.      Nose: Nose normal.      Mouth/Throat:      Mouth: Mucous membranes are moist.   Eyes:      Extraocular Movements: Extraocular movements intact.      Conjunctiva/sclera: Conjunctivae normal.   Cardiovascular:      Rate and Rhythm: Normal rate and regular rhythm.      Pulses: Normal pulses.      Heart sounds: Normal heart sounds. No murmur heard.     Comments: Midline CABG scar noted.  Pulmonary:      Effort: Pulmonary effort is normal.      Breath sounds: Rales present. No wheezing.   Abdominal:      General: Bowel sounds are normal.      Palpations: Abdomen is soft.      Tenderness: There is no abdominal tenderness. There is no guarding or rebound.      Comments: Surgical scars noted on the epigastric region.   Musculoskeletal:         General: Normal range of motion.      Cervical back: Neck supple.      Right lower leg: Edema present.      Left lower leg: Edema present.      Comments: 2+ pitting edema noted bilateral lower extremities up to the knees.  Surgical scar noted on the right knee joint.  Right femoral central venous line noted.   Skin:     General: Skin is warm.      Findings: Bruising and lesion present.      Comments: Multiple abrasions noted in bilateral lower and upper extremities, scalp.   Neurological:      General: No focal deficit present.      Mental Status: He is alert and oriented to person, place, and time. Mental status is at baseline.      Comments: Hard of hearing.   Psychiatric:         Mood and Affect: Mood normal.         Behavior: Behavior normal.       Physical Exam  Skin: Bruising is present all over the skin. There are numerous skin tears.  Extremities: Swelling is noted in the legs.    Laboratory data:    I have reviewed the labs done in the emergency room.    Results from last 7 days   Lab Units 06/29/25  1706   SODIUM mmol/L 131*   POTASSIUM mmol/L 2.9*   CHLORIDE mmol/L 81*   CO2 mmol/L 24.4   BUN mg/dL 61.0*   CREATININE  mg/dL 3.79*   CALCIUM mg/dL 9.0   BILIRUBIN mg/dL 0.2   ALK PHOS U/L 69   ALT (SGPT) U/L 14   AST (SGOT) U/L 19   GLUCOSE mg/dL 335*     Results from last 7 days   Lab Units 06/29/25  1706   WBC 10*3/mm3 15.69*   HEMOGLOBIN g/dL 9.0*   HEMATOCRIT % 27.6*   PLATELETS 10*3/mm3 205       Results from last 7 days   Lab Units 06/29/25  1826 06/29/25  1706   HSTROP T ng/L 95* 98*     Results from last 7 days   Lab Units 06/29/25  1706   PROBNP pg/mL 1,835.0*       Results from last 7 days   Lab Units 06/29/25  1706   LIPASE U/L 19       Results from last 7 days   Lab Units 06/29/25  2205   COLOR UA  Yellow   GLUCOSE UA  500 mg/dL (2+)*   KETONES UA  Negative   BLOOD UA  Negative   LEUKOCYTES UA  Moderate (2+)*   PH, URINE  5.5   BILIRUBIN UA  Negative   UROBILINOGEN UA  0.2 E.U./dL   RBC UA /HPF None Seen   WBC UA /HPF 21-50*     EKG:      EKG done in the emergency room was reviewed by me.  It shows sinus tachycardia at 103 bpm.  Left axis deviation noted.  ST flattening noted in the anterior chest leads.  Occasional PAC noted.    Radiology:    Portable chest x-ray done in the emergency room was reviewed by me.  It shows chronic appearing parenchymal changes bilaterally with no homogenous opacity in the left lower zone suspicious for infiltrate.  Prominent right transfer fissure noted.  Sternal wires noted.  An official report is currently pending.    CT Abdomen Pelvis Without Contrast  Result Date: 6/29/2025  FINAL REPORT TECHNIQUE: null CLINICAL HISTORY: sepsis COMPARISON: null FINDINGS: CT abdomen and pelvis without contrast Comparison: 08/29/2024 Findings: Mild degenerative change spine and hips. No acute bony abnormalities. Liver and spleen within normal limits. Pancreas and adrenal glands unremarkable. Cholecystectomy. No right renal stone or hydronephrosis. No focal renal abnormality or ureteral dilation. Status post left nephrectomy. No evidence for aortic aneurysm. No free fluid or adenopathy in the pelvis. No  diverticulitis. Appendix unremarkable.     Impression: No acute processes Authenticated and Electronically Signed by Anthony De La Paz MD on 06/29/2025 11:11:15 PM    CT Chest Without Contrast Diagnostic  Result Date: 6/29/2025  FINAL REPORT TECHNIQUE: null CLINICAL HISTORY: sepsis w/ hypoxemia COMPARISON: null FINDINGS: CT chest without contrast Comparison: None provided Findings: Bilateral posterior lower lobe atelectasis. Trace bilateral pleural effusions noted. Emphysema without other parenchymal abnormality. Cardiomegaly with coronary artery calcifications. Prior sternotomy for CABG. Benign partially calcified mediastinal nodes. No significant mediastinal adenopathy. No significant focal bony abnormalities.     Impression: Trace pleural effusions with lower lobe atelectasis Authenticated and Electronically Signed by Anthony De La Paz MD on 06/29/2025 11:07:32 PM    XR Ankle 3+ View Left  Result Date: 6/26/2025  CLINICAL INDICATION: pain TECHNIQUE: XR ANKLE RIGHT 3+ VIEWS COMPARISON: 24 April 2025, CT 6/13/2025 FINDINGS: Postoperative changes from resection of the distal fibula. Healed distal tibial fracture deformity in similar alignment. Severe narrowing of the tibiotalar joint with irregularity of the lateral talar dome and adjacent distal tibia. Tilt of the talus with widening of the medial clear space. Heterotopic ossification is again seen laterally. Severe narrowing of the tibiotalar joint redemonstrated.. Small tibiotalar effusion. Calcaneal enthesopathy.    Redemonstration of severe tibiotalar osteoarthrosis with postoperative changes as described. No acute bony findings. CRITICAL RESULT:   No. COMMUNICATION: Per this written report. Drafted by Pino Martinez MD on 6/26/2025 12:57 PM Final report signed by Pino Martinez MD on 6/26/2025 12:59 PM    Assessment:    Septic shock secondary to #2 and #3, POA.  Acute urinary tract infection, POA.  Left lower lobe bacterial pneumonia, unable to classify  further, POA.  Demand ischemia with elevated troponins, POA.  Hypokalemia, POA.  Chronic kidney disease stage IV.  Diabetes mellitus type 2 with nephropathy and hyperglycemia, POA.  Coronary artery disease status post CABG and stents.  COPD, no exacerbation.  Benign prostatic hyperplasia.  Will falls with skin abrasions, POA.  Assessment & Plan  The plan for septic shock is as follows:  - Administered 3 liters of fluid and started on Levophed to maintain blood pressure.  - A central line has been placed in the right groin.  - Strong antibiotics, Zosyn and vancomycin, have been initiated.  - Blood and urine cultures have been sent.  - Obtain nasal swab for MRSA.    The plan for stage IV chronic kidney disease is as follows:  - We will hold torsemide 100 mg due to low blood pressure.  - Consult Dr. Hooper from nephrology in AM.    The plan for status post bypass surgery is as follows:  - Continue baby aspirin, clopidogrel.  - Hold metoprolol due to low blood pressures.  - Continue midodrine.    Diabetes mellitus type 2 with hyperglycemia.  - We will start him on Lantus 20 units nightly.  - Continue subcutaneous insulin protocol for coverage and postprandial insulin.  - Obtain hemoglobin A1c levels.    Hypokalemia.  - Electrolyte replacement protocol.    Multiple skin abrasions.  - Consult wound care services.     Discussed with nursing staff at the bedside.    Risk Assessment: High  DVT Prophylaxis: Heparin  Code Status: Full  Diet: Renal diabetic.      Silviano Cisneros MD  06/29/25  23:45 EDT    Patient or patient representative verbalized consent for the use of Ambient Listening during the visit for chart documentation.    Dictated utilizing Dragon dictation.     Contains text generated by [employer]  Electronically signed by Silviano Cisneros MD at 06/30/25 0004       Current Facility-Administered Medications   Medication Dose Route Frequency Provider Last Rate Last Admin    acetaminophen (TYLENOL) tablet 650 mg  650 mg  Oral Q4H PRN Silviano Cisneros MD        Or    acetaminophen (TYLENOL) 160 MG/5ML oral solution 650 mg  650 mg Oral Q4H PRN Silviano Cisneros MD        Or    acetaminophen (TYLENOL) suppository 650 mg  650 mg Rectal Q4H PRN Silviano Cisneros MD        aspirin EC tablet 81 mg  81 mg Oral Daily Silviano Cisneros MD   81 mg at 07/09/25 0905    sennosides-docusate (PERICOLACE) 8.6-50 MG per tablet 2 tablet  2 tablet Oral BID Silviano Cisneros MD   2 tablet at 07/09/25 0904    And    polyethylene glycol (MIRALAX) packet 17 g  17 g Oral Daily PRN Silviano Cisneros MD        And    bisacodyl (DULCOLAX) EC tablet 5 mg  5 mg Oral Daily PRN Silviano Cisneros MD        And    bisacodyl (DULCOLAX) suppository 10 mg  10 mg Rectal Daily PRN Silviano Cisneros MD        budesonide (PULMICORT) nebulizer solution 0.5 mg  0.5 mg Nebulization BID - RT Silviano Cisneros MD   0.5 mg at 07/09/25 0707    buprenorphine-naloxone (SUBOXONE) 8-2 MG film 1 film  1 film Sublingual BID Kuldeep Patel DO   1 film at 07/09/25 1004    busPIRone (BUSPAR) tablet 15 mg  15 mg Oral Q12H Silviano Cisneros MD   15 mg at 07/09/25 0904    Calcium Replacement - Follow Nurse / BPA Driven Protocol   Not Applicable PRN Agustin Will DO        clopidogrel (PLAVIX) tablet 75 mg  75 mg Oral Daily Silviano Cisneros MD   75 mg at 07/09/25 0905    dextrose (D50W) (25 g/50 mL) IV injection 25 g  25 g Intravenous Q15 Min PRN Silviano Cisneros MD        dextrose (GLUTOSE) oral gel 15 g  15 g Oral Q15 Min PRN Silviano Cisneros MD        Diclofenac Sodium (VOLTAREN) 1 % gel 2 g  2 g Topical 4x Daily PRN Kuldeep Patel DO   2 g at 07/02/25 2201    glucagon (GLUCAGEN) injection 1 mg  1 mg Intramuscular Q15 Min PRN Silviano Cisneros MD        insulin glargine (LANTUS, SEMGLEE) injection 20 Units  20 Units Subcutaneous Nightly Silviano Cisneros MD   20 Units at 07/08/25 2059    Insulin Lispro (humaLOG) injection 2-9 Units  2-9 Units Subcutaneous 4x Daily AC & at Bedtime Silviano Cisneros MD   2 Units at 07/08/25 5211     ipratropium-albuterol (DUO-NEB) nebulizer solution 3 mL  3 mL Nebulization Q4H PRN Silviano Cisneros MD   3 mL at 25 1941    lactobacillus acidophilus (RISAQUAD) capsule 1 capsule  1 capsule Oral BID Silviano Cisneros MD   1 capsule at 25 0904    Magnesium Standard Dose Replacement - Follow Nurse / BPA Driven Protocol   Not Applicable PRN Silviano Cisneros MD        Magnesium Standard Dose Replacement - Follow Nurse / BPA Driven Protocol   Not Applicable PRN Agustin Will,         ondansetron (ZOFRAN) injection 4 mg  4 mg Intravenous Q6H PRN Silviano Cisneros MD        pantoprazole (PROTONIX) EC tablet 40 mg  40 mg Oral Q AM Silviano Cisneros MD   40 mg at 25 0621    Phosphorus Replacement - Follow Nurse / BPA Driven Protocol   Not Applicable PRN Agustin Will DO        Potassium Replacement - Follow Nurse / BPA Driven Protocol   Not Applicable PRN Fady Guevara MD        Potassium Replacement - Follow Nurse / BPA Driven Protocol   Not Applicable PRN Silviano Cisneros MD        Potassium Replacement - Follow Nurse / BPA Driven Protocol   Not Applicable PRN Agustin Will, DO        sodium chloride 0.9 % flush 10 mL  10 mL Intravenous PRN Fady Guevara MD        sodium chloride 0.9 % flush 10 mL  10 mL Intravenous Q12H Silviano Cisneros MD   10 mL at 25 0905    sodium chloride 0.9 % flush 10 mL  10 mL Intravenous PRN Silviano Cisneros MD        sodium chloride 0.9 % infusion 40 mL  40 mL Intravenous PRN Silviano Cisneros MD            Physician Progress Notes (most recent note)         Kludeep Patel DO   Physician  Hospitalist     Progress Notes      Signed     Date of Service: 25  Creation Time: 25     Signed       [facility]    [facility]ESS NOTE     Name:  Jonny Ferrari Jr.            Age:  74 y.o.  Sex:  male  :  1950  MRN:  [id number]   Visit Number:  [id number]  Admission Date:  2025  Date Of  "Service:  07/09/25  Primary Care Physician:  Amaury Brice MD      LOS: 10 days :     Chief Complaint:       Follow-up septic shock     Subjective:     Patient examined.  Resting in bedside chair.  Very pleasant, laughing and cracking jokes.  Wife at bedside.  He is eating a turkey sandwich.  States that he feels great     Hospital Course:     Per prior provider: \"Jonny Ferrari Jr. is a 74-year-old male with history of diabetes mellitus type 2, chronic kidney disease stage III, COPD, coronary artery disease status post CABG and stent, ERASMO, BPH was brought to the emergency room by EMS with symptoms of generalized weakness and fatigue   The patient was brought in by ambulance due to an inability to walk, a condition that has persisted for the past 2 to 3 days. He reports no fever, dysuria, or cough but feels weak. He has experienced several falls recently and has been unable to walk independently for the past 3 to 4 days. Despite using a walker or cane, he continues to fall.   In the emergency room, patient was afebrile but mildly tachycardic in the 100 with initial blood pressure of 80/54 and pulse oxygen saturation of 95% on room air.  Patient was given 30 mL/kg fluid bolus initially in the emergency room with initial improvement of the blood pressure to systolic 100 but subsequently dropped it again to the 70s systolic.  Patient was given another 500 bolus of normal saline, he right femoral central line was placed and he was initiated on Levophed. \"     Patient was able to be weaned off steroids.  He completed antibiotics for UTI after discussion with pharmacy.  Has been seen by nephrology and kidney function is back to baseline.  Did have some drop in hemoglobin, there was concern for possible hematuria, this has resolved however.  Did receive 1 unit of PRBCs.     Review of Systems:      All systems were reviewed and negative except as mentioned in subjective, assessment and plan.     Vital Signs:   "   Temp:  [97.1 °F (36.2 °C)-97.6 °F (36.4 °C)] 97.3 °F (36.3 °C)  Heart Rate:  [74-89] 89  Resp:  [16-18] 18  BP: ()/(61-95) 118/73     Intake and output:     I/O last 3 completed shifts:  In: 1300 [P.O.:1300]  Out: 750 [Urine:750]  I/O this shift:  In: 600 [P.O.:600]  Out: -      Physical Examination:  Examined again 7/9/25     General Appearance:  Alert and cooperative. NAD   Head:  Atraumatic and normocephalic.   Eyes: Conjunctivae and sclerae normal, no icterus. No pallor.   Throat: No oral lesions, no thrush, oral mucosa moist.   Neck: Supple, trachea midline, no thyromegaly.   Lungs:   Breath sounds heard bilaterally equally.  No wheezing or crackles.    Heart:  Normal S1 and S2, no murmur, No JVD.   Abdomen:   Normal bowel sounds, Soft, nontender, nondistended, no rebound tenderness.   Extremities: Supple, no edema, no cyanosis, no clubbing.   Skin: No bleeding or rash.   Neurologic: Alert and oriented x 3. No facial asymmetry. Generalized weakness.       Laboratory results:            Results from last 7 days   Lab Units 07/07/25  0431 07/05/25  1009 07/04/25  0454   SODIUM mmol/L 135* 137 139   POTASSIUM mmol/L 4.6 4.4 3.7   CHLORIDE mmol/L 101 102 101   CO2 mmol/L 23.8 24.3 27.9   BUN mg/dL 16.0 13.0 16.0   CREATININE mg/dL 1.82* 1.88* 1.95*   CALCIUM mg/dL 9.2 8.8 8.9   GLUCOSE mg/dL 123* 143* 84             Results from last 7 days   Lab Units 07/07/25  0431 07/05/25  1009 07/04/25  0454   WBC 10*3/mm3 6.96  --   --    HEMOGLOBIN g/dL 8.2* 7.7* 8.2*   HEMATOCRIT % 26.2* 24.8* 26.4*   PLATELETS 10*3/mm3 139*  --   --                          Recent Labs     08/29/24  2314   PHART 7.382   WGT3ODV 49.8*   PO2ART 68.0*   BRV1OHG 29.5*   BASEEXCESS 3.8*      I have reviewed the patient's laboratory results.     Radiology results:     Radiology results from the last 24 hours:   No radiology results from the last 24 hrs     I have reviewed the patient's radiology reports.     Medication Review:      I  have reviewed the patient's active and prn medications.      Problem List:       Septic shock    CAD (coronary artery disease)    Chronic kidney disease, stage IV (severe)    Acute UTI (urinary tract infection)    Pneumonia of left lower lobe due to infectious organism        Assessment:     Septic shock secondary to #2 and #3, POA.  Acute urinary tract infection, POA.  Left lower lobe bacterial pneumonia, unable to classify further, POA.  Demand ischemia with elevated troponins, POA.  Hypokalemia, POA.  Chronic kidney disease stage IV.  Diabetes mellitus type 2 with nephropathy and hyperglycemia, POA.  Coronary artery disease status post CABG and stents.  COPD, no exacerbation.  Benign prostatic hyperplasia.  Will falls with skin abrasions, POA.     Plan:     Septic shock  Acute UTI  Left lower lobe bacterial pneumonia  Negative blood culture, on Zosyn.  Oxygenation stable.  Off pressors.  Urine culture had E faecalis.  Discussed with pharmacy, no further treatment indicated  Demand ischemia with elevated troponins  Troponins plateaued 95-98  Suspect secondary to demand ischemia in the setting of his septic shock as well as CKD  Low suspicion for ACS  Hypokalemia  Continue replacement  CKD stage IV  Nephrology, Dr. Hooper consulted and appreciate recommendations.  Numbers now stabilized.  Off fluids.  Diuretics started  Type 2 diabetes  Subcutaneous insulin protocol  Anemia  Does have chronic anemia likely due to renal failure, however acute on chronic currently.  Received 1 unit of PRBCs.  There was reportedly some blood loss with catheter insertion but urine is clear now.  Have restarted aspirin.  Continue to monitor, especially for any GI bleeding.  CAD status post CABG  Continue DAPT with aspirin and Plavix  COPD not exacerbation  Multiple skin abrasions  Wound care  Impaired mobility and ADLs  PT/OT once stable to participate     At this time we are pending approval or denial for short-term rehab.  Patient is  okay with going back home if he gets denied, but wants to see if he is able to go.     Plan of care reviewed, no changes. Copied forward from 25     DVT Prophylaxis: SCD  Code Status: Full  Diet: Renal diabetic.  Discharge Plan: [facility] [place]  25  14:23 EDT     Dictated utilizing Dragon dictation.                            Physical Therapy Notes (last 72 hours)        Frankie Munoz PT at 25 1002  Version 1 of 1      Attestation signed by Kuldeep Ren PT at 25 1251      I attest to the accuracy and completion of this note                    Goal Outcome Evaluation:  Plan of Care Reviewed With: patient, spouse        Progress: improving  Outcome Evaluation: Pt received sitting in recliner. Pt performed sit to stand with SBA and FWW. Pt ambulated 60 feet CGA with FWW and demonstrated severe R calcaneal eversion throughout ambulation. Pt revealed he has a history of a R ankle fracture 2 years ago that led to this deformity. Pt performed seated ankle exercises as indicated in flowsheets to improve strength of B ankles as well as improve neuromuscular control of R ankle. Pt demonstrates high fear of falling which led to him not ambulating in the past 2 years. Pt reports desire to be able to ambulate on his own prior to returning home. I believe this pt would benefit from short term rehabilitation at discharge to improve functional activity tolerance, decrease fall risk, and maximize functional independence. Continue PT POC. Of note, pt reports having an orthotic at home for the R ankle. Family was asked to bring the orthotic for the treatment tomorrow.                               Electronically signed by Kuldeep Ren PT at 25 1251       Frankie Munoz PT at 25 1002  Version 1 of 1      Attestation signed by Kuldeep Ren PT at 25 1252      I attest to the accuracy and completion of this note                    Patient Name: Jonny Ferrari JrBuffy  :  1950    MRN: [id number]                              Today's Date: 7/7/2025       Admit Date: 6/29/2025    Visit Dx:     ICD-10-CM ICD-9-CM   1. Sepsis with acute renal failure and septic shock, due to unspecified organism, unspecified acute renal failure type  A41.9 038.9    R65.21 995.92    N17.9 785.52     584.9   2. Acute kidney injury superimposed on chronic kidney disease  N17.9 584.9    N18.9 585.9   3. Hyponatremia  E87.1 276.1   4. Hypokalemia  E87.6 276.8   5. Chronic anemia  D64.9 285.9   6. Left lower lobe consolidation  J18.1 481   7. Type 2 diabetes mellitus with hyperglycemia, unspecified whether long term insulin use  E11.65 250.00   8. Multiple skin tears  T14.8XXA 879.8     Patient Active Problem List   Diagnosis    CAD (coronary artery disease)    Essential hypertension    Dyslipidemia    Obesity    GERD (gastroesophageal reflux disease)    OA (osteoarthritis)    TIA (transient ischemic attack)    Anxiety neurosis    Psoriasis    Tobacco use    Diabetes mellitus    Bilateral carotid artery stenosis    NSTEMI (non-ST elevated myocardial infarction)    Chronic kidney disease, stage IV (severe)    Esophageal dysphagia    Iron deficiency anemia    Acute urinary retention    Debility    Perirectal abscess    Acute renal failure (ARF)    Acute metabolic encephalopathy    Sepsis    Septic shock    Acute UTI (urinary tract infection)    Pneumonia of left lower lobe due to infectious organism     Past Medical History:   Diagnosis Date    Anemia     Anxiety neurosis     CAD (coronary artery disease)     Cancer     Kidney    Chronic kidney disease     Patient reported left kidney removed secondary to kidney cancer and that he only has 30% function of the right kidney    Constipation     COPD (chronic obstructive pulmonary disease)     Depression     Diabetes mellitus     DM TYPE 2 , ONSET IN 2009    Dyslipidemia     Dysphagia     Reported noted with food, fluids and pills    Elevated cholesterol      Full dentures     GERD (gastroesophageal reflux disease)     Gout     H/O left nephrectomy 2020    secondary to cancer    [place] (hard of hearing)     Patient has bilateral hearing aids, still is very [place]    Hypertension     Impaired functional mobility, balance, gait, and endurance     MI (myocardial infarction)     Patient reported apx March 2021 (reported he refused cardiac cath) and June 2021 (did have cardiac cath with placement of 2 stents).     OA (osteoarthritis)     Obesity     MILD TO MODERATE EXOGENOUS OBESITY    Problems with swallowing     with food    Psoriasis     Sleep apnea     CPAP HS - to bring mask and machine DOS    Tattoo     x1    TIA (transient ischemic attack)     Wears glasses 10/27/2021     Past Surgical History:   Procedure Laterality Date    APPENDECTOMY      CARDIAC CATHETERIZATION      CARDIAC CATHETERIZATION N/A 2/20/2019    Procedure: Left Heart Cath;  Surgeon: Ernst Smith MD;  Location:  [facility];  Service: Cardiology    CARDIAC CATHETERIZATION Left 6/9/2021    Procedure: Left Heart Cath;  Surgeon: Ernst Smith MD;  Location:  [facility];  Service: Cardiology;  Laterality: Left;    CHOLECYSTECTOMY      COLONOSCOPY      COLONOSCOPY N/A 11/10/2021    Procedure: COLONOSCOPY with polypectomy x6 and clip x2;  Surgeon: Chidi Trinidad MD;  Location: [facility] ENDOSCOPY;  Service: Gastroenterology;  Laterality: N/A;    COLONOSCOPY N/A 10/25/2023    Procedure: COLONOSCOPY incomplete;  Surgeon: Chidi Trinidad MD;  Location: [facility] ENDOSCOPY;  Service: Gastroenterology;  Laterality: N/A;    COLONOSCOPY N/A 11/8/2023    Procedure: COLONOSCOPY WITH POLYPECTOMY X1;  Surgeon: Chidi Trinidad MD;  Location: [facility] ENDOSCOPY;  Service: Gastroenterology;  Laterality: N/A;    CORONARY ARTERY BYPASS GRAFT  2001    Patient reported no MI prior to CABG and that the bypass was 3 vessel     ENDOSCOPY      ENDOSCOPY N/A 11/10/2021    Procedure:  ESOPHAGOGASTRODUODENOSCOPY with biopsy and dilatation;  Surgeon: Chidi Trinidad MD;  Location: [facility] ENDOSCOPY;  Service: Gastroenterology;  Laterality: N/A;    ENDOSCOPY N/A 10/25/2023    Procedure: ESOPHAGOGASTRODUODENOSCOPY with biopsy and dilatation;  Surgeon: Chidi Trinidad MD;  Location: [facility] ENDOSCOPY;  Service: Gastroenterology;  Laterality: N/A;    ENDOSCOPY N/A 11/8/2023    Procedure: ESOPHAGOGASTRODUODENOSCOPY WITH BIOPSY;  Surgeon: Chidi Trinidad MD;  Location: [facility] ENDOSCOPY;  Service: Gastroenterology;  Laterality: N/A;    INCISION AND DRAINAGE PERIRECTAL ABSCESS N/A 8/29/2024    Procedure: INCISION AND DRAINAGE OF PERIRECTAL ABSCESS;  Surgeon: Sherman Billingsley MD;  Location: [facility] OR;  Service: General;  Laterality: N/A;    LAPAROSCOPIC NEPHRECTOMY Left     MOUTH SURGERY      Full mouth extraction    URETER SURGERY      VASECTOMY        General Information       [facility] Name 07/07/25 1125          Physical Therapy Time and Intention    Document Type therapy note (daily note)  -     Mode of Treatment physical therapy;co-treatment;occupational therapy  -       Row Name 07/07/25 1125          General Information    Patient Profile Reviewed yes  -     Existing Precautions/Restrictions fall;oxygen therapy device and L/min  -       Row Name 07/07/25 1125          Cognition    Orientation Status (Cognition) oriented x 4  -       Row Name 07/07/25 1125          Safety Issues/Impairments Affecting Functional Mobility    Impairments Affecting Function (Mobility) balance;endurance/activity tolerance;shortness of breath;strength;pain  -               User Key  (r) = Recorded By, (t) = Taken By, (c) = Cosigned By      Initials Name Provider Type     Frankie Munoz, PT Physical Therapist                   Mobility       Row Name 07/07/25 1126          Bed Mobility    Comment, (Bed Mobility) Pt received in chair  -       Row Name 07/07/25 1126          Sit-Stand Transfer    Sit-Stand  Allen (Transfers) standby assist;1 person assist  -     Assistive Device (Sit-Stand Transfers) walker, front-wheeled  -       Row Name 07/07/25 1126          Gait/Stairs (Locomotion)    Allen Level (Gait) contact guard;1 person assist  -     Assistive Device (Gait) walker, front-wheeled  -     Patient was able to Ambulate yes  -     Distance in Feet (Gait) 60  -JH     Deviations/Abnormal Patterns (Gait) festinating/shuffling;gait speed decreased;base of support, narrow;right sided deviations;micah decreased;stride length decreased;weight shifting decreased  -     Right Sided Gait Deviations heel strike decreased  R calcaneal eversion  -     Allen Level (Stairs) not tested  -               User Key  (r) = Recorded By, (t) = Taken By, (c) = Cosigned By      Initials Name Provider Type     Frankie Munoz PT Physical Therapist                   Obj/Interventions       [facility] Name 07/07/25 1127          Motor Skills    Therapeutic Exercise ankle  -[facility] Name 07/07/25 1127          Ankle (Therapeutic Exercise)    Ankle (Therapeutic Exercise) AROM (active range of motion);strengthening exercise  -     Ankle AROM (Therapeutic Exercise) bilateral;eversion;10 repetitions;3 second hold;sitting  -     Ankle Strengthening (Therapeutic Exercise) bilateral;dorsiflexion;plantarflexion;10 repetitions;sitting;eversion  ABC's  -       Row Name 07/07/25 1127          Balance    Balance Assessment sitting static balance;sitting dynamic balance;standing static balance;standing dynamic balance  -     Static Sitting Balance standby assist  -     Dynamic Sitting Balance standby assist  -     Position, Sitting Balance unsupported;sitting in chair  -     Static Standing Balance contact guard;1-person assist  -     Dynamic Standing Balance contact guard;1-person assist  -     Position/Device Used, Standing Balance supported;walker, front-wheeled  -               User Key  (r) =  Recorded By, (t) = Taken By, (c) = Cosigned By      Initials Name Provider Type     Frankie Munoz, PT Physical Therapist                   Goals/Plan    No documentation.                  Clinical Impression       Row Name 07/07/25 1129          Pain    Pretreatment Pain Rating 0/10 - no pain  -     Posttreatment Pain Rating 0/10 - no pain  -       Row Name 07/07/25 1129          Plan of Care Review    Plan of Care Reviewed With patient;spouse  -     Progress improving  -     Outcome Evaluation Pt received sitting in recliner. Pt performed sit to stand with SBA and FWW. Pt ambulated 60 feet CGA with FWW and demonstrated severe R calcaneal eversion throughout ambulation. Pt revealed he has a history of a R ankle fracture 2 years ago that led to this deformity. Pt performed seated ankle exercises as indicated in flowsheets to improve strength of B ankles as well as improve neuromuscular control of R ankle. Pt demonstrates high fear of falling which led to him not ambulating in the past 2 years. Pt reports desire to be able to ambulate on his own prior to returning home. I believe this pt would benefit from short term rehabilitation at discharge to improve functional activity tolerance, decrease fall risk, and maximize functional independence. Continue PT POC. Of note, pt reports having an orthotic at home for the R ankle. Family was asked to bring the orthotic for the treatment tomorrow.  -       Row Name 07/07/25 1129          Therapy Assessment/Plan (PT)    Rehab Potential (PT) good  -     Criteria for Skilled Interventions Met (PT) yes;skilled treatment is necessary  -     Therapy Frequency (PT) 5 times/wk  -       Row Name 07/07/25 1129          Vital Signs    Pre Systolic BP Rehab 117  -     Pre Treatment Diastolic BP 63  -     Post Systolic BP Rehab 140  -     Post Treatment Diastolic BP 76  -     Pretreatment Heart Rate (beats/min) 71  -     Posttreatment Heart Rate (beats/min) 73  -JH      Pre SpO2 (%) 96  -     O2 Delivery Pre Treatment room air  -     Post SpO2 (%) 97  -     O2 Delivery Post Treatment room air  -     Pre Patient Position Sitting  -     Post Patient Position Sitting  -       Row Name 07/07/25 1129          Positioning and Restraints    Pre-Treatment Position sitting in chair/recliner  -     Post Treatment Position chair  -     In Chair notified nsg;reclined;call light within reach;encouraged to call for assist;with family/caregiver  -               User Key  (r) = Recorded By, (t) = Taken By, (c) = Cosigned By      Initials Name Provider Type    Frankie López PT Physical Therapist                   Outcome Measures       Row Name 07/07/25 1135 07/07/25 0924       How much help from another person do you currently need...    Turning from your back to your side while in flat bed without using bedrails? 4  - 4 (P)   -CM    Moving from lying on back to sitting on the side of a flat bed without bedrails? 4  - 3 (P)   -CM    Moving to and from a bed to a chair (including a wheelchair)? 3  - 3 (P)   -CM    Standing up from a chair using your arms (e.g., wheelchair, bedside chair)? 3  - 3 (P)   -CM    Climbing 3-5 steps with a railing? 2  - 2 (P)   -CM    To walk in hospital room? 3  - 3 (P)   -CM    AM-PAC 6 Clicks Score (PT) 19  - 18 (P)   -CM    Highest Level of Mobility Goal Walk 10 Steps or More-6  - Walk 10 Steps or More-6 (P)   -CM      Row Name 07/07/25 1135          Functional Assessment    Outcome Measure Options AM-PAC 6 Clicks Basic Mobility (PT)  -               User Key  (r) = Recorded By, (t) = Taken By, (c) = Cosigned By      Initials Name Provider Type    Chidi Nascimento, RN Extern Registered Nurse    Frankie López PT Physical Therapist                                 Physical Therapy Education       Title: PT OT SLP Therapies (In Progress)       Topic: Physical Therapy (In Progress)       Point: Mobility training (Done)        Learning Progress Summary            Patient Acceptance, E,TB, VU by  at 7/7/2025 1136    Comment: Pt educated in HEP, proper gait mechanics with FWW, and discharge recommendations.    Acceptance, E,D, DU,VU by  at 7/2/2025 1338    Acceptance, E,D, DU,VU by  at 7/1/2025 1707    Comment: role of PT and POC                      Point: Home exercise program (Done)       Learning Progress Summary            Patient Acceptance, E,TB, VU by  at 7/7/2025 1136    Comment: Pt educated in HEP, proper gait mechanics with FWW, and discharge recommendations.    Acceptance, E,TB, VU by  at 7/5/2025 1748    Acceptance, E,D, DU,VU by  at 7/2/2025 1338                      Point: Body mechanics (Done)       Learning Progress Summary            Patient Acceptance, E,D, DU,VU by  at 7/2/2025 1338    Acceptance, E,D, DU,VU by  at 7/1/2025 1707    Comment: role of PT and POC                      Point: Precautions (Not Started)       Learner Progress:  Not documented in this visit.                              User Key       Initials Effective Dates Name Provider Type Discipline     06/16/21 -  Danica Amezcua PTA Physical Therapist Assistant PT     06/13/23 -  Kuldeep Ren PT Physical Therapist PT     06/19/25 -  Frankie Munoz PT Physical Therapist PT                  PT Recommendation and Plan     Progress: improving  Outcome Evaluation: Pt received sitting in recliner. Pt performed sit to stand with SBA and FWW. Pt ambulated 60 feet CGA with FWW and demonstrated severe R calcaneal eversion throughout ambulation. Pt revealed he has a history of a R ankle fracture 2 years ago that led to this deformity. Pt performed seated ankle exercises as indicated in flowsheets to improve strength of B ankles as well as improve neuromuscular control of R ankle. Pt demonstrates high fear of falling which led to him not ambulating in the past 2 years. Pt reports desire to be able to ambulate on his own prior to returning  home. I believe this pt would benefit from short term rehabilitation at discharge to improve functional activity tolerance, decrease fall risk, and maximize functional independence. Continue PT POC. Of note, pt reports having an orthotic at home for the R ankle. Family was asked to bring the orthotic for the treatment tomorrow.     Time Calculation:         PT Charges       Row Name 07/07/25 1137             Time Calculation    Start Time 1002  -      Stop Time 1028  -      Time Calculation (min) 26 min  -      PT Received On 07/07/25  -      PT Goal Re-Cert Due Date 07/11/25  -         Time Calculation- PT    Total Timed Code Minutes- PT 26 minute(s)  -         Timed Charges    23377 - PT Therapeutic Exercise Minutes 11  -      53138 - Gait Training Minutes  15  -         Total Minutes    Timed Charges Total Minutes 26  -       Total Minutes 26  -JH                User Key  (r) = Recorded By, (t) = Taken By, (c) = Cosigned By      Initials Name Provider Type     Frankie Munoz PT Physical Therapist                  Therapy Charges for Today       Code Description Service Date Service Provider Modifiers Qty    52621090231 HC PT THER PROC EA 15 MIN 7/7/2025 Frankie Munoz, PT GP 1    [id number] HC GAIT TRAINING EA 15 MIN 7/7/2025 Frankie Munoz, PT GP 1            PT G-Codes  Outcome Measure Options: AM-PAC 6 Clicks Basic Mobility (PT)  AM-PAC 6 Clicks Score (PT): 19  AM-PAC 6 Clicks Score (OT): 15  PT Discharge Summary  Anticipated Discharge Disposition (PT): skilled nursing facility    Frankie Munoz PT TP#[id number] 7/7/2025      Electronically signed by Kuldeep Ren PT at 07/07/25 1252       Frankie Munoz PT at 07/08/25 1046  Version 1 of 1      Attestation signed by Kuldeep Ren PT at 07/09/25 4748      I attest to the accuracy and completion of this note                    Goal Outcome Evaluation:  Plan of Care Reviewed With: patient, spouse        Progress: improving  Outcome Evaluation: Pt  participated well with treatment. Pt received sitting in chair asleep with HR 79 and oxygen sat 99% on room air. Pt was considerably lethargic throughout first 10 minutes of treatment and required verbal cueing to open eyes. Pt performed B LE exercises as indicated in flowsheets prior to functional mobility and exercises were progressed this session. Pt performed 5 time sit to stand test this session with CGA throughout and completed test in 18.2 seconds indicating increased fall risk and risk of further hospitalizations due to falls. Pt performed additional STS with CGA and FWW and ambulated a total of 10 feet with CGA and FWW. Pt reported increased fatigue and could not ambulate further this session. Pt will continue to benefit from skilled PT to address functional activity tolerance deficit and decrease fall risk. Pt would benefit from short term rehabilitation to maximize functional independence, reduce fall risk, and reduce risk of further hospitalizations.    Anticipated Discharge Disposition (PT): skilled nursing facility                          Electronically signed by Kuldeep Ren, PT at 25 0836       Frankie Munoz, PT at 25 1046  Version 1 of 1      Attestation signed by Kuldeep Ren PT at 25 0837      I attest to the accuracy and completion of this note                    Patient Name: Jonny Ferrari Jr.  : 1950    MRN: [id number]                              Today's Date: 2025       Admit Date: 2025    Visit Dx:     ICD-10-CM ICD-9-CM   1. Sepsis with acute renal failure and septic shock, due to unspecified organism, unspecified acute renal failure type  A41.9 038.9    R65.21 995.92    N17.9 785.52     584.9   2. Acute kidney injury superimposed on chronic kidney disease  N17.9 584.9    N18.9 585.9   3. Hyponatremia  E87.1 276.1   4. Hypokalemia  E87.6 276.8   5. Chronic anemia  D64.9 285.9   6. Left lower lobe consolidation  J18.1 481   7. Type 2 diabetes mellitus with  hyperglycemia, unspecified whether long term insulin use  E11.65 250.00   8. Multiple skin tears  T14.8XXA 879.8     Patient Active Problem List   Diagnosis    CAD (coronary artery disease)    Essential hypertension    Dyslipidemia    Obesity    GERD (gastroesophageal reflux disease)    OA (osteoarthritis)    TIA (transient ischemic attack)    Anxiety neurosis    Psoriasis    Tobacco use    Diabetes mellitus    Bilateral carotid artery stenosis    NSTEMI (non-ST elevated myocardial infarction)    Chronic kidney disease, stage IV (severe)    Esophageal dysphagia    Iron deficiency anemia    Acute urinary retention    Debility    Perirectal abscess    Acute renal failure (ARF)    Acute metabolic encephalopathy    Sepsis    Septic shock    Acute UTI (urinary tract infection)    Pneumonia of left lower lobe due to infectious organism     Past Medical History:   Diagnosis Date    Anemia     Anxiety neurosis     CAD (coronary artery disease)     Cancer     Kidney    Chronic kidney disease     Patient reported left kidney removed secondary to kidney cancer and that he only has 30% function of the right kidney    Constipation     COPD (chronic obstructive pulmonary disease)     Depression     Diabetes mellitus     DM TYPE 2 , ONSET IN 2009    Dyslipidemia     Dysphagia     Reported noted with food, fluids and pills    Elevated cholesterol     Full dentures     GERD (gastroesophageal reflux disease)     Gout     H/O left nephrectomy 2020    secondary to cancer    [place] (hard of hearing)     Patient has bilateral hearing aids, still is very [place]    Hypertension     Impaired functional mobility, balance, gait, and endurance     MI (myocardial infarction)     Patient reported apx March 2021 (reported he refused cardiac cath) and June 2021 (did have cardiac cath with placement of 2 stents).     OA (osteoarthritis)     Obesity     MILD TO MODERATE EXOGENOUS OBESITY    Problems with swallowing     with food    Psoriasis     Sleep  apnea     CPAP HS - to bring mask and machine DOS    Tattoo     x1    TIA (transient ischemic attack)     Wears glasses 10/27/2021     Past Surgical History:   Procedure Laterality Date    APPENDECTOMY      CARDIAC CATHETERIZATION      CARDIAC CATHETERIZATION N/A 2/20/2019    Procedure: Left Heart Cath;  Surgeon: Ernst Smith MD;  Location:  [facility];  Service: Cardiology    CARDIAC CATHETERIZATION Left 6/9/2021    Procedure: Left Heart Cath;  Surgeon: Ernst Smith MD;  Location:  [facility];  Service: Cardiology;  Laterality: Left;    CHOLECYSTECTOMY      COLONOSCOPY      COLONOSCOPY N/A 11/10/2021    Procedure: COLONOSCOPY with polypectomy x6 and clip x2;  Surgeon: Chidi Trinidad MD;  Location: [facility] ENDOSCOPY;  Service: Gastroenterology;  Laterality: N/A;    COLONOSCOPY N/A 10/25/2023    Procedure: COLONOSCOPY incomplete;  Surgeon: Chidi Trinidad MD;  Location: [facility] ENDOSCOPY;  Service: Gastroenterology;  Laterality: N/A;    COLONOSCOPY N/A 11/8/2023    Procedure: COLONOSCOPY WITH POLYPECTOMY X1;  Surgeon: Chidi Trinidad MD;  Location: [facility] ENDOSCOPY;  Service: Gastroenterology;  Laterality: N/A;    CORONARY ARTERY BYPASS GRAFT  2001    Patient reported no MI prior to CABG and that the bypass was 3 vessel     ENDOSCOPY      ENDOSCOPY N/A 11/10/2021    Procedure: ESOPHAGOGASTRODUODENOSCOPY with biopsy and dilatation;  Surgeon: Chidi Trinidad MD;  Location: [facility] ENDOSCOPY;  Service: Gastroenterology;  Laterality: N/A;    ENDOSCOPY N/A 10/25/2023    Procedure: ESOPHAGOGASTRODUODENOSCOPY with biopsy and dilatation;  Surgeon: Chidi Trinidad MD;  Location: [facility] ENDOSCOPY;  Service: Gastroenterology;  Laterality: N/A;    ENDOSCOPY N/A 11/8/2023    Procedure: ESOPHAGOGASTRODUODENOSCOPY WITH BIOPSY;  Surgeon: Chidi Trinidad MD;  Location: [facility] ENDOSCOPY;  Service: Gastroenterology;  Laterality: N/A;    INCISION AND DRAINAGE PERIRECTAL ABSCESS N/A  8/29/2024    Procedure: INCISION AND DRAINAGE OF PERIRECTAL ABSCESS;  Surgeon: Sherman Billingsley MD;  Location: [facility] OR;  Service: General;  Laterality: N/A;    LAPAROSCOPIC NEPHRECTOMY Left     MOUTH SURGERY      Full mouth extraction    URETER SURGERY      VASECTOMY        General Information       Row Name 07/08/25 1139          Physical Therapy Time and Intention    Document Type therapy note (daily note)  -     Mode of Treatment individual therapy;physical therapy  -       Row Name 07/08/25 1139          General Information    Patient Profile Reviewed yes  -     Existing Precautions/Restrictions fall  -       Row Name 07/08/25 1139          Cognition    Orientation Status (Cognition) oriented x 4  -       Row Name 07/08/25 1139          Safety Issues/Impairments Affecting Functional Mobility    Impairments Affecting Function (Mobility) balance;endurance/activity tolerance;shortness of breath;strength;pain  -               User Key  (r) = Recorded By, (t) = Taken By, (c) = Cosigned By      Initials Name Provider Type     Frankie Munoz, PT Physical Therapist                   Mobility       Row Name 07/08/25 1139          Bed Mobility    Comment, (Bed Mobility) pt received in chair.  -       Row Name 07/08/25 1139          Sit-Stand Transfer    Sit-Stand [place] (Transfers) contact guard;1 person assist  -     Assistive Device (Sit-Stand Transfers) walker, front-wheeled  -     Comment, (Sit-Stand Transfer) 6x STS this session  -       Row Name 07/08/25 1139          Gait/Stairs (Locomotion)    [place] Level (Gait) contact guard;1 person assist  -     Assistive Device (Gait) walker, front-wheeled  -     Patient was able to Ambulate yes  -     Distance in Feet (Gait) 10  -     Deviations/Abnormal Patterns (Gait) festinating/shuffling;gait speed decreased;base of support, narrow;right sided deviations;micah decreased;stride length decreased;weight shifting decreased  -      Bilateral Gait Deviations forward flexed posture  -     Right Sided Gait Deviations heel strike decreased  -               User Key  (r) = Recorded By, (t) = Taken By, (c) = Cosigned By      Initials Name Provider Type     Frankie Munoz PT Physical Therapist                   Obj/Interventions       [facility] Name 07/08/25 1140          Motor Skills    Therapeutic Exercise hip;knee;ankle  -JH       Row Name 07/08/25 1140          Hip (Therapeutic Exercise)    Hip (Therapeutic Exercise) strengthening exercise  -     Hip Strengthening (Therapeutic Exercise) bilateral;flexion;marching while seated;10 repetitions;sitting  -[facility] 07/08/25 1140          Knee (Therapeutic Exercise)    Knee (Therapeutic Exercise) strengthening exercise  -     Knee Strengthening (Therapeutic Exercise) bilateral;flexion;extension;SAQ (short arc quad);sitting;10 repetitions  -JH       Row Name 07/08/25 1140          Ankle (Therapeutic Exercise)    Ankle (Therapeutic Exercise) strengthening exercise  -     Ankle AROM (Therapeutic Exercise) bilateral  -     Ankle Strengthening (Therapeutic Exercise) bilateral;dorsiflexion;plantarflexion;eversion;sitting;10 repetitions  -JH       Row Name 07/08/25 1140          Advanced Stepping/Walking Interventions    Stepping/Walking Interventions backward walking  -     Backward Walking (Stepping/Walking Interventions) 5' backward walking this session  -JH       Row Name 07/08/25 1140          Balance    Balance Assessment sitting static balance;sitting dynamic balance;standing static balance;standing dynamic balance  -     Static Sitting Balance standby assist  -     Dynamic Sitting Balance standby assist  [facility]     Position, Sitting Balance unsupported;sitting in chair  -     Static Standing Balance contact guard;1-person assist  -     Dynamic Standing Balance contact guard;1-person assist  -     Position/Device Used, Standing Balance supported;walker, rolling  -                User Key  (r) = Recorded By, (t) = Taken By, (c) = Cosigned By      Initials Name Provider Type     Frankie Munoz PT Physical Therapist                   Goals/Plan    No documentation.                  Clinical Impression       Row Name 07/08/25 1142          Pain    Pretreatment Pain Rating 5/10  -     Posttreatment Pain Rating 5/10  -     Pain Location back;hip;knee;extremity  -     Pain Side/Orientation bilateral;lower;generalized  -     Pain Management Interventions exercise or physical activity utilized;positioning techniques utilized  -     Response to Pain Interventions activity participation with tolerable pain;functional ability unchanged  -       Row Name 07/08/25 1142          Plan of Care Review    Plan of Care Reviewed With patient;spouse  -     Progress improving  -     Outcome Evaluation Pt participated well with treatment. Pt received sitting in chair asleep with HR 79 and oxygen sat 99% on room air. Pt was considerably lethargic throughout first 10 minutes of treatment and required verbal cueing to open eyes. Pt performed B LE exercises as indicated in flowsheets prior to functional mobility and exercises were progressed this session. Pt performed 5 time sit to stand test this session with CGA throughout and completed test in 18.2 seconds indicating increased fall risk and risk of further hospitalizations due to falls. Pt performed additional STS with CGA and FWW and ambulated a total of 10 feet with CGA and FWW. Pt reported increased fatigue and could not ambulate further this session. Pt will continue to benefit from skilled PT to address functional activity tolerance deficit and decrease fall risk. Pt would benefit from short term rehabilitation to maximize functional independence, reduce fall risk, and reduce risk of further hospitalizations.  -       Row Name 07/08/25 1142          Vital Signs    Pre Systolic BP Rehab 108  -     Pre Treatment Diastolic BP 64  -      Pretreatment Heart Rate (beats/min) 79  -     Posttreatment Heart Rate (beats/min) 79  -JH     Pre SpO2 (%) 99  -JH     O2 Delivery Pre Treatment room air  -     Post SpO2 (%) 96  -     O2 Delivery Post Treatment room air  -     Pre Patient Position Sitting  -     Post Patient Position Sitting  -       Row Name 07/08/25 1142          Positioning and Restraints    Pre-Treatment Position sitting in chair/recliner  -     Post Treatment Position chair  -     In Chair notified nsg;reclined;call light within reach;encouraged to call for assist;with family/caregiver;RLE elevated;LLE elevated  -               User Key  (r) = Recorded By, (t) = Taken By, (c) = Cosigned By      Initials Name Provider Type    Frankie López PT Physical Therapist                   Outcome Measures       Row Name 07/08/25 1151 07/08/25 0819       How much help from another person do you currently need...    Turning from your back to your side while in flat bed without using bedrails? 4  - 4  -HN    Moving from lying on back to sitting on the side of a flat bed without bedrails? 4  - 4  -HN    Moving to and from a bed to a chair (including a wheelchair)? 3  - 3  -HN    Standing up from a chair using your arms (e.g., wheelchair, bedside chair)? 3  - 3  -HN    Climbing 3-5 steps with a railing? 2  - 2  -HN    To walk in hospital room? 3  - 3  -HN    AM-PAC 6 Clicks Score (PT) 19  - 19  -HN    Highest Level of Mobility Goal Walk 10 Steps or More-  - Walk 10 Steps or More-6  -HN      Row Name 07/08/25 1151          Functional Assessment    Outcome Measure Options AM-PAC 6 Clicks Basic Mobility (PT)  -               User Key  (r) = Recorded By, (t) = Taken By, (c) = Cosigned By      Initials Name Provider Type    Haleigh Mirza, RN Registered Nurse     Frankie Munoz PT Physical Therapist                                 Physical Therapy Education       Title: PT OT SLP Therapies (In Progress)       Topic:  Physical Therapy (In Progress)       Point: Mobility training (Done)       Learning Progress Summary            Patient Acceptance, E,TB, VU by  at 7/8/2025 1151    Comment: Pt educated in sit to stand transfer, gait activities, and HEP.    Acceptance, E,TB, VU by  at 7/7/2025 1136    Comment: Pt educated in HEP, proper gait mechanics with FWW, and discharge recommendations.    Acceptance, E,D, DU,VU by  at 7/2/2025 1338    Acceptance, E,D, DU,VU by  at 7/1/2025 1707    Comment: role of PT and POC                      Point: Home exercise program (Done)       Learning Progress Summary            Patient Acceptance, E,TB, VU by  at 7/8/2025 1151    Comment: Pt educated in sit to stand transfer, gait activities, and HEP.    Acceptance, E,TB, VU by  at 7/7/2025 1136    Comment: Pt educated in HEP, proper gait mechanics with FWW, and discharge recommendations.    Acceptance, E,TB, VU by  at 7/5/2025 1748    Acceptance, E,D, DU,VU by  at 7/2/2025 1338                      Point: Body mechanics (Done)       Learning Progress Summary            Patient Acceptance, E,TB, VU by  at 7/8/2025 1151    Comment: Pt educated in sit to stand transfer, gait activities, and HEP.    Acceptance, E,D, DU,VU by  at 7/2/2025 1338    Acceptance, E,D, DU,VU by  at 7/1/2025 1707    Comment: role of PT and POC                      Point: Precautions (Not Started)       Learner Progress:  Not documented in this visit.                              User Key       Initials Effective Dates Name Provider Type Discipline     06/16/21 -  Danica Amezcua, PTA Physical Therapist Assistant PT     06/13/23 -  Kuldeep Ren PT Physical Therapist PT     06/19/25 -  Frankie Munoz PT Physical Therapist PT                  PT Recommendation and Plan     Progress: improving  Outcome Evaluation: Pt participated well with treatment. Pt received sitting in chair asleep with HR 79 and oxygen sat 99% on room air. Pt was considerably  lethargic throughout first 10 minutes of treatment and required verbal cueing to open eyes. Pt performed B LE exercises as indicated in flowsheets prior to functional mobility and exercises were progressed this session. Pt performed 5 time sit to stand test this session with CGA throughout and completed test in 18.2 seconds indicating increased fall risk and risk of further hospitalizations due to falls. Pt performed additional STS with CGA and FWW and ambulated a total of 10 feet with CGA and FWW. Pt reported increased fatigue and could not ambulate further this session. Pt will continue to benefit from skilled PT to address functional activity tolerance deficit and decrease fall risk. Pt would benefit from short term rehabilitation to maximize functional independence, reduce fall risk, and reduce risk of further hospitalizations.     Time Calculation:         PT Charges       Row Name 07/08/25 1153             Time Calculation    Start Time 1046  -      Stop Time 1113  -      Time Calculation (min) 27 min  -      PT Received On 07/08/25  -      PT Goal Re-Cert Due Date 07/11/25  -         Time Calculation- PT    Total Timed Code Minutes- PT 27 minute(s)  -         Timed Charges    75364 - PT Therapeutic Exercise Minutes 15  -      53850 - PT Therapeutic Activity Minutes 12  -         Total Minutes    Timed Charges Total Minutes 27  -       Total Minutes 27  -                User Key  (r) = Recorded By, (t) = Taken By, (c) = Cosigned By      Initials Name Provider Type     Frankie Munoz PT Physical Therapist                  Therapy Charges for Today       Code Description Service Date Service Provider Modifiers Qty    96771795613 HC PT THER PROC EA 15 MIN 7/7/2025 Frankie Munoz, PT GP 1    86252829453 HC GAIT TRAINING EA 15 MIN 7/7/2025 Frankie Munoz, PT GP 1    61917002456 HC PT THER PROC EA 15 MIN 7/8/2025 Frankie Munoz PT GP 1    [id number]  PT THERAPEUTIC ACT EA 15 MIN 7/8/2025 Frankie Munoz  PT GP 1            PT G-Codes  Outcome Measure Options: AM-PAC 6 Clicks Basic Mobility (PT)  AM-PAC 6 Clicks Score (PT): 19  AM-PAC 6 Clicks Score (OT): 15  PT Discharge Summary  Anticipated Discharge Disposition (PT): skilled nursing facility    Frankie Munoz, PT [id number]   2025      Electronically signed by Kuldeep Ren, PT at 25 0837       Kuldeep Ren, PT at 25 1108  Version 1 of 1         PT tx held this date as pt is not feeling well. PT to follow up as appropriate.       Electronically signed by Kuldeep Ren, PT at 25 1241          Occupational Therapy Notes (last 72 hours)        Tameka Oconnor at 25 1111          Pt not feeling well today, request OT come back tomorrow.  Pt is getting up to the chair with nsg.  Will follow up with pt tomorrow.    Electronically signed by Tameka Oconnor at 25 1242       Vandana Rosas, OT at 25 1328          Patient Name: Jonny Ferrari Jr.  : 1950    MRN: [id number]                              Today's Date: 2025       Admit Date: 2025    Visit Dx:     ICD-10-CM ICD-9-CM   1. Sepsis with acute renal failure and septic shock, due to unspecified organism, unspecified acute renal failure type  A41.9 038.9    R65.21 995.92    N17.9 785.52     584.9   2. Acute kidney injury superimposed on chronic kidney disease  N17.9 584.9    N18.9 585.9   3. Hyponatremia  E87.1 276.1   4. Hypokalemia  E87.6 276.8   5. Chronic anemia  D64.9 285.9   6. Left lower lobe consolidation  J18.1 481   7. Type 2 diabetes mellitus with hyperglycemia, unspecified whether long term insulin use  E11.65 250.00   8. Multiple skin tears  T14.8XXA 879.8     Patient Active Problem List   Diagnosis    CAD (coronary artery disease)    Essential hypertension    Dyslipidemia    Obesity    GERD (gastroesophageal reflux disease)    OA (osteoarthritis)    TIA (transient ischemic attack)    Anxiety neurosis    Psoriasis    Tobacco use    Diabetes mellitus     Bilateral carotid artery stenosis    NSTEMI (non-ST elevated myocardial infarction)    Chronic kidney disease, stage IV (severe)    Esophageal dysphagia    Iron deficiency anemia    Acute urinary retention    Debility    Perirectal abscess    Acute renal failure (ARF)    Acute metabolic encephalopathy    Sepsis    Septic shock    Acute UTI (urinary tract infection)    Pneumonia of left lower lobe due to infectious organism     Past Medical History:   Diagnosis Date    Anemia     Anxiety neurosis     CAD (coronary artery disease)     Cancer     Kidney    Chronic kidney disease     Patient reported left kidney removed secondary to kidney cancer and that he only has 30% function of the right kidney    Constipation     COPD (chronic obstructive pulmonary disease)     Depression     Diabetes mellitus     DM TYPE 2 , ONSET IN 2009    Dyslipidemia     Dysphagia     Reported noted with food, fluids and pills    Elevated cholesterol     Full dentures     GERD (gastroesophageal reflux disease)     Gout     H/O left nephrectomy 2020    secondary to cancer    [place] (hard of hearing)     Patient has bilateral hearing aids, still is very [place]    Hypertension     Impaired functional mobility, balance, gait, and endurance     MI (myocardial infarction)     Patient reported apx March 2021 (reported he refused cardiac cath) and June 2021 (did have cardiac cath with placement of 2 stents).     OA (osteoarthritis)     Obesity     MILD TO MODERATE EXOGENOUS OBESITY    Problems with swallowing     with food    Psoriasis     Sleep apnea     CPAP HS - to bring mask and machine DOS    Tattoo     x1    TIA (transient ischemic attack)     Wears glasses 10/27/2021     Past Surgical History:   Procedure Laterality Date    APPENDECTOMY      CARDIAC CATHETERIZATION      CARDIAC CATHETERIZATION N/A 2/20/2019    Procedure: Left Heart Cath;  Surgeon: Ernst Smith MD;  Location:  [facility];  Service: Cardiology    CARDIAC  CATHETERIZATION Left 6/9/2021    Procedure: Left Heart Cath;  Surgeon: Ernst Smith MD;  Location: [facility] CATH INVASIVE LOCATION;  Service: Cardiology;  Laterality: Left;    CHOLECYSTECTOMY      COLONOSCOPY      COLONOSCOPY N/A 11/10/2021    Procedure: COLONOSCOPY with polypectomy x6 and clip x2;  Surgeon: Chidi Trinidad MD;  Location: [facility] ENDOSCOPY;  Service: Gastroenterology;  Laterality: N/A;    COLONOSCOPY N/A 10/25/2023    Procedure: COLONOSCOPY incomplete;  Surgeon: Chidi Trinidad MD;  Location: [facility] ENDOSCOPY;  Service: Gastroenterology;  Laterality: N/A;    COLONOSCOPY N/A 11/8/2023    Procedure: COLONOSCOPY WITH POLYPECTOMY X1;  Surgeon: Chidi Trinidad MD;  Location: [facility] ENDOSCOPY;  Service: Gastroenterology;  Laterality: N/A;    CORONARY ARTERY BYPASS GRAFT  2001    Patient reported no MI prior to CABG and that the bypass was 3 vessel     ENDOSCOPY      ENDOSCOPY N/A 11/10/2021    Procedure: ESOPHAGOGASTRODUODENOSCOPY with biopsy and dilatation;  Surgeon: Chidi Trinidad MD;  Location: [facility] ENDOSCOPY;  Service: Gastroenterology;  Laterality: N/A;    ENDOSCOPY N/A 10/25/2023    Procedure: ESOPHAGOGASTRODUODENOSCOPY with biopsy and dilatation;  Surgeon: Chidi Trinidad MD;  Location: [facility] ENDOSCOPY;  Service: Gastroenterology;  Laterality: N/A;    ENDOSCOPY N/A 11/8/2023    Procedure: ESOPHAGOGASTRODUODENOSCOPY WITH BIOPSY;  Surgeon: Chidi Trinidad MD;  Location: [facility] ENDOSCOPY;  Service: Gastroenterology;  Laterality: N/A;    INCISION AND DRAINAGE PERIRECTAL ABSCESS N/A 8/29/2024    Procedure: INCISION AND DRAINAGE OF PERIRECTAL ABSCESS;  Surgeon: Sherman Billingsley MD;  Location: [facility] OR;  Service: General;  Laterality: N/A;    LAPAROSCOPIC NEPHRECTOMY Left     MOUTH SURGERY      Full mouth extraction    URETER SURGERY      VASECTOMY        General Information       Row Name 07/07/25 2665          OT Time and Intention    Document Type therapy note (daily note)  -      Mode of Treatment occupational therapy;co-treatment;physical therapy  -     Patient Effort good  -       Row Name 07/07/25 1305          General Information    Patient Profile Reviewed yes  -     Existing Precautions/Restrictions fall;oxygen therapy device and L/min  -       Row Name 07/07/25 1305          Cognition    Orientation Status (Cognition) oriented x 4  -       Row Name 07/07/25 1305          Safety Issues/Impairments Affecting Functional Mobility    Impairments Affecting Function (Mobility) balance;endurance/activity tolerance;shortness of breath;strength;pain  -               User Key  (r) = Recorded By, (t) = Taken By, (c) = Cosigned By      Initials Name Provider Type     Vandana Rosas OT Occupational Therapist                     Mobility/ADL's       Row Name 07/07/25 1307          Sit-Stand Transfer    Sit-Stand [place] (Transfers) standby assist;1 person assist  -     Assistive Device (Sit-Stand Transfers) walker, front-wheeled  -       Row Name 07/07/25 1307          Functional Mobility    Functional Mobility- Ind. Level contact guard assist;1 person  -     Functional Mobility- Device walker, front-wheeled  -     Functional Mobility-Distance (Feet) 60  -HH     Patient was able to Ambulate yes  -       Row Name 07/07/25 1307          Activities of Daily Living    BADL Assessment/Intervention lower body dressing  -       Row Name 07/07/25 1307          Lower Body Dressing Assessment/Training    [place] Level (Lower Body Dressing) minimum assist (75% patient effort);socks;doff;don  -     Position (Lower Body Dressing) unsupported sitting  -               User Key  (r) = Recorded By, (t) = Taken By, (c) = Cosigned By      Initials Name Provider Type     Vandana Rosas OT Occupational Therapist                   Obj/Interventions    No documentation.                  Goals/Plan    No documentation.                  Clinical Impression       Row Name  07/07/25 1311          Pain Assessment    Pretreatment Pain Rating 0/10 - no pain  -HH     Posttreatment Pain Rating 0/10 - no pain  -HH       Row Name 07/07/25 1311          Plan of Care Review    Outcome Evaluation Pt agreeable to OT session this date. Pt received sitting in recliner, call-light within reach. Pt completed STS with FWW. Pt ambulated ~60ft with FWW requiring CGA with FWW and verbal cueing needed for technique and safety. Pt was able to doff/don bilateral socks with Reji while seated in recliner. Pt verbalized fear of falling resulting in him not ambulating prior to admission. Pt would benefit from STR at discharge to improve Ind/safety with functional tasks/mobility and decrease risk of falling. Cont. POC  -HH       Row Name 07/07/25 1311          Vital Signs    Pre Systolic BP Rehab 117  -HH     Pre Treatment Diastolic BP 63  -HH     Pretreatment Heart Rate (beats/min) 71  -HH     Posttreatment Heart Rate (beats/min) 73  -HH     Pre SpO2 (%) 96  -HH     O2 Delivery Pre Treatment room air  -HH     O2 Delivery Intra Treatment room air  -HH     Post SpO2 (%) 97  -HH     O2 Delivery Post Treatment room air  -HH     Pre Patient Position Sitting  -HH     Intra Patient Position Standing  -HH     Post Patient Position Sitting  -HH       Row Name 07/07/25 1311          Positioning and Restraints    Pre-Treatment Position sitting in chair/recliner  -HH     Post Treatment Position chair  -HH     In Chair sitting;notified nsg;reclined;encouraged to call for assist;with family/caregiver  -               User Key  (r) = Recorded By, (t) = Taken By, (c) = Cosigned By      Initials Name Provider Type     Vandana Rosas OT Occupational Therapist                   Outcome Measures       Row Name 07/07/25 1323          How much help from another is currently needed...    Putting on and taking off regular lower body clothing? 2  -HH     Bathing (including washing, rinsing, and drying) 2  -HH     Toileting (which  includes using toilet bed pan or urinal) 2  -HH     Putting on and taking off regular upper body clothing 3  -HH     Taking care of personal grooming (such as brushing teeth) 3  -HH     Eating meals 3  -     AM-PAC 6 Clicks Score (OT) 15  -       Row Name 07/07/25 1135 07/07/25 0924       How much help from another person do you currently need...    Turning from your back to your side while in flat bed without using bedrails? 4  - 4  -AS (r) CM (t) AS (c)    Moving from lying on back to sitting on the side of a flat bed without bedrails? 4  -JH 3  -AS (r) CM (t) AS (c)    Moving to and from a bed to a chair (including a wheelchair)? 3  - 3  -AS (r) CM (t) AS (c)    Standing up from a chair using your arms (e.g., wheelchair, bedside chair)? 3  - 3  -AS (r) CM (t) AS (c)    Climbing 3-5 steps with a railing? 2  - 2  -AS (r) CM (t) AS (c)    To walk in hospital room? 3  - 3  -AS (r) CM (t) AS (c)    AM-PAC 6 Clicks Score (PT) 19  - 18  -AS (r) CM (t)    Highest Level of Mobility Goal Walk 10 Steps or More-6  -JH Walk 10 Steps or More-6  -AS (r) CM (t)      Row Name 07/07/25 1323 07/07/25 1135       Functional Assessment    Outcome Measure Options AM-PAC 6 Clicks Daily Activity (OT)  -[facility] 6 Clicks Basic Mobility (PT)  -              User Key  (r) = Recorded By, (t) = Taken By, (c) = Cosigned By      Initials Name Provider Type    AS Anahi Hensley RN Registered Nurse    Vandana Garcia, OT Occupational Therapist    Chidi Nascimento RN Extern Registered Nurse     Frankie Munoz, PT Physical Therapist                    Occupational Therapy Education       Title: PT OT SLP Therapies (In Progress)       Topic: Occupational Therapy (In Progress)       Point: ADL training (Done)       Learning Progress Summary            Patient Acceptance, E, VU by  at 7/7/2025 1324    Comment: Continued POC and discharge recommendations    Acceptance, E,TB, VU by SD at 7/4/2025 1956    Comment: Safety  during toilet tf                                      User Key       Initials Effective Dates Name Provider Type Discipline    SD 06/16/21 -  Janel Howe OT Occupational Therapist OT     02/25/25 -  Vandana Rosas OT Occupational Therapist OT                  OT Recommendation and Plan     Plan of Care Review  Outcome Evaluation: Pt agreeable to OT session this date. Pt received sitting in recliner, call-light within reach. Pt completed STS with FWW. Pt ambulated ~60ft with FWW requiring CGA with FWW and verbal cueing needed for technique and safety. Pt was able to doff/don bilateral socks with Reji while seated in recliner. Pt verbalized fear of falling resulting in him not ambulating prior to admission. Pt would benefit from STR at discharge to improve Ind/safety with functional tasks/mobility and decrease risk of falling. Cont. POC     Time Calculation:         Time Calculation- OT       Row Name 07/07/25 1326 07/07/25 1137          Time Calculation- OT    OT Start Time 1003  -HH --     OT Received On 07/07/25  -HH --        Timed Charges    00377 - Gait Training Minutes  -- 15  -JH     48599 - OT Therapeutic Activity Minutes 24  -HH --     46745 - OT Self Care/Mgmt Minutes 4  -HH --        Total Minutes    Timed Charges Total Minutes 28  -HH 15  -JH      Total Minutes 28  -HH 15  -JH               User Key  (r) = Recorded By, (t) = Taken By, (c) = Cosigned By      Initials Name Provider Type     Vnadana Rosas OT Occupational Therapist     Head, Frankie, PT Physical Therapist                  Therapy Charges for Today       Code Description Service Date Service Provider Modifiers Qty    72246078837  OT THERAPEUTIC ACT EA 15 MIN 7/7/2025 Vandana Rosas OT GO 2                 Vandana Rosas OT  7/7/2025    Electronically signed by Vandana Rosas OT at 07/07/25 1329       Vandana Rosas OT at 07/07/25 1325          Goal Outcome Evaluation:              Outcome Evaluation: Pt agreeable to OT  session this date. Pt received sitting in recliner, call-light within reach. Pt completed STS with FWW. Pt ambulated ~60ft with FWW requiring CGA with FWW and verbal cueing needed for technique and safety. Pt was able to doff/don bilateral socks with Reji while seated in recliner. Pt verbalized fear of falling resulting in him not ambulating prior to admission. Pt would benefit from STR at discharge to improve Ind/safety with functional tasks/mobility and decrease risk of falling. Cont. POC                               Electronically signed by Vandana Rosas OT at 07/07/25 1514

## 2025-07-09 NOTE — CASE MANAGEMENT/SOCIAL WORK
Case Management/Social Work    Patient Name:  Jonny Ferrari Jr.  YOB: 1950  MRN: [id number]  Admit Date:  6/29/2025        14:51 EDT   Discharge Plan       Row Name 07/09/25 1450       Plan    Plan DCP- [facility] for STR. Auth still pending.                  1045: CM spoke with pt and wife at bedside. Pt currently in chair and denies any pain. Alert and oriented X3. DCP- Pending insurance approval for [facility]. CM to follow.       Electronically signed by:  Marcelle Villaseñor RN  07/09/25 14:51 EDT

## 2025-07-09 NOTE — PROGRESS NOTES
"    [facility]    Name:  Jonny Ferrari Jr.   Age:  74 y.o.  Sex:  male  :  1950  MRN:  [id number]   Visit Number:  [id number]  Admission Date:  2025  Date Of Service:  25  Primary Care Physician:  Amaury Brice MD     LOS: 10 days :    Chief Complaint:      Follow-up septic shock    Subjective:    Patient examined.  Resting in bedside chair.  Very pleasant, laughing and cracking jokes.  Wife at bedside.  He is eating a turkey sandwich.  States that he feels great    Hospital Course:    Per prior provider: \"Jonny Ferrari Jr. is a 74-year-old male with history of diabetes mellitus type 2, chronic kidney disease stage III, COPD, coronary artery disease status post CABG and stent, ERASMO, BPH was brought to the emergency room by EMS with symptoms of generalized weakness and fatigue   The patient was brought in by ambulance due to an inability to walk, a condition that has persisted for the past 2 to 3 days. He reports no fever, dysuria, or cough but feels weak. He has experienced several falls recently and has been unable to walk independently for the past 3 to 4 days. Despite using a walker or cane, he continues to fall.   In the emergency room, patient was afebrile but mildly tachycardic in the 100 with initial blood pressure of 80/54 and pulse oxygen saturation of 95% on room air.  Patient was given 30 mL/kg fluid bolus initially in the emergency room with initial improvement of the blood pressure to systolic 100 but subsequently dropped it again to the 70s systolic.  Patient was given another 500 bolus of normal saline, he right femoral central line was placed and he was initiated on Levophed. \"     Patient was able to be weaned off steroids.  He completed antibiotics for UTI after discussion with pharmacy.  Has been seen by nephrology and kidney function is back to baseline.  Did have some drop in hemoglobin, there was concern for possible hematuria, " this has resolved however.  Did receive 1 unit of PRBCs.    Review of Systems:     All systems were reviewed and negative except as mentioned in subjective, assessment and plan.    Vital Signs:    Temp:  [97.1 °F (36.2 °C)-97.6 °F (36.4 °C)] 97.3 °F (36.3 °C)  Heart Rate:  [74-89] 89  Resp:  [16-18] 18  BP: ()/(61-95) 118/73    Intake and output:    I/O last 3 completed shifts:  In: 1300 [P.O.:1300]  Out: 750 [Urine:750]  I/O this shift:  In: 600 [P.O.:600]  Out: -     Physical Examination:  Examined again 7/9/25    General Appearance:  Alert and cooperative. NAD   Head:  Atraumatic and normocephalic.   Eyes: Conjunctivae and sclerae normal, no icterus. No pallor.   Throat: No oral lesions, no thrush, oral mucosa moist.   Neck: Supple, trachea midline, no thyromegaly.   Lungs:   Breath sounds heard bilaterally equally.  No wheezing or crackles.    Heart:  Normal S1 and S2, no murmur, No JVD.   Abdomen:   Normal bowel sounds, Soft, nontender, nondistended, no rebound tenderness.   Extremities: Supple, no edema, no cyanosis, no clubbing.   Skin: No bleeding or rash.   Neurologic: Alert and oriented x 3. No facial asymmetry. Generalized weakness.      Laboratory results:    Results from last 7 days   Lab Units 07/07/25  0431 07/05/25  1009 07/04/25  0454   SODIUM mmol/L 135* 137 139   POTASSIUM mmol/L 4.6 4.4 3.7   CHLORIDE mmol/L 101 102 101   CO2 mmol/L 23.8 24.3 27.9   BUN mg/dL 16.0 13.0 16.0   CREATININE mg/dL 1.82* 1.88* 1.95*   CALCIUM mg/dL 9.2 8.8 8.9   GLUCOSE mg/dL 123* 143* 84     Results from last 7 days   Lab Units 07/07/25  0431 07/05/25  1009 07/04/25  0454   WBC 10*3/mm3 6.96  --   --    HEMOGLOBIN g/dL 8.2* 7.7* 8.2*   HEMATOCRIT % 26.2* 24.8* 26.4*   PLATELETS 10*3/mm3 139*  --   --                    Recent Labs     08/29/24  2314   PHART 7.382   DHZ5RLS 49.8*   PO2ART 68.0*   NTI9VXQ 29.5*   BASEEXCESS 3.8*      I have reviewed the patient's laboratory results.    Radiology results:    No  radiology results from the last 24 hrs  I have reviewed the patient's radiology reports.    Medication Review:     I have reviewed the patient's active and prn medications.     Problem List:      Septic shock    CAD (coronary artery disease)    Chronic kidney disease, stage IV (severe)    Acute UTI (urinary tract infection)    Pneumonia of left lower lobe due to infectious organism      Assessment:    Septic shock secondary to #2 and #3, POA.  Acute urinary tract infection, POA.  Left lower lobe bacterial pneumonia, unable to classify further, POA.  Demand ischemia with elevated troponins, POA.  Hypokalemia, POA.  Chronic kidney disease stage IV.  Diabetes mellitus type 2 with nephropathy and hyperglycemia, POA.  Coronary artery disease status post CABG and stents.  COPD, no exacerbation.  Benign prostatic hyperplasia.  Will falls with skin abrasions, POA.    Plan:    Septic shock  Acute UTI  Left lower lobe bacterial pneumonia  Negative blood culture, on Zosyn.  Oxygenation stable.  Off pressors.  Urine culture had E faecalis.  Discussed with pharmacy, no further treatment indicated  Demand ischemia with elevated troponins  Troponins plateaued 95-98  Suspect secondary to demand ischemia in the setting of his septic shock as well as CKD  Low suspicion for ACS  Hypokalemia  Continue replacement  CKD stage IV  Nephrology, Dr. Hooper consulted and appreciate recommendations.  Numbers now stabilized.  Off fluids.  Diuretics started  Type 2 diabetes  Subcutaneous insulin protocol  Anemia  Does have chronic anemia likely due to renal failure, however acute on chronic currently.  Received 1 unit of PRBCs.  There was reportedly some blood loss with catheter insertion but urine is clear now.  Have restarted aspirin.  Continue to monitor, especially for any GI bleeding.  CAD status post CABG  Continue DAPT with aspirin and Plavix  COPD not exacerbation  Multiple skin abrasions  Wound care  Impaired mobility and ADLs  PT/OT  once stable to participate     At this time we are pending approval or denial for short-term rehab.  Patient is okay with going back home if he gets denied, but wants to see if he is able to go.    Plan of care reviewed, no changes. Copied forward from 7/8/25     DVT Prophylaxis: SCD  Code Status: Full  Diet: Renal diabetic.  Discharge Plan: KRISTINE Patel DO  07/09/25  14:23 EDT    Dictated utilizing Dragon dictation.

## 2025-07-09 NOTE — PLAN OF CARE
Goal Outcome Evaluation:              Outcome Evaluation: VSS. No acute events during this shift.

## 2025-07-09 NOTE — PAYOR COMM NOTE
"  UPDATED CLINICALS  UR MANAGER; SIL PORTILLO, RN   [phone] AND [phone]     NPI:  [id number]  TAX ID:  [id number]      Nuzhat Marinelli Jr. (74 y.o. Male)       Date of Birth   1950    Social Security Number       Address   [address] [place] [address]    Home Phone   [phone]    MRN   [id number]       Taoism   Religious    Marital Status                               Admission Date   2025    Admission Type   Emergency    Admitting Provider   Silviano Cisneros MD    Attending Provider   Kuldeep Patel DO    Department, Room/Bed   [facility] TELEMETRY 3, 329/1       Discharge Date       Discharge Disposition       Discharge Destination                                 Attending Provider: Kuldeep Patel DO    Allergies: Metformin    Isolation: None   Infection: None   Code Status: CPR    Ht: 182.9 cm (72\")   Wt: 98.9 kg (218 lb 0.6 oz)    Admission Cmt: None   Principal Problem: Septic shock [A41.9,R65.21]                   Active Insurance as of 2025       Primary Coverage       Payor Plan Insurance Group Employer/Plan Group    ANTHEM MEDICARE REPLACEMENT ANTHEM MEDICARE ADVANTAGE HMO [id number]       Payor Plan Address Payor Plan Phone Number Payor Plan Fax Number Effective Dates    [address] [phone]  2025 - None Entered    [facility] [id number]         Subscriber Name Subscriber Birth Date Member ID       NUZHAT MARINELLI JR. 1950 [id number]                     Emergency Contacts        (Rel.) Home Phone Work Phone Mobile Phone    IsisLashon (Spouse) [phone] -- [phone]    ISISCATE (Relative) -- -- [phone]    ISISNEGRA (Son) [phone] -- --                 Physician Progress Notes (last 48 hours)        Alem Maharaj MD at [address] [facility]              [facility] Progress Note      Patient Name: Nuzhat Marinelli Jr.  : 1950  MRN: [id number]  Primary Care Physician:  " Amaury Brice MD  Date of admission: 6/29/2025    Subjective     Interval History:   Clinically stable no acute events overnight  No chest pain no shortness of breath no labs done today     renal function is near plateaued down to 1.82  Review of Systems:   As noted above    Objective     Vitals:   Temp:  [97.5 °F (36.4 °C)-98.2 °F (36.8 °C)] 97.8 °F (36.6 °C)  Heart Rate:  [66-96] 74  Resp:  [16-18] 16  BP: ()/(62-84) 108/64    Intake/Output Summary (Last 24 hours) at 7/8/2025 1425  Last data filed at 7/8/2025 1300  Gross per 24 hour   Intake 1060 ml   Output 750 ml   Net 310 ml       Physical Exam:    General Appearance: alert no acute distress   Skin: warm and dry  HEENT: oral mucosa normal  Neck: supple, no JVD  Lungs: CTA  Heart: RRR, normal S1 and S2  Abdomen: soft, nontender  : no palpable bladder  Extremities: no edema  Neuro: normal speech    Scheduled Meds:     aspirin, 81 mg, Oral, Daily  budesonide, 0.5 mg, Nebulization, BID - RT  buprenorphine-naloxone, 1 film, Sublingual, BID  busPIRone, 15 mg, Oral, Q12H  clopidogrel, 75 mg, Oral, Daily  insulin glargine, 20 Units, Subcutaneous, Nightly  insulin lispro, 2-9 Units, Subcutaneous, 4x Daily AC & at Bedtime  lactobacillus acidophilus, 1 capsule, Oral, BID  pantoprazole, 40 mg, Oral, Q AM  senna-docusate sodium, 2 tablet, Oral, BID  sodium chloride, 10 mL, Intravenous, Q12H      IV Meds:        Results Reviewed:   I have personally reviewed the results from the time of this admission to 7/8/2025 14:25 EDT     Results from last 7 days   Lab Units 07/07/25  0431 07/05/25  1009 07/04/25  0454   SODIUM mmol/L 135* 137 139   POTASSIUM mmol/L 4.6 4.4 3.7   CHLORIDE mmol/L 101 102 101   CO2 mmol/L 23.8 24.3 27.9   BUN mg/dL 16.0 13.0 16.0   CREATININE mg/dL 1.82* 1.88* 1.95*   CALCIUM mg/dL 9.2 8.8 8.9   GLUCOSE mg/dL 123* 143* 84     Estimated Creatinine Clearance: 44.7 mL/min (A) (by C-G formula based on SCr of 1.82 mg/dL (H)).  Results from  last 7 days   Lab Units 07/03/25  0503 07/02/25  1517 07/02/25  0407   PHOSPHORUS mg/dL 2.6 2.4* 1.9*         Results from last 7 days   Lab Units 07/07/25  0431 07/05/25  1009 07/04/25  0454 07/03/25  0503 07/02/25  1517 07/02/25  0407 07/02/25  0315 07/01/25  1945   WBC 10*3/mm3 6.96  --   --   --   --  5.65  --  5.67   HEMOGLOBIN g/dL 8.2* 7.7* 8.2* 8.6* 7.7* 6.0*   < > 8.4*   PLATELETS 10*3/mm3 139*  --   --   --   --  129*  --  148    < > = values in this interval not displayed.     Results from last 7 days   Lab Units 07/01/25  1945   INR  1.03       Assessment / Plan     ASSESSMENT:  Acute kidney injury: Recovered   chronic kidney disease stage IV: Underlying chronic kidney disease secondary to diabetes and hypertension as well as volume loss, status post left nephrectomy in March of 2020.  Baseline creatinine of 2.9 with a EGFR of 21 mL/min.  Now 1.82  Type 2 diabetes: Longstanding history of type 2 diabetes likely with some complications.  Septic shock: Treated as per hospitalist service still on pressors.  Left lower lobe pneumonia: On IV antibiotics as per hospitalist service.  Urinary tract infection: Treated  Hypokalemia: Replace per protocol  Coronary artery disease: Status post CABG and stent placements.  No acute issues  COPD: Longstanding history of smoking.  Untreated sleep apnea: Longstanding known history of obstructive sleep apnea, has not been able to use his BiPAP        PLAN:   -Encourage oral hydration monitor sodium it was 135 yesterday   -Monitor drops in the hemoglobin  - monitor intake and output  -Dose medicate per GFR   -avoid nephrotoxin  -Aim for MAP greater than 65  - Okay to discharge from nephrology perspective-  -Thank for the consult will closely follow      Alem Maharaj MD  07/08/25  14:25 EDT    [facility]\""  [phone]     Electronically signed by Alem Maharaj MD at 07/08/25 5380       Kuldeep Patel DO at 07/08/25 8044         "      [facility]    Name:  Jonny Ferrari Jr.   Age:  74 y.o.  Sex:  male  :  1950  MRN:  [id number]   Visit Number:  [id number]  Admission Date:  2025  Date Of Service:  25  Primary Care Physician:  Amaury Brice MD     LOS: 9 days :    Chief Complaint:      Follow-up septic shock    Subjective:    Patient examined.  Resting comfortably. No acute events overnight. Waiting on STR    Hospital Course:    Per prior provider: \"Jonny Ferrari Jr. is a 74-year-old male with history of diabetes mellitus type 2, chronic kidney disease stage III, COPD, coronary artery disease status post CABG and stent, ERASMO, BPH was brought to the emergency room by EMS with symptoms of generalized weakness and fatigue   The patient was brought in by ambulance due to an inability to walk, a condition that has persisted for the past 2 to 3 days. He reports no fever, dysuria, or cough but feels weak. He has experienced several falls recently and has been unable to walk independently for the past 3 to 4 days. Despite using a walker or cane, he continues to fall.   In the emergency room, patient was afebrile but mildly tachycardic in the 100 with initial blood pressure of 80/54 and pulse oxygen saturation of 95% on room air.  Patient was given 30 mL/kg fluid bolus initially in the emergency room with initial improvement of the blood pressure to systolic 100 but subsequently dropped it again to the 70s systolic.  Patient was given another 500 bolus of normal saline, he right femoral central line was placed and he was initiated on Levophed. \"     Patient was able to be weaned off steroids.  He completed antibiotics for UTI after discussion with pharmacy.  Has been seen by nephrology and kidney function is back to baseline.  Did have some drop in hemoglobin, there was concern for possible hematuria, this has resolved however.  Did receive 1 unit of PRBCs.    Review of Systems: "     All systems were reviewed and negative except as mentioned in subjective, assessment and plan.    Vital Signs:    Temp:  [97.5 °F (36.4 °C)-98.2 °F (36.8 °C)] 97.8 °F (36.6 °C)  Heart Rate:  [66-96] 74  Resp:  [16-18] 16  BP: ()/(62-84) 108/64    Intake and output:    I/O last 3 completed shifts:  In: 1592 [P.O.:1582; I.V.:10]  Out: 1925 [Urine:1925]  I/O this shift:  In: 580 [P.O.:580]  Out: 650 [Urine:650]    Physical Examination:  Examined again 7/8/25    General Appearance:  Alert and cooperative. NAD   Head:  Atraumatic and normocephalic.   Eyes: Conjunctivae and sclerae normal, no icterus. No pallor.   Throat: No oral lesions, no thrush, oral mucosa moist.   Neck: Supple, trachea midline, no thyromegaly.   Lungs:   Breath sounds heard bilaterally equally.  No wheezing or crackles.    Heart:  Normal S1 and S2, no murmur, No JVD.   Abdomen:   Normal bowel sounds, Soft, nontender, nondistended, no rebound tenderness.   Extremities: Supple, no edema, no cyanosis, no clubbing.   Skin: No bleeding or rash.   Neurologic: Alert and oriented x 3. No facial asymmetry. Generalized weakness.      Laboratory results:    Results from last 7 days   Lab Units 07/07/25  0431 07/05/25  1009 07/04/25  0454   SODIUM mmol/L 135* 137 139   POTASSIUM mmol/L 4.6 4.4 3.7   CHLORIDE mmol/L 101 102 101   CO2 mmol/L 23.8 24.3 27.9   BUN mg/dL 16.0 13.0 16.0   CREATININE mg/dL 1.82* 1.88* 1.95*   CALCIUM mg/dL 9.2 8.8 8.9   GLUCOSE mg/dL 123* 143* 84     Results from last 7 days   Lab Units 07/07/25  0431 07/05/25  1009 07/04/25  0454 07/02/25  1517 07/02/25  0407 07/02/25  0315 07/01/25  1945   WBC 10*3/mm3 6.96  --   --   --  5.65  --  5.67   HEMOGLOBIN g/dL 8.2* 7.7* 8.2*   < > 6.0*   < > 8.4*   HEMATOCRIT % 26.2* 24.8* 26.4*   < > 18.8*   < > 26.7*   PLATELETS 10*3/mm3 139*  --   --   --  129*  --  148    < > = values in this interval not displayed.     Results from last 7 days   Lab Units 07/01/25  8425   INR  1.03              Recent Labs     08/29/24  2314   PHART 7.382   DVS1IQB 49.8*   PO2ART 68.0*   MQC5SJJ 29.5*   BASEEXCESS 3.8*      I have reviewed the patient's laboratory results.    Radiology results:    No radiology results from the last 24 hrs  I have reviewed the patient's radiology reports.    Medication Review:     I have reviewed the patient's active and prn medications.     Problem List:      Septic shock    CAD (coronary artery disease)    Chronic kidney disease, stage IV (severe)    Acute UTI (urinary tract infection)    Pneumonia of left lower lobe due to infectious organism      Assessment:    Septic shock secondary to #2 and #3, POA.  Acute urinary tract infection, POA.  Left lower lobe bacterial pneumonia, unable to classify further, POA.  Demand ischemia with elevated troponins, POA.  Hypokalemia, POA.  Chronic kidney disease stage IV.  Diabetes mellitus type 2 with nephropathy and hyperglycemia, POA.  Coronary artery disease status post CABG and stents.  COPD, no exacerbation.  Benign prostatic hyperplasia.  Will falls with skin abrasions, POA.    Plan:    Septic shock  Acute UTI  Left lower lobe bacterial pneumonia  Negative blood culture, on Zosyn.  Oxygenation stable.  Off pressors.  Urine culture had E faecalis.  Discussed with pharmacy, no further treatment indicated  Demand ischemia with elevated troponins  Troponins plateaued 95-98  Suspect secondary to demand ischemia in the setting of his septic shock as well as CKD  Low suspicion for ACS  Hypokalemia  Continue replacement  CKD stage IV  Nephrology, Dr. Hooper consulted and appreciate recommendations.  Numbers now stabilized.  Off fluids.  Diuretics started  Type 2 diabetes  Subcutaneous insulin protocol  Anemia  Does have chronic anemia likely due to renal failure, however acute on chronic currently.  Received 1 unit of PRBCs.  There was reportedly some blood loss with catheter insertion but urine is clear now.  Have restarted aspirin.  Continue to  monitor, especially for any GI bleeding.  CAD status post CABG  Continue DAPT with aspirin and Plavix  COPD not exacerbation  Multiple skin abrasions  Wound care  Impaired mobility and ADLs  PT/OT once stable to participate     At this time we are pending approval or denial for short-term rehab.  Patient is okay with going back home if he gets denied, but wants to see if he is able to go.    Plan of care reviewed, no changes. Copied forward from 25     DVT Prophylaxis: SCD  Code Status: Full  Diet: Renal diabetic.  Discharge Plan: STR       Kuldeep Patel DO  25  13:46 EDT    Dictated utilizing Dragon dictation.        Electronically signed by Kuldeep Patel DO at 25 1347       Alem Maharaj MD at 25 1548              [facility] Progress Note      Patient Name: Jonny Ferrari Jr.  : 1950  MRN: [id number]  Primary Care Physician:  Amaury Brice MD  Date of admission: 2025    Subjective     Interval History:     Blood pressure is stable no acute uremic symptoms  No acute events overnight  Clinically stable still some weakness and lethargy good urine output  Creatinine is near plateaued down to 1.82  Review of Systems:   As noted above    Objective     Vitals:   Temp:  [97.5 °F (36.4 °C)-98.4 °F (36.9 °C)] 97.5 °F (36.4 °C)  Heart Rate:  [69-89] 78  Resp:  [14-20] 17  BP: ()/(60-76) 120/70    Intake/Output Summary (Last 24 hours) at 2025 1545  Last data filed at 2025 1207  Gross per 24 hour   Intake 1862 ml   Output 2050 ml   Net -188 ml       Physical Exam:    General Appearance: alert, oriented x 3, no acute distress   Skin: warm and dry  HEENT: oral mucosa normal, nonicteric sclera  Neck: supple, no JVD  Lungs: CTA  Heart: RRR, normal S1 and S2  Abdomen: soft, nontender, nondistended  : no palpable bladder  Extremities: no edema, cyanosis or clubbing  Neuro: normal speech and mental status     Scheduled Meds:     aspirin, 81  mg, Oral, Daily  budesonide, 0.5 mg, Nebulization, BID - RT  buprenorphine-naloxone, 1 film, Sublingual, BID  busPIRone, 15 mg, Oral, Q12H  clopidogrel, 75 mg, Oral, Daily  insulin glargine, 20 Units, Subcutaneous, Nightly  insulin lispro, 2-9 Units, Subcutaneous, 4x Daily AC & at Bedtime  lactobacillus acidophilus, 1 capsule, Oral, BID  pantoprazole, 40 mg, Oral, Q AM  senna-docusate sodium, 2 tablet, Oral, BID  sodium chloride, 10 mL, Intravenous, Q12H      IV Meds:        Results Reviewed:   I have personally reviewed the results from the time of this admission to 7/7/2025 15:45 EDT     Results from last 7 days   Lab Units 07/07/25  0431 07/05/25  1009 07/04/25  0454   SODIUM mmol/L 135* 137 139   POTASSIUM mmol/L 4.6 4.4 3.7   CHLORIDE mmol/L 101 102 101   CO2 mmol/L 23.8 24.3 27.9   BUN mg/dL 16.0 13.0 16.0   CREATININE mg/dL 1.82* 1.88* 1.95*   CALCIUM mg/dL 9.2 8.8 8.9   GLUCOSE mg/dL 123* 143* 84     Estimated Creatinine Clearance: 44.6 mL/min (A) (by C-G formula based on SCr of 1.82 mg/dL (H)).  Results from last 7 days   Lab Units 07/03/25  0503 07/02/25  1517 07/02/25  0407   PHOSPHORUS mg/dL 2.6 2.4* 1.9*         Results from last 7 days   Lab Units 07/07/25  0431 07/05/25  1009 07/04/25  0454 07/03/25  0503 07/02/25  1517 07/02/25  0407 07/02/25  0315 07/01/25  1945 07/01/25  0830   WBC 10*3/mm3 6.96  --   --   --   --  5.65  --  5.67 8.15   HEMOGLOBIN g/dL 8.2* 7.7* 8.2* 8.6* 7.7* 6.0*   < > 8.4* 7.0*   PLATELETS 10*3/mm3 139*  --   --   --   --  129*  --  148 154    < > = values in this interval not displayed.     Results from last 7 days   Lab Units 07/01/25  1945   INR  1.03       Assessment / Plan     ASSESSMENT:  Acute kidney injury: Recovered   chronic kidney disease stage IV: Underlying chronic kidney disease secondary to diabetes and hypertension as well as volume loss, status post left nephrectomy in March of 2020.  Baseline creatinine of 2.9 with a EGFR of 21 mL/min.  Now 1.82  Type 2  diabetes: Longstanding history of type 2 diabetes likely with some complications.  Septic shock: Treated as per hospitalist service still on pressors.  Left lower lobe pneumonia: On IV antibiotics as per hospitalist service.  Urinary tract infection: Treated  Hypokalemia: Replace per protocol  Coronary artery disease: Status post CABG and stent placements.  No acute issues  COPD: Longstanding history of smoking.  Untreated sleep apnea: Longstanding known history of obstructive sleep apnea, has not been able to use his BiPAP        PLAN:   -Encourage oral hydration   - monitor intake and output  -Dose medicate per GFR   -avoid nephrotoxin  -Monitor electrolytes  -Aim for MAP greater than 65  - Okay to discharge from nephrology perspective-  -Thank for the consult will closely follow      Alem Maharaj MD  07/07/25  15:45 EDT    [facility]  [phone]     Electronically signed by Alem Maharaj MD at 07/07/25 6492

## 2025-07-09 NOTE — THERAPY TREATMENT NOTE
Pt not feeling well today, request OT come back tomorrow.  Pt is getting up to the chair with nsg.  Will follow up with pt tomorrow.

## 2025-07-09 NOTE — PROGRESS NOTES
[facility] Progress Note      Patient Name: Jonny Ferrari Jr.  : 1950  MRN: [id number]  Primary Care Physician:  Amaury Brice MD  Date of admission: 2025    Subjective     Interval History:   Resting well blood pressure stable no acute events overnight clinically stable no confusion no lower extremity swelling     renal function is near plateaued down to 1.82 but no labs done today awaiting rehab    Review of Systems:   As noted above    Objective     Vitals:   Temp:  [97.1 °F (36.2 °C)-97.7 °F (36.5 °C)] 97.7 °F (36.5 °C)  Heart Rate:  [74-89] 84  Resp:  [16-18] 18  BP: ()/(61-95) 121/83    Intake/Output Summary (Last 24 hours) at 2025 1551  Last data filed at 2025 1500  Gross per 24 hour   Intake 1200 ml   Output --   Net 1200 ml       Physical Exam:    General Appearance: alert no acute distress   Skin: warm and dry  HEENT: oral mucosa normal  Neck: supple, no JVD  Lungs: CTA  Heart: RRR, normal S1 and S2  Abdomen: soft, nontender  : no palpable bladder  Extremities: no edema  Neuro: normal speech    Scheduled Meds:     aspirin, 81 mg, Oral, Daily  budesonide, 0.5 mg, Nebulization, BID - RT  buprenorphine-naloxone, 1 film, Sublingual, BID  busPIRone, 15 mg, Oral, Q12H  clopidogrel, 75 mg, Oral, Daily  insulin glargine, 20 Units, Subcutaneous, Nightly  insulin lispro, 2-9 Units, Subcutaneous, 4x Daily AC & at Bedtime  lactobacillus acidophilus, 1 capsule, Oral, BID  pantoprazole, 40 mg, Oral, Q AM  senna-docusate sodium, 2 tablet, Oral, BID  sodium chloride, 10 mL, Intravenous, Q12H      IV Meds:        Results Reviewed:   I have personally reviewed the results from the time of this admission to 2025 15:51 EDT     Results from last 7 days   Lab Units 25  0431 25  1009 25  0454   SODIUM mmol/L 135* 137 139   POTASSIUM mmol/L 4.6 4.4 3.7   CHLORIDE mmol/L 101 102 101   CO2 mmol/L 23.8 24.3 27.9   BUN mg/dL 16.0 13.0 16.0    CREATININE mg/dL 1.82* 1.88* 1.95*   CALCIUM mg/dL 9.2 8.8 8.9   GLUCOSE mg/dL 123* 143* 84     Estimated Creatinine Clearance: 44.7 mL/min (A) (by C-G formula based on SCr of 1.82 mg/dL (H)).  Results from last 7 days   Lab Units 07/03/25  0503   PHOSPHORUS mg/dL 2.6         Results from last 7 days   Lab Units 07/07/25  0431 07/05/25  1009 07/04/25  0454 07/03/25  0503   WBC 10*3/mm3 6.96  --   --   --    HEMOGLOBIN g/dL 8.2* 7.7* 8.2* 8.6*   PLATELETS 10*3/mm3 139*  --   --   --              Assessment / Plan     ASSESSMENT:  Acute kidney injury: Recovered   chronic kidney disease stage IV: Underlying chronic kidney disease secondary to diabetes and hypertension as well as volume loss, status post left nephrectomy in March of 2020.  Baseline creatinine of 2.9 with a EGFR of 21 mL/min.  Now 1.82  Type 2 diabetes: Longstanding history of type 2 diabetes likely with some complications.  Septic shock: Treated as per hospitalist service still on pressors.  Left lower lobe pneumonia: On IV antibiotics as per hospitalist service.  Urinary tract infection: Treated  Hypokalemia: Replace per protocol  Coronary artery disease: Status post CABG and stent placements.  No acute issues  COPD: Longstanding history of smoking.  Untreated sleep apnea: Longstanding known history of obstructive sleep apnea, has not been able to use his BiPAP        PLAN:   -Encourage oral hydration   Monitor electrolytes-  -Dose medicate per GFR   -avoid nephrotoxin  -Aim for MAP greater than 65  - Okay to discharge from nephrology perspective-  -Thank for the consult will closely follow      Alem Maharaj MD  07/09/25  15:51 EDT    [facility]  [phone]

## 2025-07-09 NOTE — CASE MANAGEMENT/SOCIAL WORK
Case Management/Social Work    Patient Name:  Jonny Ferrari Jr.  YOB: 1950  MRN: [id number]  Admit Date:  6/29/2025        Auth is still pending in [facility] for STR at [facility]. JESSICA to call [place] today to see about status. JESSICA following.     16:37 EDT JESSICA spoke with [facility] regarding P2P. JESSICA submitted additional clinicals as pt only walked 10 feet with PT yesterday. JESSICA to send P2P information to PA for review P2P due tomorrow by 12pm CST. JESSICA following.     Electronically signed by:  HEAVENLY Rodríguez  07/09/25 09:18 EDT

## 2025-07-09 NOTE — PLAN OF CARE
Goal Outcome Evaluation:  Plan of Care Reviewed With: patient        Problem: Adult Inpatient Plan of Care  Goal: Plan of Care Review  Outcome: Progressing  Flowsheets (Taken 7/9/2025 1745)  Plan of Care Reviewed With: patient  Goal: Patient-Specific Goal (Individualized)  Outcome: Progressing  Goal: Absence of Hospital-Acquired Illness or Injury  Outcome: Progressing  Intervention: Identify and Manage Fall Risk  Recent Flowsheet Documentation  Taken 7/9/2025 1745 by Natasha Ventura RN  Safety Promotion/Fall Prevention:   activity supervised   assistive device/personal items within reach   clutter free environment maintained   toileting scheduled   safety round/check completed   room organization consistent   nonskid shoes/slippers when out of bed   lighting adjusted   fall prevention program maintained  Taken 7/9/2025 1606 by Natasha Ventura RN  Safety Promotion/Fall Prevention:   activity supervised   assistive device/personal items within reach   clutter free environment maintained   safety round/check completed   toileting scheduled   room organization consistent   nonskid shoes/slippers when out of bed   lighting adjusted   fall prevention program maintained  Taken 7/9/2025 1419 by Natasha Ventura, RN  Safety Promotion/Fall Prevention:   activity supervised   assistive device/personal items within reach   clutter free environment maintained   toileting scheduled   safety round/check completed   room organization consistent   nonskid shoes/slippers when out of bed   lighting adjusted   fall prevention program maintained  Taken 7/9/2025 1000 by Natasha Ventura, RN  Safety Promotion/Fall Prevention:   activity supervised   clutter free environment maintained   assistive device/personal items within reach   safety round/check completed   toileting scheduled   room organization consistent   nonskid shoes/slippers when out of bed   lighting adjusted   fall prevention program maintained  Intervention: Prevent Skin  Injury  Recent Flowsheet Documentation  Taken 7/9/2025 1606 by Natasha Ventura RN  Body Position:   weight shifting   position changed independently  Taken 7/9/2025 1419 by Natasha Ventura RN  Body Position:   weight shifting   position changed independently  Taken 7/9/2025 1000 by Natasha Ventura RN  Skin Protection:   incontinence pads utilized   transparent dressing maintained  Intervention: Prevent Infection  Recent Flowsheet Documentation  Taken 7/9/2025 1745 by Natasha Ventura RN  Infection Prevention:   rest/sleep promoted   single patient room provided   hand hygiene promoted   equipment surfaces disinfected   environmental surveillance performed  Taken 7/9/2025 1606 by Natasha Ventura RN  Infection Prevention:   rest/sleep promoted   single patient room provided   hand hygiene promoted   equipment surfaces disinfected   environmental surveillance performed  Taken 7/9/2025 1419 by Natasha Ventura RN  Infection Prevention:   rest/sleep promoted   single patient room provided   hand hygiene promoted   equipment surfaces disinfected   environmental surveillance performed  Taken 7/9/2025 1000 by Natasha Ventura RN  Infection Prevention:   rest/sleep promoted   single patient room provided   hand hygiene promoted   equipment surfaces disinfected   environmental surveillance performed  Taken 7/9/2025 0815 by Natasha Ventura RN  Infection Prevention:   rest/sleep promoted   single patient room provided   hand hygiene promoted   equipment surfaces disinfected   environmental surveillance performed  Goal: Optimal Comfort and Wellbeing  Outcome: Progressing  Goal: Readiness for Transition of Care  Outcome: Progressing     Problem: Skin Injury Risk Increased  Goal: Skin Health and Integrity  Outcome: Progressing  Intervention: Optimize Skin Protection  Recent Flowsheet Documentation  Taken 7/9/2025 1745 by Natasha Ventura, RN  Activity Management: activity encouraged  Taken 7/9/2025 1606 by Natasha Ventura RN  Activity  Management: activity encouraged  Head of Bed (HOB) Positioning: HOB elevated  Taken 7/9/2025 1419 by Natasha Ventura RN  Activity Management: activity encouraged  Taken 7/9/2025 1000 by Natasha Ventura RN  Pressure Reduction Techniques: frequent weight shift encouraged  Skin Protection:   incontinence pads utilized   transparent dressing maintained     Problem: Fall Injury Risk  Goal: Absence of Fall and Fall-Related Injury  Outcome: Progressing  Intervention: Promote Injury-Free Environment  Recent Flowsheet Documentation  Taken 7/9/2025 1745 by Natasha Ventura RN  Safety Promotion/Fall Prevention:   activity supervised   assistive device/personal items within reach   clutter free environment maintained   toileting scheduled   safety round/check completed   room organization consistent   nonskid shoes/slippers when out of bed   lighting adjusted   fall prevention program maintained  Taken 7/9/2025 1606 by Natasha Ventura RN  Safety Promotion/Fall Prevention:   activity supervised   assistive device/personal items within reach   clutter free environment maintained   safety round/check completed   toileting scheduled   room organization consistent   nonskid shoes/slippers when out of bed   lighting adjusted   fall prevention program maintained  Taken 7/9/2025 1419 by Natasha Ventura, RN  Safety Promotion/Fall Prevention:   activity supervised   assistive device/personal items within reach   clutter free environment maintained   toileting scheduled   safety round/check completed   room organization consistent   nonskid shoes/slippers when out of bed   lighting adjusted   fall prevention program maintained  Taken 7/9/2025 1000 by Natasha Ventura, TERESA  Safety Promotion/Fall Prevention:   activity supervised   clutter free environment maintained   assistive device/personal items within reach   safety round/check completed   toileting scheduled   room organization consistent   nonskid shoes/slippers when out of bed   lighting  adjusted   fall prevention program maintained     Problem: Comorbidity Management  Goal: Blood Glucose Level Within Target Range  Outcome: Progressing  Goal: Blood Pressure in Desired Range  Outcome: Progressing     Problem: Wound  Goal: Absence of Infection Signs and Symptoms  Outcome: Progressing  Goal: Improved Oral Intake  Outcome: Progressing  Goal: Optimal Pain Control and Function  Outcome: Progressing  Goal: Skin Health and Integrity  Outcome: Progressing  Intervention: Optimize Skin Protection  Recent Flowsheet Documentation  Taken 7/9/2025 1745 by Natasha Ventura, RN  Activity Management: activity encouraged  Taken 7/9/2025 1606 by Natasha Ventura RN  Activity Management: activity encouraged  Head of Bed (HOB) Positioning: HOB elevated  Taken 7/9/2025 1419 by Natasha Ventura RN  Activity Management: activity encouraged  Taken 7/9/2025 1000 by Natasha Ventura RN  Pressure Reduction Techniques: frequent weight shift encouraged  Goal: Optimal Wound Healing  Outcome: Progressing     Problem: Mobility Impairment  Goal: Optimal Mobility  Outcome: Progressing  Intervention: Optimize Mobility  Recent Flowsheet Documentation  Taken 7/9/2025 1745 by Natasha Ventura RN  Activity Management: activity encouraged  Positioning/Transfer Devices:   pillows   in use  Taken 7/9/2025 1606 by Natasha Ventura, RN  Activity Management: activity encouraged  Positioning/Transfer Devices:   pillows   in use  Taken 7/9/2025 1419 by Natasha Ventura, RN  Activity Management: activity encouraged  Positioning/Transfer Devices:   pillows   in use

## 2025-07-10 ENCOUNTER — READMISSION MANAGEMENT (OUTPATIENT)
Dept: CALL CENTER | Facility: HOSPITAL | Age: 75
End: 2025-07-10
Payer: MEDICARE

## 2025-07-10 VITALS
DIASTOLIC BLOOD PRESSURE: 71 MMHG | HEIGHT: 72 IN | RESPIRATION RATE: 16 BRPM | OXYGEN SATURATION: 97 % | HEART RATE: 74 BPM | BODY MASS INDEX: 29.53 KG/M2 | WEIGHT: 218.03 LBS | SYSTOLIC BLOOD PRESSURE: 121 MMHG | TEMPERATURE: 97.6 F

## 2025-07-10 PROBLEM — J18.9 PNEUMONIA, UNSPECIFIED ORGANISM: Status: ACTIVE | Noted: 2025-07-10

## 2025-07-10 LAB
GLUCOSE BLDC GLUCOMTR-MCNC: 140 MG/DL (ref 70–130)
GLUCOSE BLDC GLUCOMTR-MCNC: 165 MG/DL (ref 70–130)

## 2025-07-10 PROCEDURE — 97535 SELF CARE MNGMENT TRAINING: CPT

## 2025-07-10 PROCEDURE — 97530 THERAPEUTIC ACTIVITIES: CPT

## 2025-07-10 PROCEDURE — 94799 UNLISTED PULMONARY SVC/PX: CPT

## 2025-07-10 PROCEDURE — 82948 REAGENT STRIP/BLOOD GLUCOSE: CPT | Performed by: INTERNAL MEDICINE

## 2025-07-10 PROCEDURE — 63710000001 INSULIN LISPRO (HUMAN) PER 5 UNITS: Performed by: INTERNAL MEDICINE

## 2025-07-10 PROCEDURE — 99239 HOSP IP/OBS DSCHRG MGMT >30: CPT | Performed by: INTERNAL MEDICINE

## 2025-07-10 PROCEDURE — 97116 GAIT TRAINING THERAPY: CPT

## 2025-07-10 PROCEDURE — 94761 N-INVAS EAR/PLS OXIMETRY MLT: CPT

## 2025-07-10 RX ADMIN — BUDESONIDE 0.5 MG: 0.5 SUSPENSION RESPIRATORY (INHALATION) at 07:36

## 2025-07-10 RX ADMIN — Medication 1 CAPSULE: at 09:29

## 2025-07-10 RX ADMIN — PANTOPRAZOLE SODIUM 40 MG: 40 TABLET, DELAYED RELEASE ORAL at 06:29

## 2025-07-10 RX ADMIN — BUPRENORPHINE AND NALOXONE 1 FILM: 8; 2 FILM BUCCAL; SUBLINGUAL at 09:30

## 2025-07-10 RX ADMIN — CLOPIDOGREL BISULFATE 75 MG: 75 TABLET, FILM COATED ORAL at 09:29

## 2025-07-10 RX ADMIN — BUSPIRONE HYDROCHLORIDE 15 MG: 15 TABLET ORAL at 09:30

## 2025-07-10 RX ADMIN — INSULIN LISPRO 2 UNITS: 100 INJECTION, SOLUTION INTRAVENOUS; SUBCUTANEOUS at 12:23

## 2025-07-10 RX ADMIN — ASPIRIN 81 MG: 81 TABLET, COATED ORAL at 09:30

## 2025-07-10 NOTE — PLAN OF CARE
Goal Outcome Evaluation:  Plan of Care Reviewed With: patient        Progress: improving  Outcome Evaluation: VSS, UP WITH STAND BY ASSIST IN ROOM, PT PREFERS TO SIT UP IN RECLINER,  O2 SATS REMAIN >90% ON ROOM AIR.

## 2025-07-10 NOTE — PLAN OF CARE
Goal Outcome Evaluation:  Plan of Care Reviewed With: patient        Progress: improving  Outcome Evaluation: Pt agreeable to physical therapy. Performed sit <->stand from recliner with RW and S, amb with RW to bathroom and c/o dizziness with turn in restroom. amb with RW 25 feet to recliner. Pt reported dizziness resolved and check bp in sitting 127/97 and standing approx 30 secs checked bp in standing 123/65. Pt reported d/t dizziness he has been having falls at home and poited multiple areas on hands and on head with scabs , of results from falls. Con't with PT POC and progress as tolerated

## 2025-07-10 NOTE — THERAPY TREATMENT NOTE
Patient Name: Jonny Ferrari Jr.  : 1950    MRN: [id number]                              Today's Date: 7/10/2025       Admit Date: 2025    Visit Dx:     ICD-10-CM ICD-9-CM   1. Sepsis with acute renal failure and septic shock, due to unspecified organism, unspecified acute renal failure type  A41.9 038.9    R65.21 995.92    N17.9 785.52     584.9   2. Acute kidney injury superimposed on chronic kidney disease  N17.9 584.9    N18.9 585.9   3. Hyponatremia  E87.1 276.1   4. Hypokalemia  E87.6 276.8   5. Chronic anemia  D64.9 285.9   6. Left lower lobe consolidation  J18.1 481   7. Type 2 diabetes mellitus with hyperglycemia, unspecified whether long term insulin use  E11.65 250.00   8. Multiple skin tears  T14.8XXA 879.8     Patient Active Problem List   Diagnosis    CAD (coronary artery disease)    Essential hypertension    Dyslipidemia    Obesity    GERD (gastroesophageal reflux disease)    OA (osteoarthritis)    TIA (transient ischemic attack)    Anxiety neurosis    Psoriasis    Tobacco use    Diabetes mellitus    Bilateral carotid artery stenosis    NSTEMI (non-ST elevated myocardial infarction)    Chronic kidney disease, stage IV (severe)    Esophageal dysphagia    Iron deficiency anemia    Acute urinary retention    Debility    Perirectal abscess    Acute renal failure (ARF)    Acute metabolic encephalopathy    Sepsis    Septic shock    Acute UTI (urinary tract infection)    Pneumonia of left lower lobe due to infectious organism     Past Medical History:   Diagnosis Date    Anemia     Anxiety neurosis     CAD (coronary artery disease)     Cancer     Kidney    Chronic kidney disease     Patient reported left kidney removed secondary to kidney cancer and that he only has 30% function of the right kidney    Constipation     COPD (chronic obstructive pulmonary disease)     Depression     Diabetes mellitus     DM TYPE 2 , ONSET IN     Dyslipidemia     Dysphagia     Reported noted with food, fluids and  pills    Elevated cholesterol     Full dentures     GERD (gastroesophageal reflux disease)     Gout     H/O left nephrectomy 2020    secondary to cancer    [place] (hard of hearing)     Patient has bilateral hearing aids, still is very [place]    Hypertension     Impaired functional mobility, balance, gait, and endurance     MI (myocardial infarction)     Patient reported apx March 2021 (reported he refused cardiac cath) and June 2021 (did have cardiac cath with placement of 2 stents).     OA (osteoarthritis)     Obesity     MILD TO MODERATE EXOGENOUS OBESITY    Problems with swallowing     with food    Psoriasis     Sleep apnea     CPAP HS - to bring mask and machine DOS    Tattoo     x1    TIA (transient ischemic attack)     Wears glasses 10/27/2021     Past Surgical History:   Procedure Laterality Date    APPENDECTOMY      CARDIAC CATHETERIZATION      CARDIAC CATHETERIZATION N/A 2/20/2019    Procedure: Left Heart Cath;  Surgeon: Ernst Smith MD;  Location:  [facility];  Service: Cardiology    CARDIAC CATHETERIZATION Left 6/9/2021    Procedure: Left Heart Cath;  Surgeon: Ernst Smith MD;  Location:  [facility];  Service: Cardiology;  Laterality: Left;    CHOLECYSTECTOMY      COLONOSCOPY      COLONOSCOPY N/A 11/10/2021    Procedure: COLONOSCOPY with polypectomy x6 and clip x2;  Surgeon: Chidi Trinidad MD;  Location: [facility] ENDOSCOPY;  Service: Gastroenterology;  Laterality: N/A;    COLONOSCOPY N/A 10/25/2023    Procedure: COLONOSCOPY incomplete;  Surgeon: Chidi Trinidad MD;  Location: [facility] ENDOSCOPY;  Service: Gastroenterology;  Laterality: N/A;    COLONOSCOPY N/A 11/8/2023    Procedure: COLONOSCOPY WITH POLYPECTOMY X1;  Surgeon: Chidi Trinidad MD;  Location: [facility] ENDOSCOPY;  Service: Gastroenterology;  Laterality: N/A;    CORONARY ARTERY BYPASS GRAFT  2001    Patient reported no MI prior to CABG and that the bypass was 3 vessel     ENDOSCOPY      ENDOSCOPY N/A  11/10/2021    Procedure: ESOPHAGOGASTRODUODENOSCOPY with biopsy and dilatation;  Surgeon: Chidi Trinidad MD;  Location: [facility] ENDOSCOPY;  Service: Gastroenterology;  Laterality: N/A;    ENDOSCOPY N/A 10/25/2023    Procedure: ESOPHAGOGASTRODUODENOSCOPY with biopsy and dilatation;  Surgeon: Chidi Trinidad MD;  Location: [facility] ENDOSCOPY;  Service: Gastroenterology;  Laterality: N/A;    ENDOSCOPY N/A 11/8/2023    Procedure: ESOPHAGOGASTRODUODENOSCOPY WITH BIOPSY;  Surgeon: Chidi Trinidad MD;  Location: [facility] ENDOSCOPY;  Service: Gastroenterology;  Laterality: N/A;    INCISION AND DRAINAGE PERIRECTAL ABSCESS N/A 8/29/2024    Procedure: INCISION AND DRAINAGE OF PERIRECTAL ABSCESS;  Surgeon: Sherman Billingsley MD;  Location: [facility] OR;  Service: General;  Laterality: N/A;    LAPAROSCOPIC NEPHRECTOMY Left     MOUTH SURGERY      Full mouth extraction    URETER SURGERY      VASECTOMY        General Information       Row Name 07/10/25 0945          Physical Therapy Time and Intention    Document Type therapy note (daily note)  -     Mode of Treatment physical therapy  -CC       Row Name 07/10/25 0945          General Information    Patient Profile Reviewed yes  -CC     Existing Precautions/Restrictions fall;orthostatic hypotension  -CC       Row Name 07/10/25 0980          Safety Issues/Impairments Affecting Functional Mobility    Safety Issues Affecting Function (Mobility) awareness of need for assistance;insight into deficits/self-awareness;safety precaution awareness;safety precautions follow-through/compliance  -     Impairments Affecting Function (Mobility) balance;endurance/activity tolerance;shortness of breath;strength;pain  -CC               User Key  (r) = Recorded By, (t) = Taken By, (c) = Cosigned By      Initials Name Provider Type    CC Danica Amezcua PTA Physical Therapist Assistant                   Mobility       Row Name 07/10/25 8746          Bed Mobility    Comment, (Bed Mobility)  sitting in chair upon arrival  -       Row Name 07/10/25 0946          Sit-Stand Transfer    Sit-Stand [place] (Transfers) supervision;modified independence  -     Assistive Device (Sit-Stand Transfers) walker, front-wheeled  -       Row Name 07/10/25 0946          Gait/Stairs (Locomotion)    [place] Level (Gait) standby assist  -     Assistive Device (Gait) walker, front-wheeled  -CC     Patient was able to Ambulate yes  -CC     Distance in Feet (Gait) 25  x2  -CC     Deviations/Abnormal Patterns (Gait) bilateral deviations;gait speed decreased;right sided deviations;stride length decreased  -CC     Right Sided Gait Deviations heel strike decreased;weight shift ability decreased  -               User Key  (r) = Recorded By, (t) = Taken By, (c) = Cosigned By      Initials Name Provider Type    CC Danica Amezcua, PTA Physical Therapist Assistant                   Obj/Interventions    No documentation.                  Goals/Plan    No documentation.                  Clinical Impression       Row Name 07/10/25 0951          Pain    Pretreatment Pain Rating 0/10 - no pain  -CC     Posttreatment Pain Rating 0/10 - no pain  -CC       Row Name 07/10/25 0951          Plan of Care Review    Plan of Care Reviewed With patient  -CC     Progress improving  -     Outcome Evaluation Pt agreeable to physical therapy. Performed sit <->stand from recliner with RW and S, amb with RW to bathroom and c/o dizziness with turn in restroom. amb with RW 25 feet to recliner. Pt reported dizziness resolved and check bp in sitting 127/97 and standing approx 30 secs checked bp in standing 123/65. Pt reported d/t dizziness he has been having falls at home and poited multiple areas on hands and on head with scabs , of results from falls. Con't with PT POC and progress as tolerated  -       Row Name 07/10/25 0951          Positioning and Restraints    Pre-Treatment Position sitting in chair/recliner  -     Post  Treatment Position chair  -CC     In Chair notified nsg;reclined;call light within reach;encouraged to call for assist;exit alarm on  -CC               User Key  (r) = Recorded By, (t) = Taken By, (c) = Cosigned By      Initials Name Provider Type    Danica Chaves, PTA Physical Therapist Assistant                   Outcome Measures       Row Name 07/10/25 1010 07/10/25 0500       How much help from another person do you currently need...    Turning from your back to your side while in flat bed without using bedrails? 4  -CC 4  -KP    Moving from lying on back to sitting on the side of a flat bed without bedrails? 4  -CC 4  -KP    Moving to and from a bed to a chair (including a wheelchair)? 4  -CC 4  -KP    Standing up from a chair using your arms (e.g., wheelchair, bedside chair)? 4  -CC 4  -KP    Climbing 3-5 steps with a railing? 3  -CC 3  -KP    To walk in hospital room? 3  -CC 3  -KP    AM-PAC 6 Clicks Score (PT) 22  -CC 22  -KP    Highest Level of Mobility Goal Walk 25 Feet or More-7  -CC Walk 25 Feet or More-7  -KP      Row Name 07/10/25 0300 07/10/25 0100       How much help from another person do you currently need...    Turning from your back to your side while in flat bed without using bedrails? 4  -KP 4  -KP    Moving from lying on back to sitting on the side of a flat bed without bedrails? 4  -KP 4  -KP    Moving to and from a bed to a chair (including a wheelchair)? 4  -KP 4  -KP    Standing up from a chair using your arms (e.g., wheelchair, bedside chair)? 4  -KP 4  -KP    Climbing 3-5 steps with a railing? 3  -KP 3  -KP    To walk in hospital room? 3  -KP 3  -KP    AM-PAC 6 Clicks Score (PT) 22  -KP 22  -KP    Highest Level of Mobility Goal Walk 25 Feet or More-7  -KP Walk 25 Feet or More-7  -KP      Row Name 07/09/25 6344          How much help from another person do you currently need...    Turning from your back to your side while in flat bed without using bedrails? 4  -KP     Moving from  lying on back to sitting on the side of a flat bed without bedrails? 4  -KP     Moving to and from a bed to a chair (including a wheelchair)? 4  -KP     Standing up from a chair using your arms (e.g., wheelchair, bedside chair)? 4  -KP     Climbing 3-5 steps with a railing? 3  -KP     To walk in hospital room? 3  -KP     AM-PAC 6 Clicks Score (PT) 22  -KP     Highest Level of Mobility Goal Walk 25 Feet or More-7  -KP       Row Name 07/10/25 1010 07/10/25 0950       Functional Assessment    Outcome Measure Options AM-PAC 6 Clicks Basic Mobility (PT)  - AM-PAC 6 Clicks Daily Activity (OT)  -              User Key  (r) = Recorded By, (t) = Taken By, (c) = Cosigned By      Initials Name Provider Type    Tameka John Occupational Therapist    CC Danica Amezcua PTA Physical Therapist Assistant    KP Benita Bejarano, RN Registered Nurse                                 Physical Therapy Education       Title: PT OT SLP Therapies (In Progress)       Topic: Physical Therapy (In Progress)       Point: Mobility training (Done)       Learning Progress Summary            Patient Acceptance, E,TB, VU by  at 7/10/2025 1011    Acceptance, E,TB, VU by  at 7/8/2025 1151    Comment: Pt educated in sit to stand transfer, gait activities, and HEP.    Acceptance, E,TB, VU by  at 7/7/2025 1136    Comment: Pt educated in HEP, proper gait mechanics with FWW, and discharge recommendations.    Acceptance, E,D, DU,VU by  at 7/2/2025 1338    Acceptance, E,D, DU,VU by  at 7/1/2025 1707    Comment: role of PT and POC                      Point: Home exercise program (Done)       Learning Progress Summary            Patient Acceptance, E,TB, VU by  at 7/8/2025 1151    Comment: Pt educated in sit to stand transfer, gait activities, and HEP.    Acceptance, E,TB, VU by  at 7/7/2025 1136    Comment: Pt educated in HEP, proper gait mechanics with FWW, and discharge recommendations.    Acceptance, E,TB, VU by  at 7/5/2025  1748    Acceptance, E,D, DU,VU by  at 7/2/2025 1338                      Point: Body mechanics (Done)       Learning Progress Summary            Patient Acceptance, E,TB, VU by  at 7/8/2025 1151    Comment: Pt educated in sit to stand transfer, gait activities, and HEP.    Acceptance, E,D, DU,VU by  at 7/2/2025 1338    Acceptance, E,D, DU,VU by  at 7/1/2025 1707    Comment: role of PT and POC                      Point: Precautions (Not Started)       Learner Progress:  Not documented in this visit.                              User Key       Initials Effective Dates Name Provider Type Discipline     06/16/21 -  Danica Amezcua PTA Physical Therapist Assistant PT     06/13/23 -  Kuldeep Ren, PT Physical Therapist PT     06/19/25 -  Frankie Munoz, PT Physical Therapist PT                  PT Recommendation and Plan     Progress: improving  Outcome Evaluation: Pt agreeable to physical therapy. Performed sit <->stand from recliner with RW and S, amb with RW to bathroom and c/o dizziness with turn in restroom. amb with RW 25 feet to recliner. Pt reported dizziness resolved and check bp in sitting 127/97 and standing approx 30 secs checked bp in standing 123/65. Pt reported d/t dizziness he has been having falls at home and poited multiple areas on hands and on head with scabs , of results from falls. Con't with PT POC and progress as tolerated     Time Calculation:         PT Charges       Row Name 07/10/25 1011             Time Calculation    PT Received On 07/10/25  -CC      PT Goal Re-Cert Due Date 07/11/25  -CC         Time Calculation- PT    Total Timed Code Minutes- PT 25 minute(s)  -CC         Timed Charges    45372 - Gait Training Minutes  15  -CC      10102 - PT Therapeutic Activity Minutes 10  -CC         Total Minutes    Timed Charges Total Minutes 25  -CC       Total Minutes 25  -CC                User Key  (r) = Recorded By, (t) = Taken By, (c) = Cosigned By      Initials Name Provider Type     CC Danica Amezcua, STEPHANIE Physical Therapist Assistant                  Therapy Charges for Today       Code Description Service Date Service Provider Modifiers Qty    32913577623 HC GAIT TRAINING EA 15 MIN 7/10/2025 Danica Amezcua, STEPHANIE GP 1    02817137401 HC PT THERAPEUTIC ACT EA 15 MIN 7/10/2025 Danica Amezcua, STEPHANIE GP 1            PT G-Codes  Outcome Measure Options: AM-PAC 6 Clicks Basic Mobility (PT)  AM-PAC 6 Clicks Score (PT): 22  AM-PAC 6 Clicks Score (OT): 20       Danica Amezcua PTA  7/10/2025

## 2025-07-10 NOTE — DISCHARGE SUMMARY
"    [facility]   DISCHARGE SUMMARY      Name:  Jonny Ferrari Jr.   Age:  74 y.o.  Sex:  male  :  1950  MRN:  [id number]   Visit Number:  [id number]    Admission Date:  2025  Date of Discharge:  7/10/2025  Primary Care Physician:  Amaury Brice MD      Discharge Diagnoses:     Septic shock secondary to #2 and #3, POA.  Acute urinary tract infection, POA.  Left lower lobe bacterial pneumonia, unable to classify further, POA.  Demand ischemia with elevated troponins, POA.  Hypokalemia, POA.  Chronic kidney disease stage IV.  Diabetes mellitus type 2 with nephropathy and hyperglycemia, POA.  Coronary artery disease status post CABG and stents.  COPD, no exacerbation.  Benign prostatic hyperplasia.  Will falls with skin abrasions, POA.    Problem List:     Active Hospital Problems    Diagnosis  POA    **Septic shock [A41.9, R65.21]  Yes    Pneumonia, unspecified organism [J18.9]  Yes    Acute UTI (urinary tract infection) [N39.0]  Yes    Pneumonia of left lower lobe due to infectious organism [J18.9]  Yes    Chronic kidney disease, stage IV (severe) [N18.4]  Yes    CAD (coronary artery disease) [I25.10]  Yes      Resolved Hospital Problems   No resolved problems to display.     Presenting Problem:    Chief Complaint   Patient presents with    Weakness - Generalized     Pt called ems for sick, unable to urinate and weakness. Pt was on the toilet with a pressure in the 80's. Pt has multiple skin tears. Pt has 18 ga iv in left ac, 22 ga right bicep by ems.       Consults:     Consulting Physician(s)         Provider   Role Specialty     Jefry Hooper MD, FÁTIMA      Consulting Physician Nephrology          Procedures Performed:        History of presenting illness/Hospital Course:    Per H&P \"Jonny Ferrari Jr. is a 74-year-old male with history of diabetes mellitus type 2, chronic kidney disease stage III, COPD, coronary artery disease status post CABG and stent, ERASMO, BPH was " "brought to the emergency room by EMS with symptoms of generalized weakness and fatigue   In the emergency room, patient was afebrile but mildly tachycardic in the 100 with initial blood pressure of 80/54 and pulse oxygen saturation of 95% on room air.  Patient was given 30 mL/kg fluid bolus initially in the emergency room with initial improvement of the blood pressure to systolic 100 but subsequently dropped it again to the 70s systolic.  Patient was given another 500 bolus of normal saline, he right femoral central line was placed and he was initiated on Levophed. \"     Patient was able to be weaned off steroids.  He completed antibiotics for UTI after discussion with pharmacy.  Has been seen by nephrology and kidney function is back to baseline.  Did have some drop in hemoglobin, there was concern for possible hematuria, this has resolved however.  Did receive 1 unit of PRBCs.    Patient worked with PT/OT.  Pursue short-term rehab.  However, patient declined.  Instead, patient discharged home with home health services and close outpatient follow-up.      DME: Patient has a mobility limitation that significantly impairs their ability to participate in one or more mobility- related activities of daily living. The patient is able to safely use the walker. The functional mobility deficit can be sufficiently resolved by use of a walker.       Vital Signs:    Temp:  [97.3 °F (36.3 °C)-98.3 °F (36.8 °C)] 97.6 °F (36.4 °C)  Heart Rate:  [] 74  Resp:  [14-18] 16  BP: (112-158)/(71-87) 121/71    Physical Exam:    General Appearance:  Alert and cooperative. NAD   Head:  Atraumatic and normocephalic.   Eyes: Conjunctivae and sclerae normal, no icterus. No pallor.   Throat: No oral lesions, no thrush, oral mucosa moist.   Neck: Supple, trachea midline, no thyromegaly.   Lungs:   Breath sounds heard bilaterally equally.  No wheezing or crackles.    Heart:  Normal S1 and S2, no murmur, No JVD.   Abdomen:   Normal bowel " sounds, Soft, nontender, nondistended, no rebound tenderness.   Extremities: Supple, no edema, no cyanosis, no clubbing.   Skin: No bleeding or rash.   Neurologic: Alert and oriented x 3. No facial asymmetry. Generalized weakness.      Pertinent Lab Results:     Results from last 7 days   Lab Units 07/07/25  0431 07/05/25  1009 07/04/25  0454   SODIUM mmol/L 135* 137 139   POTASSIUM mmol/L 4.6 4.4 3.7   CHLORIDE mmol/L 101 102 101   CO2 mmol/L 23.8 24.3 27.9   BUN mg/dL 16.0 13.0 16.0   CREATININE mg/dL 1.82* 1.88* 1.95*   CALCIUM mg/dL 9.2 8.8 8.9   GLUCOSE mg/dL 123* 143* 84     Results from last 7 days   Lab Units 07/07/25  0431 07/05/25  1009 07/04/25  0454   WBC 10*3/mm3 6.96  --   --    HEMOGLOBIN g/dL 8.2* 7.7* 8.2*   HEMATOCRIT % 26.2* 24.8* 26.4*   PLATELETS 10*3/mm3 139*  --   --                                    Pertinent Radiology Results:    Imaging Results (All)       Procedure Component Value Units Date/Time    XR Chest 1 View [[id number]] Collected: 06/30/25 0941     Updated: 06/30/25 0950    Narrative:      PROCEDURE: XR CHEST 1 VW-     HISTORY: generalized weakness, abnormal chest radiograph. Hypertension.     COMPARISON: 10/06/2022     FINDINGS:  Portable view of the chest demonstrates the lungs to be  grossly clear. There is no evidence of effusion, pneumothorax or other  significant pleural disease. Patient is status post CABG. Mediastinum is  otherwise unremarkable.     The heart size is normal.       Impression:      Unremarkable portable chest.            This report was signed and finalized on 6/30/2025 9:48 AM by Frankie Ewing MD.       CT Abdomen Pelvis Without Contrast [[id number]] Collected: 06/29/25 2311     Updated: 06/29/25 2312    Narrative:      FINAL REPORT    TECHNIQUE:  null    CLINICAL HISTORY:  sepsis    COMPARISON:  null    FINDINGS:  CT abdomen and pelvis without contrast    Comparison: 08/29/2024    Findings:    Mild degenerative change spine and hips.    No acute bony  abnormalities.    Liver and spleen within normal limits.    Pancreas and adrenal glands unremarkable.    Cholecystectomy.    No right renal stone or hydronephrosis.    No focal renal abnormality or ureteral dilation.    Status post left nephrectomy.    No evidence for aortic aneurysm.    No free fluid or adenopathy in the pelvis.    No diverticulitis. Appendix unremarkable.      Impression:      Impression:    No acute processes    Authenticated and Electronically Signed by Anthony De La Paz MD on  06/29/2025 11:11:15 PM    CT Chest Without Contrast Diagnostic [[id number]] Collected: 06/29/25 2307     Updated: 06/29/25 2308    Narrative:      FINAL REPORT    TECHNIQUE:  null    CLINICAL HISTORY:  sepsis w/ hypoxemia    COMPARISON:  null    FINDINGS:  CT chest without contrast    Comparison: None provided    Findings:    Bilateral posterior lower lobe atelectasis.    Trace bilateral pleural effusions noted.    Emphysema without other parenchymal abnormality.    Cardiomegaly with coronary artery calcifications.    Prior sternotomy for CABG.    Benign partially calcified mediastinal nodes.    No significant mediastinal adenopathy.    No significant focal bony abnormalities.      Impression:      Impression:    Trace pleural effusions with lower lobe atelectasis    Authenticated and Electronically Signed by Anthony De La Paz MD on  06/29/2025 11:07:32 PM            Echo:    Results for orders placed during the hospital encounter of 05/27/21    Adult Transthoracic Echo Complete W/ Cont if Necessary Per Protocol 05/29/2021 12:29 AM    Interpretation Summary  · This is a technically limited study due to limited acoustic windows.  · There is biatrial enlargement.  · Global and segmental LV wall motion is normal. The calculated LV ejection fraction is 59%.  · The aortic and mitral valves are structurally and functionally normal.  · Mild tricuspid regurgitation regurgitation is present.  · There are no other important findings on  this study.    Condition on Discharge:      Stable.    Code status during the hospital stay:    Code Status and Medical Interventions: CPR (Attempt to Resuscitate); Full Support   Ordered at: 06/29/25 5672     Code Status (Patient has no pulse and is not breathing):    CPR (Attempt to Resuscitate)     Medical Interventions (Patient has pulse or is breathing):    Full Support     Discharge Disposition:    Home-Health Care [facility]    Discharge Medications:       Discharge Medications        Continue These Medications        Instructions Start Date   acetaminophen 325 MG tablet  Commonly known as: TYLENOL   650 mg, Every 6 Hours PRN      alfuzosin 10 MG 24 hr tablet  Commonly known as: UROXATRAL   10 mg, Daily      allopurinol 100 MG tablet  Commonly known as: ZYLOPRIM   2 tablets, Daily      aspirin 81 MG EC tablet   1 tablet, Daily      buprenorphine-naloxone 8-2 MG per SL tablet  Commonly known as: SUBOXONE   Place 1 tablet under the tongue 2 (Two) Times a Day.      busPIRone 15 MG tablet  Commonly known as: BUSPAR   15 mg, 2 times daily      calcitriol 0.25 MCG capsule  Commonly known as: ROCALTROL   0.5 mcg, Daily      clopidogrel 75 MG tablet  Commonly known as: PLAVIX   75 mg, Oral, Daily      dutasteride 0.5 MG capsule  Commonly known as: AVODART   0.5 mg, Daily      ezetimibe 10 MG tablet  Commonly known as: ZETIA   10 mg, Daily      Farxiga 10 MG tablet  Generic drug: dapagliflozin Propanediol   10 mg, Daily      Lantus SoloStar 100 UNIT/ML injection pen  Generic drug: Insulin Glargine   Inject 20 Units under the skin into the appropriate area as directed 4 (Four) Times a Day After Meals & at Bedtime.      linagliptin 5 MG tablet tablet  Commonly known as: TRADJENTA   5 mg, Oral, Daily      nitroglycerin 0.4 MG SL tablet  Commonly known as: NITROSTAT   0.4 mg, Sublingual, Every 5 Minutes PRN, Take no more than 3 doses in 15 minutes.      omeprazole 20 MG capsule  Commonly known as: priLOSEC   20 mg, 2 Times  Daily      sertraline 100 MG tablet  Commonly known as: ZOLOFT   100 mg, Daily      tamsulosin 0.4 MG capsule 24 hr capsule  Commonly known as: FLOMAX   1 capsule, Nightly      True Metrix Blood Glucose Test test strip  Generic drug: glucose blood   See Admin Instructions             Stop These Medications      metoprolol tartrate 25 MG tablet  Commonly known as: LOPRESSOR     midodrine 5 MG tablet  Commonly known as: PROAMATINE     potassium chloride ER 20 MEQ tablet controlled-release ER tablet  Commonly known as: K-TAB     Spiriva HandiHaler 18 MCG per inhalation capsule  Generic drug: tiotropium     Symbicort 160-4.5 MCG/ACT inhaler  Generic drug: budesonide-formoterol     torsemide 100 MG tablet  Commonly known as: DEMADEX     Vitamin D 50 MCG (2000 UT) tablet            Discharge Diet:     Diet Instructions       Diet: Diabetic Diets, Cardiac Diets; Healthy Heart (2-3 Na+); Regular (IDDSI 7); Thin (IDDSI 0); Consistent Carbohydrate      Discharge Diet:  Diabetic Diets  Cardiac Diets       Cardiac Diet: Healthy Heart (2-3 Na+)    Texture: Regular (IDDSI 7)    Fluid Consistency: Thin (IDDSI 0)    Diabetic Diet: Consistent Carbohydrate          Activity at Discharge:     Activity Instructions       Activity as Tolerated            Follow-up Appointments:    Additional Instructions for the Follow-ups that You Need to Schedule       Ambulatory Referral to Home Health   As directed      Face to Face Visit Date: 7/10/2025   Follow-up provider for Plan of Care?: I treated the patient in an acute care facility and will not continue treatment after discharge.   Follow-up provider: TISHA MARTELL [[id number]]   Reason/Clinical Findings: debility, copd, ckd   Describe mobility limitations that make leaving home difficult: debility, copd, ckd   Nursing/Therapeutic Services Requested: Physical Therapy Skilled Nursing Occupational Therapy   Skilled nursing orders: CHF management Cardiopulmonary assessments   PT orders:  Total joint pathway Strengthening Therapeutic exercise Electrical stimulation Ultrasound Gait Training Transfer training Home safety assessment   Weight Bearing Status: As Tolerated   Occupational orders: Activities of daily living Home safety assessment Energy conservation Strengthening Cognition Fine motor   Frequency: 1 Week 1        Discharge Follow-up with PCP   As directed       Currently Documented PCP:    Amaury Brice MD    PCP Phone Number:    [phone]     Follow Up Details: 1 week               Contact information for follow-up providers       Amaury Brice MD .    Specialty: Family Medicine  Why: 1 week  Contact information:  [address]  [place] [address]  [phone]                       Contact information for after-discharge care       Home Medical Care       [facility] .    Service: Home Rehabilitation  Contact information:  [address] [address]  [phone]                                 Future Appointments   Date Time Provider Department Center   6/23/2026  1:15 PM Ernst Smith MD [facility]     Test Results Pending at Discharge:    Pending Results       None                 Kuldeep Patel DO  07/10/25  12:19 EDT    Time: I spent >30 minutes on this discharge activity which included: face-to-face encounter with the patient, reviewing the data in the system, coordination of the care with the nursing staff as well as consultants, documentation, and entering orders.     Dictated utilizing Dragon dictation.

## 2025-07-10 NOTE — CASE MANAGEMENT/SOCIAL WORK
Case Management/Social Work    Patient Name:  Jonny Ferrari Jr.  YOB: 1950  MRN: [id number]  Admit Date:  6/29/2025        Therapy to see pt this morning in order for PA to complete P2P by 12pm. CST, per PA request. JESSICA Following.     10:42 EDT PA states pt is more appropriate for home with HH/assist. JESSICA updated team.       Electronically signed by:  HEAVENLY Rodríguez  07/10/25 09:25 EDT

## 2025-07-10 NOTE — NURSING NOTE
Discharge reviewed with pt and family-education along with follow ups provided.all personal belongings gathered and with patient.

## 2025-07-10 NOTE — THERAPY TREATMENT NOTE
Patient Name: Jonny Ferrari Jr.  : 1950    MRN: [id number]                              Today's Date: 7/10/2025       Admit Date: 2025    Visit Dx:     ICD-10-CM ICD-9-CM   1. Sepsis with acute renal failure and septic shock, due to unspecified organism, unspecified acute renal failure type  A41.9 038.9    R65.21 995.92    N17.9 785.52     584.9   2. Acute kidney injury superimposed on chronic kidney disease  N17.9 584.9    N18.9 585.9   3. Hyponatremia  E87.1 276.1   4. Hypokalemia  E87.6 276.8   5. Chronic anemia  D64.9 285.9   6. Left lower lobe consolidation  J18.1 481   7. Type 2 diabetes mellitus with hyperglycemia, unspecified whether long term insulin use  E11.65 250.00   8. Multiple skin tears  T14.8XXA 879.8     Patient Active Problem List   Diagnosis    CAD (coronary artery disease)    Essential hypertension    Dyslipidemia    Obesity    GERD (gastroesophageal reflux disease)    OA (osteoarthritis)    TIA (transient ischemic attack)    Anxiety neurosis    Psoriasis    Tobacco use    Diabetes mellitus    Bilateral carotid artery stenosis    NSTEMI (non-ST elevated myocardial infarction)    Chronic kidney disease, stage IV (severe)    Esophageal dysphagia    Iron deficiency anemia    Acute urinary retention    Debility    Perirectal abscess    Acute renal failure (ARF)    Acute metabolic encephalopathy    Sepsis    Septic shock    Acute UTI (urinary tract infection)    Pneumonia of left lower lobe due to infectious organism     Past Medical History:   Diagnosis Date    Anemia     Anxiety neurosis     CAD (coronary artery disease)     Cancer     Kidney    Chronic kidney disease     Patient reported left kidney removed secondary to kidney cancer and that he only has 30% function of the right kidney    Constipation     COPD (chronic obstructive pulmonary disease)     Depression     Diabetes mellitus     DM TYPE 2 , ONSET IN     Dyslipidemia     Dysphagia     Reported noted with food, fluids and  pills    Elevated cholesterol     Full dentures     GERD (gastroesophageal reflux disease)     Gout     H/O left nephrectomy 2020    secondary to cancer    [place] (hard of hearing)     Patient has bilateral hearing aids, still is very [place]    Hypertension     Impaired functional mobility, balance, gait, and endurance     MI (myocardial infarction)     Patient reported apx March 2021 (reported he refused cardiac cath) and June 2021 (did have cardiac cath with placement of 2 stents).     OA (osteoarthritis)     Obesity     MILD TO MODERATE EXOGENOUS OBESITY    Problems with swallowing     with food    Psoriasis     Sleep apnea     CPAP HS - to bring mask and machine DOS    Tattoo     x1    TIA (transient ischemic attack)     Wears glasses 10/27/2021     Past Surgical History:   Procedure Laterality Date    APPENDECTOMY      CARDIAC CATHETERIZATION      CARDIAC CATHETERIZATION N/A 2/20/2019    Procedure: Left Heart Cath;  Surgeon: Ernst Smith MD;  Location:  [facility];  Service: Cardiology    CARDIAC CATHETERIZATION Left 6/9/2021    Procedure: Left Heart Cath;  Surgeon: Ernst Smith MD;  Location:  [facility];  Service: Cardiology;  Laterality: Left;    CHOLECYSTECTOMY      COLONOSCOPY      COLONOSCOPY N/A 11/10/2021    Procedure: COLONOSCOPY with polypectomy x6 and clip x2;  Surgeon: Chidi Trinidad MD;  Location: [facility] ENDOSCOPY;  Service: Gastroenterology;  Laterality: N/A;    COLONOSCOPY N/A 10/25/2023    Procedure: COLONOSCOPY incomplete;  Surgeon: Chidi Trinidad MD;  Location: [facility] ENDOSCOPY;  Service: Gastroenterology;  Laterality: N/A;    COLONOSCOPY N/A 11/8/2023    Procedure: COLONOSCOPY WITH POLYPECTOMY X1;  Surgeon: Chidi Trinidad MD;  Location: [facility] ENDOSCOPY;  Service: Gastroenterology;  Laterality: N/A;    CORONARY ARTERY BYPASS GRAFT  2001    Patient reported no MI prior to CABG and that the bypass was 3 vessel     ENDOSCOPY      ENDOSCOPY N/A  11/10/2021    Procedure: ESOPHAGOGASTRODUODENOSCOPY with biopsy and dilatation;  Surgeon: Chidi Trinidad MD;  Location: [facility] ENDOSCOPY;  Service: Gastroenterology;  Laterality: N/A;    ENDOSCOPY N/A 10/25/2023    Procedure: ESOPHAGOGASTRODUODENOSCOPY with biopsy and dilatation;  Surgeon: Chidi Trinidad MD;  Location: [facility] ENDOSCOPY;  Service: Gastroenterology;  Laterality: N/A;    ENDOSCOPY N/A 11/8/2023    Procedure: ESOPHAGOGASTRODUODENOSCOPY WITH BIOPSY;  Surgeon: Chidi Trinidad MD;  Location: [facility] ENDOSCOPY;  Service: Gastroenterology;  Laterality: N/A;    INCISION AND DRAINAGE PERIRECTAL ABSCESS N/A 8/29/2024    Procedure: INCISION AND DRAINAGE OF PERIRECTAL ABSCESS;  Surgeon: Sherman Billingsley MD;  Location: [facility] OR;  Service: General;  Laterality: N/A;    LAPAROSCOPIC NEPHRECTOMY Left     MOUTH SURGERY      Full mouth extraction    URETER SURGERY      VASECTOMY        General Information       Row Name 07/10/25 0853          OT Time and Intention    Subjective Information no complaints  -     Document Type therapy note (daily note)  -     Mode of Treatment occupational therapy  -     Patient Effort good  -[facility] Name 07/10/25 0853          General Information    Patient Profile Reviewed yes  -     Existing Precautions/Restrictions fall;orthostatic hypotension  -               User Key  (r) = Recorded By, (t) = Taken By, (c) = Cosigned By      Initials Name Provider Type     Tameka Oconnor Occupational Therapist                     Mobility/ADL's       Row Name 07/10/25 0853          Bed Mobility    Comment, (Bed Mobility) pt sitting up in his chair  -[facility] Name 07/10/25 0853          Functional Mobility    Functional Mobility- Ind. Level standby assist  -     Functional Mobility- Device walker, front-wheeled  -     Functional Mobility-Distance (Feet) 50  -     Functional Mobility- Comment stood at toilet to urinate and then walked back to his chair  -      Patient was able to Ambulate yes  -       Row Name 07/10/25 0853          Upper Body Dressing Assessment/Training    [place] Level (Upper Body Dressing) don;pull-over garment;set up  -       Row Name 07/10/25 0853          Lower Body Dressing Assessment/Training    [place] Level (Lower Body Dressing) don;pants/bottoms;contact guard assist  -     Position (Lower Body Dressing) unsupported sitting  -     Comment, (Lower Body Dressing) pt able to adjust his socks independently  -       Row Name 07/10/25 0853          Toileting Assessment/Training    [place] Level (Toileting) independent  -     Comment, (Toileting) stood at toilet to urinate independently  -               User Key  (r) = Recorded By, (t) = Taken By, (c) = Cosigned By      Initials Name Provider Type    Tameka John Occupational Therapist                   Obj/Interventions    No documentation.                  Goals/Plan    No documentation.                  Clinical Impression       Row Name 07/10/25 0853          Pain Assessment    Pretreatment Pain Rating 0/10 - no pain  -     Posttreatment Pain Rating 0/10 - no pain  -[facility] Name 07/10/25 0853          Plan of Care Review    Plan of Care Reviewed With patient  -     Progress improving  -     Outcome Evaluation Pt received sitting up in his chair willing to work with therapy.  Pt able to don pull over shirt with set up, donned his underwear and shorts over his feet independently and was cga when standing to pull up his clothing.  Pt able to manage his socks to pull them up tighter without assistance.  Pt walked using RW to the bathroom ~25', stood to urinate and then walked back to his chair.  Pt did report feeling dizzy standing at the toilet and held onto the grab bars.  Pt reports the dizziness is his issue at home and is what is causing him to fall.  Pts BP was checked in sitting after walking to the bathroom and was 127/97, pt stood and BP was taken  after about 30 seconds of standing 123/65.  Pt turned his head from side to side and up/down without report of dizziness.  Pt may benefit from rehab to improve his balance and dizziness as he is at risk of having further falls.  Pt has multiple bruises on his head, arms and legs where he has suffered falls at home.  Recommend STR to address these issues and improve his safety when he returns home.  Cont OT per POC.  -AH       Row Name 07/10/25 0853          Vital Signs    Intra Systolic BP Rehab 127  -AH     Intra Treatment Diastolic BP 97  -AH     Post Systolic BP Rehab 123  -AH     Post Treatment Diastolic BP 65  -AH       Row Name 07/10/25 0853          Positioning and Restraints    Pre-Treatment Position sitting in chair/recliner  -AH     Post Treatment Position chair  -AH     In Chair reclined;call light within reach;encouraged to call for assist;exit alarm on  -               User Key  (r) = Recorded By, (t) = Taken By, (c) = Cosigned By      Initials Name Provider Type    Tameka John Occupational Therapist                   Outcome Measures       Row Name 07/10/25 0950          How much help from another is currently needed...    Putting on and taking off regular lower body clothing? 3  -AH     Bathing (including washing, rinsing, and drying) 3  -AH     Toileting (which includes using toilet bed pan or urinal) 4  -AH     Putting on and taking off regular upper body clothing 3  -AH     Taking care of personal grooming (such as brushing teeth) 3  -AH     Eating meals 4  -AH     AM-PAC 6 Clicks Score (OT) 20  -AH       Row Name 07/10/25 0500 07/10/25 0300       How much help from another person do you currently need...    Turning from your back to your side while in flat bed without using bedrails? 4  -KP 4  -KP    Moving from lying on back to sitting on the side of a flat bed without bedrails? 4  -KP 4  -KP    Moving to and from a bed to a chair (including a wheelchair)? 4  -KP 4  -KP    Standing up  from a chair using your arms (e.g., wheelchair, bedside chair)? 4  -KP 4  -KP    Climbing 3-5 steps with a railing? 3  -KP 3  -KP    To walk in hospital room? 3  -KP 3  -KP    AM-PAC 6 Clicks Score (PT) 22  -KP 22  -KP    Highest Level of Mobility Goal Walk 25 Feet or More-7  -KP Walk 25 Feet or More-7  -KP      Row Name 07/10/25 0100 07/09/25 2349       How much help from another person do you currently need...    Turning from your back to your side while in flat bed without using bedrails? 4  -KP 4  -KP    Moving from lying on back to sitting on the side of a flat bed without bedrails? 4  -KP 4  -KP    Moving to and from a bed to a chair (including a wheelchair)? 4  -KP 4  -KP    Standing up from a chair using your arms (e.g., wheelchair, bedside chair)? 4  -KP 4  -KP    Climbing 3-5 steps with a railing? 3  -KP 3  -KP    To walk in hospital room? 3  -KP 3  -KP    AM-PAC 6 Clicks Score (PT) 22  -KP 22  -KP    Highest Level of Mobility Goal Walk 25 Feet or More-7  -KP Walk 25 Feet or More-7  -KP      Row Name 07/10/25 0950          Functional Assessment    Outcome Measure Options AM-PAC 6 Clicks Daily Activity (OT)  -               User Key  (r) = Recorded By, (t) = Taken By, (c) = Cosigned By      Initials Name Provider Type     Tameka Oconnor Occupational Therapist     Benita Bejarano, RN Registered Nurse                    Occupational Therapy Education       Title: PT OT SLP Therapies (In Progress)       Topic: Occupational Therapy (In Progress)       Point: ADL training (Done)       Learning Progress Summary            Patient Acceptance, E,TB, VU by  at 7/10/2025 0951    Comment: benefit of working with therapy and safety with transfers and functional mobility tasks.    Acceptance, E, VU by  at 7/7/2025 1324    Comment: Continued POC and discharge recommendations                      Point: Precautions (Done)       Learning Progress Summary            Patient Acceptance, E,TB, VU by  at 7/10/2025  0906    Comment: benefit of working with therapy and safety with transfers and functional mobility tasks.                                      User Key       Initials Effective Dates Name Provider Type Discipline     06/16/21 -  Tameka Oconnor Occupational Therapist OT     02/25/25 -  Vandana Rosas OT Occupational Therapist OT                  OT Recommendation and Plan     Plan of Care Review  Plan of Care Reviewed With: patient  Progress: improving  Outcome Evaluation: Pt received sitting up in his chair willing to work with therapy.  Pt able to don pull over shirt with set up, donned his underwear and shorts over his feet independently and was cga when standing to pull up his clothing.  Pt able to manage his socks to pull them up tighter without assistance.  Pt walked using RW to the bathroom ~25', stood to urinate and then walked back to his chair.  Pt did report feeling dizzy standing at the toilet and held onto the grab bars.  Pt reports the dizziness is his issue at home and is what is causing him to fall.  Pts BP was checked in sitting after walking to the bathroom and was 127/97, pt stood and BP was taken after about 30 seconds of standing 123/65.  Pt turned his head from side to side and up/down without report of dizziness.  Pt may benefit from rehab to improve his balance and dizziness as he is at risk of having further falls.  Pt has multiple bruises on his head, arms and legs where he has suffered falls at home.  Recommend STR to address these issues and improve his safety when he returns home.  Cont OT per POC.     Time Calculation:         Time Calculation- OT       Row Name 07/10/25 0853             Time Calculation- OT    OT Start Time 0853  -      OT Stop Time 0920  -      OT Time Calculation (min) 27 min  -      OT Received On 07/10/25  -      OT Goal Re-Cert Due Date 07/11/25  -         Timed Charges    95935 - OT Therapeutic Activity Minutes 8  -      90083 - OT Self Care/Mgmt  Minutes 19  -AH         Total Minutes    Timed Charges Total Minutes 27  -AH       Total Minutes 27  -AH                User Key  (r) = Recorded By, (t) = Taken By, (c) = Cosigned By      Initials Name Provider Type    Tameka John Occupational Therapist                  Therapy Charges for Today       Code Description Service Date Service Provider Modifiers Qty    [id number]  OT THERAPEUTIC ACT EA 15 MIN 7/10/2025 Tameka Oconnor 1    [id number]  OT SELF CARE/MGMT/TRAIN EA 15 MIN 7/10/2025 Tameka Oconnor 1                 Tameka Oconnor  7/10/2025

## 2025-07-10 NOTE — CASE MANAGEMENT/SOCIAL WORK
Case Management Discharge Note      Final Note: DC Home with wife to transport. Declined walker due to insurance already paid for Rollator (2 years old), [employer] [facility] to follow.         Selected Continued Care - Discharged on 7/10/2025 Admission date: 6/29/2025 - Discharge disposition: Home-Health Care Svc      Destination    No services have been selected for the patient.                Durable Medical Equipment    No services have been selected for the patient.                Dialysis/Infusion    No services have been selected for the patient.                Home Medical Care Coordination complete.      Service Provider Services Address Phone Fax Patient Preferred    [facility] [address] [id number] [phone] --       Internal Comment last updated by Marcelle Villaseñor RN 7/10/2025 1022    [facility] accepted pt per phone.                         Therapy    No services have been selected for the patient.                Community Resources    No services have been selected for the patient.                Community & DME    No services have been selected for the patient.                    Transportation Services  Transportation: Private Transportation  Private: Car    Final Discharge Disposition Code: 06 - home with home health care

## 2025-07-10 NOTE — PROGRESS NOTES
[facility] Progress Note      Patient Name: Jonny Ferrari Jr.  : 1950  MRN: [id number]  Primary Care Physician:  Amaury Brice MD  Date of admission: 2025    Subjective     Interval History:   Clinically stable    No acute events over night    No uremic symptoms    Bp stable    Minimal LE swelling         renal function is near plateaued down to 1.82 but no labs done since 25 awaiting rehab    Review of Systems:   As noted above    Objective     Vitals:   Temp:  [97.3 °F (36.3 °C)-98.3 °F (36.8 °C)] 98.3 °F (36.8 °C)  Heart Rate:  [] 78  Resp:  [14-18] 16  BP: (112-158)/(71-87) 128/71    Intake/Output Summary (Last 24 hours) at 7/10/2025 1134  Last data filed at 7/10/2025 0900  Gross per 24 hour   Intake 1080 ml   Output --   Net 1080 ml       Physical Exam:    General Appearance: alert no acute distress   Skin: warm and dry  HEENT: oral mucosa normal  Neck: supple, no JVD  Lungs: CTA  Heart: RRR, normal S1 and S2  Abdomen: soft, nontender  : no palpable bladder  Extremities: no edema  Neuro: normal speech    Scheduled Meds:     aspirin, 81 mg, Oral, Daily  budesonide, 0.5 mg, Nebulization, BID - RT  busPIRone, 15 mg, Oral, Q12H  clopidogrel, 75 mg, Oral, Daily  insulin glargine, 20 Units, Subcutaneous, Nightly  insulin lispro, 2-9 Units, Subcutaneous, 4x Daily AC & at Bedtime  lactobacillus acidophilus, 1 capsule, Oral, BID  pantoprazole, 40 mg, Oral, Q AM  senna-docusate sodium, 2 tablet, Oral, BID  sodium chloride, 10 mL, Intravenous, Q12H      IV Meds:        Results Reviewed:   I have personally reviewed the results from the time of this admission to 7/10/2025 11:34 EDT     Results from last 7 days   Lab Units 25  0431 25  1009 25  0454   SODIUM mmol/L 135* 137 139   POTASSIUM mmol/L 4.6 4.4 3.7   CHLORIDE mmol/L 101 102 101   CO2 mmol/L 23.8 24.3 27.9   BUN mg/dL 16.0 13.0 16.0   CREATININE mg/dL 1.82* 1.88* 1.95*   CALCIUM mg/dL  9.2 8.8 8.9   GLUCOSE mg/dL 123* 143* 84     Estimated Creatinine Clearance: 44.7 mL/min (A) (by C-G formula based on SCr of 1.82 mg/dL (H)).            Results from last 7 days   Lab Units 07/07/25  0431 07/05/25  1009 07/04/25  0454   WBC 10*3/mm3 6.96  --   --    HEMOGLOBIN g/dL 8.2* 7.7* 8.2*   PLATELETS 10*3/mm3 139*  --   --              Assessment / Plan     ASSESSMENT:  Acute kidney injury: Recovered   chronic kidney disease stage IV: Underlying chronic kidney disease secondary to diabetes and hypertension as well as volume loss, status post left nephrectomy in March of 2020.  Baseline creatinine of 2.9 with a EGFR of 21 mL/min.  Now 1.82  Type 2 diabetes: Longstanding history of type 2 diabetes likely with some complications.  Septic shock: Treated as per hospitalist service still on pressors.  Left lower lobe pneumonia: On IV antibiotics as per hospitalist service.  Urinary tract infection: Treated  Hypokalemia: Replace per protocol  Coronary artery disease: Status post CABG and stent placements.  No acute issues  COPD: Longstanding history of smoking.  Untreated sleep apnea: Longstanding known history of obstructive sleep apnea, has not been able to use his BiPAP        PLAN:   -Encourage oral hydration   Monitor electrolytes-  -Dose medicate per GFR   If he is here tomorrow suggest to do some labs in am   -avoid nephrotoxin  -Aim for MAP greater than 65  - Okay to discharge from nephrology perspective-  -Thank for the consult will closely follow      Alem Maharaj MD  07/10/25  11:34 EDT    [facility]  [phone]

## 2025-07-10 NOTE — PLAN OF CARE
Goal Outcome Evaluation:  Plan of Care Reviewed With: patient        Progress: improving  Outcome Evaluation: Pt received sitting up in his chair willing to work with therapy.  Pt able to don pull over shirt with set up, donned his underwear and shorts over his feet independently and was cga when standing to pull up his clothing.  Pt able to manage his socks to pull them up tighter without assistance.  Pt walked using RW to the bathroom ~25', stood to urinate and then walked back to his chair.  Pt did report feeling dizzy standing at the toilet and held onto the grab bars.  Pt reports the dizziness is his issue at home and is what is causing him to fall.  Pts BP was checked in sitting after walking to the bathroom and was 127/97, pt stood and BP was taken after about 30 seconds of standing 123/65.  Pt turned his head from side to side and up/down without report of dizziness.  Pt may benefit from rehab to improve his balance and dizziness as he is at risk of having further falls.  Pt has multiple bruises on his head, arms and legs where he has suffered falls at home.  Recommend STR to address these issues and improve his safety when he returns home.  Cont OT per POC.

## 2025-07-10 NOTE — CASE MANAGEMENT/SOCIAL WORK
Case Management/Social Work    Patient Name:  Jonny Ferrari Jr.  YOB: 1950  MRN: [id number]  Admit Date:  6/29/2025        10:26 EDT   Discharge Plan       Row Name 07/10/25 1024       Plan    Plan DCP- Updated to Home with Home Health. [facility] accepted.                  1025: CM spoke with [facility] at [facility]. Accepted pt when DC.  CM to follow.      Electronically signed by:  Marcelle Villaseñor RN  07/10/25 10:26 EDT

## 2025-07-11 ENCOUNTER — NURSE TRIAGE (OUTPATIENT)
Dept: CALL CENTER | Facility: HOSPITAL | Age: 75
End: 2025-07-11
Payer: MEDICARE

## 2025-07-11 NOTE — TELEPHONE ENCOUNTER
Reason for Disposition  • Caller has medicine question only, adult not sick, AND triager answers question    Additional Information  • Negative: [1] Intentional drug overdose AND [2] suicidal thoughts or ideas  • Negative: Drug overdose and triager unable to answer question  • Negative: Caller requesting a renewal or refill of a medicine patient is currently taking  • Negative: Caller requesting information unrelated to medicine  • Negative: Caller requesting information about COVID-19 Vaccine  • Negative: Caller requesting information about Emergency Contraception  • Negative: Caller requesting information about Combined Birth Control Pills  • Negative: Caller requesting information about Progestin Birth Control Pills  • Negative: Caller requesting information about Post-Op pain or medicines  • Negative: Caller requesting a prescription antibiotic (such as Penicillin) for Strep throat and has a positive culture result  • Negative: Caller requesting a prescription anti-viral med (such as Tamiflu) and has influenza (flu) symptoms  • Negative: Immunization reaction suspected  • Negative: Rash while taking a medicine or within 3 days of stopping it  • Negative: [1] Asthma and [2] having symptoms of asthma (cough, wheezing, etc.)  • Negative: [1] Symptom of illness (e.g., headache, abdominal pain, earache, vomiting) AND [2] more than mild  • Negative: Breastfeeding questions about mother's medicines and diet  • Negative: MORE THAN A DOUBLE DOSE of a prescription or over-the-counter (OTC) drug  • Negative: [1] DOUBLE DOSE (an extra dose or lesser amount) of prescription drug AND [2] any symptoms (e.g., dizziness, nausea, pain, sleepiness)  • Negative: [1] DOUBLE DOSE (an extra dose or lesser amount) of over-the-counter (OTC) drug AND [2] any symptoms (e.g., dizziness, nausea, pain, sleepiness)  • Negative: Took another person's prescription drug  • Negative: [1] DOUBLE DOSE (an extra dose or lesser amount) of  "prescription drug AND [2] NO symptoms  (Exception: A double dose of antibiotics.)  • Negative: Diabetes drug error or overdose (e.g., took wrong type of insulin or took extra dose)  • Negative: [1] Prescription not at pharmacy AND [2] was prescribed by PCP recently (Exception: Triager has access to EMR and prescription is recorded there. Go to Home Care and confirm for pharmacy.)  • Negative: [1] Pharmacy calling with prescription question AND [2] triager unable to answer question  • Negative: [1] Caller has URGENT medicine question about med that PCP or specialist prescribed AND [2] triager unable to answer question  • Negative: Medicine patch causing local rash or itching  • Negative: [1] Caller has medicine question about med NOT prescribed by PCP AND [2] triager unable to answer question (e.g., compatibility with other med, storage)  • Negative: Prescription request for new medicine (not a refill)  • Negative: [1] Caller has NON-URGENT medicine question about med that PCP prescribed AND [2] triager unable to answer question  • Negative: Caller wants to use a complementary or alternative medicine  • Negative: [1] Prescription prescribed recently is not at pharmacy AND [2] triager has access to patient's EMR AND [3] prescription is recorded in the EMR  • Negative: [1] DOUBLE DOSE (an extra dose or lesser amount) of over-the-counter (OTC) drug AND [2] NO symptoms  • Negative: [1] DOUBLE DOSE (an extra dose or lesser amount) of antibiotic drug AND [2] NO symptoms    Answer Assessment - Initial Assessment Questions  1. NAME of MEDICINE: \"What medicine(s) are you calling about?\"      zoloft  2. QUESTION: \"What is your question?\" (e.g., double dose of medicine, side effect)      What dose is he to take  3. PRESCRIBER: \"Who prescribed the medicine?\" Reason: if prescribed by specialist, call should be referred to that group.      Pcp   4. SYMPTOMS: \"Do you have any symptoms?\" If Yes, ask: \"What symptoms are you having?\"  " "\"How bad are the symptoms (e.g., mild, moderate, severe)      na  5. PREGNANCY:  \"Is there any chance that you are pregnant?\" \"When was your last menstrual period?\"      na    Protocols used: Medication Question Call-ADULT-    "

## 2025-07-11 NOTE — OUTREACH NOTE
Prep Survey      Flowsheet Row Responses   [facility] patient discharged from? [facility]   Is LACE score < 7 ? No   Eligibility Readm Mgmt   Discharge diagnosis Septis shock   Does the patient have one of the following disease processes/diagnoses(primary or secondary)? Sepsis   Does the patient have Home health ordered? Yes   What is the Home health agency?  [facility]   Is there a DME ordered? No   Prep survey completed? Yes            JAZMIN ANTON - Registered Nurse

## 2025-07-11 NOTE — TELEPHONE ENCOUNTER
"Reason for Disposition   Health Information question, no triage required and triager able to answer question    Additional Information   Negative: [1] Caller is not with the adult (patient) AND [2] reporting urgent symptoms   Negative: Lab result questions   Negative: Medication questions   Negative: Caller can't be reached by phone   Negative: Caller has already spoken to PCP or another triager   Negative: RN needs further essential information from caller in order to complete triage   Negative: Requesting regular office appointment   Negative: [1] Caller requesting NON-URGENT health information AND [2] PCP's office is the best resource    Answer Assessment - Initial Assessment Questions  1. REASON FOR CALL or QUESTION: \"What is your reason for calling today?\" or \"How can I best help you?\" or \"What question do you have that I can help answer?\"  Recent hospital stay. Wife is caller asking about medication list per AVS    Advised medications to stop per AVS    Medications to take per AVS page 4-5    Protocols used: Information Only Call - No Triage-ADULT-    "

## 2025-07-11 NOTE — PAYOR COMM NOTE
"To:  [place]  From: Amber Ferrari RN  Phone: [phone]  Fax: [phone]  NPI: [id number]  TIN: [id number]  Member ID: [id number]   MRN: [id number]    Nuzhat Ferrari Jr. (74 y.o. Male)       Date of Birth   1950    Social Security Number       Address   [address] [place] [address]    Home Phone   [phone]    MRN   [id number]       [facility]    Marital Status                               Admission Date   2025    Admission Type   Emergency    Admitting Provider   Silviano Cisneros MD    Attending Provider       Department, Room/Bed   [facility] TELEMETRY 3, 329/1       Discharge Date   7/10/2025    Discharge Disposition   Home-Health Care [facility]    Discharge Destination                                 Attending Provider: (none)   Allergies: Metformin    Isolation: None   Infection: None   Code Status: Prior    Ht: 182.9 cm (72\")   Wt: 98.9 kg (218 lb 0.6 oz)    Admission Cmt: None   Principal Problem: Septic shock [A41.9,R65.21]                   Active Insurance as of 2025       Primary Coverage       Payor Plan Insurance Group Employer/Plan Group    ANTHEM MEDICARE REPLACEMENT ANTH MEDICARE ADVANTAGE HMO [id number]       Payor Plan Address Payor Plan Phone Number Payor Plan Fax Number Effective Dates    [address] [phone]  2025 - None Entered    [facility] [address]         Subscriber Name Subscriber Birth Date Member ID       NUZHAT FERRARI JR. 1950 [id number]                     Emergency Contacts        (Rel.) Home Phone Work Phone Mobile Phone    Lashon Ferrari (Spouse) [phone] -- [phone]    ISISCATE (Relative) -- -- [phone]    NEGRA FERRARI (Son) [phone] -- --                 Discharge Summary        Kuldeep Patel DO at 07/10/25 1219              [facility]   [facility]GE SUMMARY      Name:  Nuzhat Ferrari Jr.   Age:  74 y.o.  Sex:  male  :  1950  MRN:  [id number]   Visit " "Number:  [id number]    Admission Date:  6/29/2025  Date of Discharge:  7/10/2025  Primary Care Physician:  Amaury Brice MD      Discharge Diagnoses:     Septic shock secondary to #2 and #3, POA.  Acute urinary tract infection, POA.  Left lower lobe bacterial pneumonia, unable to classify further, POA.  Demand ischemia with elevated troponins, POA.  Hypokalemia, POA.  Chronic kidney disease stage IV.  Diabetes mellitus type 2 with nephropathy and hyperglycemia, POA.  Coronary artery disease status post CABG and stents.  COPD, no exacerbation.  Benign prostatic hyperplasia.  Will falls with skin abrasions, POA.    Problem List:     Active Hospital Problems    Diagnosis  POA    **Septic shock [A41.9, R65.21]  Yes    Pneumonia, unspecified organism [J18.9]  Yes    Acute UTI (urinary tract infection) [N39.0]  Yes    Pneumonia of left lower lobe due to infectious organism [J18.9]  Yes    Chronic kidney disease, stage IV (severe) [N18.4]  Yes    CAD (coronary artery disease) [I25.10]  Yes      Resolved Hospital Problems   No resolved problems to display.     Presenting Problem:    Chief Complaint   Patient presents with    Weakness - Generalized     Pt called ems for sick, unable to urinate and weakness. Pt was on the toilet with a pressure in the 80's. Pt has multiple skin tears. Pt has 18 ga iv in left ac, 22 ga right bicep by ems.       Consults:     Consulting Physician(s)         Provider   Role Specialty     Jefry Hooper MD, FÁTIMA      Consulting Physician Nephrology          Procedures Performed:        History of presenting illness/Hospital Course:    Per H&P \"Jonny Ferrari Jr. is a 74-year-old male with history of diabetes mellitus type 2, chronic kidney disease stage III, COPD, coronary artery disease status post CABG and stent, ERASMO, BPH was brought to the emergency room by EMS with symptoms of generalized weakness and fatigue   In the emergency room, patient was afebrile but mildly tachycardic in the " "100 with initial blood pressure of 80/54 and pulse oxygen saturation of 95% on room air.  Patient was given 30 mL/kg fluid bolus initially in the emergency room with initial improvement of the blood pressure to systolic 100 but subsequently dropped it again to the 70s systolic.  Patient was given another 500 bolus of normal saline, he right femoral central line was placed and he was initiated on Levophed. \"     Patient was able to be weaned off steroids.  He completed antibiotics for UTI after discussion with pharmacy.  Has been seen by nephrology and kidney function is back to baseline.  Did have some drop in hemoglobin, there was concern for possible hematuria, this has resolved however.  Did receive 1 unit of PRBCs.    Patient worked with PT/OT.  Pursue short-term rehab.  However, patient declined.  Instead, patient discharged home with home health services and close outpatient follow-up.      DME: Patient has a mobility limitation that significantly impairs their ability to participate in one or more mobility- related activities of daily living. The patient is able to safely use the walker. The functional mobility deficit can be sufficiently resolved by use of a walker.       Vital Signs:    Temp:  [97.3 °F (36.3 °C)-98.3 °F (36.8 °C)] 97.6 °F (36.4 °C)  Heart Rate:  [] 74  Resp:  [14-18] 16  BP: (112-158)/(71-87) 121/71    Physical Exam:    General Appearance:  Alert and cooperative. NAD   Head:  Atraumatic and normocephalic.   Eyes: Conjunctivae and sclerae normal, no icterus. No pallor.   Throat: No oral lesions, no thrush, oral mucosa moist.   Neck: Supple, trachea midline, no thyromegaly.   Lungs:   Breath sounds heard bilaterally equally.  No wheezing or crackles.    Heart:  Normal S1 and S2, no murmur, No JVD.   Abdomen:   Normal bowel sounds, Soft, nontender, nondistended, no rebound tenderness.   Extremities: Supple, no edema, no cyanosis, no clubbing.   Skin: No bleeding or rash.   Neurologic: " Alert and oriented x 3. No facial asymmetry. Generalized weakness.      Pertinent Lab Results:     Results from last 7 days   Lab Units 07/07/25  0431 07/05/25  1009 07/04/25  0454   SODIUM mmol/L 135* 137 139   POTASSIUM mmol/L 4.6 4.4 3.7   CHLORIDE mmol/L 101 102 101   CO2 mmol/L 23.8 24.3 27.9   BUN mg/dL 16.0 13.0 16.0   CREATININE mg/dL 1.82* 1.88* 1.95*   CALCIUM mg/dL 9.2 8.8 8.9   GLUCOSE mg/dL 123* 143* 84     Results from last 7 days   Lab Units 07/07/25  0431 07/05/25  1009 07/04/25  0454   WBC 10*3/mm3 6.96  --   --    HEMOGLOBIN g/dL 8.2* 7.7* 8.2*   HEMATOCRIT % 26.2* 24.8* 26.4*   PLATELETS 10*3/mm3 139*  --   --                                    Pertinent Radiology Results:    Imaging Results (All)       Procedure Component Value Units Date/Time    XR Chest 1 View [[id number]] Collected: 06/30/25 0941     Updated: 06/30/25 0950    Narrative:      PROCEDURE: XR CHEST 1 VW-     HISTORY: generalized weakness, abnormal chest radiograph. Hypertension.     COMPARISON: 10/06/2022     FINDINGS:  Portable view of the chest demonstrates the lungs to be  grossly clear. There is no evidence of effusion, pneumothorax or other  significant pleural disease. Patient is status post CABG. Mediastinum is  otherwise unremarkable.     The heart size is normal.       Impression:      Unremarkable portable chest.            This report was signed and finalized on 6/30/2025 9:48 AM by Frankie Ewing MD.       CT Abdomen Pelvis Without Contrast [[id number]] Collected: 06/29/25 2311     Updated: 06/29/25 2312    Narrative:      FINAL REPORT    TECHNIQUE:  null    CLINICAL HISTORY:  sepsis    COMPARISON:  null    FINDINGS:  CT abdomen and pelvis without contrast    Comparison: 08/29/2024    Findings:    Mild degenerative change spine and hips.    No acute bony abnormalities.    Liver and spleen within normal limits.    Pancreas and adrenal glands unremarkable.    Cholecystectomy.    No right renal stone or  hydronephrosis.    No focal renal abnormality or ureteral dilation.    Status post left nephrectomy.    No evidence for aortic aneurysm.    No free fluid or adenopathy in the pelvis.    No diverticulitis. Appendix unremarkable.      Impression:      Impression:    No acute processes    Authenticated and Electronically Signed by Anthony De La Paz MD on  06/29/2025 11:11:15 PM    CT Chest Without Contrast Diagnostic [[id number]] Collected: 06/29/25 2307     Updated: 06/29/25 2308    Narrative:      FINAL REPORT    TECHNIQUE:  null    CLINICAL HISTORY:  sepsis w/ hypoxemia    COMPARISON:  null    FINDINGS:  CT chest without contrast    Comparison: None provided    Findings:    Bilateral posterior lower lobe atelectasis.    Trace bilateral pleural effusions noted.    Emphysema without other parenchymal abnormality.    Cardiomegaly with coronary artery calcifications.    Prior sternotomy for CABG.    Benign partially calcified mediastinal nodes.    No significant mediastinal adenopathy.    No significant focal bony abnormalities.      Impression:      Impression:    Trace pleural effusions with lower lobe atelectasis    Authenticated and Electronically Signed by Anthony De La Paz MD on  06/29/2025 11:07:32 PM            Echo:    Results for orders placed during the hospital encounter of 05/27/21    Adult Transthoracic Echo Complete W/ Cont if Necessary Per Protocol 05/29/2021 12:29 AM    Interpretation Summary  · This is a technically limited study due to limited acoustic windows.  · There is biatrial enlargement.  · Global and segmental LV wall motion is normal. The calculated LV ejection fraction is 59%.  · The aortic and mitral valves are structurally and functionally normal.  · Mild tricuspid regurgitation regurgitation is present.  · There are no other important findings on this study.    Condition on Discharge:      Stable.    Code status during the hospital stay:    Code Status and Medical Interventions: CPR (Attempt  to Resuscitate); Full Support   Ordered at: 06/29/25 2348     Code Status (Patient has no pulse and is not breathing):    CPR (Attempt to Resuscitate)     Medical Interventions (Patient has pulse or is breathing):    Full Support     Discharge Disposition:    Home-Health Care [facility]    Discharge Medications:       Discharge Medications        Continue These Medications        Instructions Start Date   acetaminophen 325 MG tablet  Commonly known as: TYLENOL   650 mg, Every 6 Hours PRN      alfuzosin 10 MG 24 hr tablet  Commonly known as: UROXATRAL   10 mg, Daily      allopurinol 100 MG tablet  Commonly known as: ZYLOPRIM   2 tablets, Daily      aspirin 81 MG EC tablet   1 tablet, Daily      buprenorphine-naloxone 8-2 MG per SL tablet  Commonly known as: SUBOXONE   Place 1 tablet under the tongue 2 (Two) Times a Day.      busPIRone 15 MG tablet  Commonly known as: BUSPAR   15 mg, 2 times daily      calcitriol 0.25 MCG capsule  Commonly known as: ROCALTROL   0.5 mcg, Daily      clopidogrel 75 MG tablet  Commonly known as: PLAVIX   75 mg, Oral, Daily      dutasteride 0.5 MG capsule  Commonly known as: AVODART   0.5 mg, Daily      ezetimibe 10 MG tablet  Commonly known as: ZETIA   10 mg, Daily      Farxiga 10 MG tablet  Generic drug: dapagliflozin Propanediol   10 mg, Daily      Lantus SoloStar 100 UNIT/ML injection pen  Generic drug: Insulin Glargine   Inject 20 Units under the skin into the appropriate area as directed 4 (Four) Times a Day After Meals & at Bedtime.      linagliptin 5 MG tablet tablet  Commonly known as: TRADJENTA   5 mg, Oral, Daily      nitroglycerin 0.4 MG SL tablet  Commonly known as: NITROSTAT   0.4 mg, Sublingual, Every 5 Minutes PRN, Take no more than 3 doses in 15 minutes.      omeprazole 20 MG capsule  Commonly known as: priLOSEC   20 mg, 2 Times Daily      sertraline 100 MG tablet  Commonly known as: ZOLOFT   100 mg, Daily      tamsulosin 0.4 MG capsule 24 hr capsule  Commonly known as: FLOMAX    1 capsule, Nightly      True Metrix Blood Glucose Test test strip  Generic drug: glucose blood   See Admin Instructions             Stop These Medications      metoprolol tartrate 25 MG tablet  Commonly known as: LOPRESSOR     midodrine 5 MG tablet  Commonly known as: PROAMATINE     potassium chloride ER 20 MEQ tablet controlled-release ER tablet  Commonly known as: K-TAB     Spiriva HandiHaler 18 MCG per inhalation capsule  Generic drug: tiotropium     Symbicort 160-4.5 MCG/ACT inhaler  Generic drug: budesonide-formoterol     torsemide 100 MG tablet  Commonly known as: DEMADEX     Vitamin D 50 MCG (2000 UT) tablet            Discharge Diet:     Diet Instructions       Diet: Diabetic Diets, Cardiac Diets; Healthy Heart (2-3 Na+); Regular (IDDSI 7); Thin (IDDSI 0); Consistent Carbohydrate      Discharge Diet:  Diabetic Diets  Cardiac Diets       Cardiac Diet: Healthy Heart (2-3 Na+)    Texture: Regular (IDDSI 7)    Fluid Consistency: Thin (IDDSI 0)    Diabetic Diet: Consistent Carbohydrate          Activity at Discharge:     Activity Instructions       Activity as Tolerated            Follow-up Appointments:    Additional Instructions for the Follow-ups that You Need to Schedule       Ambulatory Referral to Home Health   As directed      Face to Face Visit Date: 7/10/2025   Follow-up provider for Plan of Care?: I treated the patient in an acute care facility and will not continue treatment after discharge.   Follow-up provider: TISHA MARTELL [[id number]]   Reason/Clinical Findings: debility, copd, ckd   Describe mobility limitations that make leaving home difficult: debility, copd, ckd   Nursing/Therapeutic Services Requested: Physical Therapy Skilled Nursing Occupational Therapy   Skilled nursing orders: CHF management Cardiopulmonary assessments   PT orders: Total joint pathway Strengthening Therapeutic exercise Electrical stimulation Ultrasound Gait Training Transfer training Home safety assessment   Weight  Bearing Status: As Tolerated   Occupational orders: Activities of daily living Home safety assessment Energy conservation Strengthening Cognition Fine motor   Frequency: 1 Week 1        Discharge Follow-up with PCP   As directed       Currently Documented PCP:    Amaury Brice MD    PCP Phone Number:    [phone]     Follow Up Details: 1 week               Contact information for follow-up providers       Amaury Brice MD .    Specialty: Family Medicine  Why: 1 week  Contact information:  [address]  [facility] [address]  [phone]                       Contact information for after-discharge care       Home Medical Care       [facility] .    Service: Home Rehabilitation  Contact information:  [address] [facility] [address]  [phone]                                 Future Appointments   Date Time Provider Department Center   6/23/2026  1:15 PM Ernst Smith MD [facility]     Test Results Pending at Discharge:    Pending Results       Inna Patel DO  07/10/25  12:19 EDT    Time: I spent >30 minutes on this discharge activity which included: face-to-face encounter with the patient, reviewing the data in the system, coordination of the care with the nursing staff as well as consultants, documentation, and entering orders.     Dictated utilizing Dragon dictation.        Electronically signed by Kuldeep Patel DO at 07/10/25 9290

## 2025-07-16 ENCOUNTER — READMISSION MANAGEMENT (OUTPATIENT)
Dept: CALL CENTER | Facility: HOSPITAL | Age: 75
End: 2025-07-16
Payer: MEDICARE

## 2025-07-16 LAB
GLUCOSE BLDC GLUCOMTR-MCNC: 80 MG/DL (ref 70–130)
Lab: NORMAL

## 2025-07-16 NOTE — OUTREACH NOTE
Sepsis Week 1 Survey      Flowsheet Row Responses   [facility] facility patient discharged from? [facility]   Does the patient have one of the following disease processes/diagnoses(primary or secondary)? Sepsis   Week 1 attempt successful? No   Call start time 0954   Unsuccessful attempts Attempt 1   Discharge diagnosis Septis shock            Sophia S - Registered Nurse

## 2025-07-24 ENCOUNTER — READMISSION MANAGEMENT (OUTPATIENT)
Dept: CALL CENTER | Facility: HOSPITAL | Age: 75
End: 2025-07-24
Payer: MEDICARE

## 2025-07-24 NOTE — OUTREACH NOTE
Sepsis Week 2 Survey      Flowsheet Row Responses   [facility] patient discharged from? [facility]   Does the patient have one of the following disease processes/diagnoses(primary or secondary)? Sepsis   Week 2 attempt successful? Yes   Call start time 1516   Call end time 1526   Discharge diagnosis Septis shock   Person spoke with today (if not patient) and relationship Spouse   Meds reviewed with patient/caregiver? Yes   Is the patient having any side effects they believe may be caused by any medication additions or changes? No   Does the patient have all medications related to this admission filled (includes all antibiotics, inhalers, nebulizers,steroids,etc.) N/A   Is the patient taking all medications as directed (includes completed medication regime)? Yes   Does the patient have a primary care provider?  Yes   Does the patient have an appointment with their PCP within 7 days of discharge? Yes   Has the patient kept scheduled appointments due by today? Yes   What is the Home health agency?  [facility]   Has home health visited the patient within 72 hours of discharge? Yes   DME comments pt uses a walker or a wheelchair, home oxygen wears prn   Psychosocial issues? No   What is the patient's perception of their health status since discharge? Improving   Nursing interventions Nurse provided patient education   Is the patient/caregiver able to teach back TIME? T emperature - higher or lower than normal, I nfection - may have signs and symptoms of an infection, M ental Decline - confused, sleepy, difficult to arouse, E xtremely Ill - severe pain, discomfort, shortness of breath   Nursing interventions Nurse provided patient education   Is patient/caregiver able to teach back steps to recovery at home? Set small, achievable goals for return to baseline health   Is the patient/caregiver able to teach back signs and symptoms of worsening condition: Altered mental status(confusion/coma), Shortness of breath/rapid  respiratory rate, Fever, Hyperthermia, Edema   Is the patient/caregiver able to teach back the hierarchy of who to call/visit for symptoms/problems? PCP, Specialist, Home health nurse, Urgent Care, ED, 911 Yes   Week 2 call completed? Yes   Is the patient interested in additional calls from an ambulatory ? No   Would this patient benefit from a Referral to [facility] Social Work? No   Call end time 1526            Linette MORAN - Registered Nurse

## (undated) DEVICE — CATH DIAG EXPO .045 FL3  5F 100CM

## (undated) DEVICE — GW INQWIRE FC PTFE STD J/1.5 .035 260

## (undated) DEVICE — LUBE JELLY PK/2.75GM STRL BX/144

## (undated) DEVICE — SYR LUER SLPTP 50ML

## (undated) DEVICE — CONMED SCOPE SAVER BITE BLOCK, 20X27 MM: Brand: SCOPE SAVER

## (undated) DEVICE — INTRO SHEATH PRELUDE IDEAL SPRNG COIL 021 6F 23X80CM

## (undated) DEVICE — NC TREK CORONARY DILATATION CATHETER 3.0 MM X 20 MM / RAPID-EXCHANGE: Brand: NC TREK

## (undated) DEVICE — GW PT 2 MS .014 185CM STR TP

## (undated) DEVICE — ESOPHAGEAL BALLOON DILATATION CATHETER: Brand: CRE FIXED WIRE

## (undated) DEVICE — COUNT NDL FOAM STRIP W/MAG 60CT

## (undated) DEVICE — DEV INFL ALLIANCE2 SYS

## (undated) DEVICE — SINGLE-USE POLYPECTOMY SNARE: Brand: CAPTIVATOR II

## (undated) DEVICE — MODEL AT P65, P/N 701554-001KIT CONTENTS: HAND CONTROLLER, 3-WAY HIGH-PRESSURE STOPCOCK WITH ROTATING END AND PREMIUM HIGH-PRESSURE TUBING: Brand: ANGIOTOUCH® KIT

## (undated) DEVICE — TUBING, SUCTION, 1/4" X 12', STRAIGHT: Brand: MEDLINE

## (undated) DEVICE — HYBRID TUBING/CAP SET FOR OLYMPUS® SCOPES: Brand: ERBE

## (undated) DEVICE — GW LUGE .014 300CM

## (undated) DEVICE — DEV INFL MONARCH 25W

## (undated) DEVICE — CATH DIAG EXPO M/ PK 5F FL4/FR4 PIG

## (undated) DEVICE — DEV COMP RAD PRELUDESYNC 24CM

## (undated) DEVICE — INTENDED FOR TISSUE SEPARATION, AND OTHER PROCEDURES THAT REQUIRE A SHARP SURGICAL BLADE TO PUNCTURE OR CUT.: Brand: BARD-PARKER ® CARBON RIB-BACK BLADES

## (undated) DEVICE — GUIDE CATHETER: Brand: MACH1™

## (undated) DEVICE — GW LUGE .014 182 CM

## (undated) DEVICE — ENDOSCOPY PORT CONNECTOR FOR OLYMPUS® SCOPES: Brand: ERBE

## (undated) DEVICE — PK CATH CARD 10

## (undated) DEVICE — QUICK CATCH IN-LINE SUCTION POLYP TRAP IS USED FOR SUCTION RETRIEVAL OF ENDOSCOPICALLY REMOVED POLYPS.

## (undated) DEVICE — MINI TREK CORONARY DILATATION CATHETER 2.0 MM X 20 MM / RAPID-EXCHANGE: Brand: MINI TREK

## (undated) DEVICE — MEDI-VAC NON-CONDUCTIVE SUCTION TUBING: Brand: CARDINAL HEALTH

## (undated) DEVICE — DEV COMP RAD PRELUDESYNC 29CM

## (undated) DEVICE — IRRIGATOR BULB ASEPTO 60CC STRL

## (undated) DEVICE — SPNG LAP 18X18IN LF STRL PK/5

## (undated) DEVICE — FRCP BX RADJAW4 NDL 2.8 240 STD OG

## (undated) DEVICE — VLV SXN AIR/H2O ORCAPOD3 1P/U STRL

## (undated) DEVICE — SUCTION CANISTER, 1500CC, RIGID: Brand: DEROYAL

## (undated) DEVICE — SPNG GZ WOVN 4X4IN 12PLY 10/BX STRL

## (undated) DEVICE — Device

## (undated) DEVICE — YANKAUER,BULB TIP,W/O VENT,RIGID,STERILE: Brand: MEDLINE

## (undated) DEVICE — CATH LITHO INTRAVASC CORNRY 6F 138CM 3X12MM

## (undated) DEVICE — MODEL BT2000 P/N 700287-012KIT CONTENTS: MANIFOLD WITH SALINE AND CONTRAST PORTS, SALINE TUBING WITH SPIKE AND HAND SYRINGE, TRANSDUCER: Brand: BT2000 AUTOMATED MANIFOLD KIT

## (undated) DEVICE — GOWN,PREVENTION PLUS,XLARGE,STERILE: Brand: MEDLINE

## (undated) DEVICE — CATH DIAG IMPULSE IMT 5F 100CM

## (undated) DEVICE — MINI TREK CORONARY DILATATION CATHETER 2.0 MM X 15 MM / RAPID-EXCHANGE: Brand: MINI TREK

## (undated) DEVICE — GLV SURG SENSICARE W/ALOE PF LF 8.5 STRL

## (undated) DEVICE — EMBOLIC PROTECTION SYSTEM: Brand: FILTERWIRE EZ™

## (undated) DEVICE — KT MANIFOLD CATHLAB CUST